# Patient Record
Sex: MALE | Race: WHITE | NOT HISPANIC OR LATINO | Employment: OTHER | URBAN - METROPOLITAN AREA
[De-identification: names, ages, dates, MRNs, and addresses within clinical notes are randomized per-mention and may not be internally consistent; named-entity substitution may affect disease eponyms.]

---

## 2017-01-13 ENCOUNTER — HOSPITAL ENCOUNTER (OUTPATIENT)
Dept: LAB | Facility: HOSPITAL | Age: 67
Discharge: HOME | End: 2017-01-13
Attending: FAMILY MEDICINE | Admitting: FAMILY MEDICINE

## 2017-01-13 LAB
ANION GAP SERPL CALC-SCNC: 12.9 MMOL/L (ref 10–20)
BUN SERPL-MCNC: 40 MG/DL (ref 8–20)
BUN/CREAT SERPL: 26.7 (ref 6.2–20.3)
CALCIUM SERPL-MCNC: 9.2 MG/DL (ref 8.9–10.3)
CHLORIDE SERPL-SCNC: 109 MMOL/L (ref 101–111)
CONV CO2: 19 MMOL/L (ref 22–32)
CREAT UR-MCNC: 1.5 MG/DL (ref 0.7–1.2)
GLUCOSE SERPL-MCNC: 214 MG/DL (ref 65–99)
POTASSIUM SERPL-SCNC: 5.9 MMOL/L (ref 3.6–5.1)
SODIUM SERPL-SCNC: 135 MMOL/L (ref 136–144)

## 2017-01-18 ENCOUNTER — HOSPITAL ENCOUNTER (OUTPATIENT)
Dept: LAB | Facility: HOSPITAL | Age: 67
Discharge: HOME | End: 2017-01-18
Attending: FAMILY MEDICINE | Admitting: FAMILY MEDICINE

## 2017-01-18 LAB
ANION GAP SERPL CALC-SCNC: 9.4 MMOL/L (ref 10–20)
BUN SERPL-MCNC: 32 MG/DL (ref 8–20)
BUN/CREAT SERPL: 21.3 (ref 6.2–20.3)
CALCIUM SERPL-MCNC: 9.5 MG/DL (ref 8.9–10.3)
CHLORIDE SERPL-SCNC: 111 MMOL/L (ref 101–111)
CONV CO2: 23 MMOL/L (ref 22–32)
CREAT UR-MCNC: 1.5 MG/DL (ref 0.7–1.2)
GLUCOSE SERPL-MCNC: 119 MG/DL (ref 65–99)
POTASSIUM SERPL-SCNC: 5.4 MMOL/L (ref 3.6–5.1)
SODIUM SERPL-SCNC: 138 MMOL/L (ref 136–144)

## 2017-11-30 ENCOUNTER — HOSPITAL ENCOUNTER (OUTPATIENT)
Dept: ULTRASOUND IMAGING | Facility: HOSPITAL | Age: 67
Discharge: HOME | End: 2017-11-30
Attending: INTERNAL MEDICINE | Admitting: INTERNAL MEDICINE

## 2017-11-30 LAB
ALBUMIN SERPL-MCNC: 4 G/DL (ref 3.5–4.8)
ALBUMIN/GLOB SERPL: 1.4 {RATIO} (ref 1–1.7)
ALP SERPL-CCNC: 49 IU/L (ref 32–91)
ALT SERPL-CCNC: 12 IU/L (ref 17–63)
ANION GAP SERPL CALC-SCNC: 8.3 MMOL/L (ref 10–20)
AST SERPL-CCNC: 15 IU/L (ref 15–41)
BACTERIA SPEC AEROBE CULT: NORMAL
BILIRUB SERPL-MCNC: 0.2 MG/DL (ref 0.3–1.2)
BILIRUB UR QL STRIP: NEGATIVE MG/DL
BUN SERPL-MCNC: 35 MG/DL (ref 8–20)
BUN/CREAT SERPL: 20.6 (ref 6.2–20.3)
CALCIUM SERPL-MCNC: 9.6 MG/DL (ref 8.9–10.3)
CASTS URNS QL MICRO: ABNORMAL /[LPF]
CHLORIDE SERPL-SCNC: 110 MMOL/L (ref 101–111)
CK SERPL-CCNC: 59 IU/L (ref 49–397)
COLOR UR: YELLOW
CONV BACTERIA IN URINE MICRO: ABNORMAL
CONV CLARITY OF URINE: ABNORMAL
CONV CO2: 23 MMOL/L (ref 22–32)
CONV HYALINE CASTS IN URINE MICRO: 4 /[LPF] (ref 0–5)
CONV PROTEIN IN URINE BY AUTOMATED TEST STRIP: NEGATIVE MG/DL
CONV SMALL ROUND CELLS: ABNORMAL /[HPF]
CONV TOTAL PROTEIN: 6.9 G/DL (ref 6.1–7.9)
CONV UROBILINOGEN IN URINE BY AUTOMATED TEST STRIP: 0.2 MG/DL
CREAT 24H UR-MCNC: 72.7 MG/DL
CREAT UR-MCNC: 1.7 MG/DL (ref 0.7–1.2)
CULTURE INDICATED?: ABNORMAL
ERYTHROCYTE [DISTWIDTH] IN BLOOD BY AUTOMATED COUNT: 12.5 % (ref 11.5–14.5)
GLOBULIN UR ELPH-MCNC: 2.9 G/DL (ref 2.5–3.8)
GLUCOSE SERPL-MCNC: 135 MG/DL (ref 65–99)
GLUCOSE UR QL: NEGATIVE MG/DL
HCT VFR BLD AUTO: 37.4 % (ref 40–54)
HGB BLD-MCNC: 12.5 G/DL (ref 14–18)
HGB UR QL STRIP: NEGATIVE
IRON SERPL-MCNC: 49 UG/DL (ref 45–182)
KETONES UR QL STRIP: NEGATIVE MG/DL
LEUKOCYTE ESTERASE UR QL STRIP: ABNORMAL
Lab: NORMAL
MCH RBC QN AUTO: 30.9 PG (ref 26–32)
MCHC RBC AUTO-ENTMCNC: 33.5 G/DL (ref 32–36)
MCV RBC AUTO: 92.3 FL (ref 80–94)
MICRO REPORT STATUS: NORMAL
NITRITE UR QL STRIP: POSITIVE
PH UR STRIP.AUTO: 8.5 [PH] (ref 4.5–8)
PLATELET # BLD AUTO: 202 10*3/UL (ref 150–450)
PMV BLD AUTO: 7.1 FL (ref 7.4–10.4)
POTASSIUM SERPL-SCNC: 5.3 MMOL/L (ref 3.6–5.1)
PROT UR-MCNC: 23 MG/DL
PROT/CREAT UR: 0.3 MG/MG (ref 0–22)
RBC # BLD AUTO: 4.06 10*6/UL (ref 4.6–6)
RBC #/AREA URNS HPF: 9 /[HPF] (ref 0–3)
SODIUM SERPL-SCNC: 136 MMOL/L (ref 136–144)
SP GR UR: 1.01 (ref 1–1.03)
SPECIMEN SOURCE: NORMAL
SPERM URNS QL MICRO: ABNORMAL /[HPF]
SQUAMOUS SPT QL MICRO: 5 /[HPF] (ref 0–5)
TSH SERPL-ACNC: 2.16 UIU/ML (ref 0.34–5.6)
UNIDENT CRYS URNS QL MICRO: ABNORMAL /[HPF]
URATE SERPL-MCNC: 5.9 MG/DL (ref 4.8–8.7)
WBC # BLD AUTO: 4.4 10*3/UL (ref 4.5–11.5)
WBC #/AREA URNS HPF: 8 /[HPF] (ref 0–5)
YEAST SPEC QL WET PREP: ABNORMAL /[HPF]

## 2017-12-01 LAB
ANA SER QL IA: ABNORMAL
INTERPRETATION UR IFE-IMP: NORMAL
PTH-INTACT SERPL-MCNC: 32 PG/ML (ref 11–72)

## 2017-12-04 LAB — PROT PATTERN SERPL IFE-IMP: NORMAL

## 2019-01-17 ENCOUNTER — HOSPITAL ENCOUNTER (OUTPATIENT)
Dept: LAB | Facility: HOSPITAL | Age: 69
Discharge: HOME | End: 2019-01-17
Attending: INTERNAL MEDICINE | Admitting: INTERNAL MEDICINE

## 2019-01-17 LAB
ANION GAP SERPL CALC-SCNC: 12.6 MMOL/L (ref 10–20)
AZTREONAM SUSC ISLT: ABNORMAL
BACTERIA ISLT: ABNORMAL
BACTERIA SPEC AEROBE CULT: ABNORMAL
BILIRUB UR QL STRIP: NEGATIVE MG/DL
BUN SERPL-MCNC: 42 MG/DL (ref 8–20)
BUN/CREAT SERPL: 24.7 (ref 6.2–20.3)
CALCIUM SERPL-MCNC: 9.2 MG/DL (ref 8.9–10.3)
CASTS URNS QL MICRO: ABNORMAL /[LPF]
CEFEPIME SUSC ISLT: ABNORMAL
CEFTRIAXONE SUSC ISLT: ABNORMAL
CHLORIDE SERPL-SCNC: 109 MMOL/L (ref 101–111)
CIPROFLOXACIN SUSC ISLT: ABNORMAL
COLONY COUNT: ABNORMAL
COLOR UR: YELLOW
CONV BACTERIA IN URINE MICRO: ABNORMAL
CONV CLARITY OF URINE: ABNORMAL
CONV CO2: 20 MMOL/L (ref 22–32)
CONV HYALINE CASTS IN URINE MICRO: 4 /[LPF] (ref 0–5)
CONV PROTEIN IN URINE BY AUTOMATED TEST STRIP: 100 MG/DL
CONV SMALL ROUND CELLS: ABNORMAL /[HPF]
CONV UROBILINOGEN IN URINE BY AUTOMATED TEST STRIP: 0.2 MG/DL
CREAT 24H UR-MCNC: 76.5 MG/DL
CREAT UR-MCNC: 1.7 MG/DL (ref 0.7–1.2)
CULTURE INDICATED?: ABNORMAL
ERYTHROCYTE [DISTWIDTH] IN BLOOD BY AUTOMATED COUNT: 12.9 % (ref 11.5–14.5)
GLUCOSE SERPL-MCNC: 85 MG/DL (ref 65–99)
GLUCOSE UR QL: NEGATIVE MG/DL
HCT VFR BLD AUTO: 38.2 % (ref 40–54)
HGB BLD-MCNC: 12.5 G/DL (ref 14–18)
HGB UR QL STRIP: ABNORMAL
IRON SERPL-MCNC: 37 UG/DL (ref 45–182)
KETONES UR QL STRIP: NEGATIVE MG/DL
LEUKOCYTE ESTERASE UR QL STRIP: ABNORMAL
LEVOFLOXACIN SUSC ISLT: ABNORMAL
Lab: ABNORMAL
MCH RBC QN AUTO: 31.5 PG (ref 26–32)
MCHC RBC AUTO-ENTMCNC: 32.9 G/DL (ref 32–36)
MCV RBC AUTO: 96 FL (ref 80–94)
MEROPENEM SUSC ISLT: ABNORMAL
MICRO REPORT STATUS: ABNORMAL
NITRITE UR QL STRIP: POSITIVE
PH UR STRIP.AUTO: 8.5 [PH] (ref 4.5–8)
PIP+TAZO SUSC ISLT: ABNORMAL
PLATELET # BLD AUTO: 203 10*3/UL (ref 150–450)
PMV BLD AUTO: 7.2 FL (ref 7.4–10.4)
POTASSIUM SERPL-SCNC: 5.6 MMOL/L (ref 3.6–5.1)
PROT UR-MCNC: 78 MG/DL
PROT/CREAT UR: 1 MG/MG (ref 0–22)
RBC # BLD AUTO: 3.98 10*6/UL (ref 4.6–6)
RBC #/AREA URNS HPF: NEGATIVE /[HPF] (ref 0–3)
SODIUM SERPL-SCNC: 136 MMOL/L (ref 136–144)
SP GR UR: 1.01 (ref 1–1.03)
SPECIMEN SOURCE: ABNORMAL
SPERM URNS QL MICRO: ABNORMAL /[HPF]
SQUAMOUS SPT QL MICRO: 7 /[HPF] (ref 0–5)
SUSC METH SPEC: ABNORMAL
TIGECYCLINE ISLT MIC: ABNORMAL
TOBRAMYCIN SUSC ISLT: ABNORMAL
TRIMETHOPRIM/SULFA: ABNORMAL
UNIDENT CRYS URNS QL MICRO: ABNORMAL /[HPF]
WBC # BLD AUTO: 5.9 10*3/UL (ref 4.5–11.5)
WBC #/AREA URNS HPF: 8 /[HPF] (ref 0–5)
YEAST SPEC QL WET PREP: ABNORMAL /[HPF]

## 2019-01-31 ENCOUNTER — HOSPITAL ENCOUNTER (OUTPATIENT)
Dept: LAB | Facility: HOSPITAL | Age: 69
Discharge: HOME | End: 2019-01-31
Attending: INTERNAL MEDICINE | Admitting: INTERNAL MEDICINE

## 2019-01-31 LAB
ANION GAP SERPL CALC-SCNC: 12.1 MMOL/L (ref 10–20)
AZTREONAM SUSC ISLT: NORMAL
BACTERIA ISLT: NORMAL
BACTERIA SPEC AEROBE CULT: NORMAL
BILIRUB UR QL STRIP: NEGATIVE MG/DL
BUN SERPL-MCNC: 26 MG/DL (ref 8–20)
BUN/CREAT SERPL: 16.3 (ref 6.2–20.3)
CALCIUM SERPL-MCNC: 8.8 MG/DL (ref 8.9–10.3)
CASTS URNS QL MICRO: ABNORMAL /[LPF]
CEFEPIME SUSC ISLT: NORMAL
CEFTRIAXONE SUSC ISLT: NORMAL
CHLORIDE SERPL-SCNC: 105 MMOL/L (ref 101–111)
CIPROFLOXACIN SUSC ISLT: NORMAL
COLONY COUNT: NORMAL
COLOR UR: YELLOW
CONV BACTERIA IN URINE MICRO: ABNORMAL
CONV CLARITY OF URINE: ABNORMAL
CONV CO2: 26 MMOL/L (ref 22–32)
CONV HYALINE CASTS IN URINE MICRO: ABNORMAL /[LPF] (ref 0–5)
CONV PROTEIN IN URINE BY AUTOMATED TEST STRIP: ABNORMAL MG/DL
CONV SMALL ROUND CELLS: ABNORMAL /[HPF]
CONV UROBILINOGEN IN URINE BY AUTOMATED TEST STRIP: 1 MG/DL
CREAT UR-MCNC: 1.6 MG/DL (ref 0.7–1.2)
CULTURE INDICATED?: ABNORMAL
GLUCOSE SERPL-MCNC: 94 MG/DL (ref 65–99)
GLUCOSE UR QL: NEGATIVE MG/DL
HGB UR QL STRIP: ABNORMAL
KETONES UR QL STRIP: NEGATIVE MG/DL
LEUKOCYTE ESTERASE UR QL STRIP: ABNORMAL
LEVOFLOXACIN SUSC ISLT: NORMAL
Lab: NORMAL
MEROPENEM SUSC ISLT: NORMAL
MICRO REPORT STATUS: NORMAL
NITRITE UR QL STRIP: POSITIVE
PH UR STRIP.AUTO: 8.5 [PH] (ref 4.5–8)
PIP+TAZO SUSC ISLT: NORMAL
POTASSIUM SERPL-SCNC: 5.1 MMOL/L (ref 3.6–5.1)
RBC #/AREA URNS HPF: 4 /[HPF] (ref 0–3)
SODIUM SERPL-SCNC: 138 MMOL/L (ref 136–144)
SP GR UR: 1.01 (ref 1–1.03)
SPECIMEN SOURCE: NORMAL
SPERM URNS QL MICRO: ABNORMAL /[HPF]
SQUAMOUS SPT QL MICRO: 20 /[HPF] (ref 0–5)
SUSC METH SPEC: NORMAL
TETRACYCLINE SUSC ISLT: NORMAL
TOBRAMYCIN SUSC ISLT: NORMAL
TRIMETHOPRIM/SULFA: NORMAL
UNIDENT CRYS URNS QL MICRO: ABNORMAL /[HPF]
WBC #/AREA URNS HPF: 8 /[HPF] (ref 0–5)
YEAST SPEC QL WET PREP: ABNORMAL /[HPF]

## 2019-05-16 ENCOUNTER — HOSPITAL ENCOUNTER (OUTPATIENT)
Dept: LAB | Facility: HOSPITAL | Age: 69
Discharge: HOME | End: 2019-05-16
Attending: INTERNAL MEDICINE | Admitting: INTERNAL MEDICINE

## 2019-05-16 LAB
25(OH)D3 SERPL-MCNC: 23 NG/ML (ref 30–100)
ANION GAP SERPL CALC-SCNC: 14.2 MMOL/L (ref 10–20)
BACTERIA SPEC AEROBE CULT: NORMAL
BASOPHILS # BLD AUTO: 0.1 10*3/UL (ref 0–0.2)
BASOPHILS NFR BLD AUTO: 1 % (ref 0–2)
BILIRUB UR QL STRIP: NEGATIVE MG/DL
BUN SERPL-MCNC: 43 MG/DL (ref 8–20)
BUN/CREAT SERPL: 23.9 (ref 6.2–20.3)
CALCIUM SERPL-MCNC: 9.4 MG/DL (ref 8.9–10.3)
CASTS URNS QL MICRO: ABNORMAL /[LPF]
CHLORIDE SERPL-SCNC: 104 MMOL/L (ref 101–111)
COLOR UR: YELLOW
CONV BACTERIA IN URINE MICRO: NEGATIVE
CONV CLARITY OF URINE: ABNORMAL
CONV CO2: 23 MMOL/L (ref 22–32)
CONV HYALINE CASTS IN URINE MICRO: ABNORMAL /[LPF] (ref 0–5)
CONV PROTEIN IN URINE BY AUTOMATED TEST STRIP: 30 MG/DL
CONV SMALL ROUND CELLS: ABNORMAL /[HPF]
CONV UROBILINOGEN IN URINE BY AUTOMATED TEST STRIP: 0.2 MG/DL
CREAT 24H UR-MCNC: 81.4 MG/DL
CREAT UR-MCNC: 1.8 MG/DL (ref 0.7–1.2)
CULTURE INDICATED?: ABNORMAL
CULTURE INDICATED?: ABNORMAL
DIFFERENTIAL METHOD BLD: (no result)
EOSINOPHIL # BLD AUTO: 0.1 10*3/UL (ref 0–0.3)
EOSINOPHIL # BLD AUTO: 2 % (ref 0–3)
ERYTHROCYTE [DISTWIDTH] IN BLOOD BY AUTOMATED COUNT: 12.4 % (ref 11.5–14.5)
GLUCOSE SERPL-MCNC: 95 MG/DL (ref 65–99)
GLUCOSE UR QL: NEGATIVE MG/DL
HBA1C MFR BLD: 5.7 % (ref 0–5.6)
HCT VFR BLD AUTO: 35.2 % (ref 40–54)
HGB BLD-MCNC: 11.6 G/DL (ref 14–18)
HGB UR QL STRIP: ABNORMAL
KETONES UR QL STRIP: NEGATIVE MG/DL
LEUKOCYTE ESTERASE UR QL STRIP: ABNORMAL
LYMPHOCYTES # BLD AUTO: 0.7 10*3/UL (ref 0.8–4.8)
LYMPHOCYTES NFR BLD AUTO: 16 % (ref 18–42)
Lab: NORMAL
MCH RBC QN AUTO: 30.6 PG (ref 26–32)
MCHC RBC AUTO-ENTMCNC: 33.1 G/DL (ref 32–36)
MCV RBC AUTO: 92.6 FL (ref 80–94)
MICRO REPORT STATUS: NORMAL
MONOCYTES # BLD AUTO: 0.5 10*3/UL (ref 0.1–1.3)
MONOCYTES NFR BLD AUTO: 11 % (ref 2–11)
NEUTROPHILS # BLD AUTO: 3.2 10*3/UL (ref 2.3–8.6)
NEUTROPHILS NFR BLD AUTO: 70 % (ref 50–75)
NITRITE UR QL STRIP: NEGATIVE
NRBC BLD AUTO-RTO: 0 /100{WBCS}
NRBC/RBC NFR BLD MANUAL: 0 10*3/UL
PH UR STRIP.AUTO: 7 [PH] (ref 4.5–8)
PHOSPHATE SERPL-MCNC: 3.4 MG/DL (ref 2.4–4.7)
PLATELET # BLD AUTO: 256 10*3/UL (ref 150–450)
PMV BLD AUTO: 6.9 FL (ref 7.4–10.4)
POTASSIUM SERPL-SCNC: 5.2 MMOL/L (ref 3.6–5.1)
PROT UR-MCNC: 40 MG/DL
PROT/CREAT UR: 0.5 MG/MG (ref 0–22)
RBC # BLD AUTO: 3.8 10*6/UL (ref 4.6–6)
RBC #/AREA URNS HPF: ABNORMAL /[HPF] (ref 0–3)
SODIUM SERPL-SCNC: 136 MMOL/L (ref 136–144)
SP GR UR: 1.01 (ref 1–1.03)
SPECIMEN SOURCE: NORMAL
SPERM URNS QL MICRO: ABNORMAL /[HPF]
SQUAMOUS SPT QL MICRO: 0 /[HPF] (ref 0–5)
UNIDENT CRYS URNS QL MICRO: ABNORMAL /[HPF]
WBC # BLD AUTO: 4.6 10*3/UL (ref 4.5–11.5)
WBC #/AREA URNS HPF: 47 /[HPF] (ref 0–5)
YEAST SPEC QL WET PREP: ABNORMAL /[HPF]

## 2019-05-17 LAB — PTH-INTACT SERPL-MCNC: 65 PG/ML (ref 11–72)

## 2019-10-17 ENCOUNTER — HOSPITAL ENCOUNTER (EMERGENCY)
Dept: CARDIOLOGY | Facility: HOSPITAL | Age: 69
Discharge: HOME | End: 2019-10-17

## 2019-10-17 ENCOUNTER — HOSPITAL ENCOUNTER (OUTPATIENT)
Facility: HOSPITAL | Age: 69
Setting detail: OBSERVATION
Discharge: HOME | End: 2019-10-18
Attending: FAMILY MEDICINE | Admitting: HOSPITALIST

## 2019-10-17 DIAGNOSIS — I82.412 DVT FEMORAL (DEEP VENOUS THROMBOSIS) WITH THROMBOPHLEBITIS, LEFT (HCC): Primary | ICD-10-CM

## 2019-10-17 LAB
ALBUMIN SERPL-MCNC: 4.5 G/DL (ref 3.5–5.2)
ALBUMIN/GLOB SERPL: 1.5 G/DL
ALP SERPL-CCNC: 71 U/L (ref 39–117)
ALT SERPL W P-5'-P-CCNC: 9 U/L (ref 1–41)
ANION GAP SERPL CALCULATED.3IONS-SCNC: 9 MMOL/L (ref 5–15)
AST SERPL-CCNC: 15 U/L (ref 1–40)
BASOPHILS # BLD AUTO: 0.1 10*3/MM3 (ref 0–0.2)
BASOPHILS NFR BLD AUTO: 1 % (ref 0–1.5)
BH CV LOW VAS LEFT COMMON FEMORAL SPONT: 1
BH CV LOW VAS LEFT DISTAL FEMORAL SPONT: 1
BH CV LOW VAS LEFT MID FEMORAL SPONT: 1
BH CV LOW VAS LEFT POPLITEAL SPONT: 1
BH CV LOW VAS LEFT PROXIMAL FEMORAL SPONT: 1
BH CV LOWER VASCULAR LEFT COMMON FEMORAL AUGMENT: NORMAL
BH CV LOWER VASCULAR LEFT COMMON FEMORAL COMPETENT: NORMAL
BH CV LOWER VASCULAR LEFT COMMON FEMORAL COMPRESS: NORMAL
BH CV LOWER VASCULAR LEFT COMMON FEMORAL PHASIC: NORMAL
BH CV LOWER VASCULAR LEFT COMMON FEMORAL SPONT: NORMAL
BH CV LOWER VASCULAR LEFT COMMON FEMORAL THROMBUS: NORMAL
BH CV LOWER VASCULAR LEFT DISTAL FEMORAL AUGMENT: NORMAL
BH CV LOWER VASCULAR LEFT DISTAL FEMORAL COMPETENT: NORMAL
BH CV LOWER VASCULAR LEFT DISTAL FEMORAL COMPRESS: NORMAL
BH CV LOWER VASCULAR LEFT DISTAL FEMORAL PHASIC: NORMAL
BH CV LOWER VASCULAR LEFT DISTAL FEMORAL SPONT: NORMAL
BH CV LOWER VASCULAR LEFT DISTAL FEMORAL THROMBUS: NORMAL
BH CV LOWER VASCULAR LEFT GASTRONEMIUS COMPRESS: NORMAL
BH CV LOWER VASCULAR LEFT GREATER SAPH AK COMPRESS: NORMAL
BH CV LOWER VASCULAR LEFT GREATER SAPH BK COMPRESS: NORMAL
BH CV LOWER VASCULAR LEFT LESSER SAPH COMPRESS: NORMAL
BH CV LOWER VASCULAR LEFT MID FEMORAL AUGMENT: NORMAL
BH CV LOWER VASCULAR LEFT MID FEMORAL COMPETENT: NORMAL
BH CV LOWER VASCULAR LEFT MID FEMORAL COMPRESS: NORMAL
BH CV LOWER VASCULAR LEFT MID FEMORAL PHASIC: NORMAL
BH CV LOWER VASCULAR LEFT MID FEMORAL SPONT: NORMAL
BH CV LOWER VASCULAR LEFT PERONEAL COMPRESS: NORMAL
BH CV LOWER VASCULAR LEFT POPLITEAL AUGMENT: NORMAL
BH CV LOWER VASCULAR LEFT POPLITEAL COMPETENT: NORMAL
BH CV LOWER VASCULAR LEFT POPLITEAL COMPRESS: NORMAL
BH CV LOWER VASCULAR LEFT POPLITEAL PHASIC: NORMAL
BH CV LOWER VASCULAR LEFT POPLITEAL SPONT: NORMAL
BH CV LOWER VASCULAR LEFT POPLITEAL THROMBUS: NORMAL
BH CV LOWER VASCULAR LEFT POSTERIOR TIBIAL COMPRESS: NORMAL
BH CV LOWER VASCULAR LEFT PROXIMAL FEMORAL AUGMENT: NORMAL
BH CV LOWER VASCULAR LEFT PROXIMAL FEMORAL COMPETENT: NORMAL
BH CV LOWER VASCULAR LEFT PROXIMAL FEMORAL COMPRESS: NORMAL
BH CV LOWER VASCULAR LEFT PROXIMAL FEMORAL PHASIC: NORMAL
BH CV LOWER VASCULAR LEFT PROXIMAL FEMORAL SPONT: NORMAL
BH CV LOWER VASCULAR LEFT PROXIMAL FEMORAL THROMBUS: NORMAL
BH CV LOWER VASCULAR LEFT SAPHENOFEMORAL JUNCTION AUGMENT: NORMAL
BH CV LOWER VASCULAR LEFT SAPHENOFEMORAL JUNCTION COMPETENT: NORMAL
BH CV LOWER VASCULAR LEFT SAPHENOFEMORAL JUNCTION COMPRESS: NORMAL
BH CV LOWER VASCULAR LEFT SAPHENOFEMORAL JUNCTION PHASIC: NORMAL
BH CV LOWER VASCULAR LEFT SAPHENOFEMORAL JUNCTION SPONT: NORMAL
BH CV LOWER VASCULAR RIGHT COMMON FEMORAL AUGMENT: NORMAL
BH CV LOWER VASCULAR RIGHT COMMON FEMORAL COMPETENT: NORMAL
BH CV LOWER VASCULAR RIGHT COMMON FEMORAL COMPRESS: NORMAL
BH CV LOWER VASCULAR RIGHT COMMON FEMORAL PHASIC: NORMAL
BH CV LOWER VASCULAR RIGHT COMMON FEMORAL SPONT: NORMAL
BILIRUB SERPL-MCNC: 0.3 MG/DL (ref 0.2–1.2)
BUN BLD-MCNC: 30 MG/DL (ref 8–23)
BUN/CREAT SERPL: 18.2 (ref 7–25)
CALCIUM SPEC-SCNC: 9.4 MG/DL (ref 8.6–10.5)
CHLORIDE SERPL-SCNC: 107 MMOL/L (ref 98–107)
CO2 SERPL-SCNC: 22 MMOL/L (ref 22–29)
CREAT BLD-MCNC: 1.65 MG/DL (ref 0.76–1.27)
DEPRECATED RDW RBC AUTO: 44.2 FL (ref 37–54)
EOSINOPHIL # BLD AUTO: 0.1 10*3/MM3 (ref 0–0.4)
EOSINOPHIL NFR BLD AUTO: 1.9 % (ref 0.3–6.2)
ERYTHROCYTE [DISTWIDTH] IN BLOOD BY AUTOMATED COUNT: 12.9 % (ref 12.3–15.4)
GFR SERPL CREATININE-BSD FRML MDRD: 42 ML/MIN/1.73
GLOBULIN UR ELPH-MCNC: 3 GM/DL
GLUCOSE BLD-MCNC: 95 MG/DL (ref 65–99)
GLUCOSE BLDC GLUCOMTR-MCNC: 196 MG/DL (ref 70–105)
HCT VFR BLD AUTO: 37.4 % (ref 37.5–51)
HGB BLD-MCNC: 13.2 G/DL (ref 13–17.7)
INR PPP: 1.03 (ref 0.9–1.1)
LYMPHOCYTES # BLD AUTO: 0.8 10*3/MM3 (ref 0.7–3.1)
LYMPHOCYTES NFR BLD AUTO: 13.1 % (ref 19.6–45.3)
MCH RBC QN AUTO: 32.1 PG (ref 26.6–33)
MCHC RBC AUTO-ENTMCNC: 35.2 G/DL (ref 31.5–35.7)
MCV RBC AUTO: 91.1 FL (ref 79–97)
MONOCYTES # BLD AUTO: 0.6 10*3/MM3 (ref 0.1–0.9)
MONOCYTES NFR BLD AUTO: 9.6 % (ref 5–12)
NEUTROPHILS # BLD AUTO: 4.4 10*3/MM3 (ref 1.7–7)
NEUTROPHILS NFR BLD AUTO: 74.4 % (ref 42.7–76)
NRBC BLD AUTO-RTO: 0.1 /100 WBC (ref 0–0.2)
PLATELET # BLD AUTO: 203 10*3/MM3 (ref 140–450)
PMV BLD AUTO: 7.7 FL (ref 6–12)
POTASSIUM BLD-SCNC: 5.4 MMOL/L (ref 3.5–5.2)
PROT SERPL-MCNC: 7.5 G/DL (ref 6–8.5)
PROTHROMBIN TIME: 10.7 SECONDS (ref 9.6–11.7)
RBC # BLD AUTO: 4.1 10*6/MM3 (ref 4.14–5.8)
SODIUM BLD-SCNC: 138 MMOL/L (ref 136–145)
WBC NRBC COR # BLD: 5.9 10*3/MM3 (ref 3.4–10.8)

## 2019-10-17 PROCEDURE — 85025 COMPLETE CBC W/AUTO DIFF WBC: CPT | Performed by: NURSE PRACTITIONER

## 2019-10-17 PROCEDURE — 99220 PR INITIAL OBSERVATION CARE/DAY 70 MINUTES: CPT | Performed by: INTERNAL MEDICINE

## 2019-10-17 PROCEDURE — G0378 HOSPITAL OBSERVATION PER HR: HCPCS

## 2019-10-17 PROCEDURE — 99283 EMERGENCY DEPT VISIT LOW MDM: CPT

## 2019-10-17 PROCEDURE — 93971 EXTREMITY STUDY: CPT

## 2019-10-17 PROCEDURE — 85610 PROTHROMBIN TIME: CPT | Performed by: NURSE PRACTITIONER

## 2019-10-17 PROCEDURE — 80053 COMPREHEN METABOLIC PANEL: CPT | Performed by: NURSE PRACTITIONER

## 2019-10-17 PROCEDURE — 96372 THER/PROPH/DIAG INJ SC/IM: CPT

## 2019-10-17 PROCEDURE — 82962 GLUCOSE BLOOD TEST: CPT

## 2019-10-17 PROCEDURE — 25010000002 ENOXAPARIN PER 10 MG: Performed by: NURSE PRACTITIONER

## 2019-10-17 RX ORDER — SODIUM BICARBONATE 650 MG/1
650 TABLET ORAL 2 TIMES DAILY
COMMUNITY

## 2019-10-17 RX ORDER — ONDANSETRON 2 MG/ML
4 INJECTION INTRAMUSCULAR; INTRAVENOUS EVERY 6 HOURS PRN
Status: DISCONTINUED | OUTPATIENT
Start: 2019-10-17 | End: 2019-10-18 | Stop reason: HOSPADM

## 2019-10-17 RX ORDER — ONDANSETRON 4 MG/1
4 TABLET, FILM COATED ORAL EVERY 6 HOURS PRN
Status: DISCONTINUED | OUTPATIENT
Start: 2019-10-17 | End: 2019-10-18 | Stop reason: HOSPADM

## 2019-10-17 RX ORDER — GABAPENTIN 300 MG/1
300 CAPSULE ORAL 3 TIMES DAILY
Status: ON HOLD | COMMUNITY
End: 2022-01-07

## 2019-10-17 RX ORDER — ACETAMINOPHEN 650 MG/1
650 SUPPOSITORY RECTAL EVERY 4 HOURS PRN
Status: DISCONTINUED | OUTPATIENT
Start: 2019-10-17 | End: 2019-10-18 | Stop reason: HOSPADM

## 2019-10-17 RX ORDER — ACETAMINOPHEN 160 MG/5ML
650 SOLUTION ORAL EVERY 4 HOURS PRN
Status: DISCONTINUED | OUTPATIENT
Start: 2019-10-17 | End: 2019-10-18 | Stop reason: HOSPADM

## 2019-10-17 RX ORDER — SODIUM CHLORIDE 0.9 % (FLUSH) 0.9 %
10 SYRINGE (ML) INJECTION EVERY 12 HOURS SCHEDULED
Status: DISCONTINUED | OUTPATIENT
Start: 2019-10-17 | End: 2019-10-18 | Stop reason: HOSPADM

## 2019-10-17 RX ORDER — SODIUM CHLORIDE 0.9 % (FLUSH) 0.9 %
10 SYRINGE (ML) INJECTION AS NEEDED
Status: DISCONTINUED | OUTPATIENT
Start: 2019-10-17 | End: 2019-10-18 | Stop reason: HOSPADM

## 2019-10-17 RX ORDER — AMLODIPINE BESYLATE 10 MG/1
10 TABLET ORAL NIGHTLY
COMMUNITY

## 2019-10-17 RX ORDER — ACETAMINOPHEN 325 MG/1
650 TABLET ORAL EVERY 4 HOURS PRN
Status: DISCONTINUED | OUTPATIENT
Start: 2019-10-17 | End: 2019-10-18 | Stop reason: HOSPADM

## 2019-10-17 RX ADMIN — ENOXAPARIN SODIUM 80 MG: 80 INJECTION SUBCUTANEOUS at 20:10

## 2019-10-17 RX ADMIN — Medication 10 ML: at 22:35

## 2019-10-17 NOTE — ED NOTES
Left leg red and swollen. Drove to [place] and back this past weekend.      Dacia Dixon, RN  10/17/19 1221

## 2019-10-17 NOTE — ED PROVIDER NOTES
Subjective   69-year-old  male with history of auditory disability, resents to the emergency room with complaints of left lower leg and knee pain for the past 2 days.  He states that he traveled over the weekend 4 hours to [place] and [facility] and did a lot of walking.  He states Tuesday his left lower leg started to swell and had very little pain he states yesterday that it hurt to walk and today it increased in pain and swelling.  Patient denies chest pain or shortness of breath.  Patient denies fever or cough.            Review of Systems   Constitutional: Negative for fever.   HENT: Negative.    Respiratory: Negative for cough, chest tightness, shortness of breath, wheezing and stridor.    Cardiovascular: Negative for chest pain.   Gastrointestinal: Negative.    Musculoskeletal: Positive for gait problem, joint swelling and myalgias.   Skin: Positive for color change and rash. Negative for pallor.   Hematological: Negative.        Past Medical History:   Diagnosis Date   • Deaf    • Diabetes mellitus (CMS/HCC)    • Renal disorder        No Known Allergies    Past Surgical History:   Procedure Laterality Date   • BLADDER SURGERY     • GASTRECTOMY         History reviewed. No pertinent family history.    Social History     Socioeconomic History   • Marital status:      Spouse name: Not on file   • Number of children: Not on file   • Years of education: Not on file   • Highest education level: Not on file   Tobacco Use   • Smoking status: Never Smoker   Substance and Sexual Activity   • Alcohol use: No     Frequency: Never           Objective   Physical Exam   Constitutional: He appears well-developed and well-nourished.   HENT:   Head: Normocephalic and atraumatic.   Eyes: Conjunctivae and EOM are normal. Pupils are equal, round, and reactive to light.   Cardiovascular: Normal rate and regular rhythm.   Pulmonary/Chest: Effort normal and breath sounds normal. No stridor. No respiratory distress. He  has no wheezes. He has no rales. He exhibits no tenderness.   Musculoskeletal: He exhibits edema.        Left lower leg: He exhibits tenderness, swelling and edema. He exhibits no bony tenderness and no laceration.        Legs:      Procedures           ED Course  ED Course as of Oct 17 1959   Thu Oct 17, 2019   1956 Patient was seen and evaluated.  He was seen with the assistance of .  He presents with swelling to left leg.  Started earlier this week.  He reports no chest pain or shortness of breath.  Feel examination he has significant edema of left leg.  He is neurovascular intact.  Ultrasound reveals extensive acute DVT.  [SP]      ED Course User Index  [SP] Juanito Reddy MD      Labs Reviewed   COMPREHENSIVE METABOLIC PANEL   CBC WITH AUTO DIFFERENTIAL   PROTIME-INR   CBC AND DIFFERENTIAL    Narrative:     The following orders were created for panel order CBC & Differential.  Procedure                               Abnormality         Status                     ---------                               -----------         ------                     CBC Auto Differential[[id number]]                                                         Please view results for these tests on the individual orders.     Medications   sodium chloride 0.9 % flush 10 mL (not administered)   enoxaparin (LOVENOX) syringe 80 mg (not administered)     No radiology results for the last day - Vascular verbal reports = + DVT common femoral and fem/popliteal vein.    Upon evaluation, US for LLE venous doppler ordered.  Results called and given verbally.  PIV established and labwork obtained.  Lovenox 1mg/kg ordered.  Order placed for admission to hospital for further evaluation and care.      Dr Reddy examined patient prior to admission.    MDM  Number of Diagnoses or Management Options  DVT femoral (deep venous thrombosis) with thrombophlebitis, left (CMS/[facility]):       Final diagnoses:   DVT femoral (deep venous thrombosis) with  thrombophlebitis, left (CMS/HCC)              Kassy Jacobs, APRN  10/17/19 2007

## 2019-10-18 VITALS
TEMPERATURE: 98.2 F | WEIGHT: 173.72 LBS | BODY MASS INDEX: 23.53 KG/M2 | DIASTOLIC BLOOD PRESSURE: 78 MMHG | OXYGEN SATURATION: 98 % | SYSTOLIC BLOOD PRESSURE: 152 MMHG | RESPIRATION RATE: 16 BRPM | HEIGHT: 72 IN | HEART RATE: 65 BPM

## 2019-10-18 LAB
ANION GAP SERPL CALCULATED.3IONS-SCNC: 10 MMOL/L (ref 5–15)
BASOPHILS # BLD AUTO: 0 10*3/MM3 (ref 0–0.2)
BASOPHILS NFR BLD AUTO: 1.1 % (ref 0–1.5)
BUN BLD-MCNC: 33 MG/DL (ref 8–23)
BUN/CREAT SERPL: 19.3 (ref 7–25)
CALCIUM SPEC-SCNC: 9.1 MG/DL (ref 8.6–10.5)
CHLORIDE SERPL-SCNC: 107 MMOL/L (ref 98–107)
CO2 SERPL-SCNC: 22 MMOL/L (ref 22–29)
CREAT BLD-MCNC: 1.71 MG/DL (ref 0.76–1.27)
DEPRECATED RDW RBC AUTO: 42.4 FL (ref 37–54)
EOSINOPHIL # BLD AUTO: 0.1 10*3/MM3 (ref 0–0.4)
EOSINOPHIL NFR BLD AUTO: 2.8 % (ref 0.3–6.2)
ERYTHROCYTE [DISTWIDTH] IN BLOOD BY AUTOMATED COUNT: 13 % (ref 12.3–15.4)
GFR SERPL CREATININE-BSD FRML MDRD: 40 ML/MIN/1.73
GLUCOSE BLD-MCNC: 97 MG/DL (ref 65–99)
GLUCOSE BLDC GLUCOMTR-MCNC: 128 MG/DL (ref 70–105)
GLUCOSE BLDC GLUCOMTR-MCNC: 136 MG/DL (ref 70–105)
GLUCOSE BLDC GLUCOMTR-MCNC: 83 MG/DL (ref 70–105)
HBA1C MFR BLD: 5.6 % (ref 3.5–5.6)
HCT VFR BLD AUTO: 35.8 % (ref 37.5–51)
HGB BLD-MCNC: 12.1 G/DL (ref 13–17.7)
LYMPHOCYTES # BLD AUTO: 1 10*3/MM3 (ref 0.7–3.1)
LYMPHOCYTES NFR BLD AUTO: 23.5 % (ref 19.6–45.3)
MCH RBC QN AUTO: 31.4 PG (ref 26.6–33)
MCHC RBC AUTO-ENTMCNC: 33.9 G/DL (ref 31.5–35.7)
MCV RBC AUTO: 92.8 FL (ref 79–97)
MONOCYTES # BLD AUTO: 0.5 10*3/MM3 (ref 0.1–0.9)
MONOCYTES NFR BLD AUTO: 12.8 % (ref 5–12)
NEUTROPHILS # BLD AUTO: 2.5 10*3/MM3 (ref 1.7–7)
NEUTROPHILS NFR BLD AUTO: 59.8 % (ref 42.7–76)
NRBC BLD AUTO-RTO: 0.1 /100 WBC (ref 0–0.2)
PLATELET # BLD AUTO: 197 10*3/MM3 (ref 140–450)
PMV BLD AUTO: 7.1 FL (ref 6–12)
POTASSIUM BLD-SCNC: 4.8 MMOL/L (ref 3.5–5.2)
RBC # BLD AUTO: 3.86 10*6/MM3 (ref 4.14–5.8)
SODIUM BLD-SCNC: 139 MMOL/L (ref 136–145)
WBC NRBC COR # BLD: 4.2 10*3/MM3 (ref 3.4–10.8)

## 2019-10-18 PROCEDURE — 99217 PR OBSERVATION CARE DISCHARGE MANAGEMENT: CPT | Performed by: HOSPITALIST

## 2019-10-18 PROCEDURE — 80048 BASIC METABOLIC PNL TOTAL CA: CPT | Performed by: NURSE PRACTITIONER

## 2019-10-18 PROCEDURE — 82962 GLUCOSE BLOOD TEST: CPT

## 2019-10-18 PROCEDURE — G0378 HOSPITAL OBSERVATION PER HR: HCPCS

## 2019-10-18 PROCEDURE — 83036 HEMOGLOBIN GLYCOSYLATED A1C: CPT | Performed by: NURSE PRACTITIONER

## 2019-10-18 PROCEDURE — 85025 COMPLETE CBC W/AUTO DIFF WBC: CPT | Performed by: NURSE PRACTITIONER

## 2019-10-18 PROCEDURE — 25010000002 ENOXAPARIN PER 10 MG: Performed by: NURSE PRACTITIONER

## 2019-10-18 PROCEDURE — 96372 THER/PROPH/DIAG INJ SC/IM: CPT

## 2019-10-18 RX ORDER — DEXTROSE MONOHYDRATE 25 G/50ML
25 INJECTION, SOLUTION INTRAVENOUS
Status: DISCONTINUED | OUTPATIENT
Start: 2019-10-18 | End: 2019-10-18 | Stop reason: HOSPADM

## 2019-10-18 RX ORDER — AMLODIPINE BESYLATE 5 MG/1
10 TABLET ORAL DAILY
Status: DISCONTINUED | OUTPATIENT
Start: 2019-10-18 | End: 2019-10-18 | Stop reason: HOSPADM

## 2019-10-18 RX ORDER — GABAPENTIN 300 MG/1
900 CAPSULE ORAL 2 TIMES DAILY
Status: DISCONTINUED | OUTPATIENT
Start: 2019-10-18 | End: 2019-10-18 | Stop reason: HOSPADM

## 2019-10-18 RX ORDER — NICOTINE POLACRILEX 4 MG
15 LOZENGE BUCCAL
Status: DISCONTINUED | OUTPATIENT
Start: 2019-10-18 | End: 2019-10-18 | Stop reason: HOSPADM

## 2019-10-18 RX ORDER — SODIUM BICARBONATE 650 MG/1
650 TABLET ORAL 3 TIMES DAILY
Status: DISCONTINUED | OUTPATIENT
Start: 2019-10-18 | End: 2019-10-18

## 2019-10-18 RX ORDER — SODIUM BICARBONATE 650 MG/1
650 TABLET ORAL 3 TIMES DAILY
Status: DISCONTINUED | OUTPATIENT
Start: 2019-10-18 | End: 2019-10-18 | Stop reason: HOSPADM

## 2019-10-18 RX ADMIN — ENOXAPARIN SODIUM 80 MG: 80 INJECTION SUBCUTANEOUS at 08:57

## 2019-10-18 RX ADMIN — GABAPENTIN 900 MG: 300 CAPSULE ORAL at 08:58

## 2019-10-18 RX ADMIN — SODIUM BICARBONATE 650 MG TABLET 650 MG: at 08:58

## 2019-10-18 RX ADMIN — SODIUM BICARBONATE 650 MG TABLET 650 MG: at 01:05

## 2019-10-18 RX ADMIN — GABAPENTIN 900 MG: 300 CAPSULE ORAL at 01:05

## 2019-10-18 RX ADMIN — Medication 10 ML: at 08:58

## 2019-10-18 RX ADMIN — AMLODIPINE BESYLATE 10 MG: 5 TABLET ORAL at 01:05

## 2019-10-18 NOTE — PROGRESS NOTES
Discharge Planning Assessment   Obi     Patient Name: Michael Dorantes  MRN: [id number]  Today's Date: 10/18/2019    Admit Date: 10/17/2019    Discharge Needs Assessment     Row Name 10/18/19 1247       Living Environment    Lives With  spouse    Current Living Arrangements  home/apartment/condo    Primary Care Provided by  self    Provides Primary Care For  no one    Family Caregiver if Needed  spouse    Quality of Family Relationships  helpful;involved;supportive    Able to Return to Prior Arrangements  yes       Resource/Environmental Concerns    Resource/Environmental Concerns  none    Transportation Concerns  car, none       Transition Planning    Patient/Family Anticipates Transition to  home with family    Patient/Family Anticipated Services at Transition  none    Transportation Anticipated  family or friend will provide;car, drives self       Discharge Needs Assessment    Readmission Within the Last 30 Days  no previous admission in last 30 days    Concerns to be Addressed  no discharge needs identified;denies needs/concerns at this time    Equipment Currently Used at Home  none    Anticipated Changes Related to Illness  none    Equipment Needed After Discharge  none        Discharge Plan     Row Name 10/18/19 1248       Plan    Plan  Anticipate routine home.     Patient/Family in Agreement with Plan  yes    Plan Comments  Met with patient and family member at bedside who report no anticipated needs at discharge. Spoke with Dr. Sherwood who reports patient will be a new start on Eliquis at discharge. Spoke with Felix, pharmacist at [facility], initial 7 days on Eliquis will be $79.25 then $111.44 for 30 day prescriptions, these costs are through patient's insurance. Cost of Eliquis with [facility] would be over $400/month. Cost of Xarelto would be essentially the same, no added savings. Delivered information to patient who is agreeable to $111, gave patient free 30 day card with instructions to bring with him to the  pharmacy. Discharge pending MD rounds.              Expected Discharge Date and Time     Expected Discharge Date Expected Discharge Time    Oct 18, 2019         Demographic Summary     Row Name 10/18/19 1246       General Information    Admission Type  observation    Arrived From  home    Required Notices Provided  Observation Status Notice    Referral Source  admission list    Reason for Consult  discharge planning    Preferred Language  other (see comments) ASL     Used During This Interaction  yes Family        Functional Status     Row Name 10/18/19 1247       Functional Status    Usual Activity Tolerance  good    Current Activity Tolerance  good       Functional Status, IADL    Medications  independent    Meal Preparation  independent    Housekeeping  independent    Laundry  independent    Shopping  independent       Mental Status    General Appearance WDL  WDL       Mental Status Summary    Recent Changes in Mental Status/Cognitive Functioning  no changes        Yaima King  [phone]

## 2019-10-18 NOTE — H&P
[facility] Medicine Services            Primary Care Provider:  Joey Islas MD    Patient Care Team:  Joey Islas MD as PCP - General (Family Medicine)    CHIEF COMPLAINT:     Chief Complaint   Patient presents with   • Leg Swelling       Pain and swelling to E    HISTORY OF PRESENT ILLNESS:    69-year-old male presents to the ER with a chief complaint of pain and swelling to his left lower extremity which is worsened over the last 2 days.  The patient had recent car ride to [place] and walked around a lot while he was down there with return on Tuesday.  The pain is exacerbated by walking and relieved with rest and elevation.  The patient denies history of DVT.  He denies recent subjective fever or chills.  He has had no chest pain or shortness of breath.    The patient is deaf with family  at bedside.          Past Medical History:   Diagnosis Date   • Deaf    • Diabetes mellitus (CMS/HCC)    • Renal disorder        Past Surgical History:   Procedure Laterality Date   • BLADDER SURGERY     • GASTRECTOMY         History reviewed. No pertinent family history.    Social History     Tobacco Use   • Smoking status: Never Smoker   Substance Use Topics   • Alcohol use: No     Frequency: Never   • Drug use: Not on file         (Not in a hospital admission)    Allergies:  Patient has no known allergies.      There is no immunization history on file for this patient.        REVIEW OF SYSTEMS:     Review of Systems   Constitution: Negative for chills and fever.   Cardiovascular: Negative for chest pain.   Respiratory: Negative for shortness of breath.    Skin:        Erythema to the left lower extremity   Musculoskeletal:        Left lower extremity pain and swelling   Gastrointestinal: Negative for abdominal pain.   All other systems reviewed and are negative.      Vital Signs  Temp:  [97.4 °F (36.3 °C)] 97.4 °F (36.3 °C)  Heart Rate:  [74] 74  Resp:  [16] 16  BP:  "(171)/(50) 171/84    Flowsheet Rows      First Filed Value   Admission Height  182.9 cm (72\") Documented at 10/17/2019 1721   Admission Weight  80.6 kg (177 lb 11.1 oz) Documented at 10/17/2019 1721           Physical Exam:    Physical Exam   Constitutional: He is oriented to person, place, and time. He appears well-developed and well-nourished.   HENT:   Head: Normocephalic and atraumatic.   Eyes: EOM are normal. Pupils are equal, round, and reactive to light. No scleral icterus.   Neck: Normal range of motion. Neck supple. No JVD present. No tracheal deviation present.   Cardiovascular: Normal rate, regular rhythm, normal heart sounds and intact distal pulses.   No murmur heard.  Pulmonary/Chest: Effort normal and breath sounds normal. No respiratory distress.   Abdominal: Soft. Bowel sounds are normal. He exhibits no distension.   Musculoskeletal: He exhibits edema and tenderness.   Neurological: He is alert and oriented to person, place, and time.   Skin: Skin is warm and dry. Capillary refill takes less than 2 seconds. There is erythema.   Psychiatric: He has a normal mood and affect. His behavior is normal. Judgment and thought content normal.   Vitals reviewed.      Emotional Behavior:    Cooperative    Debilities:  Deaf; family  at bedside  Age appropriate      Results Review:      I reviewed the patient's new clinical results.    Lab Results (most recent)     Procedure Component Value Units Date/Time    Comprehensive Metabolic Panel [[id number]]  (Abnormal) Collected:  10/17/19 2004    Specimen:  Blood Updated:  10/17/19 2028     Glucose 95 mg/dL      BUN 30 mg/dL      Creatinine 1.65 mg/dL      Sodium 138 mmol/L      Potassium 5.4 mmol/L      Chloride 107 mmol/L      CO2 22.0 mmol/L      Calcium 9.4 mg/dL      Total Protein 7.5 g/dL      Albumin 4.50 g/dL      ALT (SGPT) 9 U/L      AST (SGOT) 15 U/L      Alkaline Phosphatase 71 U/L      Total Bilirubin 0.3 mg/dL      eGFR Non  Amer 42 " mL/min/1.73      Globulin 3.0 gm/dL      A/G Ratio 1.5 g/dL      BUN/Creatinine Ratio 18.2     Anion Gap 9.0 mmol/L     Narrative:       GFR Normal >60  Chronic Kidney Disease <60  Kidney Failure <15    Protime-INR [[id number]]  (Normal) Collected:  10/17/19 2004    Specimen:  Blood Updated:  10/17/19 2022     Protime 10.7 Seconds      INR 1.03    CBC & Differential [[id number]] Collected:  10/17/19 2004    Specimen:  Blood Updated:  10/17/19 2014    Narrative:       The following orders were created for panel order CBC & Differential.  Procedure                               Abnormality         Status                     ---------                               -----------         ------                     CBC Auto Differential[[id number]]        Abnormal            Final result                 Please view results for these tests on the individual orders.    CBC Auto Differential [[id number]]  (Abnormal) Collected:  10/17/19 2004    Specimen:  Blood Updated:  10/17/19 2014     WBC 5.90 10*3/mm3      RBC 4.10 10*6/mm3      Hemoglobin 13.2 g/dL      Hematocrit 37.4 %      MCV 91.1 fL      MCH 32.1 pg      MCHC 35.2 g/dL      RDW 12.9 %      RDW-SD 44.2 fl      MPV 7.7 fL      Platelets 203 10*3/mm3      Neutrophil % 74.4 %      Lymphocyte % 13.1 %      Monocyte % 9.6 %      Eosinophil % 1.9 %      Basophil % 1.0 %      Neutrophils, Absolute 4.40 10*3/mm3      Lymphocytes, Absolute 0.80 10*3/mm3      Monocytes, Absolute 0.60 10*3/mm3      Eosinophils, Absolute 0.10 10*3/mm3      Basophils, Absolute 0.10 10*3/mm3      nRBC 0.1 /100 WBC           Imaging Results (most recent)     None        reviewed    Results for orders placed during the hospital encounter of 10/17/19   Duplex Venous Lower Extremity - Left    Narrative · Acute left lower extremity deep vein thrombosis noted in the common   femoral, proximal femoral, mid femoral, distal femoral and popliteal.               Duplex Venous Lower Extremity - Left  · Acute left  lower extremity deep vein thrombosis noted in the common   femoral, proximal femoral, mid femoral, distal femoral and popliteal.         Active Hospital Problems    Diagnosis  POA   • DVT femoral (deep venous thrombosis) with thrombophlebitis, left (CMS/HCC) [I82.412]  Yes      Resolved Hospital Problems   No resolved problems to display.         Assessment/Plan       DVT femoral (deep venous thrombosis) with thrombophlebitis, left (CMS/HCC)    DVT, left lower extremity, acute: Lovenox therapeutic dose every 12 hours with transition to oral anticoagulants    --Lovenox therapeutic dose x1 given in ER     --consider provoked with recent long car travel     Hyperkalemia, mild, potassium 5.4 with comparison 5.2 on 5/16/2019: Repeat BMP in a.m.    CKD with unknown staging, Creatinine 1.65 with comparison 1.8 on 5/16/2019: Repeat BMP    --Followed by Dr. Laughlin     Diabetes type 2, chronic, moderately controlled with diabetic neuropathy: Check hemoglobin A1c; hold metformin; add sliding scale; continue Neurontin    Hypertension, chronic, controlled: Continue amlodipine     DVT prophylaxis--patient receiving anticoagulation for acute DVT    Disposition    Acute DVT will likely be able to transition to oral anticoagulation within 23-hour observation timeframe.    I discussed the patients findings and my recommendations with patient and wife at bedside.     CATINA Patel  10/17/19  8:54 PM

## 2019-10-18 NOTE — PLAN OF CARE
Problem: Patient Care Overview  Goal: Plan of Care Review  Outcome: Ongoing (interventions implemented as appropriate)   10/18/19 0441   OTHER   Outcome Summary pt has had no complaints overnight, appears to have rested well. Pt should discharge back home soon once switched to oral anticoagulants. will continue to monitor pt.      Goal: Individualization and Mutuality  Outcome: Ongoing (interventions implemented as appropriate)    Goal: Discharge Needs Assessment  Outcome: Ongoing (interventions implemented as appropriate)    Goal: Interprofessional Rounds/Family Conf  Outcome: Ongoing (interventions implemented as appropriate)      Problem: VTE, DVT and PE (Adult)  Goal: Signs and Symptoms of Listed Potential Problems Will be Absent, Minimized or Managed (VTE, DVT and PE)  Outcome: Ongoing (interventions implemented as appropriate)   10/18/19 0441   Goal/Outcome Evaluation   Problems Assessed (VTE, DVT, PE) all   Problems Present (VTE, DVT, PE) deep vein thrombosis

## 2019-10-18 NOTE — DISCHARGE SUMMARY
Date of Admission: 10/17/2019    Date of Discharge:  10/18/2019    Length of stay:  LOS: 0 days     Presenting Problem/History of Present Illness  Active Hospital Problems    Diagnosis  POA   • **DVT femoral (deep venous thrombosis) with thrombophlebitis, left (CMS/[facility]) [I82.412]  Yes     Priority: High      Resolved Hospital Problems   No resolved problems to display.          Hospital Course    Chief Complaint   Patient presents with   • Leg Swelling         The patient is a 69-year-old deaf male with history of bladder cancer presented to the emergency room on 10/17/2019 with 2 days of left lower extremity edema. The patient had recent car ride to [place] and walked around a lot while he was down there with return on Tuesday.  Venous Doppler of lower extremity was significant for DVT.    The patient was placed on observation.  He will be discharged in the morning of 10/18/2019 with prescription for E;iquis.  The patient will follow up with hematology/oncology as an outpatient for further work-up.  Bladder cancer in the past was managed by his urologist.  Discussion was interpreted to sign language by his sister-in-law who was at the bedside.  The patient has expressed understanding of the plan of care and agrees.      Past Medical History:     Past Medical History:   Diagnosis Date   • Deaf    • Diabetes mellitus (CMS/[facility])    • Renal disorder        Past Surgical History:     Past Surgical History:   Procedure Laterality Date   • BLADDER SURGERY     • GASTRECTOMY         Social History:   Social History     Socioeconomic History   • Marital status:      Spouse name: Not on file   • Number of children: Not on file   • Years of education: Not on file   • Highest education level: Not on file   Tobacco Use   • Smoking status: Never Smoker   • Smokeless tobacco: Never Used   Substance and Sexual Activity   • Alcohol use: No     Frequency: Never   • Drug use: No   • Sexual activity: Defer       Procedures  Performed: None         Vital Signs  Temp:  [98.2 °F (36.8 °C)-98.6 °F (37 °C)] 98.2 °F (36.8 °C)  Heart Rate:  [65-74] 65  Resp:  [16-18] 16  BP: (127-162)/(68-80) 152/78    Physical Exam:  Physical Exam   Constitutional: He is oriented to person, place, and time. He appears well-developed and well-nourished.   HENT:   Head: Normocephalic. Microcephalic: deaf.   Eyes: Conjunctivae, EOM and lids are normal. Pupils are equal, round, and reactive to light.   Neck: Trachea normal and full passive range of motion without pain. No thyroid mass present.   Cardiovascular: Normal rate and normal pulses. A regularly irregular rhythm present.   Pulmonary/Chest: Effort normal and breath sounds normal.   Abdominal: Soft. Bowel sounds are normal.   Musculoskeletal:   Left lower extremity edema   Neurological: He is alert and oriented to person, place, and time.   Skin: Skin is warm.   Psychiatric: He has a normal mood and affect.   Patient has hearing deficit.       Discharge Diagnosis:     DVT femoral (deep venous thrombosis) with thrombophlebitis, left (CMS/HCC)      Present on Admission:  • DVT femoral (deep venous thrombosis) with thrombophlebitis, left (CMS/[facility])      Discharge Disposition  Home or Self Care       Discharge Medications      New Medications      Instructions Start Date   apixaban 5 MG tablet tablet  Commonly known as:  ELIQUIS   10 mg, Oral, Every 12 Hours Scheduled      apixaban 5 MG tablet tablet  Commonly known as:  ELIQUIS   5 mg, Oral, Every 12 Hours Scheduled         Continue These Medications      Instructions Start Date   amLODIPine 10 MG tablet  Commonly known as:  NORVASC   10 mg, Oral, Nightly      gabapentin 300 MG capsule  Commonly known as:  NEURONTIN   900 mg, Oral, 2 Times Daily      metFORMIN 1000 MG tablet  Commonly known as:  GLUCOPHAGE   1,000 mg, Oral, 2 Times Daily With Meals      sodium bicarbonate 650 MG tablet   650 mg, Oral, 3 Times Daily           Consults:   Consults     No orders  found for last 30 day(s).          Pertinent Test Results:     I reviewed the patient's new clinical results.    Results from last 7 days   Lab Units 10/18/19  0248 10/17/19  2004   WBC 10*3/mm3 4.20 5.90   HEMOGLOBIN g/dL 12.1* 13.2   HEMATOCRIT % 35.8* 37.4*   PLATELETS 10*3/mm3 197 203     Results from last 7 days   Lab Units 10/18/19  0248 10/17/19  2004   SODIUM mmol/L 139 138   POTASSIUM mmol/L 4.8 5.4*   CHLORIDE mmol/L 107 107   CO2 mmol/L 22.0 22.0   BUN mg/dL 33* 30*   CREATININE mg/dL 1.71* 1.65*   GLUCOSE mg/dL 97 95   CALCIUM mg/dL 9.1 9.4     Results from last 7 days   Lab Units 10/18/19  0248 10/17/19  2004   SODIUM mmol/L 139 138   POTASSIUM mmol/L 4.8 5.4*   CHLORIDE mmol/L 107 107   CO2 mmol/L 22.0 22.0   BUN mg/dL 33* 30*   CREATININE mg/dL 1.71* 1.65*   CALCIUM mg/dL 9.1 9.4   BILIRUBIN mg/dL  --  0.3   ALK PHOS U/L  --  71   ALT (SGPT) U/L  --  9   AST (SGOT) U/L  --  15   GLUCOSE mg/dL 97 95         Lab Results   Component Value Date    CALCIUM 9.1 10/18/2019    PHOS 3.4 05/16/2019     Hemoglobin A1C   Date Value Ref Range Status   10/18/2019 5.6 3.5 - 5.6 % Final             Microbiology Results (last 10 days)     ** No results found for the last 240 hours. **          ECG/EMG Results (most recent)     None          Results for orders placed during the hospital encounter of 10/17/19   Duplex Venous Lower Extremity - Left    Narrative · Acute left lower extremity deep vein thrombosis noted in the common   femoral, proximal femoral, mid femoral, distal femoral and popliteal.               Condition on Discharge:    Stable    Discharge Diet: Diabetic    Activity at Discharge: As tolerated    Follow-up Appointments  No future appointments.  Additional Instructions for the Follow-ups that You Need to Schedule     Discharge Follow-up with PCP   As directed       Currently Documented PCP:    Joey Islas MD    PCP Phone Number:    [phone]     Follow Up Details:  2 weeks         Discharge  Follow-up with Specified Provider: heme/onc; 1 Month   As directed      To:  heme/onc    Follow Up:  1 Month    Follow Up Details:  DVT         Additional information on Labs and Follow-ups:      [facility] appt in 1 month [phone]                    Test Results Pending at Discharge: None    Matthew Cantu DO  10/18/19  5:23 PM

## 2019-11-01 ENCOUNTER — TELEPHONE (OUTPATIENT)
Dept: ONCOLOGY | Facility: CLINIC | Age: 69
End: 2019-11-01

## 2019-11-01 NOTE — TELEPHONE ENCOUNTER
Left message asking patient to call my direct line to reschedule appointment as Dr Hoffman won't be in the office next Friday

## 2019-11-07 ENCOUNTER — LAB (OUTPATIENT)
Dept: LAB | Facility: HOSPITAL | Age: 69
End: 2019-11-07

## 2019-11-07 ENCOUNTER — TRANSCRIBE ORDERS (OUTPATIENT)
Dept: ADMINISTRATIVE | Facility: HOSPITAL | Age: 69
End: 2019-11-07

## 2019-11-07 DIAGNOSIS — Z79.01 LONG TERM (CURRENT) USE OF ANTICOAGULANTS: ICD-10-CM

## 2019-11-07 DIAGNOSIS — Z79.01 LONG TERM (CURRENT) USE OF ANTICOAGULANTS: Primary | ICD-10-CM

## 2019-11-07 LAB
INR PPP: 3.94 (ref 2–3)
PROTHROMBIN TIME: 37 SECONDS (ref 19.4–28.5)

## 2019-11-07 PROCEDURE — 85610 PROTHROMBIN TIME: CPT

## 2019-11-07 PROCEDURE — 36415 COLL VENOUS BLD VENIPUNCTURE: CPT

## 2019-11-08 ENCOUNTER — APPOINTMENT (OUTPATIENT)
Dept: LAB | Facility: HOSPITAL | Age: 69
End: 2019-11-08

## 2019-11-08 ENCOUNTER — APPOINTMENT (OUTPATIENT)
Dept: ONCOLOGY | Facility: CLINIC | Age: 69
End: 2019-11-08

## 2019-11-08 ENCOUNTER — TRANSCRIBE ORDERS (OUTPATIENT)
Dept: ADMINISTRATIVE | Facility: HOSPITAL | Age: 69
End: 2019-11-08

## 2019-11-08 ENCOUNTER — LAB (OUTPATIENT)
Dept: LAB | Facility: HOSPITAL | Age: 69
End: 2019-11-08

## 2019-11-08 DIAGNOSIS — N18.30 CHRONIC KIDNEY DISEASE, STAGE III (MODERATE) (HCC): ICD-10-CM

## 2019-11-08 DIAGNOSIS — E11.8 DIABETIC COMPLICATION (HCC): ICD-10-CM

## 2019-11-08 DIAGNOSIS — I10 ESSENTIAL HYPERTENSION: ICD-10-CM

## 2019-11-08 DIAGNOSIS — N18.30 CHRONIC KIDNEY DISEASE, STAGE III (MODERATE) (HCC): Primary | ICD-10-CM

## 2019-11-08 DIAGNOSIS — E55.9 VITAMIN D DEFICIENCY, UNSPECIFIED: ICD-10-CM

## 2019-11-08 DIAGNOSIS — R80.9 PROTEINURIA, UNSPECIFIED TYPE: ICD-10-CM

## 2019-11-08 LAB
25(OH)D3 SERPL-MCNC: 34.7 NG/ML (ref 30–100)
ANION GAP SERPL CALCULATED.3IONS-SCNC: 8.9 MMOL/L (ref 5–15)
BACTERIA UR QL AUTO: ABNORMAL /HPF
BASOPHILS # BLD AUTO: 0.1 10*3/MM3 (ref 0–0.2)
BASOPHILS NFR BLD AUTO: 1.3 % (ref 0–1.5)
BILIRUB UR QL STRIP: NEGATIVE
BUN BLD-MCNC: 29 MG/DL (ref 8–23)
BUN/CREAT SERPL: 18 (ref 7–25)
CALCIUM SPEC-SCNC: 9.8 MG/DL (ref 8.6–10.5)
CHLORIDE SERPL-SCNC: 102 MMOL/L (ref 98–107)
CLARITY UR: ABNORMAL
CO2 SERPL-SCNC: 30.1 MMOL/L (ref 22–29)
COLOR UR: YELLOW
CREAT BLD-MCNC: 1.61 MG/DL (ref 0.76–1.27)
CREAT UR-MCNC: 71.9 MG/DL
DEPRECATED RDW RBC AUTO: 41.1 FL (ref 37–54)
EOSINOPHIL # BLD AUTO: 0.1 10*3/MM3 (ref 0–0.4)
EOSINOPHIL NFR BLD AUTO: 2.7 % (ref 0.3–6.2)
ERYTHROCYTE [DISTWIDTH] IN BLOOD BY AUTOMATED COUNT: 12.7 % (ref 12.3–15.4)
GFR SERPL CREATININE-BSD FRML MDRD: 43 ML/MIN/1.73
GLUCOSE BLD-MCNC: 99 MG/DL (ref 65–99)
GLUCOSE UR STRIP-MCNC: NEGATIVE MG/DL
HBA1C MFR BLD: 5.5 % (ref 3.5–5.6)
HCT VFR BLD AUTO: 36.8 % (ref 37.5–51)
HGB BLD-MCNC: 12.7 G/DL (ref 13–17.7)
HGB UR QL STRIP.AUTO: ABNORMAL
HYALINE CASTS UR QL AUTO: ABNORMAL /LPF
KETONES UR QL STRIP: NEGATIVE
LEUKOCYTE ESTERASE UR QL STRIP.AUTO: ABNORMAL
LYMPHOCYTES # BLD AUTO: 0.9 10*3/MM3 (ref 0.7–3.1)
LYMPHOCYTES NFR BLD AUTO: 20.4 % (ref 19.6–45.3)
MCH RBC QN AUTO: 31.6 PG (ref 26.6–33)
MCHC RBC AUTO-ENTMCNC: 34.5 G/DL (ref 31.5–35.7)
MCV RBC AUTO: 91.8 FL (ref 79–97)
MONOCYTES # BLD AUTO: 0.4 10*3/MM3 (ref 0.1–0.9)
MONOCYTES NFR BLD AUTO: 9.8 % (ref 5–12)
NEUTROPHILS # BLD AUTO: 2.9 10*3/MM3 (ref 1.7–7)
NEUTROPHILS NFR BLD AUTO: 65.8 % (ref 42.7–76)
NITRITE UR QL STRIP: NEGATIVE
NRBC BLD AUTO-RTO: 0.1 /100 WBC (ref 0–0.2)
PH UR STRIP.AUTO: 6.5 [PH] (ref 5–8)
PHOSPHATE SERPL-MCNC: 3.3 MG/DL (ref 2.5–4.5)
PLATELET # BLD AUTO: 234 10*3/MM3 (ref 140–450)
PMV BLD AUTO: 6.7 FL (ref 6–12)
POTASSIUM BLD-SCNC: 5.1 MMOL/L (ref 3.5–5.2)
PROT UR QL STRIP: ABNORMAL
PROT UR-MCNC: 75 MG/DL
PTH-INTACT SERPL-MCNC: 65.3 PG/ML (ref 15–65)
RBC # BLD AUTO: 4.01 10*6/MM3 (ref 4.14–5.8)
RBC # UR: ABNORMAL /HPF
REF LAB TEST METHOD: ABNORMAL
SODIUM BLD-SCNC: 141 MMOL/L (ref 136–145)
SP GR UR STRIP: 1.01 (ref 1–1.03)
SQUAMOUS #/AREA URNS HPF: ABNORMAL /HPF
UROBILINOGEN UR QL STRIP: ABNORMAL
WBC NRBC COR # BLD: 4.5 10*3/MM3 (ref 3.4–10.8)
WBC UR QL AUTO: ABNORMAL /HPF

## 2019-11-08 PROCEDURE — 82570 ASSAY OF URINE CREATININE: CPT

## 2019-11-08 PROCEDURE — 80048 BASIC METABOLIC PNL TOTAL CA: CPT

## 2019-11-08 PROCEDURE — 81001 URINALYSIS AUTO W/SCOPE: CPT

## 2019-11-08 PROCEDURE — 85025 COMPLETE CBC W/AUTO DIFF WBC: CPT

## 2019-11-08 PROCEDURE — 82306 VITAMIN D 25 HYDROXY: CPT

## 2019-11-08 PROCEDURE — 83036 HEMOGLOBIN GLYCOSYLATED A1C: CPT

## 2019-11-08 PROCEDURE — 84100 ASSAY OF PHOSPHORUS: CPT

## 2019-11-08 PROCEDURE — 84156 ASSAY OF PROTEIN URINE: CPT

## 2019-11-08 PROCEDURE — 83970 ASSAY OF PARATHORMONE: CPT

## 2019-11-08 PROCEDURE — 87086 URINE CULTURE/COLONY COUNT: CPT

## 2019-11-09 LAB — BACTERIA SPEC AEROBE CULT: NORMAL

## 2019-11-19 NOTE — PROGRESS NOTES
Hematology/Oncology Outpatient Consultation    Patient name: Michael Dorantes  : 1950  MRN: [id number]  Primary Care Physician: Joey Islas MD  Referring Physician: Joey Islas MD  Reason For Consult:     Chief Complaint   Patient presents with   • Follow-up     DVT to left leg       History of Present Illness:  This is a 69-year-old male who developed swelling and pain involving the left lower extremity.  Within 24 hours of symptoms, patient was seen by his physician who ordered Doppler of the left lower extremity.  Patient had Doppler of the left lower extremity done on 10/17/2019 showed acute left lower extremity deep venous thrombosis noted in the common femoral, proximal femoral, mid femoral and distal femoral and popliteal veins.  Was then placed on anticoagulation therapy currently on warfarin 7.5 mg daily which she has been tolerating well.  The swelling has significantly improved.  Prior to the blood clots patient had gone on a  trip which was 3 hours long.  But denies any trauma or any surgical procedures.  He has a history of prostate cancer as well as bladder cancer and underwent radical cystectomy and currently has a urostomy tube.  Patient is seen by Dr. Mcdaniel and has had no evidence of the disease recurrent.  He is now being seen on a yearly basis.    Patient has no prior history of blood clots and no family history of blood clots.  Overall he feels well he remains active  He is up-to-date on his colonoscopy which he had approximately 2 years ago.      Past Medical History:   Diagnosis Date   • Bladder cancer (CMS/HCC)    • Deaf    • Diabetes mellitus (CMS/HCC)    • Prostate cancer (CMS/HCC)    • Renal disorder        Past Surgical History:   Procedure Laterality Date   • BLADDER SURGERY     • CYSTECTOMY     • GASTRECTOMY     • PROSTATECTOMY           Current Outpatient Medications:   •  amLODIPine (NORVASC) 10 MG tablet, Take 10 mg by mouth Every Night., Disp: , Rfl:   •   gabapentin (NEURONTIN) 300 MG capsule, Take 900 mg by mouth 2 (Two) Times a Day., Disp: , Rfl:   •  metFORMIN (GLUCOPHAGE) 1000 MG tablet, Take 1,000 mg by mouth 2 (Two) Times a Day With Meals., Disp: , Rfl:   •  Omega-3 Fatty Acids (FISH OIL) 1000 MG capsule capsule, Take  by mouth Daily., Disp: , Rfl:   •  sodium bicarbonate 650 MG tablet, Take 650 mg by mouth 3 (Three) Times a Day., Disp: , Rfl:   •  warfarin (COUMADIN) 7.5 MG tablet, Take 7.5 mg by mouth Daily., Disp: , Rfl:   •  apixaban (ELIQUIS) 5 MG tablet tablet, Take 1 tablet by mouth Every 12 (Twelve) Hours., Disp: 60 tablet, Rfl: 0    No Known Allergies      There is no immunization history on file for this patient.    Family History   Problem Relation Age of Onset   • Cancer Father        Cancer-related family history includes Cancer in his father.    Social History     Tobacco Use   • Smoking status: Never Smoker   • Smokeless tobacco: Never Used   Substance Use Topics   • Alcohol use: No     Frequency: Never   • Drug use: No     Subjective: Patient is here for routine follow-up.  He has a history of the venous thrombosis.  He denies any new leg swelling, pain or redness      ROS:    Review of Systems   Constitutional: Negative for chills and fever.   HENT: Negative for ear pain, mouth sores, nosebleeds and sore throat.    Eyes: Negative for photophobia and visual disturbance.   Respiratory: Negative for wheezing and stridor.    Cardiovascular: Negative for chest pain and palpitations.   Gastrointestinal: Negative for abdominal pain, diarrhea, nausea and vomiting.   Endocrine: Negative for cold intolerance and heat intolerance.   Genitourinary: Negative for dysuria and hematuria.   Musculoskeletal: Negative for joint swelling and neck stiffness.   Skin: Negative for color change and rash.   Neurological: Negative for seizures and syncope.   Hematological: Negative for adenopathy.        No obvious bleeding   Psychiatric/Behavioral: Negative for  "agitation, confusion and hallucinations.       Objective:    Vitals:    11/20/19 1022   BP: 160/79   Pulse: 62   Resp: 20   Temp: 98.1 °F (36.7 °C)   Weight: 80.3 kg (177 lb)   Height: 182.9 cm (72\")   PainSc: 0-No pain       ECOG  (1) Restricted in physically strenuous activity, ambulatory and able to do work of light nature    Physical Exam:    Physical Exam   Constitutional: He is oriented to person, place, and time. No distress.   HENT:   Head: Normocephalic and atraumatic.   Eyes: Conjunctivae and EOM are normal. Right eye exhibits no discharge. Left eye exhibits no discharge. No scleral icterus.   Neck: Normal range of motion. Neck supple. No thyromegaly present.   Cardiovascular: Normal rate, regular rhythm and normal heart sounds. Exam reveals no gallop and no friction rub.   Pulmonary/Chest: Effort normal. No stridor. No respiratory distress. He has no wheezes.   Abdominal: Soft. Bowel sounds are normal. He exhibits no mass. There is no tenderness. There is no rebound and no guarding.   Genitourinary:   Genitourinary Comments: Cystostomy bag   Musculoskeletal: Normal range of motion. He exhibits no tenderness.   Lymphadenopathy:     He has no cervical adenopathy.   Neurological: He is alert and oriented to person, place, and time. He exhibits normal muscle tone.   Skin: Skin is warm. No rash noted. He is not diaphoretic. No erythema.   Psychiatric: He has a normal mood and affect. His behavior is normal.   Nursing note and vitals reviewed.      RECENT LABS  WBC   Date Value Ref Range Status   11/08/2019 4.50 3.40 - 10.80 10*3/mm3 Final     RBC   Date Value Ref Range Status   11/08/2019 4.01 (L) 4.14 - 5.80 10*6/mm3 Final     Hemoglobin   Date Value Ref Range Status   11/08/2019 12.7 (L) 13.0 - 17.7 g/dL Final     Hematocrit   Date Value Ref Range Status   11/08/2019 36.8 (L) 37.5 - 51.0 % Final     MCV   Date Value Ref Range Status   11/08/2019 91.8 79.0 - 97.0 fL Final     MCH   Date Value Ref Range Status "   11/08/2019 31.6 26.6 - 33.0 pg Final     MCHC   Date Value Ref Range Status   11/08/2019 34.5 31.5 - 35.7 g/dL Final     RDW   Date Value Ref Range Status   11/08/2019 12.7 12.3 - 15.4 % Final     RDW-SD   Date Value Ref Range Status   11/08/2019 41.1 37.0 - 54.0 fl Final     MPV   Date Value Ref Range Status   11/08/2019 6.7 6.0 - 12.0 fL Final     Platelets   Date Value Ref Range Status   11/08/2019 234 140 - 450 10*3/mm3 Final     Neutrophil %   Date Value Ref Range Status   11/08/2019 65.8 42.7 - 76.0 % Final     Lymphocyte %   Date Value Ref Range Status   11/08/2019 20.4 19.6 - 45.3 % Final     Monocyte %   Date Value Ref Range Status   11/08/2019 9.8 5.0 - 12.0 % Final     Eosinophil %   Date Value Ref Range Status   11/08/2019 2.7 0.3 - 6.2 % Final     Basophil %   Date Value Ref Range Status   11/08/2019 1.3 0.0 - 1.5 % Final     Neutrophils, Absolute   Date Value Ref Range Status   11/08/2019 2.90 1.70 - 7.00 10*3/mm3 Final     Lymphocytes, Absolute   Date Value Ref Range Status   11/08/2019 0.90 0.70 - 3.10 10*3/mm3 Final     Monocytes, Absolute   Date Value Ref Range Status   11/08/2019 0.40 0.10 - 0.90 10*3/mm3 Final     Eosinophils, Absolute   Date Value Ref Range Status   11/08/2019 0.10 0.00 - 0.40 10*3/mm3 Final     Basophils, Absolute   Date Value Ref Range Status   11/08/2019 0.10 0.00 - 0.20 10*3/mm3 Final     nRBC   Date Value Ref Range Status   11/08/2019 0.1 0.0 - 0.2 /100 WBC Final       Lab Results   Component Value Date    GLUCOSE 99 11/08/2019    BUN 29 (H) 11/08/2019    CREATININE 1.61 (H) 11/08/2019    EGFRIFNONA 43 (L) 11/08/2019    BCR 18.0 11/08/2019    K 5.1 11/08/2019    CO2 30.1 (H) 11/08/2019    CALCIUM 9.8 11/08/2019    ALBUMIN 4.50 10/17/2019    LABIL2 1.4 11/30/2017    AST 15 10/17/2019    ALT 9 10/17/2019         Assessment/Plan     There are no diagnoses linked to this encounter.    1. DVT involving the left lower extremity really on warfarin therapy.  First  episode.  2. History of prostate cancer and bladder cancer status post surgery with proximal urostomy  3. Congenital deafness      Plans:    Complete thrombophilia work-up  I have requested for his records from Dr. Mcdaniel's office  Will continue warfarin.  His INR has been monitored by his primary care physician Dr. Islas  I will see him in 3 weeks  All his questions have been answered to the best of my abilities      I have reviewed labs results, imaging, vitals, and medications with the patient today.  Will follow up in 3 weeks  with me.    I counselled Michael of the risks of continuing to use tobacco and cessation.    During this visit, I spent 3-10 minutes counseling the patient regarding tobacco cessation.     Patient verbalized understanding and is in agreement of the above plan.

## 2019-11-20 ENCOUNTER — CONSULT (OUTPATIENT)
Dept: ONCOLOGY | Facility: CLINIC | Age: 69
End: 2019-11-20

## 2019-11-20 ENCOUNTER — RESULTS ENCOUNTER (OUTPATIENT)
Dept: ONCOLOGY | Facility: CLINIC | Age: 69
End: 2019-11-20

## 2019-11-20 ENCOUNTER — APPOINTMENT (OUTPATIENT)
Dept: LAB | Facility: HOSPITAL | Age: 69
End: 2019-11-20

## 2019-11-20 VITALS
BODY MASS INDEX: 23.98 KG/M2 | DIASTOLIC BLOOD PRESSURE: 79 MMHG | HEART RATE: 62 BPM | RESPIRATION RATE: 20 BRPM | TEMPERATURE: 98.1 F | HEIGHT: 72 IN | SYSTOLIC BLOOD PRESSURE: 160 MMHG | WEIGHT: 177 LBS

## 2019-11-20 DIAGNOSIS — D68.59 THROMBOPHILIA (HCC): ICD-10-CM

## 2019-11-20 DIAGNOSIS — D68.59 THROMBOPHILIA (HCC): Primary | ICD-10-CM

## 2019-11-20 DIAGNOSIS — I82.402 DEEP VEIN THROMBOSIS (DVT) OF LEFT LOWER EXTREMITY, UNSPECIFIED CHRONICITY, UNSPECIFIED VEIN (HCC): ICD-10-CM

## 2019-11-20 PROCEDURE — 99205 OFFICE O/P NEW HI 60 MIN: CPT | Performed by: INTERNAL MEDICINE

## 2019-11-20 RX ORDER — WARFARIN SODIUM 7.5 MG/1
2.5 TABLET ORAL
Status: ON HOLD | COMMUNITY
End: 2022-01-06

## 2019-11-20 RX ORDER — CHLORAL HYDRATE 500 MG
CAPSULE ORAL DAILY
Status: ON HOLD | COMMUNITY
End: 2022-01-07

## 2019-11-27 ENCOUNTER — TELEPHONE (OUTPATIENT)
Dept: ONCOLOGY | Facility: HOSPITAL | Age: 69
End: 2019-11-27

## 2019-11-27 NOTE — TELEPHONE ENCOUNTER
bio reference called in INR for this patient of 7.0 we do not monitor this patients INR. Dr. Islas does called and spoke with Sue at Dr. Perez office and advised them of the INR result

## 2019-12-19 NOTE — PROGRESS NOTES
Hematology/Oncology Outpatient Follow Up    PATIENT NAME:Michael Dorantes  :1950  MRN: [id number]  PRIMARY CARE PHYSICIAN: Joey Islas MD  REFERRING PHYSICIAN: Joey Islas MD    Chief Complaint   Patient presents with   • Follow-up     DVT involving the left lower extremity        HISTORY OF PRESENT ILLNESS:   This is a 69-year-old male who developed swelling and pain involving the left lower extremity.  Within 24 hours of symptoms, patient was seen by his physician who ordered Doppler of the left lower extremity.  Patient had Doppler of the left lower extremity done on 10/17/2019 showed acute left lower extremity deep venous thrombosis noted in the common femoral, proximal femoral, mid femoral and distal femoral and popliteal veins.  Was then placed on anticoagulation therapy currently on warfarin 7.5 mg daily which she has been tolerating well.  The swelling has significantly improved.  Prior to the blood clots patient had gone on a  trip which was 3 hours long.  But denies any trauma or any surgical procedures.  He has a history of prostate cancer as well as bladder cancer and underwent radical cystectomy and currently has a urostomy tube.  Patient is seen by Dr. Mcdaniel and has had no evidence of the disease recurrent.  He is now being seen on a yearly basis.     Patient has no prior history of blood clots and no family history of blood clots.  Overall he feels well he remains active  He is up-to-date on his colonoscopy which he had approximately 2 years ago.  · 2019 patient had thrombophilia work-up including anticardiolipin antibodies which were negative.  Beta-2 glycoprotein was normal factor V Leiden was not mutated prothrombin gene was not mutated.       Past Medical History:   Diagnosis Date   • Bladder cancer (CMS/HCC)    • Deaf    • Diabetes mellitus (CMS/HCC)    • Prostate cancer (CMS/HCC)    • Renal disorder        Past Surgical History:   Procedure Laterality Date   • BLADDER  SURGERY     • CYSTECTOMY     • GASTRECTOMY     • PROSTATECTOMY           Current Outpatient Medications:   •  warfarin (COUMADIN) 5 MG tablet, Take 2.5 mg by mouth Daily., Disp: , Rfl:   •  amLODIPine (NORVASC) 10 MG tablet, Take 10 mg by mouth Every Night., Disp: , Rfl:   •  gabapentin (NEURONTIN) 300 MG capsule, Take 900 mg by mouth 2 (Two) Times a Day., Disp: , Rfl:   •  metFORMIN (GLUCOPHAGE) 1000 MG tablet, Take 1,000 mg by mouth 2 (Two) Times a Day With Meals., Disp: , Rfl:   •  Omega-3 Fatty Acids (FISH OIL) 1000 MG capsule capsule, Take  by mouth Daily., Disp: , Rfl:   •  sodium bicarbonate 650 MG tablet, Take 650 mg by mouth 3 (Three) Times a Day., Disp: , Rfl:   •  warfarin (COUMADIN) 7.5 MG tablet, Take 2.5 mg by mouth Daily., Disp: , Rfl:     No Known Allergies    Family History   Problem Relation Age of Onset   • Cancer Father        Cancer-related family history includes Cancer in his father.    Social History     Tobacco Use   • Smoking status: Never Smoker   • Smokeless tobacco: Never Used   Substance Use Topics   • Alcohol use: No     Frequency: Never   • Drug use: No       I have reviewed the history of present illness, past medical history, family history, social history, lab results, all notes and other records since the patient was last seen on 11/20/2019.    SUBJECTIVE: he is here in follow up. Denies any problems today.  He also does not have any bleeding issues.  Patient is accompanied today by his family members for this appointment.            REVIEW OF SYSTEMS:  Review of Systems   Constitutional: Negative for chills and fever.   HENT: Negative for ear pain, mouth sores, nosebleeds and sore throat.    Eyes: Negative for photophobia and visual disturbance.   Respiratory: Negative for wheezing and stridor.    Cardiovascular: Negative for chest pain and palpitations.   Gastrointestinal: Negative for abdominal pain, diarrhea, nausea and vomiting.   Endocrine: Negative for cold intolerance and  "heat intolerance.   Genitourinary: Negative for dysuria and hematuria.   Musculoskeletal: Negative for joint swelling and neck stiffness.   Skin: Negative for color change and rash.   Neurological: Negative for seizures and syncope.   Hematological: Negative for adenopathy.        No obvious bleeding   Psychiatric/Behavioral: Negative for agitation, confusion and hallucinations.       OBJECTIVE:    Vitals:    12/20/19 1224   BP: 150/72   Pulse: 65   Resp: 18   Temp: 98.1 °F (36.7 °C)   Weight: 80.8 kg (178 lb 3.2 oz)   Height: 182.9 cm (72\")   PainSc:   3   PainLoc: Foot  Comment: has neurop in feet. slight pain/tingle.       ECOG  (1) Restricted in physically strenuous activity, ambulatory and able to do work of light nature    Physical Exam   Constitutional: He is oriented to person, place, and time. No distress.   HENT:   Head: Normocephalic and atraumatic.   Eyes: Conjunctivae and EOM are normal. Right eye exhibits no discharge. Left eye exhibits no discharge. No scleral icterus.   Neck: Normal range of motion. Neck supple. No thyromegaly present.   Cardiovascular: Normal rate, regular rhythm and normal heart sounds. Exam reveals no gallop and no friction rub.   Pulmonary/Chest: Effort normal. No stridor. No respiratory distress. He has no wheezes.   Abdominal: Soft. Bowel sounds are normal. He exhibits no mass. There is no tenderness. There is no rebound and no guarding.   Musculoskeletal: Normal range of motion. He exhibits no tenderness.   Lymphadenopathy:     He has no cervical adenopathy.   Neurological: He is alert and oriented to person, place, and time. He exhibits normal muscle tone.   Skin: Skin is warm. No rash noted. He is not diaphoretic. No erythema.   Psychiatric: He has a normal mood and affect. His behavior is normal.   Nursing note and vitals reviewed.      RECENT LABS    WBC   Date Value Ref Range Status   11/08/2019 4.50 3.40 - 10.80 10*3/mm3 Final     RBC   Date Value Ref Range Status "   11/08/2019 4.01 (L) 4.14 - 5.80 10*6/mm3 Final     Hemoglobin   Date Value Ref Range Status   11/08/2019 12.7 (L) 13.0 - 17.7 g/dL Final     Hematocrit   Date Value Ref Range Status   11/08/2019 36.8 (L) 37.5 - 51.0 % Final     MCV   Date Value Ref Range Status   11/08/2019 91.8 79.0 - 97.0 fL Final     MCH   Date Value Ref Range Status   11/08/2019 31.6 26.6 - 33.0 pg Final     MCHC   Date Value Ref Range Status   11/08/2019 34.5 31.5 - 35.7 g/dL Final     RDW   Date Value Ref Range Status   11/08/2019 12.7 12.3 - 15.4 % Final     RDW-SD   Date Value Ref Range Status   11/08/2019 41.1 37.0 - 54.0 fl Final     MPV   Date Value Ref Range Status   11/08/2019 6.7 6.0 - 12.0 fL Final     Platelets   Date Value Ref Range Status   11/08/2019 234 140 - 450 10*3/mm3 Final     Neutrophil %   Date Value Ref Range Status   11/08/2019 65.8 42.7 - 76.0 % Final     Lymphocyte %   Date Value Ref Range Status   11/08/2019 20.4 19.6 - 45.3 % Final     Monocyte %   Date Value Ref Range Status   11/08/2019 9.8 5.0 - 12.0 % Final     Eosinophil %   Date Value Ref Range Status   11/08/2019 2.7 0.3 - 6.2 % Final     Basophil %   Date Value Ref Range Status   11/08/2019 1.3 0.0 - 1.5 % Final     Neutrophils, Absolute   Date Value Ref Range Status   11/08/2019 2.90 1.70 - 7.00 10*3/mm3 Final     Lymphocytes, Absolute   Date Value Ref Range Status   11/08/2019 0.90 0.70 - 3.10 10*3/mm3 Final     Monocytes, Absolute   Date Value Ref Range Status   11/08/2019 0.40 0.10 - 0.90 10*3/mm3 Final     Eosinophils, Absolute   Date Value Ref Range Status   11/08/2019 0.10 0.00 - 0.40 10*3/mm3 Final     Basophils, Absolute   Date Value Ref Range Status   11/08/2019 0.10 0.00 - 0.20 10*3/mm3 Final     nRBC   Date Value Ref Range Status   11/08/2019 0.1 0.0 - 0.2 /100 WBC Final       Lab Results   Component Value Date    GLUCOSE 99 11/08/2019    BUN 29 (H) 11/08/2019    CREATININE 1.61 (H) 11/08/2019    EGFRIFNONA 43 (L) 11/08/2019    BCR 18.0  11/08/2019    K 5.1 11/08/2019    CO2 30.1 (H) 11/08/2019    CALCIUM 9.8 11/08/2019    ALBUMIN 4.50 10/17/2019    LABIL2 1.4 11/30/2017    AST 15 10/17/2019    ALT 9 10/17/2019         Assessment/Plan     DVT femoral (deep venous thrombosis) with thrombophlebitis, left (CMS/HCC)  - [facility] Requisition ([facility] Only)      ASSESSMENT:    1. DVT involving the left lower extremity. On warfarin therapy.  First episode.  2. History of prostate cancer and bladder cancer status post surgery with proximal urostomy  3. Congenital deafness    PLAN:     Complete thrombophilia work-up  I have requested for his records from Dr. Mcdaniel's office.  Reviewed Dr. Mcdaniel's notes patient is to follow-up with him December 2019.  Patient is encouraged to do so.  Will pleat his thrombophilia work-up by also checking antiphospholipid antibodies, lupus anticoagulant, Antithrombin III protein C, protein S activity levels.  Results will be called to patient.  We will continue INR monitoring through Dr. Islas's office  So long as he is doing well I will see him back in 6 months    All his questions have been answered to the best of my abilities              Patient verbalized understanding and is in agreement of the above plan.

## 2019-12-20 ENCOUNTER — TRANSCRIBE ORDERS (OUTPATIENT)
Dept: ADMINISTRATIVE | Facility: HOSPITAL | Age: 69
End: 2019-12-20

## 2019-12-20 ENCOUNTER — LAB (OUTPATIENT)
Dept: LAB | Facility: HOSPITAL | Age: 69
End: 2019-12-20

## 2019-12-20 ENCOUNTER — OFFICE VISIT (OUTPATIENT)
Dept: ONCOLOGY | Facility: CLINIC | Age: 69
End: 2019-12-20

## 2019-12-20 ENCOUNTER — RESULTS ENCOUNTER (OUTPATIENT)
Dept: ONCOLOGY | Facility: CLINIC | Age: 69
End: 2019-12-20

## 2019-12-20 ENCOUNTER — APPOINTMENT (OUTPATIENT)
Dept: LAB | Facility: HOSPITAL | Age: 69
End: 2019-12-20

## 2019-12-20 VITALS
TEMPERATURE: 98.1 F | RESPIRATION RATE: 18 BRPM | SYSTOLIC BLOOD PRESSURE: 150 MMHG | DIASTOLIC BLOOD PRESSURE: 72 MMHG | BODY MASS INDEX: 24.14 KG/M2 | HEART RATE: 65 BPM | HEIGHT: 72 IN | WEIGHT: 178.2 LBS

## 2019-12-20 DIAGNOSIS — Z85.46 HISTORY OF PROSTATE CANCER: ICD-10-CM

## 2019-12-20 DIAGNOSIS — Z79.01 LONG TERM (CURRENT) USE OF ANTICOAGULANTS: Primary | ICD-10-CM

## 2019-12-20 DIAGNOSIS — Z85.46 HISTORY OF PROSTATE CANCER: Primary | ICD-10-CM

## 2019-12-20 DIAGNOSIS — I82.412 DVT FEMORAL (DEEP VENOUS THROMBOSIS) WITH THROMBOPHLEBITIS, LEFT (HCC): Primary | ICD-10-CM

## 2019-12-20 DIAGNOSIS — I82.412 DVT FEMORAL (DEEP VENOUS THROMBOSIS) WITH THROMBOPHLEBITIS, LEFT (HCC): ICD-10-CM

## 2019-12-20 DIAGNOSIS — Z79.01 LONG TERM (CURRENT) USE OF ANTICOAGULANTS: ICD-10-CM

## 2019-12-20 LAB
INR PPP: 1.91 (ref 2–3)
PROTHROMBIN TIME: 18.4 SECONDS (ref 19.4–28.5)
PSA SERPL-MCNC: <0.014 NG/ML (ref 0–4)

## 2019-12-20 PROCEDURE — 36415 COLL VENOUS BLD VENIPUNCTURE: CPT

## 2019-12-20 PROCEDURE — 99214 OFFICE O/P EST MOD 30 MIN: CPT | Performed by: INTERNAL MEDICINE

## 2019-12-20 PROCEDURE — 84153 ASSAY OF PSA TOTAL: CPT

## 2019-12-20 PROCEDURE — G0103 PSA SCREENING: HCPCS

## 2019-12-20 PROCEDURE — 85610 PROTHROMBIN TIME: CPT

## 2019-12-20 PROCEDURE — G0463 HOSPITAL OUTPT CLINIC VISIT: HCPCS | Performed by: INTERNAL MEDICINE

## 2019-12-20 RX ORDER — WARFARIN SODIUM 5 MG/1
5 TABLET ORAL DAILY
COMMUNITY
Start: 2019-12-13

## 2020-02-21 ENCOUNTER — TRANSCRIBE ORDERS (OUTPATIENT)
Dept: ADMINISTRATIVE | Facility: HOSPITAL | Age: 70
End: 2020-02-21

## 2020-02-21 ENCOUNTER — LAB (OUTPATIENT)
Dept: LAB | Facility: HOSPITAL | Age: 70
End: 2020-02-21

## 2020-02-21 DIAGNOSIS — Z79.01 LONG TERM (CURRENT) USE OF ANTICOAGULANTS: ICD-10-CM

## 2020-02-21 DIAGNOSIS — Z79.01 LONG TERM (CURRENT) USE OF ANTICOAGULANTS: Primary | ICD-10-CM

## 2020-02-21 LAB
INR PPP: 1.87 (ref 2–3)
PROTHROMBIN TIME: 18.1 SECONDS (ref 19.4–28.5)

## 2020-02-21 PROCEDURE — 36415 COLL VENOUS BLD VENIPUNCTURE: CPT

## 2020-02-21 PROCEDURE — 85610 PROTHROMBIN TIME: CPT

## 2020-06-12 ENCOUNTER — TRANSCRIBE ORDERS (OUTPATIENT)
Dept: ADMINISTRATIVE | Facility: HOSPITAL | Age: 70
End: 2020-06-12

## 2020-06-12 ENCOUNTER — LAB (OUTPATIENT)
Dept: LAB | Facility: HOSPITAL | Age: 70
End: 2020-06-12

## 2020-06-12 DIAGNOSIS — I10 ESSENTIAL HYPERTENSION, MALIGNANT: ICD-10-CM

## 2020-06-12 DIAGNOSIS — R80.9 PROTEINURIA, UNSPECIFIED TYPE: ICD-10-CM

## 2020-06-12 DIAGNOSIS — E11.8 DIABETIC COMPLICATION (HCC): ICD-10-CM

## 2020-06-12 DIAGNOSIS — N18.30 CHRONIC KIDNEY DISEASE, STAGE III (MODERATE) (HCC): ICD-10-CM

## 2020-06-12 DIAGNOSIS — E55.9 AVITAMINOSIS D: ICD-10-CM

## 2020-06-12 DIAGNOSIS — D63.1 ANEMIA IN END-STAGE RENAL DISEASE (HCC): Primary | ICD-10-CM

## 2020-06-12 DIAGNOSIS — D63.1 ANEMIA IN END-STAGE RENAL DISEASE (HCC): ICD-10-CM

## 2020-06-12 DIAGNOSIS — N18.6 ANEMIA IN END-STAGE RENAL DISEASE (HCC): ICD-10-CM

## 2020-06-12 DIAGNOSIS — N18.6 ANEMIA IN END-STAGE RENAL DISEASE (HCC): Primary | ICD-10-CM

## 2020-06-12 LAB
25(OH)D3 SERPL-MCNC: 35.9 NG/ML (ref 30–100)
ANION GAP SERPL CALCULATED.3IONS-SCNC: 12.8 MMOL/L (ref 5–15)
BACTERIA UR QL AUTO: ABNORMAL /HPF
BASOPHILS # BLD AUTO: 0.04 10*3/MM3 (ref 0–0.2)
BASOPHILS NFR BLD AUTO: 0.8 % (ref 0–1.5)
BILIRUB UR QL STRIP: NEGATIVE
BUN BLD-MCNC: 42 MG/DL (ref 8–23)
BUN/CREAT SERPL: 20.4 (ref 7–25)
CALCIUM SPEC-SCNC: 9.8 MG/DL (ref 8.6–10.5)
CHLORIDE SERPL-SCNC: 104 MMOL/L (ref 98–107)
CLARITY UR: ABNORMAL
CO2 SERPL-SCNC: 23.2 MMOL/L (ref 22–29)
COLOR UR: YELLOW
CREAT BLD-MCNC: 2.06 MG/DL (ref 0.76–1.27)
CREAT UR-MCNC: 82.1 MG/DL
DEPRECATED RDW RBC AUTO: 42 FL (ref 37–54)
EOSINOPHIL # BLD AUTO: 0.13 10*3/MM3 (ref 0–0.4)
EOSINOPHIL NFR BLD AUTO: 2.7 % (ref 0.3–6.2)
ERYTHROCYTE [DISTWIDTH] IN BLOOD BY AUTOMATED COUNT: 12.6 % (ref 12.3–15.4)
GFR SERPL CREATININE-BSD FRML MDRD: 32 ML/MIN/1.73
GLUCOSE BLD-MCNC: 114 MG/DL (ref 65–99)
GLUCOSE UR STRIP-MCNC: NEGATIVE MG/DL
HBA1C MFR BLD: 6.3 % (ref 3.5–5.6)
HCT VFR BLD AUTO: 35.9 % (ref 37.5–51)
HGB BLD-MCNC: 12.1 G/DL (ref 13–17.7)
HGB UR QL STRIP.AUTO: ABNORMAL
HYALINE CASTS UR QL AUTO: ABNORMAL /LPF
IMM GRANULOCYTES # BLD AUTO: 0.01 10*3/MM3 (ref 0–0.05)
IMM GRANULOCYTES NFR BLD AUTO: 0.2 % (ref 0–0.5)
INR PPP: 3.03 (ref 2–3)
KETONES UR QL STRIP: NEGATIVE
LEUKOCYTE ESTERASE UR QL STRIP.AUTO: ABNORMAL
LYMPHOCYTES # BLD AUTO: 0.84 10*3/MM3 (ref 0.7–3.1)
LYMPHOCYTES NFR BLD AUTO: 17.7 % (ref 19.6–45.3)
MAGNESIUM SERPL-MCNC: 2.1 MG/DL (ref 1.6–2.4)
MCH RBC QN AUTO: 30.9 PG (ref 26.6–33)
MCHC RBC AUTO-ENTMCNC: 33.7 G/DL (ref 31.5–35.7)
MCV RBC AUTO: 91.6 FL (ref 79–97)
MONOCYTES # BLD AUTO: 0.51 10*3/MM3 (ref 0.1–0.9)
MONOCYTES NFR BLD AUTO: 10.8 % (ref 5–12)
NEUTROPHILS # BLD AUTO: 3.21 10*3/MM3 (ref 1.7–7)
NEUTROPHILS NFR BLD AUTO: 67.8 % (ref 42.7–76)
NITRITE UR QL STRIP: NEGATIVE
NRBC BLD AUTO-RTO: 0 /100 WBC (ref 0–0.2)
PH UR STRIP.AUTO: 7 [PH] (ref 5–8)
PLATELET # BLD AUTO: 227 10*3/MM3 (ref 140–450)
PMV BLD AUTO: 9 FL (ref 6–12)
POTASSIUM BLD-SCNC: 5.3 MMOL/L (ref 3.5–5.2)
PROT SERPL-MCNC: 7.3 G/DL (ref 6–8.5)
PROT UR QL STRIP: ABNORMAL
PROT UR-MCNC: 95 MG/DL
PROT/CREAT UR: 1157.1 MG/G CREA (ref 0–200)
PROTHROMBIN TIME: 28.6 SECONDS (ref 19.4–28.5)
PTH-INTACT SERPL-MCNC: 38.1 PG/ML (ref 15–65)
RBC # BLD AUTO: 3.92 10*6/MM3 (ref 4.14–5.8)
RBC # UR: ABNORMAL /HPF
REF LAB TEST METHOD: ABNORMAL
SODIUM BLD-SCNC: 140 MMOL/L (ref 136–145)
SP GR UR STRIP: 1.01 (ref 1–1.03)
SQUAMOUS #/AREA URNS HPF: ABNORMAL /HPF
URATE SERPL-MCNC: 5.9 MG/DL (ref 3.4–7)
UROBILINOGEN UR QL STRIP: ABNORMAL
WBC NRBC COR # BLD: 4.74 10*3/MM3 (ref 3.4–10.8)
WBC UR QL AUTO: ABNORMAL /HPF

## 2020-06-12 PROCEDURE — 85610 PROTHROMBIN TIME: CPT

## 2020-06-12 PROCEDURE — 83036 HEMOGLOBIN GLYCOSYLATED A1C: CPT

## 2020-06-12 PROCEDURE — 84550 ASSAY OF BLOOD/URIC ACID: CPT

## 2020-06-12 PROCEDURE — 82306 VITAMIN D 25 HYDROXY: CPT

## 2020-06-12 PROCEDURE — 36415 COLL VENOUS BLD VENIPUNCTURE: CPT

## 2020-06-12 PROCEDURE — 85025 COMPLETE CBC W/AUTO DIFF WBC: CPT

## 2020-06-12 PROCEDURE — 81001 URINALYSIS AUTO W/SCOPE: CPT

## 2020-06-12 PROCEDURE — 83735 ASSAY OF MAGNESIUM: CPT

## 2020-06-12 PROCEDURE — 83970 ASSAY OF PARATHORMONE: CPT

## 2020-06-12 PROCEDURE — 84155 ASSAY OF PROTEIN SERUM: CPT

## 2020-06-12 PROCEDURE — 80048 BASIC METABOLIC PNL TOTAL CA: CPT

## 2020-06-12 PROCEDURE — 82570 ASSAY OF URINE CREATININE: CPT

## 2020-06-12 PROCEDURE — 84156 ASSAY OF PROTEIN URINE: CPT

## 2020-10-29 ENCOUNTER — LAB (OUTPATIENT)
Dept: LAB | Facility: HOSPITAL | Age: 70
End: 2020-10-29

## 2020-10-29 ENCOUNTER — TRANSCRIBE ORDERS (OUTPATIENT)
Dept: ADMINISTRATIVE | Facility: HOSPITAL | Age: 70
End: 2020-10-29

## 2020-10-29 DIAGNOSIS — R80.9 PROTEINURIA, UNSPECIFIED TYPE: ICD-10-CM

## 2020-10-29 DIAGNOSIS — I13.10 MALIGNANT HYPERTENSIVE HEART AND CHRONIC KIDNEY DISEASE STAGE III (HCC): ICD-10-CM

## 2020-10-29 DIAGNOSIS — E11.8 DIABETIC COMPLICATION (HCC): ICD-10-CM

## 2020-10-29 DIAGNOSIS — E55.9 VITAMIN D DEFICIENCY, UNSPECIFIED: ICD-10-CM

## 2020-10-29 DIAGNOSIS — N17.9 ACUTE RENAL FAILURE, UNSPECIFIED ACUTE RENAL FAILURE TYPE (HCC): Primary | ICD-10-CM

## 2020-10-29 DIAGNOSIS — N17.9 ACUTE RENAL FAILURE, UNSPECIFIED ACUTE RENAL FAILURE TYPE (HCC): ICD-10-CM

## 2020-10-29 DIAGNOSIS — N18.30 MALIGNANT HYPERTENSIVE HEART AND CHRONIC KIDNEY DISEASE STAGE III (HCC): ICD-10-CM

## 2020-10-29 DIAGNOSIS — I10 ESSENTIAL HYPERTENSION: ICD-10-CM

## 2020-10-29 LAB
25(OH)D3 SERPL-MCNC: 24.1 NG/ML (ref 30–100)
ANION GAP SERPL CALCULATED.3IONS-SCNC: 10.7 MMOL/L (ref 5–15)
BACTERIA UR QL AUTO: ABNORMAL /HPF
BASOPHILS # BLD AUTO: 0.04 10*3/MM3 (ref 0–0.2)
BASOPHILS NFR BLD AUTO: 0.8 % (ref 0–1.5)
BILIRUB UR QL STRIP: NEGATIVE
BUN SERPL-MCNC: 30 MG/DL (ref 8–23)
BUN/CREAT SERPL: 16.7 (ref 7–25)
CALCIUM SPEC-SCNC: 9.7 MG/DL (ref 8.6–10.5)
CHLORIDE SERPL-SCNC: 104 MMOL/L (ref 98–107)
CLARITY UR: ABNORMAL
CO2 SERPL-SCNC: 27.3 MMOL/L (ref 22–29)
COLOR UR: ABNORMAL
CREAT SERPL-MCNC: 1.8 MG/DL (ref 0.76–1.27)
CREAT UR-MCNC: 75.2 MG/DL
DEPRECATED RDW RBC AUTO: 40.1 FL (ref 37–54)
EOSINOPHIL # BLD AUTO: 0.09 10*3/MM3 (ref 0–0.4)
EOSINOPHIL NFR BLD AUTO: 1.9 % (ref 0.3–6.2)
ERYTHROCYTE [DISTWIDTH] IN BLOOD BY AUTOMATED COUNT: 12.6 % (ref 12.3–15.4)
GFR SERPL CREATININE-BSD FRML MDRD: 37 ML/MIN/1.73
GLUCOSE SERPL-MCNC: 92 MG/DL (ref 65–99)
GLUCOSE UR STRIP-MCNC: NEGATIVE MG/DL
HBA1C MFR BLD: 5.8 % (ref 3.5–5.6)
HCT VFR BLD AUTO: 34.4 % (ref 37.5–51)
HGB BLD-MCNC: 11.5 G/DL (ref 13–17.7)
HGB UR QL STRIP.AUTO: ABNORMAL
HYALINE CASTS UR QL AUTO: ABNORMAL /LPF
IMM GRANULOCYTES # BLD AUTO: 0.01 10*3/MM3 (ref 0–0.05)
IMM GRANULOCYTES NFR BLD AUTO: 0.2 % (ref 0–0.5)
KETONES UR QL STRIP: NEGATIVE
LEUKOCYTE ESTERASE UR QL STRIP.AUTO: ABNORMAL
LYMPHOCYTES # BLD AUTO: 0.96 10*3/MM3 (ref 0.7–3.1)
LYMPHOCYTES NFR BLD AUTO: 20.1 % (ref 19.6–45.3)
MCH RBC QN AUTO: 29.3 PG (ref 26.6–33)
MCHC RBC AUTO-ENTMCNC: 33.4 G/DL (ref 31.5–35.7)
MCV RBC AUTO: 87.5 FL (ref 79–97)
MONOCYTES # BLD AUTO: 0.49 10*3/MM3 (ref 0.1–0.9)
MONOCYTES NFR BLD AUTO: 10.3 % (ref 5–12)
NEUTROPHILS NFR BLD AUTO: 3.18 10*3/MM3 (ref 1.7–7)
NEUTROPHILS NFR BLD AUTO: 66.7 % (ref 42.7–76)
NITRITE UR QL STRIP: NEGATIVE
NRBC BLD AUTO-RTO: 0 /100 WBC (ref 0–0.2)
PH UR STRIP.AUTO: 7.5 [PH] (ref 5–8)
PLATELET # BLD AUTO: 214 10*3/MM3 (ref 140–450)
PMV BLD AUTO: 9.1 FL (ref 6–12)
POTASSIUM SERPL-SCNC: 4.8 MMOL/L (ref 3.5–5.2)
PROT UR QL STRIP: ABNORMAL
PROT UR-MCNC: 171 MG/DL
PTH-INTACT SERPL-MCNC: 49.6 PG/ML (ref 15–65)
RBC # BLD AUTO: 3.93 10*6/MM3 (ref 4.14–5.8)
RBC # UR: ABNORMAL /HPF
REF LAB TEST METHOD: ABNORMAL
SODIUM SERPL-SCNC: 142 MMOL/L (ref 136–145)
SP GR UR STRIP: 1.01 (ref 1–1.03)
SQUAMOUS #/AREA URNS HPF: ABNORMAL /HPF
URATE SERPL-MCNC: 6.7 MG/DL (ref 3.4–7)
UROBILINOGEN UR QL STRIP: ABNORMAL
WBC # BLD AUTO: 4.77 10*3/MM3 (ref 3.4–10.8)
WBC UR QL AUTO: ABNORMAL /HPF

## 2020-10-29 PROCEDURE — 81001 URINALYSIS AUTO W/SCOPE: CPT

## 2020-10-29 PROCEDURE — 82306 VITAMIN D 25 HYDROXY: CPT

## 2020-10-29 PROCEDURE — 83970 ASSAY OF PARATHORMONE: CPT

## 2020-10-29 PROCEDURE — 82570 ASSAY OF URINE CREATININE: CPT

## 2020-10-29 PROCEDURE — 87086 URINE CULTURE/COLONY COUNT: CPT

## 2020-10-29 PROCEDURE — 80048 BASIC METABOLIC PNL TOTAL CA: CPT

## 2020-10-29 PROCEDURE — 83036 HEMOGLOBIN GLYCOSYLATED A1C: CPT

## 2020-10-29 PROCEDURE — 84156 ASSAY OF PROTEIN URINE: CPT

## 2020-10-29 PROCEDURE — 84550 ASSAY OF BLOOD/URIC ACID: CPT

## 2020-10-29 PROCEDURE — 36415 COLL VENOUS BLD VENIPUNCTURE: CPT

## 2020-10-29 PROCEDURE — 85025 COMPLETE CBC W/AUTO DIFF WBC: CPT

## 2020-10-30 LAB — BACTERIA SPEC AEROBE CULT: NORMAL

## 2021-06-10 ENCOUNTER — LAB (OUTPATIENT)
Dept: LAB | Facility: HOSPITAL | Age: 71
End: 2021-06-10

## 2021-06-10 ENCOUNTER — TRANSCRIBE ORDERS (OUTPATIENT)
Dept: ADMINISTRATIVE | Facility: HOSPITAL | Age: 71
End: 2021-06-10

## 2021-06-10 DIAGNOSIS — N18.30 MALIGNANT HYPERTENSIVE HEART AND CHRONIC KIDNEY DISEASE STAGE III (HCC): ICD-10-CM

## 2021-06-10 DIAGNOSIS — N18.30 MALIGNANT HYPERTENSIVE HEART AND CHRONIC KIDNEY DISEASE STAGE III (HCC): Primary | ICD-10-CM

## 2021-06-10 DIAGNOSIS — I13.10 MALIGNANT HYPERTENSIVE HEART AND CHRONIC KIDNEY DISEASE STAGE III (HCC): ICD-10-CM

## 2021-06-10 DIAGNOSIS — I13.10 MALIGNANT HYPERTENSIVE HEART AND CHRONIC KIDNEY DISEASE STAGE III (HCC): Primary | ICD-10-CM

## 2021-06-10 DIAGNOSIS — E55.9 VITAMIN D DEFICIENCY, UNSPECIFIED: ICD-10-CM

## 2021-06-10 LAB
25(OH)D3 SERPL-MCNC: 31.7 NG/ML
ALBUMIN SERPL-MCNC: 4.1 G/DL (ref 3.5–5.2)
ALBUMIN/GLOB SERPL: 1.6 G/DL
ALP SERPL-CCNC: 56 U/L (ref 39–117)
ALT SERPL W P-5'-P-CCNC: 12 U/L (ref 1–41)
ANION GAP SERPL CALCULATED.3IONS-SCNC: 6.4 MMOL/L (ref 5–15)
AST SERPL-CCNC: 15 U/L (ref 1–40)
BACTERIA UR QL AUTO: ABNORMAL /HPF
BASOPHILS # BLD AUTO: 0.06 10*3/MM3 (ref 0–0.2)
BASOPHILS NFR BLD AUTO: 1.5 % (ref 0–1.5)
BILIRUB SERPL-MCNC: 0.3 MG/DL (ref 0–1.2)
BILIRUB UR QL STRIP: NEGATIVE
BUN SERPL-MCNC: 33 MG/DL (ref 8–23)
BUN/CREAT SERPL: 18.1 (ref 7–25)
CALCIUM SPEC-SCNC: 8.6 MG/DL (ref 8.6–10.5)
CHLORIDE SERPL-SCNC: 108 MMOL/L (ref 98–107)
CK SERPL-CCNC: 94 U/L (ref 20–200)
CLARITY UR: CLEAR
CO2 SERPL-SCNC: 24.6 MMOL/L (ref 22–29)
COLOR UR: YELLOW
CREAT SERPL-MCNC: 1.82 MG/DL (ref 0.76–1.27)
CREAT UR-MCNC: 123.1 MG/DL
DEPRECATED RDW RBC AUTO: 41.1 FL (ref 37–54)
EOSINOPHIL # BLD AUTO: 0.13 10*3/MM3 (ref 0–0.4)
EOSINOPHIL NFR BLD AUTO: 3.2 % (ref 0.3–6.2)
ERYTHROCYTE [DISTWIDTH] IN BLOOD BY AUTOMATED COUNT: 12.3 % (ref 12.3–15.4)
GFR SERPL CREATININE-BSD FRML MDRD: 37 ML/MIN/1.73
GLOBULIN UR ELPH-MCNC: 2.5 GM/DL
GLUCOSE SERPL-MCNC: 110 MG/DL (ref 65–99)
GLUCOSE UR STRIP-MCNC: NEGATIVE MG/DL
HCT VFR BLD AUTO: 37.8 % (ref 37.5–51)
HGB BLD-MCNC: 12.4 G/DL (ref 13–17.7)
HGB UR QL STRIP.AUTO: ABNORMAL
HYALINE CASTS UR QL AUTO: ABNORMAL /LPF
IMM GRANULOCYTES # BLD AUTO: 0.01 10*3/MM3 (ref 0–0.05)
IMM GRANULOCYTES NFR BLD AUTO: 0.2 % (ref 0–0.5)
KETONES UR QL STRIP: NEGATIVE
LEUKOCYTE ESTERASE UR QL STRIP.AUTO: ABNORMAL
LYMPHOCYTES # BLD AUTO: 0.75 10*3/MM3 (ref 0.7–3.1)
LYMPHOCYTES NFR BLD AUTO: 18.4 % (ref 19.6–45.3)
MAGNESIUM SERPL-MCNC: 2 MG/DL (ref 1.6–2.4)
MCH RBC QN AUTO: 29.9 PG (ref 26.6–33)
MCHC RBC AUTO-ENTMCNC: 32.8 G/DL (ref 31.5–35.7)
MCV RBC AUTO: 91.1 FL (ref 79–97)
MONOCYTES # BLD AUTO: 0.49 10*3/MM3 (ref 0.1–0.9)
MONOCYTES NFR BLD AUTO: 12 % (ref 5–12)
NEUTROPHILS NFR BLD AUTO: 2.64 10*3/MM3 (ref 1.7–7)
NEUTROPHILS NFR BLD AUTO: 64.7 % (ref 42.7–76)
NITRITE UR QL STRIP: NEGATIVE
NRBC BLD AUTO-RTO: 0 /100 WBC (ref 0–0.2)
PH UR STRIP.AUTO: 7.5 [PH] (ref 5–8)
PLATELET # BLD AUTO: 208 10*3/MM3 (ref 140–450)
PMV BLD AUTO: 9.6 FL (ref 6–12)
POTASSIUM SERPL-SCNC: 5.1 MMOL/L (ref 3.5–5.2)
PROT SERPL-MCNC: 6.6 G/DL (ref 6–8.5)
PROT UR QL STRIP: ABNORMAL
PROT UR-MCNC: 382 MG/DL
PROT/CREAT UR: 3103.2 MG/G CREA (ref 0–200)
PTH-INTACT SERPL-MCNC: 142 PG/ML (ref 15–65)
RBC # BLD AUTO: 4.15 10*6/MM3 (ref 4.14–5.8)
RBC # UR: ABNORMAL /HPF
REF LAB TEST METHOD: ABNORMAL
SODIUM SERPL-SCNC: 139 MMOL/L (ref 136–145)
SP GR UR STRIP: 1.02 (ref 1–1.03)
SQUAMOUS #/AREA URNS HPF: ABNORMAL /HPF
TRI-PHOS CRY URNS QL MICRO: ABNORMAL /HPF
TSH SERPL DL<=0.05 MIU/L-ACNC: 3.42 UIU/ML (ref 0.27–4.2)
URATE SERPL-MCNC: 6.1 MG/DL (ref 3.4–7)
UROBILINOGEN UR QL STRIP: ABNORMAL
WBC # BLD AUTO: 4.08 10*3/MM3 (ref 3.4–10.8)
WBC UR QL AUTO: ABNORMAL /HPF

## 2021-06-10 PROCEDURE — 80053 COMPREHEN METABOLIC PANEL: CPT

## 2021-06-10 PROCEDURE — 84550 ASSAY OF BLOOD/URIC ACID: CPT

## 2021-06-10 PROCEDURE — 85025 COMPLETE CBC W/AUTO DIFF WBC: CPT

## 2021-06-10 PROCEDURE — 81001 URINALYSIS AUTO W/SCOPE: CPT

## 2021-06-10 PROCEDURE — 82306 VITAMIN D 25 HYDROXY: CPT

## 2021-06-10 PROCEDURE — 84156 ASSAY OF PROTEIN URINE: CPT

## 2021-06-10 PROCEDURE — 82570 ASSAY OF URINE CREATININE: CPT

## 2021-06-10 PROCEDURE — 84443 ASSAY THYROID STIM HORMONE: CPT

## 2021-06-10 PROCEDURE — 83970 ASSAY OF PARATHORMONE: CPT

## 2021-06-10 PROCEDURE — 82550 ASSAY OF CK (CPK): CPT

## 2021-06-10 PROCEDURE — 87086 URINE CULTURE/COLONY COUNT: CPT

## 2021-06-10 PROCEDURE — 36415 COLL VENOUS BLD VENIPUNCTURE: CPT

## 2021-06-10 PROCEDURE — 83735 ASSAY OF MAGNESIUM: CPT

## 2021-06-12 LAB — BACTERIA SPEC AEROBE CULT: NORMAL

## 2022-01-05 ENCOUNTER — HOSPITAL ENCOUNTER (INPATIENT)
Facility: HOSPITAL | Age: 72
LOS: 4 days | Discharge: HOME | End: 2022-01-11
Attending: INTERNAL MEDICINE | Admitting: INTERNAL MEDICINE

## 2022-01-05 ENCOUNTER — APPOINTMENT (OUTPATIENT)
Dept: GENERAL RADIOLOGY | Facility: HOSPITAL | Age: 72
End: 2022-01-05

## 2022-01-05 DIAGNOSIS — N17.9 ACUTE KIDNEY INJURY: ICD-10-CM

## 2022-01-05 DIAGNOSIS — R09.02 HYPOXIA: ICD-10-CM

## 2022-01-05 DIAGNOSIS — U07.1 COVID-19: Primary | ICD-10-CM

## 2022-01-05 LAB
ALBUMIN SERPL-MCNC: 3.3 G/DL (ref 3.5–5.2)
ALBUMIN/GLOB SERPL: 1.3 G/DL
ALP SERPL-CCNC: 45 U/L (ref 39–117)
ALT SERPL W P-5'-P-CCNC: 12 U/L (ref 1–41)
ANION GAP SERPL CALCULATED.3IONS-SCNC: 10 MMOL/L (ref 5–15)
AST SERPL-CCNC: 20 U/L (ref 1–40)
BASOPHILS # BLD AUTO: 0 10*3/MM3 (ref 0–0.2)
BASOPHILS NFR BLD AUTO: 0.8 % (ref 0–1.5)
BILIRUB SERPL-MCNC: 0.3 MG/DL (ref 0–1.2)
BUN SERPL-MCNC: 36 MG/DL (ref 8–23)
BUN/CREAT SERPL: 12.6 (ref 7–25)
CALCIUM SPEC-SCNC: 8 MG/DL (ref 8.6–10.5)
CHLORIDE SERPL-SCNC: 99 MMOL/L (ref 98–107)
CO2 SERPL-SCNC: 25 MMOL/L (ref 22–29)
CREAT SERPL-MCNC: 2.86 MG/DL (ref 0.76–1.27)
D DIMER PPP FEU-MCNC: 0.5 MG/L (FEU) (ref 0–0.59)
DEPRECATED RDW RBC AUTO: 40.3 FL (ref 37–54)
EOSINOPHIL # BLD AUTO: 0 10*3/MM3 (ref 0–0.4)
EOSINOPHIL NFR BLD AUTO: 0.1 % (ref 0.3–6.2)
ERYTHROCYTE [DISTWIDTH] IN BLOOD BY AUTOMATED COUNT: 13 % (ref 12.3–15.4)
GFR SERPL CREATININE-BSD FRML MDRD: 22 ML/MIN/1.73
GLOBULIN UR ELPH-MCNC: 2.6 GM/DL
GLUCOSE SERPL-MCNC: 97 MG/DL (ref 65–99)
HCT VFR BLD AUTO: 32.6 % (ref 37.5–51)
HGB BLD-MCNC: 11 G/DL (ref 13–17.7)
INR PPP: 1.22 (ref 2–3)
LYMPHOCYTES # BLD AUTO: 0.5 10*3/MM3 (ref 0.7–3.1)
LYMPHOCYTES NFR BLD AUTO: 8.9 % (ref 19.6–45.3)
MCH RBC QN AUTO: 30.3 PG (ref 26.6–33)
MCHC RBC AUTO-ENTMCNC: 33.8 G/DL (ref 31.5–35.7)
MCV RBC AUTO: 89.6 FL (ref 79–97)
MONOCYTES # BLD AUTO: 0.6 10*3/MM3 (ref 0.1–0.9)
MONOCYTES NFR BLD AUTO: 11.2 % (ref 5–12)
NEUTROPHILS NFR BLD AUTO: 4.3 10*3/MM3 (ref 1.7–7)
NEUTROPHILS NFR BLD AUTO: 79 % (ref 42.7–76)
NRBC BLD AUTO-RTO: 0 /100 WBC (ref 0–0.2)
NT-PROBNP SERPL-MCNC: 193.3 PG/ML (ref 0–900)
PLATELET # BLD AUTO: 158 10*3/MM3 (ref 140–450)
PMV BLD AUTO: 7.1 FL (ref 6–12)
POTASSIUM SERPL-SCNC: 4.4 MMOL/L (ref 3.5–5.2)
PROT SERPL-MCNC: 5.9 G/DL (ref 6–8.5)
PROTHROMBIN TIME: 13.3 SECONDS (ref 19.4–28.5)
RBC # BLD AUTO: 3.64 10*6/MM3 (ref 4.14–5.8)
SARS-COV-2 RNA PNL SPEC NAA+PROBE: DETECTED
SODIUM SERPL-SCNC: 134 MMOL/L (ref 136–145)
TROPONIN T SERPL-MCNC: <0.01 NG/ML (ref 0–0.03)
WBC NRBC COR # BLD: 5.5 10*3/MM3 (ref 3.4–10.8)

## 2022-01-05 PROCEDURE — 99284 EMERGENCY DEPT VISIT MOD MDM: CPT

## 2022-01-05 PROCEDURE — 93005 ELECTROCARDIOGRAM TRACING: CPT

## 2022-01-05 PROCEDURE — 80053 COMPREHEN METABOLIC PANEL: CPT | Performed by: NURSE PRACTITIONER

## 2022-01-05 PROCEDURE — 83880 ASSAY OF NATRIURETIC PEPTIDE: CPT | Performed by: NURSE PRACTITIONER

## 2022-01-05 PROCEDURE — 84484 ASSAY OF TROPONIN QUANT: CPT | Performed by: NURSE PRACTITIONER

## 2022-01-05 PROCEDURE — 85610 PROTHROMBIN TIME: CPT | Performed by: NURSE PRACTITIONER

## 2022-01-05 PROCEDURE — 71045 X-RAY EXAM CHEST 1 VIEW: CPT

## 2022-01-05 PROCEDURE — 85025 COMPLETE CBC W/AUTO DIFF WBC: CPT | Performed by: NURSE PRACTITIONER

## 2022-01-05 PROCEDURE — U0003 INFECTIOUS AGENT DETECTION BY NUCLEIC ACID (DNA OR RNA); SEVERE ACUTE RESPIRATORY SYNDROME CORONAVIRUS 2 (SARS-COV-2) (CORONAVIRUS DISEASE [COVID-19]), AMPLIFIED PROBE TECHNIQUE, MAKING USE OF HIGH THROUGHPUT TECHNOLOGIES AS DESCRIBED BY CMS-2020-01-R: HCPCS

## 2022-01-05 PROCEDURE — 93005 ELECTROCARDIOGRAM TRACING: CPT | Performed by: INTERNAL MEDICINE

## 2022-01-05 PROCEDURE — 85379 FIBRIN DEGRADATION QUANT: CPT | Performed by: NURSE PRACTITIONER

## 2022-01-05 RX ORDER — SODIUM CHLORIDE 0.9 % (FLUSH) 0.9 %
10 SYRINGE (ML) INJECTION AS NEEDED
Status: DISCONTINUED | OUTPATIENT
Start: 2022-01-05 | End: 2022-01-11 | Stop reason: HOSPADM

## 2022-01-06 PROBLEM — E11.22 TYPE 2 DIABETES MELLITUS WITH STAGE 3 CHRONIC KIDNEY DISEASE AND HYPERTENSION (HCC): Status: ACTIVE | Noted: 2022-01-06

## 2022-01-06 PROBLEM — U07.1 COVID-19: Status: ACTIVE | Noted: 2022-01-06

## 2022-01-06 PROBLEM — J12.82 PNEUMONIA DUE TO COVID-19 VIRUS: Status: ACTIVE | Noted: 2022-01-06

## 2022-01-06 PROBLEM — N18.30 TYPE 2 DIABETES MELLITUS WITH STAGE 3 CHRONIC KIDNEY DISEASE AND HYPERTENSION: Status: ACTIVE | Noted: 2022-01-06

## 2022-01-06 PROBLEM — N17.9 AKI (ACUTE KIDNEY INJURY): Status: ACTIVE | Noted: 2022-01-06

## 2022-01-06 PROBLEM — U07.1 PNEUMONIA DUE TO COVID-19 VIRUS: Status: ACTIVE | Noted: 2022-01-06

## 2022-01-06 PROBLEM — E11.40 DIABETES MELLITUS WITH NEUROPATHY: Status: ACTIVE | Noted: 2022-01-06

## 2022-01-06 PROBLEM — Z86.718 HISTORY OF DVT (DEEP VEIN THROMBOSIS): Status: ACTIVE | Noted: 2022-01-06

## 2022-01-06 PROBLEM — I12.9 TYPE 2 DIABETES MELLITUS WITH STAGE 3 CHRONIC KIDNEY DISEASE AND HYPERTENSION: Status: ACTIVE | Noted: 2022-01-06

## 2022-01-06 LAB
ALBUMIN SERPL-MCNC: 3.2 G/DL (ref 3.5–5.2)
ALBUMIN SERPL-MCNC: 3.2 G/DL (ref 3.5–5.2)
ALBUMIN/GLOB SERPL: 1.2 G/DL
ALP SERPL-CCNC: 46 U/L (ref 39–117)
ALP SERPL-CCNC: 47 U/L (ref 39–117)
ALT SERPL W P-5'-P-CCNC: 10 U/L (ref 1–41)
ALT SERPL W P-5'-P-CCNC: 10 U/L (ref 1–41)
ANION GAP SERPL CALCULATED.3IONS-SCNC: 10 MMOL/L (ref 5–15)
AST SERPL-CCNC: 15 U/L (ref 1–40)
AST SERPL-CCNC: 16 U/L (ref 1–40)
BASOPHILS # BLD AUTO: 0 10*3/MM3 (ref 0–0.2)
BASOPHILS NFR BLD AUTO: 0.3 % (ref 0–1.5)
BILIRUB CONJ SERPL-MCNC: <0.2 MG/DL (ref 0–0.3)
BILIRUB INDIRECT SERPL-MCNC: ABNORMAL MG/DL
BILIRUB SERPL-MCNC: 0.2 MG/DL (ref 0–1.2)
BILIRUB SERPL-MCNC: 0.3 MG/DL (ref 0–1.2)
BUN SERPL-MCNC: 35 MG/DL (ref 8–23)
BUN/CREAT SERPL: 15.8 (ref 7–25)
CALCIUM SPEC-SCNC: 8.2 MG/DL (ref 8.6–10.5)
CHLORIDE SERPL-SCNC: 101 MMOL/L (ref 98–107)
CO2 SERPL-SCNC: 25 MMOL/L (ref 22–29)
CREAT SERPL-MCNC: 1.98 MG/DL (ref 0.76–1.27)
CREAT SERPL-MCNC: 2.22 MG/DL (ref 0.76–1.27)
DEPRECATED RDW RBC AUTO: 41.1 FL (ref 37–54)
EOSINOPHIL # BLD AUTO: 0 10*3/MM3 (ref 0–0.4)
EOSINOPHIL NFR BLD AUTO: 0.2 % (ref 0.3–6.2)
ERYTHROCYTE [DISTWIDTH] IN BLOOD BY AUTOMATED COUNT: 13.1 % (ref 12.3–15.4)
FERRITIN SERPL-MCNC: 148.5 NG/ML (ref 30–400)
GFR SERPL CREATININE-BSD FRML MDRD: 29 ML/MIN/1.73
GFR SERPL CREATININE-BSD FRML MDRD: 33 ML/MIN/1.73
GLOBULIN UR ELPH-MCNC: 2.7 GM/DL
GLUCOSE BLDC GLUCOMTR-MCNC: 135 MG/DL (ref 70–105)
GLUCOSE BLDC GLUCOMTR-MCNC: 172 MG/DL (ref 70–105)
GLUCOSE BLDC GLUCOMTR-MCNC: 288 MG/DL (ref 70–105)
GLUCOSE SERPL-MCNC: 111 MG/DL (ref 65–99)
HBA1C MFR BLD: 6 % (ref 3.5–5.6)
HCT VFR BLD AUTO: 31.5 % (ref 37.5–51)
HGB BLD-MCNC: 10.8 G/DL (ref 13–17.7)
HOLD SPECIMEN: NORMAL
INR PPP: 1.23 (ref 2–3)
INR PPP: 1.24 (ref 2–3)
LYMPHOCYTES # BLD AUTO: 0.4 10*3/MM3 (ref 0.7–3.1)
LYMPHOCYTES NFR BLD AUTO: 7.4 % (ref 19.6–45.3)
MAGNESIUM SERPL-MCNC: 1.8 MG/DL (ref 1.6–2.4)
MCH RBC QN AUTO: 30.7 PG (ref 26.6–33)
MCHC RBC AUTO-ENTMCNC: 34.2 G/DL (ref 31.5–35.7)
MCV RBC AUTO: 89.8 FL (ref 79–97)
MONOCYTES # BLD AUTO: 0.5 10*3/MM3 (ref 0.1–0.9)
MONOCYTES NFR BLD AUTO: 9.3 % (ref 5–12)
NEUTROPHILS NFR BLD AUTO: 4.5 10*3/MM3 (ref 1.7–7)
NEUTROPHILS NFR BLD AUTO: 82.8 % (ref 42.7–76)
NRBC BLD AUTO-RTO: 0 /100 WBC (ref 0–0.2)
PHOSPHATE SERPL-MCNC: 2.6 MG/DL (ref 2.5–4.5)
PLATELET # BLD AUTO: 159 10*3/MM3 (ref 140–450)
PMV BLD AUTO: 6.9 FL (ref 6–12)
POTASSIUM SERPL-SCNC: 4.2 MMOL/L (ref 3.5–5.2)
PROT SERPL-MCNC: 5.7 G/DL (ref 6–8.5)
PROT SERPL-MCNC: 5.9 G/DL (ref 6–8.5)
PROTHROMBIN TIME: 13.4 SECONDS (ref 19.4–28.5)
PROTHROMBIN TIME: 13.5 SECONDS (ref 19.4–28.5)
RBC # BLD AUTO: 3.51 10*6/MM3 (ref 4.14–5.8)
SODIUM SERPL-SCNC: 136 MMOL/L (ref 136–145)
TSH SERPL DL<=0.05 MIU/L-ACNC: 2.95 UIU/ML (ref 0.27–4.2)
WBC NRBC COR # BLD: 5.4 10*3/MM3 (ref 3.4–10.8)
WHOLE BLOOD HOLD SPECIMEN: NORMAL
WHOLE BLOOD HOLD SPECIMEN: NORMAL

## 2022-01-06 PROCEDURE — 25010000002 ENOXAPARIN PER 10 MG: Performed by: INTERNAL MEDICINE

## 2022-01-06 PROCEDURE — 94640 AIRWAY INHALATION TREATMENT: CPT

## 2022-01-06 PROCEDURE — 94664 DEMO&/EVAL PT USE INHALER: CPT

## 2022-01-06 PROCEDURE — 84443 ASSAY THYROID STIM HORMONE: CPT | Performed by: NURSE PRACTITIONER

## 2022-01-06 PROCEDURE — 84100 ASSAY OF PHOSPHORUS: CPT | Performed by: NURSE PRACTITIONER

## 2022-01-06 PROCEDURE — 80053 COMPREHEN METABOLIC PANEL: CPT | Performed by: NURSE PRACTITIONER

## 2022-01-06 PROCEDURE — 82565 ASSAY OF CREATININE: CPT | Performed by: NURSE PRACTITIONER

## 2022-01-06 PROCEDURE — 94799 UNLISTED PULMONARY SVC/PX: CPT

## 2022-01-06 PROCEDURE — 63710000001 INSULIN LISPRO (HUMAN) PER 5 UNITS: Performed by: INTERNAL MEDICINE

## 2022-01-06 PROCEDURE — G0378 HOSPITAL OBSERVATION PER HR: HCPCS

## 2022-01-06 PROCEDURE — 82962 GLUCOSE BLOOD TEST: CPT

## 2022-01-06 PROCEDURE — XW033E5 INTRODUCTION OF REMDESIVIR ANTI-INFECTIVE INTO PERIPHERAL VEIN, PERCUTANEOUS APPROACH, NEW TECHNOLOGY GROUP 5: ICD-10-PCS | Performed by: INTERNAL MEDICINE

## 2022-01-06 PROCEDURE — 82248 BILIRUBIN DIRECT: CPT | Performed by: NURSE PRACTITIONER

## 2022-01-06 PROCEDURE — 85610 PROTHROMBIN TIME: CPT | Performed by: NURSE PRACTITIONER

## 2022-01-06 PROCEDURE — 83036 HEMOGLOBIN GLYCOSYLATED A1C: CPT | Performed by: INTERNAL MEDICINE

## 2022-01-06 PROCEDURE — 85025 COMPLETE CBC W/AUTO DIFF WBC: CPT | Performed by: NURSE PRACTITIONER

## 2022-01-06 PROCEDURE — 87040 BLOOD CULTURE FOR BACTERIA: CPT | Performed by: NURSE PRACTITIONER

## 2022-01-06 PROCEDURE — 82728 ASSAY OF FERRITIN: CPT | Performed by: NURSE PRACTITIONER

## 2022-01-06 PROCEDURE — 83735 ASSAY OF MAGNESIUM: CPT | Performed by: NURSE PRACTITIONER

## 2022-01-06 PROCEDURE — 36415 COLL VENOUS BLD VENIPUNCTURE: CPT | Performed by: NURSE PRACTITIONER

## 2022-01-06 RX ORDER — CHOLECALCIFEROL (VITAMIN D3) 125 MCG
5 CAPSULE ORAL NIGHTLY PRN
Status: DISCONTINUED | OUTPATIENT
Start: 2022-01-06 | End: 2022-01-11 | Stop reason: HOSPADM

## 2022-01-06 RX ORDER — SODIUM BICARBONATE 650 MG/1
650 TABLET ORAL 3 TIMES DAILY
Status: DISCONTINUED | OUTPATIENT
Start: 2022-01-06 | End: 2022-01-11 | Stop reason: HOSPADM

## 2022-01-06 RX ORDER — SODIUM CHLORIDE 0.9 % (FLUSH) 0.9 %
10 SYRINGE (ML) INJECTION AS NEEDED
Status: DISCONTINUED | OUTPATIENT
Start: 2022-01-06 | End: 2022-01-11 | Stop reason: HOSPADM

## 2022-01-06 RX ORDER — GABAPENTIN 300 MG/1
300 CAPSULE ORAL EVERY 8 HOURS SCHEDULED
Status: DISCONTINUED | OUTPATIENT
Start: 2022-01-06 | End: 2022-01-11 | Stop reason: HOSPADM

## 2022-01-06 RX ORDER — GLIMEPIRIDE 1 MG/1
1 TABLET ORAL
COMMUNITY

## 2022-01-06 RX ORDER — DEXTROSE MONOHYDRATE 25 G/50ML
25 INJECTION, SOLUTION INTRAVENOUS
Status: DISCONTINUED | OUTPATIENT
Start: 2022-01-06 | End: 2022-01-11 | Stop reason: HOSPADM

## 2022-01-06 RX ORDER — NICOTINE POLACRILEX 4 MG
15 LOZENGE BUCCAL
Status: DISCONTINUED | OUTPATIENT
Start: 2022-01-06 | End: 2022-01-11 | Stop reason: HOSPADM

## 2022-01-06 RX ORDER — INSULIN LISPRO 100 [IU]/ML
0-7 INJECTION, SOLUTION INTRAVENOUS; SUBCUTANEOUS AS NEEDED
Status: DISCONTINUED | OUTPATIENT
Start: 2022-01-06 | End: 2022-01-07

## 2022-01-06 RX ORDER — GABAPENTIN 300 MG/1
900 CAPSULE ORAL 2 TIMES DAILY
Status: DISCONTINUED | OUTPATIENT
Start: 2022-01-06 | End: 2022-01-06

## 2022-01-06 RX ORDER — ZINC SULFATE 50(220)MG
220 CAPSULE ORAL DAILY
Status: DISCONTINUED | OUTPATIENT
Start: 2022-01-06 | End: 2022-01-11 | Stop reason: HOSPADM

## 2022-01-06 RX ORDER — ACETAMINOPHEN 325 MG/1
650 TABLET ORAL EVERY 4 HOURS PRN
Status: DISCONTINUED | OUTPATIENT
Start: 2022-01-06 | End: 2022-01-11 | Stop reason: HOSPADM

## 2022-01-06 RX ORDER — GUAIFENESIN 600 MG/1
600 TABLET, EXTENDED RELEASE ORAL EVERY 12 HOURS SCHEDULED
Status: DISCONTINUED | OUTPATIENT
Start: 2022-01-06 | End: 2022-01-10

## 2022-01-06 RX ORDER — INSULIN LISPRO 100 [IU]/ML
0-7 INJECTION, SOLUTION INTRAVENOUS; SUBCUTANEOUS
Status: DISCONTINUED | OUTPATIENT
Start: 2022-01-06 | End: 2022-01-07

## 2022-01-06 RX ORDER — PANTOPRAZOLE SODIUM 40 MG/1
40 TABLET, DELAYED RELEASE ORAL
Status: DISCONTINUED | OUTPATIENT
Start: 2022-01-07 | End: 2022-01-11 | Stop reason: HOSPADM

## 2022-01-06 RX ORDER — ONDANSETRON 2 MG/ML
4 INJECTION INTRAMUSCULAR; INTRAVENOUS EVERY 6 HOURS PRN
Status: DISCONTINUED | OUTPATIENT
Start: 2022-01-06 | End: 2022-01-11 | Stop reason: HOSPADM

## 2022-01-06 RX ORDER — BUDESONIDE AND FORMOTEROL FUMARATE DIHYDRATE 160; 4.5 UG/1; UG/1
2 AEROSOL RESPIRATORY (INHALATION)
Status: DISCONTINUED | OUTPATIENT
Start: 2022-01-06 | End: 2022-01-11 | Stop reason: HOSPADM

## 2022-01-06 RX ORDER — ALBUTEROL SULFATE 90 UG/1
2 AEROSOL, METERED RESPIRATORY (INHALATION)
Status: DISCONTINUED | OUTPATIENT
Start: 2022-01-06 | End: 2022-01-10

## 2022-01-06 RX ORDER — ONDANSETRON 4 MG/1
4 TABLET, FILM COATED ORAL EVERY 6 HOURS PRN
Status: DISCONTINUED | OUTPATIENT
Start: 2022-01-06 | End: 2022-01-11 | Stop reason: HOSPADM

## 2022-01-06 RX ORDER — WARFARIN SODIUM 2.5 MG/1
5 TABLET ORAL
Status: DISCONTINUED | OUTPATIENT
Start: 2022-01-06 | End: 2022-01-11

## 2022-01-06 RX ORDER — DEXAMETHASONE 6 MG/1
6 TABLET ORAL
Status: DISCONTINUED | OUTPATIENT
Start: 2022-01-06 | End: 2022-01-08

## 2022-01-06 RX ORDER — SODIUM CHLORIDE 0.9 % (FLUSH) 0.9 %
10 SYRINGE (ML) INJECTION EVERY 12 HOURS SCHEDULED
Status: DISCONTINUED | OUTPATIENT
Start: 2022-01-06 | End: 2022-01-11 | Stop reason: HOSPADM

## 2022-01-06 RX ORDER — OLANZAPINE 10 MG/2ML
1 INJECTION, POWDER, LYOPHILIZED, FOR SOLUTION INTRAMUSCULAR AS NEEDED
Status: DISCONTINUED | OUTPATIENT
Start: 2022-01-06 | End: 2022-01-11 | Stop reason: HOSPADM

## 2022-01-06 RX ORDER — HYDRALAZINE HYDROCHLORIDE 50 MG/1
75 TABLET, FILM COATED ORAL 3 TIMES DAILY
COMMUNITY

## 2022-01-06 RX ORDER — SODIUM CHLORIDE 9 MG/ML
75 INJECTION, SOLUTION INTRAVENOUS CONTINUOUS
Status: DISCONTINUED | OUTPATIENT
Start: 2022-01-06 | End: 2022-01-07

## 2022-01-06 RX ADMIN — ALBUTEROL SULFATE 2 PUFF: 108 INHALANT RESPIRATORY (INHALATION) at 19:25

## 2022-01-06 RX ADMIN — GUAIFENESIN 600 MG: 600 TABLET, EXTENDED RELEASE ORAL at 09:58

## 2022-01-06 RX ADMIN — SODIUM BICARBONATE 650 MG TABLET 650 MG: at 21:36

## 2022-01-06 RX ADMIN — BUDESONIDE AND FORMOTEROL FUMARATE DIHYDRATE 2 PUFF: 160; 4.5 AEROSOL RESPIRATORY (INHALATION) at 19:27

## 2022-01-06 RX ADMIN — BUDESONIDE AND FORMOTEROL FUMARATE DIHYDRATE 2 PUFF: 160; 4.5 AEROSOL RESPIRATORY (INHALATION) at 07:54

## 2022-01-06 RX ADMIN — GABAPENTIN 300 MG: 300 CAPSULE ORAL at 13:48

## 2022-01-06 RX ADMIN — SODIUM BICARBONATE 650 MG TABLET 650 MG: at 16:07

## 2022-01-06 RX ADMIN — ENOXAPARIN SODIUM 30 MG: 30 INJECTION SUBCUTANEOUS at 10:01

## 2022-01-06 RX ADMIN — DEXAMETHASONE 6 MG: 6 TABLET ORAL at 09:58

## 2022-01-06 RX ADMIN — WARFARIN 5 MG: 2.5 TABLET ORAL at 17:44

## 2022-01-06 RX ADMIN — SODIUM CHLORIDE, PRESERVATIVE FREE 10 ML: 5 INJECTION INTRAVENOUS at 12:13

## 2022-01-06 RX ADMIN — REMDESIVIR 200 MG: 100 INJECTION, POWDER, LYOPHILIZED, FOR SOLUTION INTRAVENOUS at 13:48

## 2022-01-06 RX ADMIN — ALBUTEROL SULFATE 2 PUFF: 108 INHALANT RESPIRATORY (INHALATION) at 10:40

## 2022-01-06 RX ADMIN — GABAPENTIN 300 MG: 300 CAPSULE ORAL at 21:36

## 2022-01-06 RX ADMIN — SODIUM CHLORIDE, PRESERVATIVE FREE 10 ML: 5 INJECTION INTRAVENOUS at 21:36

## 2022-01-06 RX ADMIN — GUAIFENESIN 600 MG: 600 TABLET, EXTENDED RELEASE ORAL at 21:36

## 2022-01-06 RX ADMIN — ALBUTEROL SULFATE 2 PUFF: 108 INHALANT RESPIRATORY (INHALATION) at 14:55

## 2022-01-06 RX ADMIN — SODIUM CHLORIDE 500 ML: 9 INJECTION, SOLUTION INTRAVENOUS at 00:29

## 2022-01-06 RX ADMIN — SODIUM CHLORIDE 75 ML/HR: 9 INJECTION, SOLUTION INTRAVENOUS at 10:01

## 2022-01-06 RX ADMIN — ZINC SULFATE 220 MG (50 MG) CAPSULE 220 MG: CAPSULE at 09:58

## 2022-01-06 RX ADMIN — INSULIN LISPRO 2 UNITS: 100 INJECTION, SOLUTION INTRAVENOUS; SUBCUTANEOUS at 17:44

## 2022-01-06 NOTE — PROGRESS NOTES
"Pharmacy dosing service  Anticoagulant  Warfarin     Subjective:    Michael Dorantes is a 71 y.o.male being continued on warfarin for  Hx of DVT .    INR Goal: 2 - 3  Home medication?: Yes, warfarin 5 mg PO every day at 1800  Bridge Therapy Present?:  Yes,  Enoxaparin 30 mg SQ Q24H, dose adjusted for LEO per Dr. Rogers  Interacting Medications Evaluation (New/Present/Discontinued): Dexamethasone (may increase INR)  Additional Contributing Factors:       Assessment/Plan:    Pt has been holding warfarin for the past several days (last dose 12/30) due to multiple teeth extractions 1/3. Will resume today at previous home dose of 5mg daily. Lovenox for VTE ppx to be discontinued once INR >2.     Continue to monitor and adjust based on INR.         Date 1/6           INR 1.23           Dose 5mg               Objective:  [Ht: 182.9 cm (72\"); Wt: 90 kg (198 lb 6.6 oz); BMI: Body mass index is 26.91 kg/m².]    Lab Results   Component Value Date    ALBUMIN 3.20 (L) 01/06/2022     Lab Results   Component Value Date    INR 1.23 (L) 01/06/2022    INR 1.22 (L) 01/05/2022    INR 3.03 (H) 06/12/2020    PROTIME 13.4 (L) 01/06/2022    PROTIME 13.3 (L) 01/05/2022    PROTIME 28.6 (H) 06/12/2020     Lab Results   Component Value Date    HGB 10.8 (L) 01/06/2022    HGB 11.0 (L) 01/05/2022    HGB 12.4 (L) 06/10/2021     Lab Results   Component Value Date    HCT 31.5 (L) 01/06/2022    HCT 32.6 (L) 01/05/2022    HCT 37.8 06/10/2021       Ailyn Bradley, PharmD  01/06/22 12:10 EST     "

## 2022-01-06 NOTE — ED PROVIDER NOTES
Subjective    Chief Complaint   Patient presents with   • Fever     Pts family reports fever as high as 104 last night.  pt has all of his teeth extracted on Monday.  Pt family reports cough, fever and lethargy since last night.      Joey Islas MD  No LMP for male patient.  No Known Allergies  History Provided By: Patient    Patient is a 71-year-old male, presents emergency department with wife and sister-in-law the bedside.  Patient is deaf, as well as his wife.  His sister-in-law uses a something which interpreted per the patient.  Patient agrees with this.  Patient presents with fever, low O2 saturations, cough, generally feeling unwell since last night.  Patient recently had all of his teeth extracted, they were concerned he had infection, he was placed on antibiotic per his PCP, but he continues to feel poorly.  He has not any speech disturbances.  No visual disturbances.  No episodes of syncope.  No unilateral weakness.  No nausea vomiting diarrhea.  Denies any chest pain.          Review of Systems   Constitutional: Positive for chills, fatigue and fever. Negative for diaphoresis.   Eyes: Negative for photophobia and visual disturbance.   Respiratory: Positive for cough. Negative for shortness of breath.    Cardiovascular: Negative for chest pain.   Gastrointestinal: Negative for abdominal pain, diarrhea, nausea and vomiting.   Musculoskeletal: Negative for back pain and neck pain.   Skin: Negative for color change and rash.   Neurological: Positive for weakness. Negative for dizziness, syncope and light-headedness.   Psychiatric/Behavioral: Negative for confusion.       Past Medical History:   Diagnosis Date   • Bladder cancer (CMS/HCC)    • Deaf    • Diabetes mellitus (CMS/HCC)    • Prostate cancer (CMS/HCC)    • Renal disorder        No Known Allergies    Past Surgical History:   Procedure Laterality Date   • BLADDER SURGERY     • CYSTECTOMY     • GASTRECTOMY     • PROSTATECTOMY         Family  History   Problem Relation Age of Onset   • Cancer Father        Social History     Socioeconomic History   • Marital status:    Tobacco Use   • Smoking status: Never Smoker   • Smokeless tobacco: Never Used   Substance and Sexual Activity   • Alcohol use: No   • Drug use: No   • Sexual activity: Defer           Objective   Physical Exam  Vitals and nursing note reviewed.   Constitutional:       General: He is not in acute distress.     Appearance: Normal appearance. He is ill-appearing. He is not toxic-appearing or diaphoretic.   HENT:      Head: Normocephalic and atraumatic.      Nose: Nose normal.      Mouth/Throat:      Lips: Pink.      Mouth: Mucous membranes are moist.      Pharynx: Oropharynx is clear. Uvula midline.      Comments: Patient has had all of his teeth extracted.  He has sutures along the gumline.  No obvious abscess, drainage or bleeding is appreciated.  4 of the mouth is normal.  Eyes:      Extraocular Movements: Extraocular movements intact.      Conjunctiva/sclera: Conjunctivae normal.      Pupils: Pupils are equal, round, and reactive to light.   Cardiovascular:      Rate and Rhythm: Normal rate and regular rhythm.      Heart sounds: Normal heart sounds. No murmur heard.  No friction rub. No gallop.    Pulmonary:      Effort: Pulmonary effort is normal.      Breath sounds: Normal breath sounds.   Abdominal:      General: Bowel sounds are normal.      Palpations: Abdomen is soft.   Musculoskeletal:         General: Normal range of motion.      Cervical back: Normal range of motion and neck supple.   Skin:     General: Skin is warm and dry.      Capillary Refill: Capillary refill takes less than 2 seconds.   Neurological:      General: No focal deficit present.      Mental Status: He is alert and oriented to person, place, and time.   Psychiatric:         Mood and Affect: Mood normal.         Behavior: Behavior normal.         Procedures           ED Course  ED Course as of 01/06/22 9607  "  Thu Jan 06, 2022   0035 Spoke with Anitha Stuart [LB]   0052 EKG sinus rhythm rate of 61.  Right bundle branch block.  Interpreted per ED physician.  Independently by me.  Compared to previous 4/29/2016. [LB]      ED Course User Index  [LB] Kira Oneill M, APRN                /56   Pulse 61   Temp 98.6 °F (37 °C) (Oral)   Resp 14   Ht 182.9 cm (72\")   Wt 90 kg (198 lb 6.6 oz)   SpO2 96%   BMI 26.91 kg/m²   Labs Reviewed   COVID-19,CEPHEID/KALEN,COR/TIERA/PAD/JOYCE IN-HOUSE,NP SWAB IN TRANSPORT MEDIA 3-4 HR TAT, RT-PCR - Abnormal; Notable for the following components:       Result Value    COVID19 Detected (*)     All other components within normal limits    Narrative:     Fact sheet for providers: https://www.fda.gov/media/535538/download     Fact sheet for patients: https://www.fda.gov/media/625121/download  Fact sheet for providers: https://www.fda.gov/media/591362/download     Fact sheet for patients: https://www.fda.gov/media/118795/download   COMPREHENSIVE METABOLIC PANEL - Abnormal; Notable for the following components:    BUN 36 (*)     Creatinine 2.86 (*)     Sodium 134 (*)     Calcium 8.0 (*)     Total Protein 5.9 (*)     Albumin 3.30 (*)     eGFR Non  Amer 22 (*)     All other components within normal limits    Narrative:     GFR Normal >60  Chronic Kidney Disease <60  Kidney Failure <15     PROTIME-INR - Abnormal; Notable for the following components:    Protime 13.3 (*)     INR 1.22 (*)     All other components within normal limits   CBC WITH AUTO DIFFERENTIAL - Abnormal; Notable for the following components:    RBC 3.64 (*)     Hemoglobin 11.0 (*)     Hematocrit 32.6 (*)     Neutrophil % 79.0 (*)     Lymphocyte % 8.9 (*)     Eosinophil % 0.1 (*)     Lymphocytes, Absolute 0.50 (*)     All other components within normal limits   BNP (IN-HOUSE) - Normal    Narrative:     Among patients with dyspnea, NT-proBNP is highly sensitive for the detection of acute congestive heart failure. In " addition NT-proBNP of <300 pg/ml effectively rules out acute congestive heart failure with 99% negative predictive value.    Results may be falsely decreased if patient taking Biotin.     TROPONIN (IN-HOUSE) - Normal    Narrative:     Troponin T Reference Range:  <= 0.03 ng/mL-   Negative for AMI  >0.03 ng/mL-     Abnormal for myocardial necrosis.  Clinicians would have to utilize clinical acumen, EKG, Troponin and serial changes to determine if it is an Acute Myocardial Infarction or myocardial injury due to an underlying chronic condition.       Results may be falsely decreased if patient taking Biotin.     D-DIMER, QUANTITATIVE - Normal    Narrative:     Reference Range  --------------------------------------------------------------------     < 0.50   Negative Predictive Value  0.50-0.59   Indeterminate    >= 0.60   Probable VTE             A very low percentage of patients with DVT may yield D-Dimer results   below the cut-off of 0.50 mg/L FEU.  This is known to be more   prevalent in patients with distal DVT.             Results of this test should always be interpreted in conjunction with   the patient's medical history, clinical presentation and other   findings.  Clinical diagnosis should not be based on the result of   [place] D-Dimer alone.   RAINBOW DRAW    Narrative:     The following orders were created for panel order [place] Draw.  Procedure                               Abnormality         Status                     ---------                               -----------         ------                     Green Top (Gel)[[id number]]                                  Final result               Lavender Top[[id number]]                                     Final result               Gold Top - SST[[id number]]                                   Final result               Light Blue Top[[id number]]                                   Final result                 Please view results for these tests on the individual  orders.   GREEN TOP   LAVENDER TOP   GOLD TOP - SST   LIGHT BLUE TOP   CBC AND DIFFERENTIAL    Narrative:     The following orders were created for panel order CBC & Differential.  Procedure                               Abnormality         Status                     ---------                               -----------         ------                     CBC Auto Differential[[id number]]        Abnormal            Final result                 Please view results for these tests on the individual orders.     Medications   sodium chloride 0.9 % flush 10 mL (has no administration in time range)   sodium chloride 0.9 % flush 10 mL (has no administration in time range)   sodium chloride 0.9 % bolus 500 mL (500 mL Intravenous New Bag 1/6/22 0029)     XR Chest 1 View    Result Date: 1/6/2022  Ill-defined infiltrates are noted bilaterally with mild interstitial changes. The findings suggest atypical/viral infection or multifocal pneumonia and suggest changes of Covid 19 infection. Recommend follow-up to ensure improvement/resolution. Electronically Signed: Bran Colin MD 1/6/2022 0:03 EST                                     MDM  Number of Diagnoses or Management Options  Acute kidney injury (HCC)  COVID-19  Hypoxia  Diagnosis management comments: Appropriate PPE was worn during the duration of the care for this patient while in the emergency department per [facility] Policy    ----Differentials> COVID-19, [place] abnormality, arrhythmia, dental infection  This list is not all inclusive and does not constitute the entireity of considered causes.     ED  Course-, Labs, Imaging (reviewed by me, interpreted per ED physician and/or Radiologist---Patient was brought back to the emergency department room for evaluation and placed on appropriate monitoring.    Patient had IV established and blood work obtained.  Radiology and imaging orders as above.    Patient noted to be hypoxic at triage, O2 sat 87% on room air.  He was  placed on 3 L nasal cannula tolerating well.  He does have COVID-19.  He has increased renal function and from baseline.  He will be given IV fluids.  I feel symptoms are likely related to COVID-19.  He does have pneumonia which appears to be viral given his COVID-19 diagnosis.  He will be admitted to family medicine for further evaluation and management.    ----Consults> family medicine    ----Disposition> I discussed with the patient their test results, work-up here in the emergency department, and need for admission and further evaluation. Patient is agreeable to the plan of care. At time of disposition patients VS are non concerning, and without acute distress.  Opportunity was provided for questions at the bedside, all questions and concerns were addressed.    Vital signs have  been reviewed. Patient is afebrile. Non toxic in appearance. Alert and oriented to person, place, time and situation.                               Amount and/or Complexity of Data Reviewed  Clinical lab tests: reviewed  Tests in the radiology section of CPT®: reviewed  Tests in the medicine section of CPT®: reviewed  Decide to obtain previous medical records or to obtain history from someone other than the patient: yes  Discuss the patient with other providers: yes (Family medicine)    Patient Progress  Patient progress: stable      Final diagnoses:   COVID-19   Hypoxia   Acute kidney injury (HCC)       ED Disposition  ED Disposition     ED Disposition Condition Comment    Decision to Admit  Level of Care: Telemetry [5]   Admitting Physician: ANTONIO MAYEN [[id number]]   Attending Physician: ANTONIO MAYEN [[id number]]            No follow-up provider specified.       Medication List      No changes were made to your prescriptions during this visit.          Kira Oneill, APRN  01/06/22 0441

## 2022-01-06 NOTE — PLAN OF CARE
Problem: Adult Inpatient Plan of Care  Goal: Plan of Care Review  Outcome: Ongoing, Progressing  Flowsheets (Taken 1/6/2022 1257)  Progress: improving  Plan of Care Reviewed With: patient  Outcome Summary: New admit from the ED. Pt. is covid + and is currently on 2L of oxygen. No complaints voiced at this time. Will continue to monitor.  Goal: Patient-Specific Goal (Individualized)  Outcome: Ongoing, Progressing  Goal: Absence of Hospital-Acquired Illness or Injury  Outcome: Ongoing, Progressing  Intervention: Identify and Manage Fall Risk  Recent Flowsheet Documentation  Taken 1/6/2022 1200 by Deidre Roy RN  Safety Promotion/Fall Prevention:   assistive device/personal items within reach   clutter free environment maintained   safety round/check completed   nonskid shoes/slippers when out of bed  Intervention: Prevent Infection  Recent Flowsheet Documentation  Taken 1/6/2022 1200 by Deidre Roy RN  Infection Prevention:   cohorting utilized   visitors restricted/screened   single patient room provided  Goal: Optimal Comfort and Wellbeing  Outcome: Ongoing, Progressing  Intervention: Provide Person-Centered Care  Recent Flowsheet Documentation  Taken 1/6/2022 1000 by Deidre Roy RN  Trust Relationship/Rapport: care explained  Goal: Readiness for Transition of Care  Outcome: Ongoing, Progressing  Intervention: Mutually Develop Transition Plan  Recent Flowsheet Documentation  Taken 1/6/2022 0949 by Deidre Roy RN  Transportation Anticipated: family or friend will provide  Patient/Family Anticipated Services at Transition: none  Patient/Family Anticipates Transition to: home with family  Taken 1/6/2022 0947 by Deidre Roy RN  Equipment Currently Used at Home: (urostomy) other (see comments)     Problem: Breathing Pattern Ineffective  Goal: Effective Breathing Pattern  Outcome: Ongoing, Progressing   Goal Outcome Evaluation:  Plan of Care Reviewed With: patient        Progress:  improving  Outcome Summary: New admit from the ED. Pt. is covid + and is currently on 2L of oxygen. No complaints voiced at this time. Will continue to monitor.

## 2022-01-06 NOTE — CONSULTS
NEPHROLOGY CONSULTATION-----[facility]    [facility]  [phone]  Stephanie Laughlin MD    Patient Care Team:  Joey Islas MD as PCP - General (Family Medicine)  Truman, MD Neville as Consulting Physician (Nephrology)    CC/REASON FOR CONSULTATION: RENAL FAILURE/ELEVATED SERUM CREATININE    PROVIDER REQUESTING CONSULTATION: PATRICIA HENRY/    History of Present Illness     HPI    Patient is a 71 y.o. WM, very well known to me as I actively follow him as an outpatient,  whom I was asked to see in consultation for evaluation and management of renal failure/elevated serum creatinine. Patient was admitted with COVID 19 + PNA.   Patient with known CRF/CKD STG 3. No NSAIDs or recent IV dye exposure. No known h/o hepatitis, TB, rheumatic fever, jaundice, SLE. Does bleed/bruise easily as he is chronically anticoagulated on Coumadin. +Urostomy. No edema or fluid retention.  +Compliance with home meds. Was not on diuretics prior to admission. Was not on ACE-I/ARB prior to admission. No herbal med use.      Review of Systems   Constitutional: Positive for activity change, appetite change and fatigue. Negative for chills, diaphoresis, fever and unexpected weight change.   HENT: Positive for congestion. Negative for dental problem, drooling, ear discharge, ear pain, facial swelling, hearing loss, mouth sores, nosebleeds, postnasal drip, rhinorrhea, sinus pressure, sinus pain, sneezing, sore throat, tinnitus, trouble swallowing and voice change.    Eyes: Negative for photophobia, pain, discharge, redness, itching and visual disturbance.   Respiratory: Positive for cough and shortness of breath. Negative for apnea, choking, chest tightness, wheezing and stridor.    Cardiovascular: Negative for chest pain, palpitations and leg swelling.   Gastrointestinal: Negative for abdominal distention, abdominal pain, anal bleeding, blood in  stool, constipation, diarrhea, nausea, rectal pain and vomiting.   Endocrine: Negative for cold intolerance, heat intolerance, polydipsia, polyphagia and polyuria.   Genitourinary: Negative for decreased urine volume, difficulty urinating, dysuria, enuresis, flank pain, frequency, genital sores, hematuria and urgency.   Musculoskeletal: Positive for back pain and myalgias. Negative for arthralgias, gait problem, joint swelling, neck pain and neck stiffness.   Skin: Negative for color change, pallor, rash and wound.   Allergic/Immunologic: Negative for environmental allergies, food allergies and immunocompromised state.   Neurological: Positive for weakness. Negative for dizziness, tremors, seizures, syncope, facial asymmetry, speech difficulty, light-headedness, numbness and headaches.   Hematological: Negative for adenopathy. Does not bruise/bleed easily.   Psychiatric/Behavioral: Negative for agitation, behavioral problems, confusion, decreased concentration, dysphoric mood, hallucinations, self-injury, sleep disturbance and suicidal ideas. The patient is not nervous/anxious and is not hyperactive.           Past Medical History:   Diagnosis Date   • Bladder cancer (CMS/[facility])    • Deaf    • Diabetes mellitus (CMS/HCC)    • Prostate cancer (CMS/HCC)    • Renal disorder        Past Surgical History:   Procedure Laterality Date   • BLADDER SURGERY     • CYSTECTOMY     • GASTRECTOMY     • PROSTATECTOMY         Family History   Problem Relation Age of Onset   • Cancer Father        Social History     Tobacco Use   • Smoking status: Never Smoker   • Smokeless tobacco: Never Used   Substance Use Topics   • Alcohol use: No   • Drug use: No       Home Meds:   Medications Prior to Admission   Medication Sig Dispense Refill Last Dose   • amLODIPine (NORVASC) 10 MG tablet Take 10 mg by mouth Every Night.   Past Week at Unknown time   • gabapentin (NEURONTIN) 300 MG capsule Take 300 mg by mouth 3 (Three) Times a Day.   1/5/2022  at Unknown time   • glimepiride (AMARYL) 1 MG tablet Take 1 mg by mouth Every Morning Before Breakfast.   1/5/2022 at Unknown time   • hydrALAZINE (APRESOLINE) 50 MG tablet Take 50 mg by mouth 2 (Two) Times a Day.      • Omega-3 Fatty Acids (FISH OIL) 1000 MG capsule capsule Take  by mouth Daily.   1/5/2022 at Unknown time   • sodium bicarbonate 650 MG tablet Take 650 mg by mouth Daily.   1/5/2022 at Unknown timehydral   • warfarin (COUMADIN) 5 MG tablet Take 5 mg by mouth Daily.   1/5/2022 at Unknown time       Scheduled Meds:  albuterol sulfate HFA, 2 puff, Inhalation, 4x Daily - RT  budesonide-formoterol, 2 puff, Inhalation, BID - RT  dexamethasone, 6 mg, Oral, Daily With Breakfast  enoxaparin, 30 mg, Subcutaneous, Q24H  gabapentin, 300 mg, Oral, Q8H  guaiFENesin, 600 mg, Oral, Q12H  insulin lispro, 0-7 Units, Subcutaneous, TID AC  [START ON 1/7/2022] pantoprazole, 40 mg, Oral, Q AM  remdesivir, 200 mg, Intravenous, Q24H   Followed by  [START ON 1/7/2022] remdesivir, 100 mg, Intravenous, Q24H  sodium bicarbonate, 650 mg, Oral, TID  sodium chloride, 10 mL, Intravenous, Q12H  warfarin, 5 mg, Oral, Daily  zinc sulfate, 220 mg, Oral, Daily        Continuous Infusions:  Pharmacy Consult - Remdesivir,   Pharmacy to dose warfarin,   sodium chloride, 75 mL/hr, Last Rate: 75 mL/hr (01/06/22 1213)        PRN Meds:  •  acetaminophen  •  dextrose  •  dextrose  •  glucagon (human recombinant)  •  insulin lispro **AND** insulin lispro  •  melatonin  •  ondansetron **OR** ondansetron  •  Pharmacy Consult - Remdesivir  •  Pharmacy to dose warfarin  •  sodium chloride  •  [COMPLETED] Insert peripheral IV **AND** sodium chloride  •  sodium chloride    Allergies:  Patient has no known allergies.    OBJECTIVE    Vital Signs  Temp:  [98.5 °F (36.9 °C)-98.6 °F (37 °C)] 98.5 °F (36.9 °C)  Heart Rate:  [61-81] 81  Resp:  [14-18] 18  BP: (114-143)/(56-74) 143/74    No intake/output data recorded.  No intake/output data  recorded.    Physical Exam:  General Appearance: alert, appears stated age and cooperative  Head: normocephalic, without obvious abnormality and atraumatic  Eyes: conjunctivae and sclerae normal and no icterus  Neck: supple and no JVD  Lungs: +BIBASILAR RALES  Heart: regular rhythm & normal rate and normal S1, S2 +CHAYO  Chest Wall: no abnormalities observed  Abdomen: normal bowel sounds and soft non-tender +UROSTOMY  Extremities: moves extremities well, no edema, no cyanosis  Skin: no bleeding, bruising or rash  Neurologic: Alert, and oriented. No focal deficits    Results Review:    I reviewed the patient's new clinical results.    WBC WBC   Date Value Ref Range Status   01/06/2022 5.40 3.40 - 10.80 10*3/mm3 Final   01/05/2022 5.50 3.40 - 10.80 10*3/mm3 Final      HGB Hemoglobin   Date Value Ref Range Status   01/06/2022 10.8 (L) 13.0 - 17.7 g/dL Final   01/05/2022 11.0 (L) 13.0 - 17.7 g/dL Final      HCT Hematocrit   Date Value Ref Range Status   01/06/2022 31.5 (L) 37.5 - 51.0 % Final   01/05/2022 32.6 (L) 37.5 - 51.0 % Final      Platlets No results found for: LABPLAT   MCV MCV   Date Value Ref Range Status   01/06/2022 89.8 79.0 - 97.0 fL Final   01/05/2022 89.6 79.0 - 97.0 fL Final          Sodium Sodium   Date Value Ref Range Status   01/06/2022 136 136 - 145 mmol/L Final   01/05/2022 134 (L) 136 - 145 mmol/L Final      Potassium Potassium   Date Value Ref Range Status   01/06/2022 4.2 3.5 - 5.2 mmol/L Final   01/05/2022 4.4 3.5 - 5.2 mmol/L Final      Chloride Chloride   Date Value Ref Range Status   01/06/2022 101 98 - 107 mmol/L Final   01/05/2022 99 98 - 107 mmol/L Final      CO2 CO2   Date Value Ref Range Status   01/06/2022 25.0 22.0 - 29.0 mmol/L Final   01/05/2022 25.0 22.0 - 29.0 mmol/L Final      BUN BUN   Date Value Ref Range Status   01/06/2022 35 (H) 8 - 23 mg/dL Final   01/05/2022 36 (H) 8 - 23 mg/dL Final      Creatinine Creatinine   Date Value Ref Range Status   01/06/2022 2.22 (H) 0.76 - 1.27  mg/dL Final   01/05/2022 2.86 (H) 0.76 - 1.27 mg/dL Final      Calcium Calcium   Date Value Ref Range Status   01/06/2022 8.2 (L) 8.6 - 10.5 mg/dL Final   01/05/2022 8.0 (L) 8.6 - 10.5 mg/dL Final      PO4 No results found for: CAPO4   Albumin Albumin   Date Value Ref Range Status   01/06/2022 3.20 (L) 3.50 - 5.20 g/dL Final   01/05/2022 3.30 (L) 3.50 - 5.20 g/dL Final      Magnesium Magnesium   Date Value Ref Range Status   01/06/2022 1.8 1.6 - 2.4 mg/dL Final      Uric Acid No results found for: URICACID       Imaging Results (Last 72 Hours)     Procedure Component Value Units Date/Time    XR Chest 1 View [[id number]] Collected: 01/06/22 0002     Updated: 01/06/22 0006    Narrative:      XR CHEST 1 VW    Date of Exam: 1/5/2022 23:38 EST    Indication: dyspnea.  Covid positive.     Comparison Exams: None available.    Technique: Portable AP chest    FINDINGS:  Ill-defined infiltrates are seen throughout the central to peripheral aspect of the lungs bilaterally with mild interstitial changes. No pleural effusions are observed. The cardiac silhouette is within normal limits for size for the portable study. The   mediastinum is unremarkable. No acute osseous abnormalities are identified.      Impression:      Ill-defined infiltrates are noted bilaterally with mild interstitial changes. The findings suggest atypical/viral infection or multifocal pneumonia and suggest changes of Covid 19 infection. Recommend follow-up to ensure improvement/resolution.    Electronically Signed: Bran Colin MD 1/6/2022 0:03 EST            Results for orders placed during the hospital encounter of 01/05/22    XR Chest 1 View    Narrative  XR CHEST 1 VW    Date of Exam: 1/5/2022 23:38 EST    Indication: dyspnea.  Covid positive.    Comparison Exams: None available.    Technique: Portable AP chest    FINDINGS:  Ill-defined infiltrates are seen throughout the central to peripheral aspect of the lungs bilaterally with mild interstitial  changes. No pleural effusions are observed. The cardiac silhouette is within normal limits for size for the portable study. The  mediastinum is unremarkable. No acute osseous abnormalities are identified.    Impression  Ill-defined infiltrates are noted bilaterally with mild interstitial changes. The findings suggest atypical/viral infection or multifocal pneumonia and suggest changes of Covid 19 infection. Recommend follow-up to ensure improvement/resolution.    Electronically Signed: Bran Colin MD 1/6/2022 0:03 EST      Results for orders placed in visit on 12/13/18    SCANNED - IMAGING      SCANNED - IMAGING        Results for orders placed during the hospital encounter of 10/17/19    Duplex Venous Lower Extremity - Left    Interpretation Summary  · Acute left lower extremity deep vein thrombosis noted in the common femoral, proximal femoral, mid femoral, distal femoral and popliteal.      ASSESSMENT / PLAN      Pneumonia due to COVID-19 virus    Diabetes mellitus with neuropathy (HCC)    History of DVT (deep vein thrombosis)    LEO (acute kidney injury) (HCC)    COVID-19    1. RENAL FAILURE-----Nonoliguric. +ARF/LEO on top of known CRF/CKD STG 3B with a baseline serum creatinine of about 1.8. CRF/CKD STG 3B is secondary to DGS/HTN NS. +ARF/LEO appears to be secondary to prerenal state/intravascular volume depletion. Hydrate. Check urine and serum studies and renal US. No NSAIDs or IV dye. Lytes, acid-base okay. No uremia    2. DMII WITH RENAL MANIFESTATIONS-----BS okay. Glucometers, SSI. Follow with steroid exposure    3. HTN WITH CKD-----BP okay. Avoid hypotension. No ACE-I/ARB/DRI/diuretic for now    4. MILD HYPOALBUMINEMIA-----Encouraged increased po protein intake    5. H/O BLADDER/PROSTATE CA WITH UROSTOMY    6. ANEMIA OF CKD--------H/H in safe range. Follow for EPO need. Check Fe    7. DM PERIPHERAL NEUROPATHY------On Neurontin    8. GERD/PUD PROPHYLAXIS------On PPI. Benefits outweigh risks, despite  renal dysfunction, given steroid exposure and h/o use and tolerating PPI    9. TYPE 4 RTA------Stable bicarbonate levels on current po NaHCO3 dose    10. DVT-----Anticoagulated    11. COVID 19 + PNA-------Okay for Remdesivir despite renal dysfunction. On Steroids and pulmonary toilet also per PMD    12. HEARING IMPAIRED    I discussed the patients findings and my recommendations with patient, nursing staff and primary care team    Will follow along closely. Thank you for allowing us to see this patient in renal consultation.    [facility]  [phone]  MD Stephanie Gant MD  01/06/22  14:47 EST

## 2022-01-06 NOTE — ED NOTES
Placed pt on 3L O2 nasal canula.  Pt saturation increased to 94-96%.     Deepali James RN  01/05/22 [id number]

## 2022-01-06 NOTE — H&P
Patient Care Team:  Joey Islas MD as PCP - General (Family Medicine)  Neville Laughlin MD as Consulting Physician (Nephrology)    Chief complaint  Fever- cough     Subjective     Patient is a 71 y.o. male presents with fever and cough  Onset of symptoms was 1/4/21.  Mr. 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 He has a hx of bladder and prostate cancer and has a urostomy.  His last dose of coumadin was 12/30 as he had all his teeth extracted on Monday 1/3. He was put on keflex on Monday after procedure. On Tuesday he began to run a fever and yesterday he started with a cough . He had a home oximetry which recorded a sat on RA of 82 and he presented to the ER. He was hypoxic with sat of 87 on RA at triage and was placed on o2 at 3L. He tested positive for COVID 19 and has a cxr C/W changes of COVID 19 pneumonia. D dimer, proBnp and troponin are negative.   CBC with hgb 11 WBC 5.5. CMP with Cr 2.86. In June 2021 Cr was 1.82. IN 2020 it was 2.06. He does see Dr Laughlin for CKD stage 3. He is admitted for further evaluation and management.     Review of Systems   Constitutional: Positive for activity change, appetite change (poor intake since Monday with teeth extraction), chills, fatigue and fever.   HENT: Positive for hearing loss (pt is deaf- sister in law is at bedside to sign ). Negative for trouble swallowing.    Eyes: Negative for visual disturbance.   Respiratory: Positive for cough. Negative for shortness of breath.    Cardiovascular: Positive for leg swelling (chronic ankle edema). Negative for chest pain and palpitations.   Gastrointestinal: Negative for abdominal pain, constipation, diarrhea, nausea and vomiting.   Genitourinary: Positive for difficulty urinating (pt is with urostopmy s/p bladder and prostate cancer.).   Musculoskeletal: Negative for gait problem.   Skin: Negative for rash and wound.   Neurological:  Positive for weakness. Negative for headaches.   Psychiatric/Behavioral: Negative for confusion.          History  Past Medical History:   Diagnosis Date   • Bladder cancer (CMS/HCC)    • Deaf    • Diabetes mellitus (CMS/HCC)    • Prostate cancer (CMS/HCC)    • Renal disorder      Past Surgical History:   Procedure Laterality Date   • BLADDER SURGERY     • CYSTECTOMY     • GASTRECTOMY     • PROSTATECTOMY       Family History   Problem Relation Age of Onset   • Cancer Father      Social History     Tobacco Use   • Smoking status: Never Smoker   • Smokeless tobacco: Never Used   Substance Use Topics   • Alcohol use: No   • Drug use: No     (Not in a hospital admission)    Allergies:  Patient has no known allergies.    Objective     Vital Signs  Temp:  [98.6 °F (37 °C)] 98.6 °F (37 °C)  Heart Rate:  [61-67] 61  Resp:  [14-16] 14  BP: (114-120)/(56-60) 114/56     Physical Exam:      General Appearance:    Alert, cooperative, in no acute distress- deaf- signs    Head:    Normocephalic, without obvious abnormality, atraumatic   Eyes:            Lids and lashes normal, conjunctivae and sclerae normal, no   icterus, no pallor, corneas clear, PERRLA   Ears:    Ears appear intact with no abnormalities noted   Throat:   No oral lesions, no thrush, oral mucosa is dry - no active bleeding at the gums    Neck:   No adenopathy, supple, trachea midline, no thyromegaly, no   carotid bruit, no JVD   Lungs:     BS are diminished Bilateral ,respirations regular, even and                  unlabored    Heart:    Regular rhythm and normal rate, normal S1 and S2, no            murmur, no gallop, no rub, no click   Chest Wall:    No abnormalities observed   Abdomen:     Normal bowel sounds, no masses, no organomegaly, soft        non-tender, non-distended, no guarding, no rebound                tenderness   Extremities:   Moves all extremities well, trace ankle edema esteban L > R ( chronic per pt) no cyanosis, no             redness   Pulses:    Pulses palpable and equal bilaterally   Skin:   No bleeding, bruising or rash   Lymph nodes:   No palpable adenopathy   Neurologic:   Cranial nerves 2 - 12 grossly intact, sensation intact, DTR       present and equal bilaterally       Results Review:     Imaging Results (Last 24 Hours)     Procedure Component Value Units Date/Time    XR Chest 1 View [[id number]] Collected: 01/06/22 0002     Updated: 01/06/22 0006    Narrative:      XR CHEST 1 VW    Date of Exam: 1/5/2022 23:38 EST    Indication: dyspnea.  Covid positive.     Comparison Exams: None available.    Technique: Portable AP chest    FINDINGS:  Ill-defined infiltrates are seen throughout the central to peripheral aspect of the lungs bilaterally with mild interstitial changes. No pleural effusions are observed. The cardiac silhouette is within normal limits for size for the portable study. The   mediastinum is unremarkable. No acute osseous abnormalities are identified.      Impression:      Ill-defined infiltrates are noted bilaterally with mild interstitial changes. The findings suggest atypical/viral infection or multifocal pneumonia and suggest changes of Covid 19 infection. Recommend follow-up to ensure improvement/resolution.    Electronically Signed: Bran Colin MD 1/6/2022 0:03 EST           Lab Results (last 24 hours)     Procedure Component Value Units Date/Time    [place] Draw [[id number]] Collected: 01/05/22 2319    Specimen: Blood from Arm, Left Updated: 01/06/22 0030    Narrative:      The following orders were created for panel order [place] Draw.  Procedure                               Abnormality         Status                     ---------                               -----------         ------                     Green Top (Gel)[[id number]]                                  Final result               Lavender Top[[id number]]                                     Final result               Gold Top - SST[[id number]]                                    Final result               Light Blue Top[[id number]]                                   Final result                 Please view results for these tests on the individual orders.    Light Blue Top [[id number]] Collected: 01/05/22 2319    Specimen: Blood from Arm, Left Updated: 01/06/22 0030     Extra Tube hold for add-on     Comment: Auto resulted       Lavender Top [[id number]] Collected: 01/05/22 2319    Specimen: Blood from Arm, Left Updated: 01/06/22 0030     Extra Tube hold for add-on     Comment: Auto resulted       Gold Top - SST [[id number]] Collected: 01/05/22 2319    Specimen: Blood from Arm, Left Updated: 01/06/22 0030     Extra Tube Hold for add-ons.     Comment: Auto resulted.       Green Top (Gel) [[id number]] Collected: 01/05/22 2319    Specimen: Blood from Arm, Left Updated: 01/06/22 0009     Extra Tube --    Protime-INR [[id number]]  (Abnormal) Collected: 01/05/22 2319    Specimen: Blood from Arm, Left Updated: 01/05/22 2359     Protime 13.3 Seconds      INR 1.22    D-dimer, Quantitative [[id number]]  (Normal) Collected: 01/05/22 2319    Specimen: Blood from Arm, Left Updated: 01/05/22 2359     D-Dimer, Quantitative 0.50 mg/L (FEU)     Narrative:      Reference Range  --------------------------------------------------------------------     < 0.50   Negative Predictive Value  0.50-0.59   Indeterminate    >= 0.60   Probable VTE             A very low percentage of patients with DVT may yield D-Dimer results   below the cut-off of 0.50 mg/L FEU.  This is known to be more   prevalent in patients with distal DVT.             Results of this test should always be interpreted in conjunction with   the patient's medical history, clinical presentation and other   findings.  Clinical diagnosis should not be based on the result of   [place] D-Dimer alone.    Comprehensive Metabolic Panel [[id number]]  (Abnormal) Collected: 01/05/22 2319    Specimen: Blood from Arm, Left Updated: 01/05/22 5813     Glucose 97  mg/dL      BUN 36 mg/dL      Creatinine 2.86 mg/dL      Sodium 134 mmol/L      Potassium 4.4 mmol/L      Chloride 99 mmol/L      CO2 25.0 mmol/L      Calcium 8.0 mg/dL      Total Protein 5.9 g/dL      Albumin 3.30 g/dL      ALT (SGPT) 12 U/L      AST (SGOT) 20 U/L      Alkaline Phosphatase 45 U/L      Total Bilirubin 0.3 mg/dL      eGFR Non African Amer 22 mL/min/1.73      Globulin 2.6 gm/dL      A/G Ratio 1.3 g/dL      BUN/Creatinine Ratio 12.6     Anion Gap 10.0 mmol/L     Narrative:      GFR Normal >60  Chronic Kidney Disease <60  Kidney Failure <15      Troponin [[id number]]  (Normal) Collected: 01/05/22 2319    Specimen: Blood from Arm, Left Updated: 01/05/22 2352     Troponin T <0.010 ng/mL     Narrative:      Troponin T Reference Range:  <= 0.03 ng/mL-   Negative for AMI  >0.03 ng/mL-     Abnormal for myocardial necrosis.  Clinicians would have to utilize clinical acumen, EKG, Troponin and serial changes to determine if it is an Acute Myocardial Infarction or myocardial injury due to an underlying chronic condition.       Results may be falsely decreased if patient taking Biotin.      BNP [[id number]]  (Normal) Collected: 01/05/22 2319    Specimen: Blood from Arm, Left Updated: 01/05/22 2350     proBNP 193.3 pg/mL     Narrative:      Among patients with dyspnea, NT-proBNP is highly sensitive for the detection of acute congestive heart failure. In addition NT-proBNP of <300 pg/ml effectively rules out acute congestive heart failure with 99% negative predictive value.    Results may be falsely decreased if patient taking Biotin.      CBC & Differential [[id number]]  (Abnormal) Collected: 01/05/22 2319    Specimen: Blood from Arm, Left Updated: 01/05/22 9758    Narrative:      The following orders were created for panel order CBC & Differential.  Procedure                               Abnormality         Status                     ---------                               -----------         ------                      CBC Auto Differential[[id number]]        Abnormal            Final result                 Please view results for these tests on the individual orders.    CBC Auto Differential [[id number]]  (Abnormal) Collected: 01/05/22 2319    Specimen: Blood from Arm, Left Updated: 01/05/22 2338     WBC 5.50 10*3/mm3      RBC 3.64 10*6/mm3      Hemoglobin 11.0 g/dL      Hematocrit 32.6 %      MCV 89.6 fL      MCH 30.3 pg      MCHC 33.8 g/dL      RDW 13.0 %      RDW-SD 40.3 fl      MPV 7.1 fL      Platelets 158 10*3/mm3      Neutrophil % 79.0 %      Lymphocyte % 8.9 %      Monocyte % 11.2 %      Eosinophil % 0.1 %      Basophil % 0.8 %      Neutrophils, Absolute 4.30 10*3/mm3      Lymphocytes, Absolute 0.50 10*3/mm3      Monocytes, Absolute 0.60 10*3/mm3      Eosinophils, Absolute 0.00 10*3/mm3      Basophils, Absolute 0.00 10*3/mm3      nRBC 0.0 /100 WBC     COVID-19,CEPHEID/KALEN,COR/TIERA/PAD/JOYCE IN-HOUSE(OR EMERGENT/ADD-ON),NP SWAB IN TRANSPORT MEDIA 3-4 HR TAT, RT-PCR - Swab, Nasopharynx [[id number]]  (Abnormal) Collected: 01/05/22 2240    Specimen: Swab from Nasopharynx Updated: 01/05/22 2331     COVID19 Detected    Narrative:      Fact sheet for providers: https://www.fda.gov/media/968797/download     Fact sheet for patients: https://www.fda.gov/media/253319/download  Fact sheet for providers: https://www.fda.gov/media/723310/download     Fact sheet for patients: https://www.fda.gov/media/978954/download           I reviewed the patient's new clinical results.    Assessment/Plan       * No active hospital problems. *       COVID pneumonia- Consulting pulmonology and nephrology - pt with CKD and GFR 22. Symbicort, O2, IS. To note pt did have 2 COVID vaccines and had not yet had his booster.     LEO on CKD- IVF consulting nephrology     T2DM with neuropathy - Hold metformin SSI for coverage. Continue home neurontin    HX DVT - restart coumadin . Pharmacy to dose. Last coumadin dose was 12/30. Bridge with lovenox    T2Dm with HTN  and CKD stage 3- Consulting Dr Laughlin . Holding Norvasc with current  bp 114/56 - SSI     Addendum: 1245pm  I spoke with Dr. Laughlin who gave the ok for remdesivir. Will hold pulmonology consult for now.       I discussed the patients findings and my recommendations with patient.     Anitha Stuart, APRN  01/06/22  07:37 EST

## 2022-01-06 NOTE — CASE MANAGEMENT/SOCIAL WORK
Discharge Planning Assessment   [facility]     Patient Name: Michael Dorantes  MRN: [id number]  Today's Date: 1/6/2022    Admit Date: 1/5/2022          Discharge Plan     Row Name 01/06/22 1607       Plan    Plan  left VM for sister Cathy Hilligas, no answer in room. Will follow up 1/6.                Chart review only.           Elijah Balderas RN

## 2022-01-07 LAB
ALBUMIN SERPL-MCNC: 2.9 G/DL (ref 3.5–5.2)
ALBUMIN/GLOB SERPL: 1.2 G/DL
ALP SERPL-CCNC: 42 U/L (ref 39–117)
ALT SERPL W P-5'-P-CCNC: 9 U/L (ref 1–41)
ANION GAP SERPL CALCULATED.3IONS-SCNC: 11 MMOL/L (ref 5–15)
AST SERPL-CCNC: 14 U/L (ref 1–40)
BASOPHILS # BLD AUTO: 0 10*3/MM3 (ref 0–0.2)
BASOPHILS NFR BLD AUTO: 0.1 % (ref 0–1.5)
BILIRUB CONJ SERPL-MCNC: <0.2 MG/DL (ref 0–0.3)
BILIRUB SERPL-MCNC: 0.2 MG/DL (ref 0–1.2)
BUN SERPL-MCNC: 35 MG/DL (ref 8–23)
BUN/CREAT SERPL: 17.2 (ref 7–25)
CALCIUM SPEC-SCNC: 7.9 MG/DL (ref 8.6–10.5)
CHLORIDE SERPL-SCNC: 101 MMOL/L (ref 98–107)
CO2 SERPL-SCNC: 22 MMOL/L (ref 22–29)
CREAT SERPL-MCNC: 2.03 MG/DL (ref 0.76–1.27)
D DIMER PPP FEU-MCNC: 0.62 MG/L (FEU) (ref 0–0.59)
DEPRECATED RDW RBC AUTO: 39.8 FL (ref 37–54)
EOSINOPHIL # BLD AUTO: 0 10*3/MM3 (ref 0–0.4)
EOSINOPHIL NFR BLD AUTO: 0.1 % (ref 0.3–6.2)
ERYTHROCYTE [DISTWIDTH] IN BLOOD BY AUTOMATED COUNT: 12.8 % (ref 12.3–15.4)
GFR SERPL CREATININE-BSD FRML MDRD: 33 ML/MIN/1.73
GLOBULIN UR ELPH-MCNC: 2.5 GM/DL
GLUCOSE BLDC GLUCOMTR-MCNC: 160 MG/DL (ref 70–105)
GLUCOSE BLDC GLUCOMTR-MCNC: 181 MG/DL (ref 70–105)
GLUCOSE BLDC GLUCOMTR-MCNC: 251 MG/DL (ref 70–105)
GLUCOSE BLDC GLUCOMTR-MCNC: 261 MG/DL (ref 70–105)
GLUCOSE SERPL-MCNC: 298 MG/DL (ref 65–99)
HCT VFR BLD AUTO: 26.9 % (ref 37.5–51)
HGB BLD-MCNC: 9.4 G/DL (ref 13–17.7)
INR PPP: 1.23 (ref 2–3)
LYMPHOCYTES # BLD AUTO: 0.2 10*3/MM3 (ref 0.7–3.1)
LYMPHOCYTES NFR BLD AUTO: 7.1 % (ref 19.6–45.3)
MCH RBC QN AUTO: 30.8 PG (ref 26.6–33)
MCHC RBC AUTO-ENTMCNC: 34.8 G/DL (ref 31.5–35.7)
MCV RBC AUTO: 88.4 FL (ref 79–97)
MONOCYTES # BLD AUTO: 0.2 10*3/MM3 (ref 0.1–0.9)
MONOCYTES NFR BLD AUTO: 6.9 % (ref 5–12)
NEUTROPHILS NFR BLD AUTO: 2.6 10*3/MM3 (ref 1.7–7)
NEUTROPHILS NFR BLD AUTO: 85.8 % (ref 42.7–76)
NRBC BLD AUTO-RTO: 0 /100 WBC (ref 0–0.2)
PLATELET # BLD AUTO: 166 10*3/MM3 (ref 140–450)
PMV BLD AUTO: 7.2 FL (ref 6–12)
POTASSIUM SERPL-SCNC: 4.6 MMOL/L (ref 3.5–5.2)
PROT SERPL-MCNC: 5.4 G/DL (ref 6–8.5)
PROTHROMBIN TIME: 13.4 SECONDS (ref 19.4–28.5)
RBC # BLD AUTO: 3.05 10*6/MM3 (ref 4.14–5.8)
SODIUM SERPL-SCNC: 134 MMOL/L (ref 136–145)
WBC NRBC COR # BLD: 3 10*3/MM3 (ref 3.4–10.8)

## 2022-01-07 PROCEDURE — 85379 FIBRIN DEGRADATION QUANT: CPT | Performed by: NURSE PRACTITIONER

## 2022-01-07 PROCEDURE — 63710000001 INSULIN LISPRO (HUMAN) PER 5 UNITS: Performed by: INTERNAL MEDICINE

## 2022-01-07 PROCEDURE — 80053 COMPREHEN METABOLIC PANEL: CPT | Performed by: NURSE PRACTITIONER

## 2022-01-07 PROCEDURE — 25010000002 ENOXAPARIN PER 10 MG: Performed by: INTERNAL MEDICINE

## 2022-01-07 PROCEDURE — 82962 GLUCOSE BLOOD TEST: CPT

## 2022-01-07 PROCEDURE — 85610 PROTHROMBIN TIME: CPT | Performed by: NURSE PRACTITIONER

## 2022-01-07 PROCEDURE — 85025 COMPLETE CBC W/AUTO DIFF WBC: CPT | Performed by: NURSE PRACTITIONER

## 2022-01-07 PROCEDURE — 94799 UNLISTED PULMONARY SVC/PX: CPT

## 2022-01-07 PROCEDURE — 82248 BILIRUBIN DIRECT: CPT | Performed by: NURSE PRACTITIONER

## 2022-01-07 RX ORDER — HYDROCODONE BITARTRATE AND ACETAMINOPHEN 5; 325 MG/1; MG/1
1 TABLET ORAL EVERY 6 HOURS PRN
COMMUNITY
End: 2022-01-20 | Stop reason: HOSPADM

## 2022-01-07 RX ORDER — ONDANSETRON 4 MG/1
4 TABLET, FILM COATED ORAL EVERY 6 HOURS PRN
COMMUNITY
End: 2022-01-20 | Stop reason: HOSPADM

## 2022-01-07 RX ORDER — WARFARIN SODIUM 1 MG/1
2 TABLET ORAL
Status: COMPLETED | OUTPATIENT
Start: 2022-01-07 | End: 2022-01-07

## 2022-01-07 RX ORDER — CEPHALEXIN 500 MG/1
500 CAPSULE ORAL 4 TIMES DAILY
Status: ON HOLD | COMMUNITY
Start: 2022-01-03 | End: 2022-01-08

## 2022-01-07 RX ORDER — BENZONATATE 100 MG/1
200 CAPSULE ORAL 3 TIMES DAILY PRN
Status: DISCONTINUED | OUTPATIENT
Start: 2022-01-07 | End: 2022-01-11 | Stop reason: HOSPADM

## 2022-01-07 RX ORDER — INSULIN LISPRO 100 [IU]/ML
0-9 INJECTION, SOLUTION INTRAVENOUS; SUBCUTANEOUS
Status: DISCONTINUED | OUTPATIENT
Start: 2022-01-07 | End: 2022-01-11 | Stop reason: HOSPADM

## 2022-01-07 RX ORDER — INSULIN LISPRO 100 [IU]/ML
0-9 INJECTION, SOLUTION INTRAVENOUS; SUBCUTANEOUS AS NEEDED
Status: DISCONTINUED | OUTPATIENT
Start: 2022-01-07 | End: 2022-01-11 | Stop reason: HOSPADM

## 2022-01-07 RX ADMIN — ALBUTEROL SULFATE 2 PUFF: 108 INHALANT RESPIRATORY (INHALATION) at 10:39

## 2022-01-07 RX ADMIN — INSULIN LISPRO 6 UNITS: 100 INJECTION, SOLUTION INTRAVENOUS; SUBCUTANEOUS at 17:35

## 2022-01-07 RX ADMIN — SODIUM CHLORIDE, PRESERVATIVE FREE 10 ML: 5 INJECTION INTRAVENOUS at 08:32

## 2022-01-07 RX ADMIN — GUAIFENESIN 600 MG: 600 TABLET, EXTENDED RELEASE ORAL at 08:31

## 2022-01-07 RX ADMIN — WARFARIN 2 MG: 1 TABLET ORAL at 17:35

## 2022-01-07 RX ADMIN — SODIUM BICARBONATE 650 MG TABLET 650 MG: at 20:55

## 2022-01-07 RX ADMIN — REMDESIVIR 100 MG: 100 INJECTION, POWDER, LYOPHILIZED, FOR SOLUTION INTRAVENOUS at 12:31

## 2022-01-07 RX ADMIN — BUDESONIDE AND FORMOTEROL FUMARATE DIHYDRATE 2 PUFF: 160; 4.5 AEROSOL RESPIRATORY (INHALATION) at 20:07

## 2022-01-07 RX ADMIN — ENOXAPARIN SODIUM 30 MG: 30 INJECTION SUBCUTANEOUS at 16:23

## 2022-01-07 RX ADMIN — SODIUM CHLORIDE, PRESERVATIVE FREE 10 ML: 5 INJECTION INTRAVENOUS at 20:56

## 2022-01-07 RX ADMIN — BUDESONIDE AND FORMOTEROL FUMARATE DIHYDRATE 2 PUFF: 160; 4.5 AEROSOL RESPIRATORY (INHALATION) at 06:44

## 2022-01-07 RX ADMIN — GABAPENTIN 300 MG: 300 CAPSULE ORAL at 16:23

## 2022-01-07 RX ADMIN — DEXAMETHASONE 6 MG: 6 TABLET ORAL at 08:31

## 2022-01-07 RX ADMIN — GUAIFENESIN 600 MG: 600 TABLET, EXTENDED RELEASE ORAL at 20:55

## 2022-01-07 RX ADMIN — INSULIN LISPRO 2 UNITS: 100 INJECTION, SOLUTION INTRAVENOUS; SUBCUTANEOUS at 12:12

## 2022-01-07 RX ADMIN — ALBUTEROL SULFATE 2 PUFF: 108 INHALANT RESPIRATORY (INHALATION) at 06:44

## 2022-01-07 RX ADMIN — WARFARIN 5 MG: 2.5 TABLET ORAL at 17:34

## 2022-01-07 RX ADMIN — ZINC SULFATE 220 MG (50 MG) CAPSULE 220 MG: CAPSULE at 08:31

## 2022-01-07 RX ADMIN — GABAPENTIN 300 MG: 300 CAPSULE ORAL at 05:05

## 2022-01-07 RX ADMIN — ALBUTEROL SULFATE 2 PUFF: 108 INHALANT RESPIRATORY (INHALATION) at 20:07

## 2022-01-07 RX ADMIN — SODIUM BICARBONATE 650 MG TABLET 650 MG: at 16:23

## 2022-01-07 RX ADMIN — PANTOPRAZOLE SODIUM 40 MG: 40 TABLET, DELAYED RELEASE ORAL at 05:05

## 2022-01-07 RX ADMIN — SODIUM BICARBONATE 650 MG TABLET 650 MG: at 08:31

## 2022-01-07 RX ADMIN — GABAPENTIN 300 MG: 300 CAPSULE ORAL at 22:20

## 2022-01-07 RX ADMIN — INSULIN LISPRO 2 UNITS: 100 INJECTION, SOLUTION INTRAVENOUS; SUBCUTANEOUS at 08:32

## 2022-01-07 NOTE — PROGRESS NOTES
"Pharmacy dosing service  Anticoagulant  Warfarin     Subjective:    Michael Dorantes is a 71 y.o.male being continued on warfarin for  Hx of DVT .    INR Goal: 2 - 3  Home medication?: Yes, warfarin 5 mg PO every day at 1800  Bridge Therapy Present?:  Yes,  Enoxaparin 30 mg SQ Q24H, dose adjusted for LEO per Dr. Rogers  Interacting Medications Evaluation (New/Present/Discontinued): Dexamethasone (may increase INR)  Additional Contributing Factors:       Assessment/Plan:    Pt has been holding warfarin for the past several days due to multiple teeth extractions 1/3. Warfarin resumed 1/6. Will give extra 2 mg warfarin for a total of warfarin 7 mg tonight. Lovenox for VTE ppx to be discontinued once INR >2.     Continue to monitor and adjust based on INR.         Date 1/6 1/7          INR 1.23 1.23          Dose 5mg 7 mg              Objective:  [Ht: 182.9 cm (72\"); Wt: 90 kg (198 lb 6.6 oz); BMI: Body mass index is 26.91 kg/m².]    Lab Results   Component Value Date    ALBUMIN 2.90 (L) 01/07/2022     Lab Results   Component Value Date    INR 1.23 (L) 01/07/2022    INR 1.24 (L) 01/06/2022    INR 1.23 (L) 01/06/2022    PROTIME 13.4 (L) 01/07/2022    PROTIME 13.5 (L) 01/06/2022    PROTIME 13.4 (L) 01/06/2022     Lab Results   Component Value Date    HGB 9.4 (L) 01/07/2022    HGB 10.8 (L) 01/06/2022    HGB 11.0 (L) 01/05/2022     Lab Results   Component Value Date    HCT 26.9 (L) 01/07/2022    HCT 31.5 (L) 01/06/2022    HCT 32.6 (L) 01/05/2022       Mariana Lopez, PharmD  01/07/22 14:57 EST       "

## 2022-01-07 NOTE — PLAN OF CARE
Problem: Adult Inpatient Plan of Care  Goal: Plan of Care Review  Outcome: Ongoing, Progressing  Goal: Patient-Specific Goal (Individualized)  Outcome: Ongoing, Progressing  Goal: Absence of Hospital-Acquired Illness or Injury  Outcome: Ongoing, Progressing  Intervention: Identify and Manage Fall Risk  Recent Flowsheet Documentation  Taken 1/7/2022 0546 by Shanon Shanks RN  Safety Promotion/Fall Prevention:   safety round/check completed   room organization consistent   clutter free environment maintained   lighting adjusted  Taken 1/7/2022 0300 by Shanon Shanks RN  Safety Promotion/Fall Prevention:   safety round/check completed   room organization consistent   activity supervised   lighting adjusted  Taken 1/7/2022 0100 by Shanon Shanks RN  Safety Promotion/Fall Prevention:   safety round/check completed   room organization consistent   activity supervised   lighting adjusted  Taken 1/6/2022 2306 by Shanon Shanks RN  Safety Promotion/Fall Prevention:   safety round/check completed   room organization consistent   activity supervised   lighting adjusted  Taken 1/6/2022 2132 by Shaonn Shanks RN  Safety Promotion/Fall Prevention:   safety round/check completed   room organization consistent   activity supervised   lighting adjusted  Taken 1/6/2022 1952 by Shanon Shanks RN  Safety Promotion/Fall Prevention:   safety round/check completed   room organization consistent   activity supervised   lighting adjusted  Intervention: Prevent Infection  Recent Flowsheet Documentation  Taken 1/7/2022 0546 by Shanon Shanks RN  Infection Prevention:   visitors restricted/screened   single patient room provided   rest/sleep promoted  Taken 1/7/2022 0300 by Shanon Shanks RN  Infection Prevention:   visitors restricted/screened   single patient room provided   rest/sleep promoted  Taken 1/7/2022 0100 by Shanon Shanks RN  Infection Prevention:   visitors restricted/screened   single patient room provided   rest/sleep promoted  Taken 1/6/2022 2306 by  Shanon Shanks, RN  Infection Prevention:   rest/sleep promoted   single patient room provided   visitors restricted/screened  Taken 1/6/2022 2132 by Shnaon Shanks RN  Infection Prevention:   rest/sleep promoted   single patient room provided  Taken 1/6/2022 1952 by Shanon Shansk RN  Infection Prevention:   single patient room provided   visitors restricted/screened   rest/sleep promoted  Goal: Optimal Comfort and Wellbeing  Outcome: Ongoing, Progressing  Goal: Readiness for Transition of Care  Outcome: Ongoing, Progressing     Problem: Breathing Pattern Ineffective  Goal: Effective Breathing Pattern  Outcome: Ongoing, Progressing   Goal Outcome Evaluation:

## 2022-01-07 NOTE — PLAN OF CARE
Problem: Adult Inpatient Plan of Care  Goal: Plan of Care Review  Outcome: Ongoing, Progressing  Goal: Patient-Specific Goal (Individualized)  Outcome: Ongoing, Progressing  Goal: Absence of Hospital-Acquired Illness or Injury  Outcome: Ongoing, Progressing  Intervention: Identify and Manage Fall Risk  Recent Flowsheet Documentation  Taken 1/7/2022 0840 by Deidre Roy RN  Safety Promotion/Fall Prevention:   assistive device/personal items within reach   clutter free environment maintained   safety round/check completed   nonskid shoes/slippers when out of bed  Intervention: Prevent Skin Injury  Recent Flowsheet Documentation  Taken 1/7/2022 0840 by Deidre Roy RN  Body Position:   position maintained   position changed independently  Goal: Optimal Comfort and Wellbeing  Outcome: Ongoing, Progressing  Intervention: Provide Person-Centered Care  Recent Flowsheet Documentation  Taken 1/7/2022 0840 by Deidre Roy RN  Trust Relationship/Rapport: care explained  Goal: Readiness for Transition of Care  Outcome: Ongoing, Progressing     Problem: Breathing Pattern Ineffective  Goal: Effective Breathing Pattern  Outcome: Ongoing, Progressing  Intervention: Promote Improved Breathing Pattern  Recent Flowsheet Documentation  Taken 1/7/2022 0840 by Deidre Roy RN  Head of Bed (HOB): HOB elevated   Goal Outcome Evaluation:  Plan of Care Reviewed With: patient        Progress: improving  Outcome Summary: New admit from the ED. Pt. is covid + and is currently on 2L of oxygen. No complaints voiced at this time. Will continue to monitor.

## 2022-01-07 NOTE — PROGRESS NOTES
"NEPHROLOGY PROGRESS NOTE------[facility]    [facility]  [phone]  Stephanie Laughlin MD      Patient Care Team:  Joey Islas MD as PCP - General (Family Medicine)  Neville Laughlin MD as Consulting Physician (Nephrology)      Provider:  Stephanie Laughlin MD  Patient Name: Michael Dorantes  :  1950    SUBJECTIVE:    F/U ARF/LEO/CRF/CKD    Feeling/breathing okay. No real SOB. No CP    Medication:  albuterol sulfate HFA, 2 puff, Inhalation, 4x Daily - RT  budesonide-formoterol, 2 puff, Inhalation, BID - RT  dexamethasone, 6 mg, Oral, Daily With Breakfast  enoxaparin, 30 mg, Subcutaneous, Q24H  gabapentin, 300 mg, Oral, Q8H  guaiFENesin, 600 mg, Oral, Q12H  insulin lispro, 0-7 Units, Subcutaneous, TID AC  pantoprazole, 40 mg, Oral, Q AM  remdesivir, 100 mg, Intravenous, Q24H  sodium bicarbonate, 650 mg, Oral, TID  sodium chloride, 10 mL, Intravenous, Q12H  warfarin, 5 mg, Oral, Daily  zinc sulfate, 220 mg, Oral, Daily      Pharmacy Consult - Remdesivir,   Pharmacy to dose warfarin,   sodium chloride, 75 mL/hr, Last Rate: 75 mL/hr (22 0647)        OBJECTIVE    Vital Sign Min/Max for last 24 hours  Temp  Min: 97.7 °F (36.5 °C)  Max: 98.9 °F (37.2 °C)   BP  Min: 139/65  Max: 163/78   Pulse  Min: 70  Max: 87   Resp  Min: 15  Max: 18   SpO2  Min: 92 %  Max: 97 %   No data recorded   No data recorded     Flowsheet Rows      First Filed Value   Admission Height 182.9 cm (72\") Documented at 2022   Admission Weight 90 kg (198 lb 6.6 oz) Documented at 2022          No intake/output data recorded.  I/O last 3 completed shifts:  In: 720 [P.O.:720]  Out: 1300 [Urine:1300]    Physical Exam:  General Appearance: alert, appears stated age and cooperative  Head: normocephalic, without obvious abnormality and atraumatic  Eyes: conjunctivae and sclerae normal and no icterus  Neck: supple and no JVD  Lungs: +BIBASILAR " RALES  Heart: regular rhythm & normal rate and normal S1, S2 +CHAYO  Chest Wall: no abnormalities observed  Abdomen: normal bowel sounds and soft non-tender +UROSTOMY  Extremities: moves extremities well, no edema, no cyanosis  Skin: no bleeding, bruising or rash  Neurologic: Alert, and oriented. No focal deficits    Labs:    WBC WBC   Date Value Ref Range Status   01/07/2022 3.00 (L) 3.40 - 10.80 10*3/mm3 Final   01/06/2022 5.40 3.40 - 10.80 10*3/mm3 Final   01/05/2022 5.50 3.40 - 10.80 10*3/mm3 Final      HGB Hemoglobin   Date Value Ref Range Status   01/07/2022 9.4 (L) 13.0 - 17.7 g/dL Final   01/06/2022 10.8 (L) 13.0 - 17.7 g/dL Final   01/05/2022 11.0 (L) 13.0 - 17.7 g/dL Final      HCT Hematocrit   Date Value Ref Range Status   01/07/2022 26.9 (L) 37.5 - 51.0 % Final   01/06/2022 31.5 (L) 37.5 - 51.0 % Final   01/05/2022 32.6 (L) 37.5 - 51.0 % Final      Platlets No results found for: LABPLAT   MCV MCV   Date Value Ref Range Status   01/07/2022 88.4 79.0 - 97.0 fL Final   01/06/2022 89.8 79.0 - 97.0 fL Final   01/05/2022 89.6 79.0 - 97.0 fL Final          Sodium Sodium   Date Value Ref Range Status   01/07/2022 134 (L) 136 - 145 mmol/L Final   01/06/2022 136 136 - 145 mmol/L Final   01/05/2022 134 (L) 136 - 145 mmol/L Final      Potassium Potassium   Date Value Ref Range Status   01/07/2022 4.6 3.5 - 5.2 mmol/L Final   01/06/2022 4.2 3.5 - 5.2 mmol/L Final   01/05/2022 4.4 3.5 - 5.2 mmol/L Final      Chloride Chloride   Date Value Ref Range Status   01/07/2022 101 98 - 107 mmol/L Final   01/06/2022 101 98 - 107 mmol/L Final   01/05/2022 99 98 - 107 mmol/L Final      CO2 CO2   Date Value Ref Range Status   01/07/2022 22.0 22.0 - 29.0 mmol/L Final   01/06/2022 25.0 22.0 - 29.0 mmol/L Final   01/05/2022 25.0 22.0 - 29.0 mmol/L Final      BUN BUN   Date Value Ref Range Status   01/07/2022 35 (H) 8 - 23 mg/dL Final   01/06/2022 35 (H) 8 - 23 mg/dL Final   01/05/2022 36 (H) 8 - 23 mg/dL Final      Creatinine  Creatinine   Date Value Ref Range Status   01/07/2022 2.03 (H) 0.76 - 1.27 mg/dL Final   01/06/2022 1.98 (H) 0.76 - 1.27 mg/dL Final   01/06/2022 2.22 (H) 0.76 - 1.27 mg/dL Final   01/05/2022 2.86 (H) 0.76 - 1.27 mg/dL Final      Calcium Calcium   Date Value Ref Range Status   01/07/2022 7.9 (L) 8.6 - 10.5 mg/dL Final   01/06/2022 8.2 (L) 8.6 - 10.5 mg/dL Final   01/05/2022 8.0 (L) 8.6 - 10.5 mg/dL Final      PO4 No components found for: PO4   Albumin Albumin   Date Value Ref Range Status   01/07/2022 2.90 (L) 3.50 - 5.20 g/dL Final   01/06/2022 3.20 (L) 3.50 - 5.20 g/dL Final   01/06/2022 3.20 (L) 3.50 - 5.20 g/dL Final   01/05/2022 3.30 (L) 3.50 - 5.20 g/dL Final      Magnesium Magnesium   Date Value Ref Range Status   01/06/2022 1.8 1.6 - 2.4 mg/dL Final      Uric Acid No components found for: URIC ACID     Imaging Results (Last 72 Hours)     Procedure Component Value Units Date/Time    XR Chest 1 View [[id number]] Collected: 01/06/22 0002     Updated: 01/06/22 0006    Narrative:      XR CHEST 1 VW    Date of Exam: 1/5/2022 23:38 EST    Indication: dyspnea.  Covid positive.     Comparison Exams: None available.    Technique: Portable AP chest    FINDINGS:  Ill-defined infiltrates are seen throughout the central to peripheral aspect of the lungs bilaterally with mild interstitial changes. No pleural effusions are observed. The cardiac silhouette is within normal limits for size for the portable study. The   mediastinum is unremarkable. No acute osseous abnormalities are identified.      Impression:      Ill-defined infiltrates are noted bilaterally with mild interstitial changes. The findings suggest atypical/viral infection or multifocal pneumonia and suggest changes of Covid 19 infection. Recommend follow-up to ensure improvement/resolution.    Electronically Signed: Bran Colin MD 1/6/2022 0:03 EST          Results for orders placed during the hospital encounter of 01/05/22    XR Chest 1  View    Narrative  XR CHEST 1 VW    Date of Exam: 1/5/2022 23:38 EST    Indication: dyspnea.  Covid positive.    Comparison Exams: None available.    Technique: Portable AP chest    FINDINGS:  Ill-defined infiltrates are seen throughout the central to peripheral aspect of the lungs bilaterally with mild interstitial changes. No pleural effusions are observed. The cardiac silhouette is within normal limits for size for the portable study. The  mediastinum is unremarkable. No acute osseous abnormalities are identified.    Impression  Ill-defined infiltrates are noted bilaterally with mild interstitial changes. The findings suggest atypical/viral infection or multifocal pneumonia and suggest changes of Covid 19 infection. Recommend follow-up to ensure improvement/resolution.    Electronically Signed: Bran Colin MD 1/6/2022 0:03 EST      Results for orders placed in visit on 12/13/18    SCANNED - IMAGING      SCANNED - IMAGING      Results for orders placed during the hospital encounter of 10/17/19    Duplex Venous Lower Extremity - Left    Interpretation Summary  · Acute left lower extremity deep vein thrombosis noted in the common femoral, proximal femoral, mid femoral, distal femoral and popliteal.        ASSESSMENT / PLAN      Pneumonia due to COVID-19 virus    Diabetes mellitus with neuropathy (HCC)    History of DVT (deep vein thrombosis)    LEO (acute kidney injury) (HCC)    COVID-19    1. ARF/LEO/CRF/CKD STG 3B----Nonoliguric. BUN/Cr down. +ARF/LEO on top of known CRF/CKD STG 3B with a baseline serum creatinine of about 1.8. CRF/CKD STG 3B is secondary to DGS/HTN NS. +ARF/LEO appears to be secondary to prerenal state/intravascular volume depletion. D/C IVFs today and follow.  No NSAIDs or IV dye. Lytes, acid-base okay. No uremia     2. DMII WITH RENAL MANIFESTATIONS-----BS okay. Glucometers, SSI. Follow with steroid exposure     3. HTN WITH CKD-----BP okay. Avoid hypotension. No ACE-I/ARB/DRI/diuretic for  now     4. MILD HYPOALBUMINEMIA-----Encouraged increased po protein intake     5. H/O BLADDER/PROSTATE CA WITH UROSTOMY     6. ANEMIA OF CKD--------H/H in safe range. Follow for EPO need.      7. DM PERIPHERAL NEUROPATHY------On Neurontin     8. GERD/PUD PROPHYLAXIS------On PPI. Benefits outweigh risks, despite renal dysfunction, given steroid exposure and h/o use and tolerating PPI     9. TYPE 4 RTA------Stable bicarbonate levels on current po NaHCO3 dose     10. DVT-----Anticoagulated     11. COVID 19 + PNA-------Okay for Remdesivir despite renal dysfunction. On Steroids and pulmonary toilet also per PMD     12. HEARING IMPAIRED        Stephanie Laughlin MD  [facility]  [phone]  01/07/22  08:44 EST

## 2022-01-07 NOTE — CASE MANAGEMENT/SOCIAL WORK
Discharge Planning Assessment  [facility]     Patient Name: Michael Dorantes  MRN: [id number]  Today's Date: 1/7/2022    Admit Date: 1/5/2022     Discharge Needs Assessment     Row Name 01/07/22 1006       Living Environment    Lives With spouse    Name(s) of Who Lives With Patient Spouse who is also COVID +.    Current Living Arrangements home/apartment/condo    Primary Care Provided by self    Provides Primary Care For no one    Family Caregiver if Needed spouse    Able to Return to Prior Arrangements yes    Living Arrangement Comments Pt has walker at home but rarely uses it.       Resource/Environmental Concerns    Resource/Environmental Concerns none       Transition Planning    Patient/Family Anticipates Transition to home with family    Transportation Anticipated family or friend will provide       Discharge Needs Assessment    Readmission Within the Last 30 Days no previous admission in last 30 days    Equipment Currently Used at Home walker, rolling    Concerns to be Addressed discharge planning    Discharge Coordination/Progress DC Plan: Return home with spouse, no home O2. Sister can transport home.               Discharge Plan     Row Name 01/07/22 1009       Plan    Plan DC Plan: Return home with spouse, no home O2. Sister can transport home.    Plan Comments IADLs, Spouse also COVID +. Pt now on room air, Remdesivir 2/5. Pharmacy [facility] - Admitted Since 1/5/2022    Coordination has not been started for this encounter.          Demographic Summary     Row Name 01/07/22 1004       General Information    Admission Type observation    Arrived From emergency department    Referral Source admission list    Reason for Consult discharge planning    Preferred Language English     Used During This Interaction no    General Information Comments Spoke to Marilyn Monaco per phone. 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               Functional Status     Row Name 01/07/22 1008        Functional Status    Usual Activity Tolerance moderate    Current Activity Tolerance moderate       Functional Status, IADL    Medications independent    Meal Preparation independent    Housekeeping independent    Laundry independent    Shopping independent       Mental Status    General Appearance WDL --  COVID 19 isolation              Phone communication or documentation only - no physical contact with patient or family.        Patient Forms    No documentation.                   Elijah Balderas RN

## 2022-01-07 NOTE — PROGRESS NOTES
LOS: 0 days   Patient Care Team:  Joey Islas MD as PCP - General (Family Medicine)  Neville Laughlin MD as Consulting Physician (Nephrology)    Subjective     Interval History: Interview conducted with the help of a secure video .    Patient Complaints: SOA improving.  Cough is harsh, non-productive.    History taken from: patient    Review of Systems   Constitutional: Positive for activity change and fatigue. Negative for appetite change, diaphoresis and fever.   HENT: Negative for facial swelling.    Eyes: Negative for visual disturbance.   Respiratory: Positive for cough and shortness of breath. Negative for wheezing and stridor.    Cardiovascular: Negative for chest pain, palpitations and leg swelling.   Gastrointestinal: Negative for abdominal pain, constipation, diarrhea, nausea and vomiting.   Genitourinary: Negative for hematuria.   Musculoskeletal: Negative for arthralgias and back pain.   Skin: Negative for rash and wound.   Neurological: Negative for weakness.   Psychiatric/Behavioral: Negative for confusion.           Objective     Vital Signs  Temp:  [97.7 °F (36.5 °C)-98.9 °F (37.2 °C)] 98.1 °F (36.7 °C)  Heart Rate:  [64-87] 69  Resp:  [15-18] 15  BP: (139-163)/(64-78) 142/65    Physical Exam:     General Appearance:    Alert, cooperative, in no acute distress,   Head:    Normocephalic, without obvious abnormality, atraumatic   Eyes:            Lids and lashes normal, conjunctivae and sclerae normal, no   icterus, no pallor, corneas clear, PERRLA   Ears:    Ears appear intact with no abnormalities noted   Throat:   No oral lesions, no thrush, oral mucosa moist   Neck:   No adenopathy, supple, trachea midline, no thyromegaly, no   carotid bruit, no JVD   Lungs:     Crackles throughout, no wheezing    Heart:    Regular rhythm and normal rate, normal S1 and S2, no            murmur, no gallop, no rub, no click   Chest Wall:    No abnormalities observed   Abdomen:      Normal bowel sounds, no masses, no organomegaly, soft        Non-tender non-distended, no guarding,   Extremities:   Moves all extremities well, no edema, no cyanosis, no             Redness   Pulses:   Pulses palpable and equal bilaterally   Skin:   No bleeding, bruising or rash   Lymph nodes:   No palpable adenopathy   Neurologic:   Cranial nerves 2 - 12 grossly intact, sensation intact, DTR       present and equal bilaterally        Results Review:    Lab Results (last 24 hours)     Procedure Component Value Units Date/Time    POC Glucose Once [[id number]]  (Abnormal) Collected: 01/07/22 1138    Specimen: Blood Updated: 01/07/22 1139     Glucose 181 mg/dL      Comment: Serial Number: [id number]:  [id number]       POC Glucose Once [[id number]]  (Abnormal) Collected: 01/07/22 0751    Specimen: Blood Updated: 01/07/22 0752     Glucose 160 mg/dL      Comment: Serial Number: [id number]:  [id number]       Blood Culture - Blood, Arm, Right [[id number]]  (Normal) Collected: 01/06/22 0729    Specimen: Blood from Arm, Right Updated: 01/07/22 0745     Blood Culture No growth at 24 hours    Blood Culture - Blood, Arm, Left [[id number]]  (Normal) Collected: 01/06/22 0735    Specimen: Blood from Arm, Left Updated: 01/07/22 0745     Blood Culture No growth at 24 hours    Comprehensive Metabolic Panel [[id number]]  (Abnormal) Collected: 01/07/22 0132    Specimen: Blood Updated: 01/07/22 0237     Glucose 298 mg/dL      BUN 35 mg/dL      Creatinine 2.03 mg/dL      Sodium 134 mmol/L      Potassium 4.6 mmol/L      Chloride 101 mmol/L      CO2 22.0 mmol/L      Calcium 7.9 mg/dL      Total Protein 5.4 g/dL      Albumin 2.90 g/dL      ALT (SGPT) 9 U/L      AST (SGOT) 14 U/L      Alkaline Phosphatase 42 U/L      Total Bilirubin 0.2 mg/dL      eGFR Non African Amer 33 mL/min/1.73      Globulin 2.5 gm/dL      A/G Ratio 1.2 g/dL      BUN/Creatinine Ratio 17.2     Anion Gap 11.0 mmol/L     Narrative:      GFR Normal >60  Chronic  Kidney Disease <60  Kidney Failure <15      Bilirubin, Direct [[id number]]  (Normal) Collected: 01/07/22 0132    Specimen: Blood Updated: 01/07/22 0237     Bilirubin, Direct <0.2 mg/dL     Protime-INR [[id number]]  (Abnormal) Collected: 01/07/22 0132    Specimen: Blood Updated: 01/07/22 0231     Protime 13.4 Seconds      INR 1.23    D-dimer, Quantitative [[id number]]  (Abnormal) Collected: 01/07/22 0132    Specimen: Blood Updated: 01/07/22 0231     D-Dimer, Quantitative 0.62 mg/L (FEU)     Narrative:      Reference Range  --------------------------------------------------------------------     < 0.50   Negative Predictive Value  0.50-0.59   Indeterminate    >= 0.60   Probable VTE             A very low percentage of patients with DVT may yield D-Dimer results   below the cut-off of 0.50 mg/L FEU.  This is known to be more   prevalent in patients with distal DVT.             Results of this test should always be interpreted in conjunction with   the patient's medical history, clinical presentation and other   findings.  Clinical diagnosis should not be based on the result of   INNOVANCE D-Dimer alone.    CBC & Differential [[id number]]  (Abnormal) Collected: 01/07/22 0132    Specimen: Blood Updated: 01/07/22 0214    Narrative:      The following orders were created for panel order CBC & Differential.  Procedure                               Abnormality         Status                     ---------                               -----------         ------                     CBC Auto Differential[[id number]]        Abnormal            Final result                 Please view results for these tests on the individual orders.    CBC Auto Differential [[id number]]  (Abnormal) Collected: 01/07/22 0132    Specimen: Blood Updated: 01/07/22 0214     WBC 3.00 10*3/mm3      RBC 3.05 10*6/mm3      Hemoglobin 9.4 g/dL      Hematocrit 26.9 %      MCV 88.4 fL      MCH 30.8 pg      MCHC 34.8 g/dL      RDW 12.8 %      RDW-SD 39.8 fl       MPV 7.2 fL      Platelets 166 10*3/mm3      Neutrophil % 85.8 %      Lymphocyte % 7.1 %      Monocyte % 6.9 %      Eosinophil % 0.1 %      Basophil % 0.1 %      Neutrophils, Absolute 2.60 10*3/mm3      Lymphocytes, Absolute 0.20 10*3/mm3      Monocytes, Absolute 0.20 10*3/mm3      Eosinophils, Absolute 0.00 10*3/mm3      Basophils, Absolute 0.00 10*3/mm3      nRBC 0.0 /100 WBC     POC Glucose Once [[id number]]  (Abnormal) Collected: 01/06/22 1939    Specimen: Blood Updated: 01/06/22 1940     Glucose 288 mg/dL      Comment: Serial Number: [id number]:  [id number]       POC Glucose Once [[id number]]  (Abnormal) Collected: 01/06/22 1633    Specimen: Blood Updated: 01/06/22 1634     Glucose 172 mg/dL      Comment: Serial Number: [id number]:  [id number]              Imaging Results (Last 24 Hours)     ** No results found for the last 24 hours. **               I reviewed the patient's new clinical results.    Medication Review:   Scheduled Meds:albuterol sulfate HFA, 2 puff, Inhalation, 4x Daily - RT  budesonide-formoterol, 2 puff, Inhalation, BID - RT  dexamethasone, 6 mg, Oral, Daily With Breakfast  enoxaparin, 30 mg, Subcutaneous, Q24H  gabapentin, 300 mg, Oral, Q8H  guaiFENesin, 600 mg, Oral, Q12H  insulin lispro, 0-9 Units, Subcutaneous, TID AC  pantoprazole, 40 mg, Oral, Q AM  remdesivir, 100 mg, Intravenous, Q24H  sodium bicarbonate, 650 mg, Oral, TID  sodium chloride, 10 mL, Intravenous, Q12H  warfarin, 2 mg, Oral, Daily  warfarin, 5 mg, Oral, Daily  zinc sulfate, 220 mg, Oral, Daily      Continuous Infusions:Pharmacy Consult - [facility],   Pharmacy to dose warfarin,       PRN Meds:.•  acetaminophen  •  benzonatate  •  dextrose  •  dextrose  •  glucagon (human recombinant)  •  HYDROcodone-homatropine  •  insulin lispro **AND** insulin lispro  •  melatonin  •  ondansetron **OR** ondansetron  •  Pharmacy Consult - Remdesivir  •  Pharmacy to dose warfarin  •  sodium chloride  •  [COMPLETED] Insert  peripheral IV **AND** sodium chloride  •  sodium chloride     Assessment/Plan       Pneumonia due to COVID-19 virus    Diabetes mellitus with neuropathy (HCC)    History of DVT (deep vein thrombosis)    LEO (acute kidney injury) (HCC)    COVID-19  Acute hypoxemia  Steroid-induce hyperglycemia    Improving - today is day 2/5 of remdesivir.  Continue steroids, bronchodilators, Mucinex.  Continue SSI for elevated blood sugars.      Pantoprazole for stress ulcer prophy  Anticoagulated with warfarin for h/o DVT        Plan for disposition:Home at discharge  Samanta Rogers MD  01/07/22  15:48 EST

## 2022-01-08 LAB
ALBUMIN SERPL-MCNC: 3.1 G/DL (ref 3.5–5.2)
ALP SERPL-CCNC: 42 U/L (ref 39–117)
ALT SERPL W P-5'-P-CCNC: 10 U/L (ref 1–41)
ANION GAP SERPL CALCULATED.3IONS-SCNC: 11 MMOL/L (ref 5–15)
AST SERPL-CCNC: 15 U/L (ref 1–40)
BACTERIA UR QL AUTO: ABNORMAL /HPF
BASOPHILS # BLD AUTO: 0 10*3/MM3 (ref 0–0.2)
BASOPHILS NFR BLD AUTO: 0.4 % (ref 0–1.5)
BILIRUB CONJ SERPL-MCNC: <0.2 MG/DL (ref 0–0.3)
BILIRUB INDIRECT SERPL-MCNC: ABNORMAL MG/DL
BILIRUB SERPL-MCNC: 0.2 MG/DL (ref 0–1.2)
BILIRUB UR QL STRIP: NEGATIVE
BUN SERPL-MCNC: 36 MG/DL (ref 8–23)
BUN/CREAT SERPL: 19.5 (ref 7–25)
CALCIUM SPEC-SCNC: 8.1 MG/DL (ref 8.6–10.5)
CHLORIDE SERPL-SCNC: 103 MMOL/L (ref 98–107)
CLARITY UR: ABNORMAL
CO2 SERPL-SCNC: 23 MMOL/L (ref 22–29)
COLOR UR: YELLOW
CREAT SERPL-MCNC: 1.85 MG/DL (ref 0.76–1.27)
DEPRECATED RDW RBC AUTO: 41.1 FL (ref 37–54)
EOSINOPHIL # BLD AUTO: 0 10*3/MM3 (ref 0–0.4)
EOSINOPHIL NFR BLD AUTO: 0 % (ref 0.3–6.2)
ERYTHROCYTE [DISTWIDTH] IN BLOOD BY AUTOMATED COUNT: 13.1 % (ref 12.3–15.4)
GFR SERPL CREATININE-BSD FRML MDRD: 36 ML/MIN/1.73
GLUCOSE BLDC GLUCOMTR-MCNC: 148 MG/DL (ref 70–105)
GLUCOSE BLDC GLUCOMTR-MCNC: 164 MG/DL (ref 70–105)
GLUCOSE BLDC GLUCOMTR-MCNC: 224 MG/DL (ref 70–105)
GLUCOSE BLDC GLUCOMTR-MCNC: 273 MG/DL (ref 70–105)
GLUCOSE SERPL-MCNC: 151 MG/DL (ref 65–99)
GLUCOSE UR STRIP-MCNC: ABNORMAL MG/DL
HCT VFR BLD AUTO: 28.1 % (ref 37.5–51)
HGB BLD-MCNC: 9.8 G/DL (ref 13–17.7)
HGB UR QL STRIP.AUTO: ABNORMAL
HYALINE CASTS UR QL AUTO: ABNORMAL /LPF
INR PPP: 1.43 (ref 2–3)
KETONES UR QL STRIP: NEGATIVE
LEUKOCYTE ESTERASE UR QL STRIP.AUTO: ABNORMAL
LYMPHOCYTES # BLD AUTO: 0.4 10*3/MM3 (ref 0.7–3.1)
LYMPHOCYTES NFR BLD AUTO: 9.3 % (ref 19.6–45.3)
MAGNESIUM SERPL-MCNC: 1.9 MG/DL (ref 1.6–2.4)
MCH RBC QN AUTO: 31.1 PG (ref 26.6–33)
MCHC RBC AUTO-ENTMCNC: 34.9 G/DL (ref 31.5–35.7)
MCV RBC AUTO: 89.2 FL (ref 79–97)
MONOCYTES # BLD AUTO: 0.3 10*3/MM3 (ref 0.1–0.9)
MONOCYTES NFR BLD AUTO: 7.1 % (ref 5–12)
NEUTROPHILS NFR BLD AUTO: 4 10*3/MM3 (ref 1.7–7)
NEUTROPHILS NFR BLD AUTO: 83.2 % (ref 42.7–76)
NITRITE UR QL STRIP: NEGATIVE
NRBC BLD AUTO-RTO: 0 /100 WBC (ref 0–0.2)
PH UR STRIP.AUTO: 7.5 [PH] (ref 5–8)
PHOSPHATE SERPL-MCNC: 2.2 MG/DL (ref 2.5–4.5)
PLATELET # BLD AUTO: 192 10*3/MM3 (ref 140–450)
PMV BLD AUTO: 7.1 FL (ref 6–12)
POTASSIUM SERPL-SCNC: 4.8 MMOL/L (ref 3.5–5.2)
PROT SERPL-MCNC: 5.7 G/DL (ref 6–8.5)
PROT UR QL STRIP: ABNORMAL
PROTHROMBIN TIME: 15.5 SECONDS (ref 19.4–28.5)
RBC # BLD AUTO: 3.15 10*6/MM3 (ref 4.14–5.8)
RBC # UR STRIP: ABNORMAL /HPF
REF LAB TEST METHOD: ABNORMAL
SODIUM SERPL-SCNC: 137 MMOL/L (ref 136–145)
SP GR UR STRIP: 1.02 (ref 1–1.03)
SQUAMOUS #/AREA URNS HPF: ABNORMAL /HPF
TRI-PHOS CRY URNS QL MICRO: ABNORMAL /HPF
UROBILINOGEN UR QL STRIP: ABNORMAL
WBC # UR STRIP: ABNORMAL /HPF
WBC NRBC COR # BLD: 4.8 10*3/MM3 (ref 3.4–10.8)

## 2022-01-08 PROCEDURE — 25010000002 ENOXAPARIN PER 10 MG: Performed by: NURSE PRACTITIONER

## 2022-01-08 PROCEDURE — 80076 HEPATIC FUNCTION PANEL: CPT | Performed by: NURSE PRACTITIONER

## 2022-01-08 PROCEDURE — 85025 COMPLETE CBC W/AUTO DIFF WBC: CPT | Performed by: NURSE PRACTITIONER

## 2022-01-08 PROCEDURE — 94799 UNLISTED PULMONARY SVC/PX: CPT

## 2022-01-08 PROCEDURE — 87086 URINE CULTURE/COLONY COUNT: CPT | Performed by: NURSE PRACTITIONER

## 2022-01-08 PROCEDURE — 87077 CULTURE AEROBIC IDENTIFY: CPT | Performed by: NURSE PRACTITIONER

## 2022-01-08 PROCEDURE — 87186 SC STD MICRODIL/AGAR DIL: CPT | Performed by: NURSE PRACTITIONER

## 2022-01-08 PROCEDURE — 80048 BASIC METABOLIC PNL TOTAL CA: CPT | Performed by: INTERNAL MEDICINE

## 2022-01-08 PROCEDURE — 36415 COLL VENOUS BLD VENIPUNCTURE: CPT | Performed by: NURSE PRACTITIONER

## 2022-01-08 PROCEDURE — 85610 PROTHROMBIN TIME: CPT | Performed by: NURSE PRACTITIONER

## 2022-01-08 PROCEDURE — 82962 GLUCOSE BLOOD TEST: CPT

## 2022-01-08 PROCEDURE — 63710000001 INSULIN LISPRO (HUMAN) PER 5 UNITS: Performed by: INTERNAL MEDICINE

## 2022-01-08 PROCEDURE — 81001 URINALYSIS AUTO W/SCOPE: CPT | Performed by: NURSE PRACTITIONER

## 2022-01-08 PROCEDURE — 83735 ASSAY OF MAGNESIUM: CPT | Performed by: INTERNAL MEDICINE

## 2022-01-08 PROCEDURE — 84100 ASSAY OF PHOSPHORUS: CPT | Performed by: INTERNAL MEDICINE

## 2022-01-08 RX ORDER — WARFARIN SODIUM 1 MG/1
1 TABLET ORAL
Status: COMPLETED | OUTPATIENT
Start: 2022-01-08 | End: 2022-01-08

## 2022-01-08 RX ORDER — DEXAMETHASONE 4 MG/1
4 TABLET ORAL
Status: DISCONTINUED | OUTPATIENT
Start: 2022-01-09 | End: 2022-01-10

## 2022-01-08 RX ORDER — HYDRALAZINE HYDROCHLORIDE 25 MG/1
50 TABLET, FILM COATED ORAL 2 TIMES DAILY
Status: DISCONTINUED | OUTPATIENT
Start: 2022-01-08 | End: 2022-01-11 | Stop reason: HOSPADM

## 2022-01-08 RX ADMIN — BUDESONIDE AND FORMOTEROL FUMARATE DIHYDRATE 2 PUFF: 160; 4.5 AEROSOL RESPIRATORY (INHALATION) at 07:58

## 2022-01-08 RX ADMIN — SODIUM CHLORIDE, PRESERVATIVE FREE 10 ML: 5 INJECTION INTRAVENOUS at 21:57

## 2022-01-08 RX ADMIN — SODIUM BICARBONATE 650 MG TABLET 650 MG: at 21:56

## 2022-01-08 RX ADMIN — SODIUM BICARBONATE 650 MG TABLET 650 MG: at 13:58

## 2022-01-08 RX ADMIN — GABAPENTIN 300 MG: 300 CAPSULE ORAL at 05:32

## 2022-01-08 RX ADMIN — GABAPENTIN 300 MG: 300 CAPSULE ORAL at 13:56

## 2022-01-08 RX ADMIN — ENOXAPARIN SODIUM 40 MG: 40 INJECTION SUBCUTANEOUS at 17:26

## 2022-01-08 RX ADMIN — REMDESIVIR 100 MG: 100 INJECTION, POWDER, LYOPHILIZED, FOR SOLUTION INTRAVENOUS at 09:40

## 2022-01-08 RX ADMIN — PANTOPRAZOLE SODIUM 40 MG: 40 TABLET, DELAYED RELEASE ORAL at 05:32

## 2022-01-08 RX ADMIN — ALBUTEROL SULFATE 2 PUFF: 108 INHALANT RESPIRATORY (INHALATION) at 19:00

## 2022-01-08 RX ADMIN — SODIUM BICARBONATE 650 MG TABLET 650 MG: at 09:39

## 2022-01-08 RX ADMIN — HYDRALAZINE HYDROCHLORIDE 50 MG: 25 TABLET, FILM COATED ORAL at 21:56

## 2022-01-08 RX ADMIN — GABAPENTIN 300 MG: 300 CAPSULE ORAL at 21:56

## 2022-01-08 RX ADMIN — POTASSIUM PHOSPHATE, MONOBASIC 1000 MG: 500 TABLET, SOLUBLE ORAL at 13:56

## 2022-01-08 RX ADMIN — ZINC SULFATE 220 MG (50 MG) CAPSULE 220 MG: CAPSULE at 09:39

## 2022-01-08 RX ADMIN — DEXAMETHASONE 6 MG: 6 TABLET ORAL at 09:39

## 2022-01-08 RX ADMIN — GUAIFENESIN 600 MG: 600 TABLET, EXTENDED RELEASE ORAL at 21:57

## 2022-01-08 RX ADMIN — SODIUM BICARBONATE 650 MG TABLET 650 MG: at 17:29

## 2022-01-08 RX ADMIN — WARFARIN 5 MG: 2.5 TABLET ORAL at 17:27

## 2022-01-08 RX ADMIN — INSULIN LISPRO 6 UNITS: 100 INJECTION, SOLUTION INTRAVENOUS; SUBCUTANEOUS at 17:29

## 2022-01-08 RX ADMIN — ALBUTEROL SULFATE 2 PUFF: 108 INHALANT RESPIRATORY (INHALATION) at 11:36

## 2022-01-08 RX ADMIN — WARFARIN 1 MG: 1 TABLET ORAL at 17:28

## 2022-01-08 RX ADMIN — ALBUTEROL SULFATE 2 PUFF: 108 INHALANT RESPIRATORY (INHALATION) at 14:30

## 2022-01-08 RX ADMIN — SODIUM CHLORIDE, PRESERVATIVE FREE 10 ML: 5 INJECTION INTRAVENOUS at 09:40

## 2022-01-08 RX ADMIN — INSULIN LISPRO 2 UNITS: 100 INJECTION, SOLUTION INTRAVENOUS; SUBCUTANEOUS at 13:48

## 2022-01-08 RX ADMIN — BUDESONIDE AND FORMOTEROL FUMARATE DIHYDRATE 2 PUFF: 160; 4.5 AEROSOL RESPIRATORY (INHALATION) at 19:07

## 2022-01-08 RX ADMIN — GUAIFENESIN 600 MG: 600 TABLET, EXTENDED RELEASE ORAL at 09:40

## 2022-01-08 RX ADMIN — ALBUTEROL SULFATE 2 PUFF: 108 INHALANT RESPIRATORY (INHALATION) at 07:55

## 2022-01-08 NOTE — PROGRESS NOTES
"Pharmacy dosing service  Anticoagulant  Warfarin     Subjective:    Michael Dorantes is a 71 y.o.male being continued on warfarin for  Hx of DVT .    INR Goal: 2 - 3  Home medication?: Yes, warfarin 5 mg PO every day at 1800  Bridge Therapy Present?:  Yes,  Enoxaparin 40 mg SQ Q24H. LEO improving and Anusha Monaeaver NP okay with increasing to lovenox 40 mg daily for crcl >30 ml/min.   Interacting Medications Evaluation (New/Present/Discontinued): Dexamethasone (may increase INR)  Additional Contributing Factors:       Assessment/Plan:    Pt has been holding warfarin for the past several days due to multiple teeth extractions 1/3. Warfarin resumed 1/6. Will give extra 1 mg warfarin for a total of warfarin 6 mg tonight. Lovenox for VTE ppx to be discontinued once INR >2.     Continue to monitor and adjust based on INR.         Date 1/6 1/7 1/8         INR 1.23 1.23 1.43         Dose 5mg 7 mg 6 mg              Objective:  [Ht: 182.9 cm (72\"); Wt: 90 kg (198 lb 6.6 oz); BMI: Body mass index is 26.91 kg/m².]    Lab Results   Component Value Date    ALBUMIN 3.10 (L) 01/08/2022     Lab Results   Component Value Date    INR 1.43 (L) 01/08/2022    INR 1.23 (L) 01/07/2022    INR 1.24 (L) 01/06/2022    PROTIME 15.5 (L) 01/08/2022    PROTIME 13.4 (L) 01/07/2022    PROTIME 13.5 (L) 01/06/2022     Lab Results   Component Value Date    HGB 9.8 (L) 01/08/2022    HGB 9.4 (L) 01/07/2022    HGB 10.8 (L) 01/06/2022     Lab Results   Component Value Date    HCT 28.1 (L) 01/08/2022    HCT 26.9 (L) 01/07/2022    HCT 31.5 (L) 01/06/2022       Mariana Lopez, PharmD  01/08/22 12:09 EST         "

## 2022-01-08 NOTE — PLAN OF CARE
Goal Outcome Evaluation:     Patient is non verbal and AOX4, witting is used for communication. Vitals re WNL for patient. Patient is on room air. No skin issues. Urostomy is assessed, skin is red. Bag is occluded, patient states he changes bag himself.

## 2022-01-08 NOTE — PLAN OF CARE
Goal Outcome Evaluation:  Plan of Care Reviewed With: patient      Progress: no change     Alert and oriented x 4. Able to make needs and wants known. Patient is deaf, translation device used to facilitate communication. No s/s of hyper/hypoglycemia noted. No tracheal deviation/neck vein distention noted. No clubbing/cyanosis noted. No decrease in appetite noted. Does not require O2 therapy at this time. Independent for ambulation and transfers. Continent of bowel and bladder. Currently in bed, awake. Call bell in reach

## 2022-01-08 NOTE — PROGRESS NOTES
"NEPHROLOGY PROGRESS NOTE------[facility]    [facility]  [phone]  Stephanie Laughlin MD      Patient Care Team:  Joey Islas MD as PCP - General (Family Medicine)  Neville Laughlin MD as Consulting Physician (Nephrology)      Provider:  Stephanie Laughlin MD  Patient Name: Michael Dorantes  :  1950    SUBJECTIVE:    F/U ARF/LEO/CRF/CKD    Comfortable. No major new events. No dysuria. No palpitations.     Medication:  albuterol sulfate HFA, 2 puff, Inhalation, 4x Daily - RT  budesonide-formoterol, 2 puff, Inhalation, BID - RT  dexamethasone, 6 mg, Oral, Daily With Breakfast  enoxaparin, 30 mg, Subcutaneous, Q24H  gabapentin, 300 mg, Oral, Q8H  guaiFENesin, 600 mg, Oral, Q12H  insulin lispro, 0-9 Units, Subcutaneous, TID AC  pantoprazole, 40 mg, Oral, Q AM  potassium phosphate (monobasic), 1,000 mg, Oral, Once  remdesivir, 100 mg, Intravenous, Q24H  sodium bicarbonate, 650 mg, Oral, TID  sodium chloride, 10 mL, Intravenous, Q12H  warfarin, 5 mg, Oral, Daily  zinc sulfate, 220 mg, Oral, Daily      Pharmacy Consult - Remdesivir,   Pharmacy to dose warfarin,         OBJECTIVE    Vital Sign Min/Max for last 24 hours  Temp  Min: 96.4 °F (35.8 °C)  Max: 98.3 °F (36.8 °C)   BP  Min: 135/69  Max: 152/76   Pulse  Min: 62  Max: 75   Resp  Min: 14  Max: 21   SpO2  Min: 93 %  Max: 98 %   No data recorded   No data recorded     Flowsheet Rows      First Filed Value   Admission Height 182.9 cm (72\") Documented at 2022   Admission Weight 90 kg (198 lb 6.6 oz) Documented at 2022          No intake/output data recorded.  I/O last 3 completed shifts:  In: 1560 [P.O.:1560]  Out: 2800 [Urine:2800]    Physical Exam:  General Appearance: alert, appears stated age and cooperative  Head: normocephalic, without obvious abnormality and atraumatic  Eyes: conjunctivae and sclerae normal and no icterus  Neck: supple and no JVD  Lungs: " +BIBASILAR RALES (BETTER)  Heart: regular rhythm & normal rate and normal S1, S2 +CHAYO  Chest Wall: no abnormalities observed  Abdomen: normal bowel sounds and soft non-tender +UROSTOMY  Extremities: moves extremities well, no edema, no cyanosis  Skin: no bleeding, bruising or rash  Neurologic: Alert, and oriented. No focal deficits    Labs:    WBC WBC   Date Value Ref Range Status   01/08/2022 4.80 3.40 - 10.80 10*3/mm3 Final   01/07/2022 3.00 (L) 3.40 - 10.80 10*3/mm3 Final   01/06/2022 5.40 3.40 - 10.80 10*3/mm3 Final   01/05/2022 5.50 3.40 - 10.80 10*3/mm3 Final      HGB Hemoglobin   Date Value Ref Range Status   01/08/2022 9.8 (L) 13.0 - 17.7 g/dL Final   01/07/2022 9.4 (L) 13.0 - 17.7 g/dL Final   01/06/2022 10.8 (L) 13.0 - 17.7 g/dL Final   01/05/2022 11.0 (L) 13.0 - 17.7 g/dL Final      HCT Hematocrit   Date Value Ref Range Status   01/08/2022 28.1 (L) 37.5 - 51.0 % Final   01/07/2022 26.9 (L) 37.5 - 51.0 % Final   01/06/2022 31.5 (L) 37.5 - 51.0 % Final   01/05/2022 32.6 (L) 37.5 - 51.0 % Final      Platlets No results found for: LABPLAT   MCV MCV   Date Value Ref Range Status   01/08/2022 89.2 79.0 - 97.0 fL Final   01/07/2022 88.4 79.0 - 97.0 fL Final   01/06/2022 89.8 79.0 - 97.0 fL Final   01/05/2022 89.6 79.0 - 97.0 fL Final          Sodium Sodium   Date Value Ref Range Status   01/08/2022 137 136 - 145 mmol/L Final   01/07/2022 134 (L) 136 - 145 mmol/L Final   01/06/2022 136 136 - 145 mmol/L Final   01/05/2022 134 (L) 136 - 145 mmol/L Final      Potassium Potassium   Date Value Ref Range Status   01/08/2022 4.8 3.5 - 5.2 mmol/L Final   01/07/2022 4.6 3.5 - 5.2 mmol/L Final   01/06/2022 4.2 3.5 - 5.2 mmol/L Final   01/05/2022 4.4 3.5 - 5.2 mmol/L Final      Chloride Chloride   Date Value Ref Range Status   01/08/2022 103 98 - 107 mmol/L Final   01/07/2022 101 98 - 107 mmol/L Final   01/06/2022 101 98 - 107 mmol/L Final   01/05/2022 99 98 - 107 mmol/L Final      CO2 CO2   Date Value Ref Range Status    01/08/2022 23.0 22.0 - 29.0 mmol/L Final   01/07/2022 22.0 22.0 - 29.0 mmol/L Final   01/06/2022 25.0 22.0 - 29.0 mmol/L Final   01/05/2022 25.0 22.0 - 29.0 mmol/L Final      BUN BUN   Date Value Ref Range Status   01/08/2022 36 (H) 8 - 23 mg/dL Final   01/07/2022 35 (H) 8 - 23 mg/dL Final   01/06/2022 35 (H) 8 - 23 mg/dL Final   01/05/2022 36 (H) 8 - 23 mg/dL Final      Creatinine Creatinine   Date Value Ref Range Status   01/08/2022 1.85 (H) 0.76 - 1.27 mg/dL Final   01/07/2022 2.03 (H) 0.76 - 1.27 mg/dL Final   01/06/2022 1.98 (H) 0.76 - 1.27 mg/dL Final   01/06/2022 2.22 (H) 0.76 - 1.27 mg/dL Final   01/05/2022 2.86 (H) 0.76 - 1.27 mg/dL Final      Calcium Calcium   Date Value Ref Range Status   01/08/2022 8.1 (L) 8.6 - 10.5 mg/dL Final   01/07/2022 7.9 (L) 8.6 - 10.5 mg/dL Final   01/06/2022 8.2 (L) 8.6 - 10.5 mg/dL Final   01/05/2022 8.0 (L) 8.6 - 10.5 mg/dL Final      PO4 No components found for: PO4   Albumin Albumin   Date Value Ref Range Status   01/08/2022 3.10 (L) 3.50 - 5.20 g/dL Final   01/07/2022 2.90 (L) 3.50 - 5.20 g/dL Final   01/06/2022 3.20 (L) 3.50 - 5.20 g/dL Final   01/06/2022 3.20 (L) 3.50 - 5.20 g/dL Final   01/05/2022 3.30 (L) 3.50 - 5.20 g/dL Final      Magnesium Magnesium   Date Value Ref Range Status   01/08/2022 1.9 1.6 - 2.4 mg/dL Final   01/06/2022 1.8 1.6 - 2.4 mg/dL Final      Uric Acid No components found for: URIC ACID     Imaging Results (Last 72 Hours)     Procedure Component Value Units Date/Time    XR Chest 1 View [[id number]] Collected: 01/06/22 0002     Updated: 01/06/22 0006    Narrative:      XR CHEST 1 VW    Date of Exam: 1/5/2022 23:38 EST    Indication: dyspnea.  Covid positive.     Comparison Exams: None available.    Technique: Portable AP chest    FINDINGS:  Ill-defined infiltrates are seen throughout the central to peripheral aspect of the lungs bilaterally with mild interstitial changes. No pleural effusions are observed. The cardiac silhouette is within normal  limits for size for the portable study. The   mediastinum is unremarkable. No acute osseous abnormalities are identified.      Impression:      Ill-defined infiltrates are noted bilaterally with mild interstitial changes. The findings suggest atypical/viral infection or multifocal pneumonia and suggest changes of Covid 19 infection. Recommend follow-up to ensure improvement/resolution.    Electronically Signed: Bran Colin MD 1/6/2022 0:03 EST          Results for orders placed during the hospital encounter of 01/05/22    XR Chest 1 View    Narrative  XR CHEST 1 VW    Date of Exam: 1/5/2022 23:38 EST    Indication: dyspnea.  Covid positive.    Comparison Exams: None available.    Technique: Portable AP chest    FINDINGS:  Ill-defined infiltrates are seen throughout the central to peripheral aspect of the lungs bilaterally with mild interstitial changes. No pleural effusions are observed. The cardiac silhouette is within normal limits for size for the portable study. The  mediastinum is unremarkable. No acute osseous abnormalities are identified.    Impression  Ill-defined infiltrates are noted bilaterally with mild interstitial changes. The findings suggest atypical/viral infection or multifocal pneumonia and suggest changes of Covid 19 infection. Recommend follow-up to ensure improvement/resolution.    Electronically Signed: Bran Colin MD 1/6/2022 0:03 EST      Results for orders placed in visit on 12/13/18    SCANNED - IMAGING      SCANNED - IMAGING      Results for orders placed during the hospital encounter of 10/17/19    Duplex Venous Lower Extremity - Left    Interpretation Summary  · Acute left lower extremity deep vein thrombosis noted in the common femoral, proximal femoral, mid femoral, distal femoral and popliteal.        ASSESSMENT / PLAN      Pneumonia due to COVID-19 virus    Diabetes mellitus with neuropathy (HCC)    History of DVT (deep vein thrombosis)    LEO (acute kidney injury) ([facility])     COVID-19    1. ARF/LEO/CRF/CKD STG 3B----Nonoliguric. BUN/Cr down to baseline. ARF/LEO resolved. +ARF/LEO on top of known CRF/CKD STG 3B with a baseline serum creatinine of about 1.8. CRF/CKD STG 3B is secondary to DGS/HTN NS. +ARF/LEO was secondary to prerenal state/intravascular volume depletion. Follow off of IVFs.   No NSAIDs or IV dye. Lytes, acid-base okay. No uremia     2. DMII WITH RENAL MANIFESTATIONS-----BS okay. Glucometers, SSI. Follow with steroid exposure     3. HTN WITH CKD-----BP okay. Avoid hypotension. No ACE-I/ARB/DRI/diuretic for now     4. MILD HYPOALBUMINEMIA-----Encouraged increased po protein intake     5. H/O BLADDER/PROSTATE CA WITH UROSTOMY----Change Urostomy bag today     6. ANEMIA OF CKD--------H/H in safe range. Follow for EPO need.      7. DM PERIPHERAL NEUROPATHY------On Neurontin     8. GERD/PUD PROPHYLAXIS------On PPI. Benefits outweigh risks, despite renal dysfunction, given steroid exposure and h/o use and tolerating PPI     9. TYPE 4 RTA------Stable bicarbonate levels on current po NaHCO3 dose     10. DVT-----Anticoagulated     11. COVID 19 + PNA-------Okay for Remdesivir despite renal dysfunction. On Steroids and pulmonary toilet also per PMD     12. HEARING IMPAIRED    13. HYPOPHOSPHATEMIA------Replace po        Stephanie Laughlin MD  [facility]  [phone]  01/08/22  09:04 EST

## 2022-01-08 NOTE — PROGRESS NOTES
LOS: 1 day   Patient Care Team:  Joey Islas MD as PCP - General (Family Medicine)  Neville Laughlin MD as Consulting Physician (Nephrology)    Subjective     Denies any complaints    Review of Systems   Constitutional: Positive for activity change and fatigue.   HENT: Positive for congestion.    Respiratory: Positive for cough and shortness of breath.    Gastrointestinal: Negative for abdominal distention, abdominal pain, diarrhea, rectal pain and vomiting.   Genitourinary: Negative.    Musculoskeletal: Negative.    Skin: Negative.    Neurological: Positive for weakness. Negative for dizziness.   Psychiatric/Behavioral: Negative.            Objective     Vital Signs  Temp:  [96.4 °F (35.8 °C)-98.3 °F (36.8 °C)] 97.5 °F (36.4 °C)  Heart Rate:  [59-74] 59  Resp:  [12-21] 12  BP: (135-160)/(61-82) 147/82      Physical Exam  Vitals reviewed.   Constitutional:       Appearance: Normal appearance.   HENT:      Head: Normocephalic and atraumatic.      Right Ear: External ear normal.      Left Ear: External ear normal.      Nose: Nose normal.      Mouth/Throat:      Mouth: Mucous membranes are moist.      Pharynx: Oropharynx is clear.   Eyes:      Conjunctiva/sclera: Conjunctivae normal.   Cardiovascular:      Rate and Rhythm: Normal rate and regular rhythm.      Pulses: Normal pulses.      Heart sounds: Normal heart sounds.   Pulmonary:      Effort: Pulmonary effort is normal. No respiratory distress.      Breath sounds: Normal breath sounds. No wheezing or rhonchi.   Abdominal:      General: Bowel sounds are normal. There is no distension.      Palpations: Abdomen is soft.      Tenderness: There is no abdominal tenderness. There is no guarding.   Musculoskeletal:         General: Normal range of motion.      Cervical back: Normal range of motion.   Skin:     General: Skin is warm and dry.   Neurological:      Mental Status: He is alert and oriented to person, place, and time.   Psychiatric:          Behavior: Behavior normal.              Results Review:    Lab Results (last 24 hours)     Procedure Component Value Units Date/Time    POC Glucose Once [[id number]]  (Abnormal) Collected: 01/08/22 1127    Specimen: Blood Updated: 01/08/22 1134     Glucose 164 mg/dL      Comment: Serial Number: [id number]:  [id number]       POC Glucose Once [[id number]]  (Abnormal) Collected: 01/08/22 0807    Specimen: Blood Updated: 01/08/22 0818     Glucose 148 mg/dL      Comment: Serial Number: [id number]:  [id number]       Blood Culture - Blood, Arm, Right [[id number]]  (Normal) Collected: 01/06/22 0729    Specimen: Blood from Arm, Right Updated: 01/08/22 0745     Blood Culture No growth at 2 days    Blood Culture - Blood, Arm, Left [[id number]]  (Normal) Collected: 01/06/22 0735    Specimen: Blood from Arm, Left Updated: 01/08/22 0745     Blood Culture No growth at 2 days    Basic Metabolic Panel [[id number]]  (Abnormal) Collected: 01/08/22 0537    Specimen: Blood Updated: 01/08/22 0638     Glucose 151 mg/dL      BUN 36 mg/dL      Creatinine 1.85 mg/dL      Sodium 137 mmol/L      Potassium 4.8 mmol/L      Chloride 103 mmol/L      CO2 23.0 mmol/L      Calcium 8.1 mg/dL      eGFR Non African Amer 36 mL/min/1.73      BUN/Creatinine Ratio 19.5     Anion Gap 11.0 mmol/L     Narrative:      GFR Normal >60  Chronic Kidney Disease <60  Kidney Failure <15      Phosphorus [[id number]]  (Abnormal) Collected: 01/08/22 0537    Specimen: Blood Updated: 01/08/22 0638     Phosphorus 2.2 mg/dL     Magnesium [[id number]]  (Normal) Collected: 01/08/22 0537    Specimen: Blood Updated: 01/08/22 0638     Magnesium 1.9 mg/dL     Hepatic Function Panel [[id number]]  (Abnormal) Collected: 01/08/22 0537    Specimen: Blood Updated: 01/08/22 0638     Total Protein 5.7 g/dL      Albumin 3.10 g/dL      ALT (SGPT) 10 U/L      AST (SGOT) 15 U/L      Alkaline Phosphatase 42 U/L      Total Bilirubin 0.2 mg/dL      Bilirubin, Direct <0.2 mg/dL       Bilirubin, Indirect --     Comment: Unable to calculate       Protime-INR [[id number]]  (Abnormal) Collected: 01/08/22 0537    Specimen: Blood Updated: 01/08/22 0619     Protime 15.5 Seconds      INR 1.43    CBC & Differential [[id number]]  (Abnormal) Collected: 01/08/22 0537    Specimen: Blood Updated: 01/08/22 0619    Narrative:      The following orders were created for panel order CBC & Differential.  Procedure                               Abnormality         Status                     ---------                               -----------         ------                     CBC Auto Differential[[id number]]        Abnormal            Final result                 Please view results for these tests on the individual orders.    CBC Auto Differential [[id number]]  (Abnormal) Collected: 01/08/22 0537    Specimen: Blood Updated: 01/08/22 0619     WBC 4.80 10*3/mm3      RBC 3.15 10*6/mm3      Hemoglobin 9.8 g/dL      Hematocrit 28.1 %      MCV 89.2 fL      MCH 31.1 pg      MCHC 34.9 g/dL      RDW 13.1 %      RDW-SD 41.1 fl      MPV 7.1 fL      Platelets 192 10*3/mm3      Neutrophil % 83.2 %      Lymphocyte % 9.3 %      Monocyte % 7.1 %      Eosinophil % 0.0 %      Basophil % 0.4 %      Neutrophils, Absolute 4.00 10*3/mm3      Lymphocytes, Absolute 0.40 10*3/mm3      Monocytes, Absolute 0.30 10*3/mm3      Eosinophils, Absolute 0.00 10*3/mm3      Basophils, Absolute 0.00 10*3/mm3      nRBC 0.0 /100 WBC     POC Glucose Once [[id number]]  (Abnormal) Collected: 01/07/22 1927    Specimen: Blood Updated: 01/07/22 1928     Glucose 251 mg/dL      Comment: Serial Number: [id number]:  [id number]       POC Glucose Once [[id number]]  (Abnormal) Collected: 01/07/22 1624    Specimen: Blood Updated: 01/07/22 1624     Glucose 261 mg/dL      Comment: Serial Number: [id number]:  [id number]       POC Glucose Once [[id number]]  (Abnormal) Collected: 01/07/22 1138    Specimen: Blood Updated: 01/07/22 1139     Glucose 181 mg/dL       Comment: Serial Number: [id number]:  [id number]              Imaging Results (Last 24 Hours)     ** No results found for the last 24 hours. **               I reviewed the patient's new clinical results.    Medication Review:   Scheduled Meds:albuterol sulfate HFA, 2 puff, Inhalation, 4x Daily - RT  budesonide-formoterol, 2 puff, Inhalation, BID - RT  dexamethasone, 6 mg, Oral, Daily With Breakfast  enoxaparin, 30 mg, Subcutaneous, Q24H  gabapentin, 300 mg, Oral, Q8H  guaiFENesin, 600 mg, Oral, Q12H  insulin lispro, 0-9 Units, Subcutaneous, TID AC  pantoprazole, 40 mg, Oral, Q AM  potassium phosphate (monobasic), 1,000 mg, Oral, Once  remdesivir, 100 mg, Intravenous, Q24H  sodium bicarbonate, 650 mg, Oral, TID  sodium chloride, 10 mL, Intravenous, Q12H  warfarin, 5 mg, Oral, Daily  zinc sulfate, 220 mg, Oral, Daily      Continuous Infusions:Pharmacy Consult - Remdesivir,   Pharmacy to dose warfarin,       PRN Meds:.•  acetaminophen  •  benzonatate  •  dextrose  •  dextrose  •  glucagon (human recombinant)  •  HYDROcodone-homatropine  •  insulin lispro **AND** insulin lispro  •  melatonin  •  ondansetron **OR** ondansetron  •  Pharmacy Consult - Remdesivir  •  Pharmacy to dose warfarin  •  sodium chloride  •  [COMPLETED] Insert peripheral IV **AND** sodium chloride  •  sodium chloride     Interval History:    Assessment/Plan       Pneumonia due to COVID-19 virus    Diabetes mellitus with neuropathy (HCC)    History of DVT (deep vein thrombosis)    LEO (acute kidney injury) (HCC)    COVID-19    Remdesivir, currently 2 liters, steroids, bronchodilators, Mucinex    SSI for elevated blood sugars.       Pantoprazole for stress ulcer prophy  Anticoagulated with warfarin for h/o DVT     Plan for disposition: home    JOANNE Taylor  01/08/22  11:38 EST

## 2022-01-09 LAB
ALBUMIN SERPL-MCNC: 2.7 G/DL (ref 3.5–5.2)
ALP SERPL-CCNC: 39 U/L (ref 39–117)
ALT SERPL W P-5'-P-CCNC: 39 U/L (ref 1–41)
ANION GAP SERPL CALCULATED.3IONS-SCNC: 9 MMOL/L (ref 5–15)
AST SERPL-CCNC: 52 U/L (ref 1–40)
BASOPHILS # BLD AUTO: 0 10*3/MM3 (ref 0–0.2)
BASOPHILS NFR BLD AUTO: 0.3 % (ref 0–1.5)
BILIRUB CONJ SERPL-MCNC: <0.2 MG/DL (ref 0–0.3)
BILIRUB INDIRECT SERPL-MCNC: ABNORMAL MG/DL
BILIRUB SERPL-MCNC: 0.2 MG/DL (ref 0–1.2)
BUN SERPL-MCNC: 33 MG/DL (ref 8–23)
BUN/CREAT SERPL: 19.1 (ref 7–25)
CALCIUM SPEC-SCNC: 7.4 MG/DL (ref 8.6–10.5)
CHLORIDE SERPL-SCNC: 104 MMOL/L (ref 98–107)
CO2 SERPL-SCNC: 24 MMOL/L (ref 22–29)
CREAT SERPL-MCNC: 1.73 MG/DL (ref 0.76–1.27)
DEPRECATED RDW RBC AUTO: 41.1 FL (ref 37–54)
EOSINOPHIL # BLD AUTO: 0 10*3/MM3 (ref 0–0.4)
EOSINOPHIL NFR BLD AUTO: 0.3 % (ref 0.3–6.2)
ERYTHROCYTE [DISTWIDTH] IN BLOOD BY AUTOMATED COUNT: 13.1 % (ref 12.3–15.4)
GFR SERPL CREATININE-BSD FRML MDRD: 39 ML/MIN/1.73
GLUCOSE BLDC GLUCOMTR-MCNC: 143 MG/DL (ref 70–105)
GLUCOSE BLDC GLUCOMTR-MCNC: 298 MG/DL (ref 70–105)
GLUCOSE BLDC GLUCOMTR-MCNC: 94 MG/DL (ref 70–105)
GLUCOSE SERPL-MCNC: 96 MG/DL (ref 65–99)
HCT VFR BLD AUTO: 26.4 % (ref 37.5–51)
HGB BLD-MCNC: 9.1 G/DL (ref 13–17.7)
INR PPP: 1.81 (ref 2–3)
LYMPHOCYTES # BLD AUTO: 0.8 10*3/MM3 (ref 0.7–3.1)
LYMPHOCYTES NFR BLD AUTO: 13.7 % (ref 19.6–45.3)
MAGNESIUM SERPL-MCNC: 1.8 MG/DL (ref 1.6–2.4)
MCH RBC QN AUTO: 29.8 PG (ref 26.6–33)
MCHC RBC AUTO-ENTMCNC: 34.4 G/DL (ref 31.5–35.7)
MCV RBC AUTO: 86.5 FL (ref 79–97)
MONOCYTES # BLD AUTO: 0.7 10*3/MM3 (ref 0.1–0.9)
MONOCYTES NFR BLD AUTO: 11.8 % (ref 5–12)
NEUTROPHILS NFR BLD AUTO: 4.3 10*3/MM3 (ref 1.7–7)
NEUTROPHILS NFR BLD AUTO: 73.9 % (ref 42.7–76)
NRBC BLD AUTO-RTO: 0.1 /100 WBC (ref 0–0.2)
PHOSPHATE SERPL-MCNC: 2.4 MG/DL (ref 2.5–4.5)
PLATELET # BLD AUTO: 174 10*3/MM3 (ref 140–450)
PMV BLD AUTO: 6.9 FL (ref 6–12)
POTASSIUM SERPL-SCNC: 4.3 MMOL/L (ref 3.5–5.2)
PROT SERPL-MCNC: 5.2 G/DL (ref 6–8.5)
PROTHROMBIN TIME: 19.2 SECONDS (ref 19.4–28.5)
QT INTERVAL: 430 MS
RBC # BLD AUTO: 3.05 10*6/MM3 (ref 4.14–5.8)
SODIUM SERPL-SCNC: 137 MMOL/L (ref 136–145)
WBC NRBC COR # BLD: 5.8 10*3/MM3 (ref 3.4–10.8)

## 2022-01-09 PROCEDURE — 80076 HEPATIC FUNCTION PANEL: CPT | Performed by: NURSE PRACTITIONER

## 2022-01-09 PROCEDURE — 85610 PROTHROMBIN TIME: CPT | Performed by: NURSE PRACTITIONER

## 2022-01-09 PROCEDURE — 25010000002 ENOXAPARIN PER 10 MG: Performed by: NURSE PRACTITIONER

## 2022-01-09 PROCEDURE — 63710000001 DEXAMETHASONE PER 0.25 MG: Performed by: NURSE PRACTITIONER

## 2022-01-09 PROCEDURE — 94799 UNLISTED PULMONARY SVC/PX: CPT

## 2022-01-09 PROCEDURE — 80048 BASIC METABOLIC PNL TOTAL CA: CPT | Performed by: INTERNAL MEDICINE

## 2022-01-09 PROCEDURE — 85025 COMPLETE CBC W/AUTO DIFF WBC: CPT | Performed by: NURSE PRACTITIONER

## 2022-01-09 PROCEDURE — 83735 ASSAY OF MAGNESIUM: CPT | Performed by: INTERNAL MEDICINE

## 2022-01-09 PROCEDURE — 82962 GLUCOSE BLOOD TEST: CPT

## 2022-01-09 PROCEDURE — 84100 ASSAY OF PHOSPHORUS: CPT | Performed by: INTERNAL MEDICINE

## 2022-01-09 RX ADMIN — SODIUM CHLORIDE, PRESERVATIVE FREE 10 ML: 5 INJECTION INTRAVENOUS at 09:36

## 2022-01-09 RX ADMIN — POTASSIUM PHOSPHATE, MONOBASIC 1000 MG: 500 TABLET, SOLUBLE ORAL at 16:36

## 2022-01-09 RX ADMIN — ALBUTEROL SULFATE 2 PUFF: 108 INHALANT RESPIRATORY (INHALATION) at 15:30

## 2022-01-09 RX ADMIN — DEXAMETHASONE 4 MG: 4 TABLET ORAL at 09:35

## 2022-01-09 RX ADMIN — ZINC SULFATE 220 MG (50 MG) CAPSULE 220 MG: CAPSULE at 09:35

## 2022-01-09 RX ADMIN — BUDESONIDE AND FORMOTEROL FUMARATE DIHYDRATE 2 PUFF: 160; 4.5 AEROSOL RESPIRATORY (INHALATION) at 07:00

## 2022-01-09 RX ADMIN — ALBUTEROL SULFATE 2 PUFF: 108 INHALANT RESPIRATORY (INHALATION) at 07:00

## 2022-01-09 RX ADMIN — WARFARIN 5 MG: 2.5 TABLET ORAL at 16:36

## 2022-01-09 RX ADMIN — ENOXAPARIN SODIUM 40 MG: 40 INJECTION SUBCUTANEOUS at 16:35

## 2022-01-09 RX ADMIN — GUAIFENESIN 600 MG: 600 TABLET, EXTENDED RELEASE ORAL at 09:35

## 2022-01-09 RX ADMIN — GABAPENTIN 300 MG: 300 CAPSULE ORAL at 21:46

## 2022-01-09 RX ADMIN — GUAIFENESIN 600 MG: 600 TABLET, EXTENDED RELEASE ORAL at 21:46

## 2022-01-09 RX ADMIN — SODIUM BICARBONATE 650 MG TABLET 650 MG: at 16:36

## 2022-01-09 RX ADMIN — SODIUM BICARBONATE 650 MG TABLET 650 MG: at 09:35

## 2022-01-09 RX ADMIN — SODIUM CHLORIDE, PRESERVATIVE FREE 10 ML: 5 INJECTION INTRAVENOUS at 21:46

## 2022-01-09 RX ADMIN — REMDESIVIR 100 MG: 100 INJECTION, POWDER, LYOPHILIZED, FOR SOLUTION INTRAVENOUS at 09:37

## 2022-01-09 RX ADMIN — GABAPENTIN 300 MG: 300 CAPSULE ORAL at 06:05

## 2022-01-09 RX ADMIN — HYDRALAZINE HYDROCHLORIDE 50 MG: 25 TABLET, FILM COATED ORAL at 09:35

## 2022-01-09 RX ADMIN — GABAPENTIN 300 MG: 300 CAPSULE ORAL at 16:36

## 2022-01-09 RX ADMIN — PANTOPRAZOLE SODIUM 40 MG: 40 TABLET, DELAYED RELEASE ORAL at 06:16

## 2022-01-09 RX ADMIN — SODIUM BICARBONATE 650 MG TABLET 650 MG: at 21:46

## 2022-01-09 RX ADMIN — HYDRALAZINE HYDROCHLORIDE 50 MG: 25 TABLET, FILM COATED ORAL at 21:46

## 2022-01-09 RX ADMIN — MAGNESIUM GLUCONATE 500 MG ORAL TABLET 400 MG: 500 TABLET ORAL at 09:35

## 2022-01-09 RX ADMIN — ALBUTEROL SULFATE 2 PUFF: 108 INHALANT RESPIRATORY (INHALATION) at 11:05

## 2022-01-09 NOTE — PROGRESS NOTES
LOS: 2 days   Patient Care Team:  Joey Islas MD as PCP - General (Family Medicine)  Neville Laughlin MD as Consulting Physician (Nephrology)    Subjective     Denies any complaints    Review of Systems   Constitutional: Positive for activity change and fatigue.   HENT: Negative for congestion.    Respiratory: Positive for shortness of breath. Negative for cough.    Gastrointestinal: Negative for abdominal distention, abdominal pain, diarrhea, rectal pain and vomiting.   Genitourinary: Negative.    Musculoskeletal: Negative.    Skin: Negative.    Neurological: Positive for weakness. Negative for dizziness.   Psychiatric/Behavioral: Negative.            Objective     Vital Signs  Temp:  [97.4 °F (36.3 °C)-98.8 °F (37.1 °C)] 98.8 °F (37.1 °C)  Heart Rate:  [59-75] 70  Resp:  [12-20] 16  BP: (130-154)/(60-82) 140/71      Physical Exam  Vitals reviewed.   Constitutional:       Appearance: Normal appearance.   HENT:      Head: Normocephalic and atraumatic.      Right Ear: External ear normal.      Left Ear: External ear normal.      Nose: Nose normal.      Mouth/Throat:      Mouth: Mucous membranes are moist.      Pharynx: Oropharynx is clear.   Eyes:      Conjunctiva/sclera: Conjunctivae normal.   Cardiovascular:      Rate and Rhythm: Normal rate and regular rhythm.      Pulses: Normal pulses.      Heart sounds: Normal heart sounds.   Pulmonary:      Effort: Pulmonary effort is normal. No respiratory distress.      Breath sounds: Normal breath sounds. No wheezing or rhonchi.   Abdominal:      General: Bowel sounds are normal. There is no distension.      Palpations: Abdomen is soft.      Tenderness: There is no abdominal tenderness. There is no guarding.   Musculoskeletal:         General: Normal range of motion.      Cervical back: Normal range of motion.   Skin:     General: Skin is warm and dry.   Neurological:      Mental Status: He is alert and oriented to person, place, and time.    Psychiatric:         Behavior: Behavior normal.              Results Review:    Lab Results (last 24 hours)     Procedure Component Value Units Date/Time    Blood Culture - Blood, Arm, Right [[id number]]  (Normal) Collected: 01/06/22 0729    Specimen: Blood from Arm, Right Updated: 01/09/22 0745     Blood Culture No growth at 3 days    Blood Culture - Blood, Arm, Left [[id number]]  (Normal) Collected: 01/06/22 0735    Specimen: Blood from Arm, Left Updated: 01/09/22 0745     Blood Culture No growth at 3 days    POC Glucose Once [[id number]]  (Normal) Collected: 01/09/22 0737    Specimen: Blood Updated: 01/09/22 0742     Glucose 94 mg/dL      Comment: Serial Number: [id number]:  [id number]       Basic Metabolic Panel [[id number]]  (Abnormal) Collected: 01/09/22 0408    Specimen: Blood Updated: 01/09/22 0500     Glucose 96 mg/dL      BUN 33 mg/dL      Creatinine 1.73 mg/dL      Sodium 137 mmol/L      Potassium 4.3 mmol/L      Chloride 104 mmol/L      CO2 24.0 mmol/L      Calcium 7.4 mg/dL      eGFR Non African Amer 39 mL/min/1.73      BUN/Creatinine Ratio 19.1     Anion Gap 9.0 mmol/L     Narrative:      GFR Normal >60  Chronic Kidney Disease <60  Kidney Failure <15      Hepatic Function Panel [[id number]]  (Abnormal) Collected: 01/09/22 0408    Specimen: Blood Updated: 01/09/22 0500     Total Protein 5.2 g/dL      Albumin 2.70 g/dL      ALT (SGPT) 39 U/L      AST (SGOT) 52 U/L      Alkaline Phosphatase 39 U/L      Total Bilirubin 0.2 mg/dL      Bilirubin, Direct <0.2 mg/dL      Bilirubin, Indirect --     Comment: Unable to calculate       Phosphorus [[id number]]  (Abnormal) Collected: 01/09/22 0408    Specimen: Blood Updated: 01/09/22 0500     Phosphorus 2.4 mg/dL     Magnesium [[id number]]  (Normal) Collected: 01/09/22 0408    Specimen: Blood Updated: 01/09/22 0500     Magnesium 1.8 mg/dL     Protime-INR [[id number]]  (Abnormal) Collected: 01/09/22 0408    Specimen: Blood Updated: 01/09/22 0455     Protime 19.2  Seconds      INR 1.81    CBC & Differential [[id number]]  (Abnormal) Collected: 01/09/22 0408    Specimen: Blood Updated: 01/09/22 0444    Narrative:      The following orders were created for panel order CBC & Differential.  Procedure                               Abnormality         Status                     ---------                               -----------         ------                     CBC Auto Differential[[id number]]        Abnormal            Final result                 Please view results for these tests on the individual orders.    CBC Auto Differential [[id number]]  (Abnormal) Collected: 01/09/22 0408    Specimen: Blood Updated: 01/09/22 0444     WBC 5.80 10*3/mm3      RBC 3.05 10*6/mm3      Hemoglobin 9.1 g/dL      Hematocrit 26.4 %      MCV 86.5 fL      MCH 29.8 pg      MCHC 34.4 g/dL      RDW 13.1 %      RDW-SD 41.1 fl      MPV 6.9 fL      Platelets 174 10*3/mm3      Neutrophil % 73.9 %      Lymphocyte % 13.7 %      Monocyte % 11.8 %      Eosinophil % 0.3 %      Basophil % 0.3 %      Neutrophils, Absolute 4.30 10*3/mm3      Lymphocytes, Absolute 0.80 10*3/mm3      Monocytes, Absolute 0.70 10*3/mm3      Eosinophils, Absolute 0.00 10*3/mm3      Basophils, Absolute 0.00 10*3/mm3      nRBC 0.1 /100 WBC     POC Glucose Once [[id number]]  (Abnormal) Collected: 01/08/22 2011    Specimen: Blood Updated: 01/08/22 2016     Glucose 224 mg/dL      Comment: Serial Number: [id number]:  [id number]       Urinalysis, Microscopic Only - Urine, Random Void [[id number]]  (Abnormal) Collected: 01/08/22 1733    Specimen: Urine, Random Void Updated: 01/08/22 1824     RBC, UA 31-50 /HPF      WBC, UA 13-20 /HPF      Bacteria, UA 4+ /HPF      Squamous Epithelial Cells, UA 3-6 /HPF      Hyaline Casts, UA None Seen /LPF      Triple Phosphate Crystals, UA Small/1+ /HPF      Methodology Manual Light Microscopy    Urine Culture - Urine, Urine, Random Void [[id number]] Collected: 01/08/22 1733    Specimen: Urine, Random Void  Updated: 01/08/22 1824    Urinalysis With Culture If Indicated - Urine, Random Void [[id number]]  (Abnormal) Collected: 01/08/22 1733    Specimen: Urine, Random Void Updated: 01/08/22 1812     Color, UA Yellow     Appearance, UA Cloudy     Comment: Result checked         pH, UA 7.5     Specific Gravity, UA 1.020     Glucose,  mg/dL (2+)     Ketones, UA Negative     Bilirubin, UA Negative     Blood, UA Large (3+)     Protein,  mg/dL (2+)     Leuk Esterase, UA Moderate (2+)     Nitrite, UA Negative     Urobilinogen, UA 1.0 E.U./dL    POC Glucose Once [[id number]]  (Abnormal) Collected: 01/08/22 1653    Specimen: Blood Updated: 01/08/22 1655     Glucose 273 mg/dL      Comment: Serial Number: [id number]:  [id number]       POC Glucose Once [[id number]]  (Abnormal) Collected: 01/08/22 1127    Specimen: Blood Updated: 01/08/22 1134     Glucose 164 mg/dL      Comment: Serial Number: [id number]:  [id number]              Imaging Results (Last 24 Hours)     ** No results found for the last 24 hours. **               I reviewed the patient's new clinical results.    Medication Review:   Scheduled Meds:albuterol sulfate HFA, 2 puff, Inhalation, 4x Daily - RT  budesonide-formoterol, 2 puff, Inhalation, BID - RT  dexamethasone, 4 mg, Oral, Daily With Breakfast  enoxaparin, 40 mg, Subcutaneous, Q24H  gabapentin, 300 mg, Oral, Q8H  guaiFENesin, 600 mg, Oral, Q12H  hydrALAZINE, 50 mg, Oral, BID  insulin lispro, 0-9 Units, Subcutaneous, TID AC  magnesium oxide, 400 mg, Oral, Daily  pantoprazole, 40 mg, Oral, Q AM  potassium phosphate (monobasic), 1,000 mg, Oral, Once  remdesivir, 100 mg, Intravenous, Q24H  sodium bicarbonate, 650 mg, Oral, TID  sodium chloride, 10 mL, Intravenous, Q12H  warfarin, 5 mg, Oral, Daily  zinc sulfate, 220 mg, Oral, Daily      Continuous Infusions:Pharmacy Consult - Remdesivir,   Pharmacy to dose warfarin,       PRN Meds:.•  acetaminophen  •  benzonatate  •  dextrose  •  dextrose  •   glucagon (human recombinant)  •  HYDROcodone-homatropine  •  insulin lispro **AND** insulin lispro  •  melatonin  •  ondansetron **OR** ondansetron  •  Pharmacy Consult - Remdesivir  •  Pharmacy to dose warfarin  •  sodium chloride  •  [COMPLETED] Insert peripheral IV **AND** sodium chloride  •  sodium chloride     Interval History:    Assessment/Plan       Pneumonia due to COVID-19 virus    Diabetes mellitus with neuropathy (HCC)    History of DVT (deep vein thrombosis)    LEO (acute kidney injury) (HCC)    COVID-19    Remdesivir, currently 2 liters, steroids, bronchodilators, Mucinex    SSI for elevated blood sugars.       Pantoprazole for stress ulcer prophy  Anticoagulated with warfarin for h/o DVT     Plan for disposition: home    JOANNE Taylor  01/09/22  10:31 EST

## 2022-01-09 NOTE — NURSING NOTE
Patient Received only one dose of Remdesivir yesterday, on the MAR it looks like 2 doses were given, but they were not. Only one dose was given.

## 2022-01-09 NOTE — PLAN OF CARE
Goal Outcome Evaluation:  Plan of Care Reviewed With: patient        Progress: improving  Outcome Summary: Pt slept well overnight, RA, no c/o pain or discomfort, urostomy bag changed 1/8/22, discharged possible today.

## 2022-01-09 NOTE — PROGRESS NOTES
"Pharmacy dosing service  Anticoagulant  Warfarin     Subjective:    Michael Dorantes is a 71 y.o.male being continued on warfarin for  Hx of DVT .    INR Goal: 2 - 3  Home medication?: Yes, warfarin 5 mg PO every day at 1800  Bridge Therapy Present?:  Yes,  Enoxaparin 40 mg SQ Q24H.   Interacting Medications Evaluation (New/Present/Discontinued): Dexamethasone (may increase INR)  Additional Contributing Factors:       Assessment/Plan:    Pt has been holding warfarin for the past several days due to multiple teeth extractions 1/3. Warfarin resumed 1/6. Will give home dose of warfarin 5 mg tonight. Lovenox for VTE ppx to be discontinued once INR >2.     Continue to monitor and adjust based on INR.         Date 1/6 1/7 1/8 1/9        INR 1.23 1.23 1.43 1.81        Dose 5mg 7 mg 6 mg  5 mg             Objective:  [Ht: 182.9 cm (72\"); Wt: 90 kg (198 lb 6.6 oz); BMI: Body mass index is 26.91 kg/m².]    Lab Results   Component Value Date    ALBUMIN 2.70 (L) 01/09/2022     Lab Results   Component Value Date    INR 1.81 (L) 01/09/2022    INR 1.43 (L) 01/08/2022    INR 1.23 (L) 01/07/2022    PROTIME 19.2 (L) 01/09/2022    PROTIME 15.5 (L) 01/08/2022    PROTIME 13.4 (L) 01/07/2022     Lab Results   Component Value Date    HGB 9.1 (L) 01/09/2022    HGB 9.8 (L) 01/08/2022    HGB 9.4 (L) 01/07/2022     Lab Results   Component Value Date    HCT 26.4 (L) 01/09/2022    HCT 28.1 (L) 01/08/2022    HCT 26.9 (L) 01/07/2022       Mariana Lopez, PharmD  01/09/22 14:05 EST           "

## 2022-01-09 NOTE — PROGRESS NOTES
"NEPHROLOGY PROGRESS NOTE------[facility]    [facility]  [phone]  Stephanie Laughlin MD      Patient Care Team:  Joey Islas MD as PCP - General (Family Medicine)  Neville Laughlin MD as Consulting Physician (Nephrology)      Provider:  Stephanie Laughlin MD  Patient Name: Michael Dorantes  :  1950    SUBJECTIVE:    F/U ARF/LEO/CRF/CKD    No complaints. No SOB, CP, palpitations, cramping. No dysuria.    Medication:  albuterol sulfate HFA, 2 puff, Inhalation, 4x Daily - RT  budesonide-formoterol, 2 puff, Inhalation, BID - RT  dexamethasone, 4 mg, Oral, Daily With Breakfast  enoxaparin, 40 mg, Subcutaneous, Q24H  gabapentin, 300 mg, Oral, Q8H  guaiFENesin, 600 mg, Oral, Q12H  hydrALAZINE, 50 mg, Oral, BID  insulin lispro, 0-9 Units, Subcutaneous, TID AC  pantoprazole, 40 mg, Oral, Q AM  remdesivir, 100 mg, Intravenous, Q24H  sodium bicarbonate, 650 mg, Oral, TID  sodium chloride, 10 mL, Intravenous, Q12H  warfarin, 5 mg, Oral, Daily  zinc sulfate, 220 mg, Oral, Daily      Pharmacy Consult - Remdesivir,   Pharmacy to dose warfarin,         OBJECTIVE    Vital Sign Min/Max for last 24 hours  Temp  Min: 97.4 °F (36.3 °C)  Max: 98.8 °F (37.1 °C)   BP  Min: 130/60  Max: 154/71   Pulse  Min: 59  Max: 75   Resp  Min: 12  Max: 20   SpO2  Min: 94 %  Max: 99 %   No data recorded   No data recorded     Flowsheet Rows      First Filed Value   Admission Height 182.9 cm (72\") Documented at 2022   Admission Weight 90 kg (198 lb 6.6 oz) Documented at 2022          No intake/output data recorded.  I/O last 3 completed shifts:  In: 720 [P.O.:720]  Out: 800 [Urine:800]    Physical Exam:  General Appearance: alert, appears stated age and cooperative  Head: normocephalic, without obvious abnormality and atraumatic  Eyes: conjunctivae and sclerae normal and no icterus  Neck: supple and no JVD  Lungs: +BIBASILAR RALES " (BETTER)  Heart: regular rhythm & normal rate and normal S1, S2 +CHAYO  Chest Wall: no abnormalities observed  Abdomen: normal bowel sounds and soft non-tender +UROSTOMY  Extremities: moves extremities well, no edema, no cyanosis  Skin: no bleeding, bruising or rash  Neurologic: Alert, and oriented. No focal deficits    Labs:    WBC WBC   Date Value Ref Range Status   01/09/2022 5.80 3.40 - 10.80 10*3/mm3 Final   01/08/2022 4.80 3.40 - 10.80 10*3/mm3 Final   01/07/2022 3.00 (L) 3.40 - 10.80 10*3/mm3 Final      HGB Hemoglobin   Date Value Ref Range Status   01/09/2022 9.1 (L) 13.0 - 17.7 g/dL Final   01/08/2022 9.8 (L) 13.0 - 17.7 g/dL Final   01/07/2022 9.4 (L) 13.0 - 17.7 g/dL Final      HCT Hematocrit   Date Value Ref Range Status   01/09/2022 26.4 (L) 37.5 - 51.0 % Final   01/08/2022 28.1 (L) 37.5 - 51.0 % Final   01/07/2022 26.9 (L) 37.5 - 51.0 % Final      Platlets No results found for: LABPLAT   MCV MCV   Date Value Ref Range Status   01/09/2022 86.5 79.0 - 97.0 fL Final   01/08/2022 89.2 79.0 - 97.0 fL Final   01/07/2022 88.4 79.0 - 97.0 fL Final          Sodium Sodium   Date Value Ref Range Status   01/09/2022 137 136 - 145 mmol/L Final   01/08/2022 137 136 - 145 mmol/L Final   01/07/2022 134 (L) 136 - 145 mmol/L Final      Potassium Potassium   Date Value Ref Range Status   01/09/2022 4.3 3.5 - 5.2 mmol/L Final   01/08/2022 4.8 3.5 - 5.2 mmol/L Final   01/07/2022 4.6 3.5 - 5.2 mmol/L Final      Chloride Chloride   Date Value Ref Range Status   01/09/2022 104 98 - 107 mmol/L Final   01/08/2022 103 98 - 107 mmol/L Final   01/07/2022 101 98 - 107 mmol/L Final      CO2 CO2   Date Value Ref Range Status   01/09/2022 24.0 22.0 - 29.0 mmol/L Final   01/08/2022 23.0 22.0 - 29.0 mmol/L Final   01/07/2022 22.0 22.0 - 29.0 mmol/L Final      BUN BUN   Date Value Ref Range Status   01/09/2022 33 (H) 8 - 23 mg/dL Final   01/08/2022 36 (H) 8 - 23 mg/dL Final   01/07/2022 35 (H) 8 - 23 mg/dL Final      Creatinine Creatinine    Date Value Ref Range Status   01/09/2022 1.73 (H) 0.76 - 1.27 mg/dL Final   01/08/2022 1.85 (H) 0.76 - 1.27 mg/dL Final   01/07/2022 2.03 (H) 0.76 - 1.27 mg/dL Final   01/06/2022 1.98 (H) 0.76 - 1.27 mg/dL Final      Calcium Calcium   Date Value Ref Range Status   01/09/2022 7.4 (L) 8.6 - 10.5 mg/dL Final   01/08/2022 8.1 (L) 8.6 - 10.5 mg/dL Final   01/07/2022 7.9 (L) 8.6 - 10.5 mg/dL Final      PO4 No components found for: PO4   Albumin Albumin   Date Value Ref Range Status   01/09/2022 2.70 (L) 3.50 - 5.20 g/dL Final   01/08/2022 3.10 (L) 3.50 - 5.20 g/dL Final   01/07/2022 2.90 (L) 3.50 - 5.20 g/dL Final   01/06/2022 3.20 (L) 3.50 - 5.20 g/dL Final      Magnesium Magnesium   Date Value Ref Range Status   01/09/2022 1.8 1.6 - 2.4 mg/dL Final   01/08/2022 1.9 1.6 - 2.4 mg/dL Final      Uric Acid No components found for: URIC ACID     Imaging Results (Last 72 Hours)     ** No results found for the last 72 hours. **          Results for orders placed during the hospital encounter of 01/05/22    XR Chest 1 View    Narrative  XR CHEST 1 VW    Date of Exam: 1/5/2022 23:38 EST    Indication: dyspnea.  Covid positive.    Comparison Exams: None available.    Technique: Portable AP chest    FINDINGS:  Ill-defined infiltrates are seen throughout the central to peripheral aspect of the lungs bilaterally with mild interstitial changes. No pleural effusions are observed. The cardiac silhouette is within normal limits for size for the portable study. The  mediastinum is unremarkable. No acute osseous abnormalities are identified.    Impression  Ill-defined infiltrates are noted bilaterally with mild interstitial changes. The findings suggest atypical/viral infection or multifocal pneumonia and suggest changes of Covid 19 infection. Recommend follow-up to ensure improvement/resolution.    Electronically Signed: Bran Colin MD 1/6/2022 0:03 EST      Results for orders placed in visit on 12/13/18    SCANNED -  IMAGING      SCANNED - IMAGING      Results for orders placed during the hospital encounter of 10/17/19    Duplex Venous Lower Extremity - Left    Interpretation Summary  · Acute left lower extremity deep vein thrombosis noted in the common femoral, proximal femoral, mid femoral, distal femoral and popliteal.        ASSESSMENT / PLAN      Pneumonia due to COVID-19 virus    Diabetes mellitus with neuropathy (HCC)    History of DVT (deep vein thrombosis)    LEO (acute kidney injury) (HCC)    COVID-19    1. ARF/LEO/CRF/CKD STG 3B----Nonoliguric. BUN/Cr down to baseline. ARF/LEO resolved. +ARF/LEO on top of known CRF/CKD STG 3B with a baseline serum creatinine of about 1.8. CRF/CKD STG 3B is secondary to DGS/HTN NS. +ARF/LEO was secondary to prerenal state/intravascular volume depletion. Follow off of IVFs.   No NSAIDs or IV dye. Lytes, acid-base okay. No uremia     2. DMII WITH RENAL MANIFESTATIONS-----BS okay. Glucometers, SSI. Follow with steroid exposure     3. HTN WITH CKD-----BP okay. Avoid hypotension. No ACE-I/ARB/DRI/diuretic for now     4. MILD HYPOALBUMINEMIA-----Encouraged increased po protein intake     5. H/O BLADDER/PROSTATE CA WITH UROSTOMY----Change Urostomy bag today     6. ANEMIA OF CKD--------H/H in safe range. Follow for EPO need.      7. DM PERIPHERAL NEUROPATHY------On Neurontin     8. GERD/PUD PROPHYLAXIS------On PPI. Benefits outweigh risks, despite renal dysfunction, given steroid exposure and h/o use and tolerating PPI     9. TYPE 4 RTA------Stable bicarbonate levels on current po NaHCO3 dose     10. DVT-----Anticoagulated     11. COVID 19 + PNA-------Okay for Remdesivir despite renal dysfunction. On Steroids and pulmonary toilet also per PMD     12. HEARING IMPAIRED    13. HYPOPHOSPHATEMIA------Replace po    14. HYPOMAGNESEMIA------Replace po        Stephanie Laughlin MD  [facility]  [phone]  01/09/22  09:00 EST

## 2022-01-10 LAB
ALBUMIN SERPL-MCNC: 2.9 G/DL (ref 3.5–5.2)
ALP SERPL-CCNC: 47 U/L (ref 39–117)
ALT SERPL W P-5'-P-CCNC: 59 U/L (ref 1–41)
ANION GAP SERPL CALCULATED.3IONS-SCNC: 10 MMOL/L (ref 5–15)
AST SERPL-CCNC: 44 U/L (ref 1–40)
BACTERIA SPEC AEROBE CULT: ABNORMAL
BASOPHILS # BLD AUTO: 0 10*3/MM3 (ref 0–0.2)
BASOPHILS NFR BLD AUTO: 0 % (ref 0–1.5)
BILIRUB CONJ SERPL-MCNC: <0.2 MG/DL (ref 0–0.3)
BILIRUB INDIRECT SERPL-MCNC: ABNORMAL MG/DL
BILIRUB SERPL-MCNC: 0.2 MG/DL (ref 0–1.2)
BUN SERPL-MCNC: 41 MG/DL (ref 8–23)
BUN/CREAT SERPL: 22.2 (ref 7–25)
CALCIUM SPEC-SCNC: 7.5 MG/DL (ref 8.6–10.5)
CHLORIDE SERPL-SCNC: 101 MMOL/L (ref 98–107)
CO2 SERPL-SCNC: 23 MMOL/L (ref 22–29)
CREAT SERPL-MCNC: 1.85 MG/DL (ref 0.76–1.27)
DEPRECATED RDW RBC AUTO: 41.1 FL (ref 37–54)
EOSINOPHIL # BLD AUTO: 0 10*3/MM3 (ref 0–0.4)
EOSINOPHIL NFR BLD AUTO: 0.1 % (ref 0.3–6.2)
ERYTHROCYTE [DISTWIDTH] IN BLOOD BY AUTOMATED COUNT: 13.1 % (ref 12.3–15.4)
GFR SERPL CREATININE-BSD FRML MDRD: 36 ML/MIN/1.73
GLUCOSE BLDC GLUCOMTR-MCNC: 142 MG/DL (ref 70–105)
GLUCOSE BLDC GLUCOMTR-MCNC: 207 MG/DL (ref 70–105)
GLUCOSE BLDC GLUCOMTR-MCNC: 250 MG/DL (ref 70–105)
GLUCOSE BLDC GLUCOMTR-MCNC: 276 MG/DL (ref 70–105)
GLUCOSE SERPL-MCNC: 240 MG/DL (ref 65–99)
HCT VFR BLD AUTO: 27.3 % (ref 37.5–51)
HGB BLD-MCNC: 9.4 G/DL (ref 13–17.7)
INR PPP: 1.86 (ref 2–3)
LYMPHOCYTES # BLD AUTO: 0.3 10*3/MM3 (ref 0.7–3.1)
LYMPHOCYTES NFR BLD AUTO: 6.5 % (ref 19.6–45.3)
MAGNESIUM SERPL-MCNC: 2 MG/DL (ref 1.6–2.4)
MCH RBC QN AUTO: 29.8 PG (ref 26.6–33)
MCHC RBC AUTO-ENTMCNC: 34.6 G/DL (ref 31.5–35.7)
MCV RBC AUTO: 86.2 FL (ref 79–97)
MONOCYTES # BLD AUTO: 0.4 10*3/MM3 (ref 0.1–0.9)
MONOCYTES NFR BLD AUTO: 7.5 % (ref 5–12)
NEUTROPHILS NFR BLD AUTO: 4.1 10*3/MM3 (ref 1.7–7)
NEUTROPHILS NFR BLD AUTO: 85.9 % (ref 42.7–76)
NRBC BLD AUTO-RTO: 0.1 /100 WBC (ref 0–0.2)
PHOSPHATE SERPL-MCNC: 3 MG/DL (ref 2.5–4.5)
PLATELET # BLD AUTO: 186 10*3/MM3 (ref 140–450)
PMV BLD AUTO: 7.2 FL (ref 6–12)
POTASSIUM SERPL-SCNC: 4.7 MMOL/L (ref 3.5–5.2)
PROT SERPL-MCNC: 5.4 G/DL (ref 6–8.5)
PROTHROMBIN TIME: 19.7 SECONDS (ref 19.4–28.5)
RBC # BLD AUTO: 3.16 10*6/MM3 (ref 4.14–5.8)
SODIUM SERPL-SCNC: 134 MMOL/L (ref 136–145)
WBC NRBC COR # BLD: 4.8 10*3/MM3 (ref 3.4–10.8)

## 2022-01-10 PROCEDURE — 80048 BASIC METABOLIC PNL TOTAL CA: CPT | Performed by: INTERNAL MEDICINE

## 2022-01-10 PROCEDURE — 83735 ASSAY OF MAGNESIUM: CPT | Performed by: INTERNAL MEDICINE

## 2022-01-10 PROCEDURE — 94799 UNLISTED PULMONARY SVC/PX: CPT

## 2022-01-10 PROCEDURE — 63710000001 DEXAMETHASONE PER 0.25 MG: Performed by: NURSE PRACTITIONER

## 2022-01-10 PROCEDURE — 85025 COMPLETE CBC W/AUTO DIFF WBC: CPT | Performed by: NURSE PRACTITIONER

## 2022-01-10 PROCEDURE — 82962 GLUCOSE BLOOD TEST: CPT

## 2022-01-10 PROCEDURE — 36415 COLL VENOUS BLD VENIPUNCTURE: CPT | Performed by: NURSE PRACTITIONER

## 2022-01-10 PROCEDURE — 80076 HEPATIC FUNCTION PANEL: CPT | Performed by: NURSE PRACTITIONER

## 2022-01-10 PROCEDURE — 85610 PROTHROMBIN TIME: CPT | Performed by: NURSE PRACTITIONER

## 2022-01-10 PROCEDURE — 25010000002 ENOXAPARIN PER 10 MG: Performed by: NURSE PRACTITIONER

## 2022-01-10 PROCEDURE — 63710000001 INSULIN LISPRO (HUMAN) PER 5 UNITS: Performed by: INTERNAL MEDICINE

## 2022-01-10 PROCEDURE — 84100 ASSAY OF PHOSPHORUS: CPT | Performed by: INTERNAL MEDICINE

## 2022-01-10 RX ORDER — SODIUM CHLORIDE 9 MG/ML
75 INJECTION, SOLUTION INTRAVENOUS CONTINUOUS
Status: DISCONTINUED | OUTPATIENT
Start: 2022-01-10 | End: 2022-01-10

## 2022-01-10 RX ORDER — DEXAMETHASONE 4 MG/1
2 TABLET ORAL
Status: DISCONTINUED | OUTPATIENT
Start: 2022-01-11 | End: 2022-01-11 | Stop reason: HOSPADM

## 2022-01-10 RX ADMIN — SODIUM BICARBONATE 650 MG TABLET 650 MG: at 09:33

## 2022-01-10 RX ADMIN — GABAPENTIN 300 MG: 300 CAPSULE ORAL at 15:02

## 2022-01-10 RX ADMIN — INSULIN LISPRO 4 UNITS: 100 INJECTION, SOLUTION INTRAVENOUS; SUBCUTANEOUS at 12:25

## 2022-01-10 RX ADMIN — WARFARIN 5 MG: 2.5 TABLET ORAL at 17:31

## 2022-01-10 RX ADMIN — SODIUM CHLORIDE, PRESERVATIVE FREE 10 ML: 5 INJECTION INTRAVENOUS at 20:19

## 2022-01-10 RX ADMIN — GABAPENTIN 300 MG: 300 CAPSULE ORAL at 05:47

## 2022-01-10 RX ADMIN — REMDESIVIR 100 MG: 100 INJECTION, POWDER, LYOPHILIZED, FOR SOLUTION INTRAVENOUS at 12:25

## 2022-01-10 RX ADMIN — HYDRALAZINE HYDROCHLORIDE 50 MG: 25 TABLET, FILM COATED ORAL at 09:33

## 2022-01-10 RX ADMIN — ENOXAPARIN SODIUM 40 MG: 40 INJECTION SUBCUTANEOUS at 17:31

## 2022-01-10 RX ADMIN — MAGNESIUM GLUCONATE 500 MG ORAL TABLET 400 MG: 500 TABLET ORAL at 09:33

## 2022-01-10 RX ADMIN — SODIUM CHLORIDE 75 ML/HR: 9 INJECTION, SOLUTION INTRAVENOUS at 09:33

## 2022-01-10 RX ADMIN — GABAPENTIN 300 MG: 300 CAPSULE ORAL at 20:18

## 2022-01-10 RX ADMIN — DEXAMETHASONE 4 MG: 4 TABLET ORAL at 09:33

## 2022-01-10 RX ADMIN — SODIUM CHLORIDE, PRESERVATIVE FREE 10 ML: 5 INJECTION INTRAVENOUS at 09:33

## 2022-01-10 RX ADMIN — SODIUM BICARBONATE 650 MG TABLET 650 MG: at 17:31

## 2022-01-10 RX ADMIN — BUDESONIDE AND FORMOTEROL FUMARATE DIHYDRATE 2 PUFF: 160; 4.5 AEROSOL RESPIRATORY (INHALATION) at 08:04

## 2022-01-10 RX ADMIN — ZINC SULFATE 220 MG (50 MG) CAPSULE 220 MG: CAPSULE at 09:33

## 2022-01-10 RX ADMIN — ALBUTEROL SULFATE 2 PUFF: 108 INHALANT RESPIRATORY (INHALATION) at 12:15

## 2022-01-10 RX ADMIN — ALBUTEROL SULFATE 2 PUFF: 108 INHALANT RESPIRATORY (INHALATION) at 08:01

## 2022-01-10 RX ADMIN — INSULIN LISPRO 6 UNITS: 100 INJECTION, SOLUTION INTRAVENOUS; SUBCUTANEOUS at 17:31

## 2022-01-10 RX ADMIN — HYDRALAZINE HYDROCHLORIDE 50 MG: 25 TABLET, FILM COATED ORAL at 20:18

## 2022-01-10 RX ADMIN — GUAIFENESIN 600 MG: 600 TABLET, EXTENDED RELEASE ORAL at 09:33

## 2022-01-10 RX ADMIN — PANTOPRAZOLE SODIUM 40 MG: 40 TABLET, DELAYED RELEASE ORAL at 05:47

## 2022-01-10 RX ADMIN — BUDESONIDE AND FORMOTEROL FUMARATE DIHYDRATE 2 PUFF: 160; 4.5 AEROSOL RESPIRATORY (INHALATION) at 18:30

## 2022-01-10 RX ADMIN — SODIUM BICARBONATE 650 MG TABLET 650 MG: at 20:18

## 2022-01-10 NOTE — PROGRESS NOTES
"NEPHROLOGY PROGRESS NOTE------[facility]    [facility]  [phone]  Stephanie Laughlin MD      Patient Care Team:  Joey Islas MD as PCP - General (Family Medicine)  Neville Laughlin MD as Consulting Physician (Nephrology)      Provider:  Stephanie Laughlin MD  Patient Name: Michael Dorantes  :  1950    SUBJECTIVE:    F/U ARF/LEO/CRF/CKD    Feeling/breathing fine. No angina, dysuria, palpitations. Appetite okay.     Medication:  albuterol sulfate HFA, 2 puff, Inhalation, 4x Daily - RT  budesonide-formoterol, 2 puff, Inhalation, BID - RT  dexamethasone, 4 mg, Oral, Daily With Breakfast  enoxaparin, 40 mg, Subcutaneous, Q24H  gabapentin, 300 mg, Oral, Q8H  guaiFENesin, 600 mg, Oral, Q12H  hydrALAZINE, 50 mg, Oral, BID  insulin lispro, 0-9 Units, Subcutaneous, TID AC  magnesium oxide, 400 mg, Oral, Daily  pantoprazole, 40 mg, Oral, Q AM  remdesivir, 100 mg, Intravenous, Q24H  sodium bicarbonate, 650 mg, Oral, TID  sodium chloride, 10 mL, Intravenous, Q12H  warfarin, 5 mg, Oral, Daily  zinc sulfate, 220 mg, Oral, Daily      Pharmacy Consult - Remdesivir,   Pharmacy to dose warfarin,         OBJECTIVE    Vital Sign Min/Max for last 24 hours  Temp  Min: 96.9 °F (36.1 °C)  Max: 99.8 °F (37.7 °C)   BP  Min: 131/67  Max: 170/78   Pulse  Min: 68  Max: 78   Resp  Min: 15  Max: 20   SpO2  Min: 95 %  Max: 97 %   No data recorded   No data recorded     Flowsheet Rows      First Filed Value   Admission Height 182.9 cm (72\") Documented at 2022   Admission Weight 90 kg (198 lb 6.6 oz) Documented at 2022          I/O this shift:  In: 480 [P.O.:480]  Out: 850 [Urine:850]  I/O last 3 completed shifts:  In: 1320 [P.O.:1320]  Out: 1800 [Urine:1800]    Physical Exam:  General Appearance: alert, appears stated age and cooperative  Head: normocephalic, without obvious abnormality and atraumatic  Eyes: conjunctivae and sclerae " normal and no icterus  Neck: supple and no JVD  Lungs: +BIBASILAR RALES (BETTER)  Heart: regular rhythm & normal rate and normal S1, S2 +CHAYO  Chest Wall: no abnormalities observed  Abdomen: normal bowel sounds and soft non-tender +UROSTOMY  Extremities: moves extremities well, no edema, no cyanosis  Skin: no bleeding, bruising or rash  Neurologic: Alert, and oriented. No focal deficits +[place]    Labs:    WBC WBC   Date Value Ref Range Status   01/10/2022 4.80 3.40 - 10.80 10*3/mm3 Final   01/09/2022 5.80 3.40 - 10.80 10*3/mm3 Final   01/08/2022 4.80 3.40 - 10.80 10*3/mm3 Final      HGB Hemoglobin   Date Value Ref Range Status   01/10/2022 9.4 (L) 13.0 - 17.7 g/dL Final   01/09/2022 9.1 (L) 13.0 - 17.7 g/dL Final   01/08/2022 9.8 (L) 13.0 - 17.7 g/dL Final      HCT Hematocrit   Date Value Ref Range Status   01/10/2022 27.3 (L) 37.5 - 51.0 % Final   01/09/2022 26.4 (L) 37.5 - 51.0 % Final   01/08/2022 28.1 (L) 37.5 - 51.0 % Final      Platlets No results found for: LABPLAT   MCV MCV   Date Value Ref Range Status   01/10/2022 86.2 79.0 - 97.0 fL Final   01/09/2022 86.5 79.0 - 97.0 fL Final   01/08/2022 89.2 79.0 - 97.0 fL Final          Sodium Sodium   Date Value Ref Range Status   01/10/2022 134 (L) 136 - 145 mmol/L Final   01/09/2022 137 136 - 145 mmol/L Final   01/08/2022 137 136 - 145 mmol/L Final      Potassium Potassium   Date Value Ref Range Status   01/10/2022 4.7 3.5 - 5.2 mmol/L Final   01/09/2022 4.3 3.5 - 5.2 mmol/L Final   01/08/2022 4.8 3.5 - 5.2 mmol/L Final      Chloride Chloride   Date Value Ref Range Status   01/10/2022 101 98 - 107 mmol/L Final   01/09/2022 104 98 - 107 mmol/L Final   01/08/2022 103 98 - 107 mmol/L Final      CO2 CO2   Date Value Ref Range Status   01/10/2022 23.0 22.0 - 29.0 mmol/L Final   01/09/2022 24.0 22.0 - 29.0 mmol/L Final   01/08/2022 23.0 22.0 - 29.0 mmol/L Final      BUN BUN   Date Value Ref Range Status   01/10/2022 41 (H) 8 - 23 mg/dL Final   01/09/2022 33 (H) 8 - 23 mg/dL  Final   01/08/2022 36 (H) 8 - 23 mg/dL Final      Creatinine Creatinine   Date Value Ref Range Status   01/10/2022 1.85 (H) 0.76 - 1.27 mg/dL Final   01/09/2022 1.73 (H) 0.76 - 1.27 mg/dL Final   01/08/2022 1.85 (H) 0.76 - 1.27 mg/dL Final      Calcium Calcium   Date Value Ref Range Status   01/10/2022 7.5 (L) 8.6 - 10.5 mg/dL Final   01/09/2022 7.4 (L) 8.6 - 10.5 mg/dL Final   01/08/2022 8.1 (L) 8.6 - 10.5 mg/dL Final      PO4 No components found for: PO4   Albumin Albumin   Date Value Ref Range Status   01/10/2022 2.90 (L) 3.50 - 5.20 g/dL Final   01/09/2022 2.70 (L) 3.50 - 5.20 g/dL Final   01/08/2022 3.10 (L) 3.50 - 5.20 g/dL Final      Magnesium Magnesium   Date Value Ref Range Status   01/10/2022 2.0 1.6 - 2.4 mg/dL Final   01/09/2022 1.8 1.6 - 2.4 mg/dL Final   01/08/2022 1.9 1.6 - 2.4 mg/dL Final      Uric Acid No components found for: URIC ACID     Imaging Results (Last 72 Hours)     ** No results found for the last 72 hours. **          Results for orders placed during the hospital encounter of 01/05/22    XR Chest 1 View    Narrative  XR CHEST 1 VW    Date of Exam: 1/5/2022 23:38 EST    Indication: dyspnea.  Covid positive.    Comparison Exams: None available.    Technique: Portable AP chest    FINDINGS:  Ill-defined infiltrates are seen throughout the central to peripheral aspect of the lungs bilaterally with mild interstitial changes. No pleural effusions are observed. The cardiac silhouette is within normal limits for size for the portable study. The  mediastinum is unremarkable. No acute osseous abnormalities are identified.    Impression  Ill-defined infiltrates are noted bilaterally with mild interstitial changes. The findings suggest atypical/viral infection or multifocal pneumonia and suggest changes of Covid 19 infection. Recommend follow-up to ensure improvement/resolution.    Electronically Signed: Bran Colin MD 1/6/2022 0:03 EST      Results for orders placed in visit on  12/13/18    SCANNED - IMAGING      SCANNED - IMAGING      Results for orders placed during the hospital encounter of 10/17/19    Duplex Venous Lower Extremity - Left    Interpretation Summary  · Acute left lower extremity deep vein thrombosis noted in the common femoral, proximal femoral, mid femoral, distal femoral and popliteal.        ASSESSMENT / PLAN      Pneumonia due to COVID-19 virus    Diabetes mellitus with neuropathy (HCC)    History of DVT (deep vein thrombosis)    LEO (acute kidney injury) (HCC)    COVID-19    1. ARF/LEO/CRF/CKD STG 3B----Nonoliguric. BUN/Cr up a little. Restart little maintenance IVFs and encouraged patient to stay well hydrated. ARF/LEO resolved. +ARF/LEO on top of known CRF/CKD STG 3B with a baseline serum creatinine of about 1.8. CRF/CKD STG 3B is secondary to DGS/HTN NS. +ARF/LEO was secondary to prerenal state/intravascular volume depletion. Follow off of IVFs.   No NSAIDs or IV dye. Lytes, acid-base okay. No uremia     2. DMII WITH RENAL MANIFESTATIONS-----BS okay. Glucometers, SSI. Follow with steroid exposure     3. HTN WITH CKD-----BP okay. Avoid hypotension. No ACE-I/ARB/DRI/diuretic for now     4. MILD HYPOALBUMINEMIA-----Encouraged increased po protein intake     5. H/O BLADDER/PROSTATE CA WITH UROSTOMY----Change Urostomy bag today     6. ANEMIA OF CKD--------H/H in safe range. Follow for EPO need.      7. DM PERIPHERAL NEUROPATHY------On Neurontin     8. GERD/PUD PROPHYLAXIS------On PPI. Benefits outweigh risks, despite renal dysfunction, given steroid exposure and h/o use and tolerating PPI     9. TYPE 4 RTA------Stable bicarbonate levels on current po NaHCO3 dose     10. DVT-----Anticoagulated     11. COVID 19 + PNA-------Okay for Remdesivir despite renal dysfunction. On Steroids and pulmonary toilet also per PMD     12. HEARING IMPAIRED    13. HYPOPHOSPHATEMIA------Replaced    14. HYPOMAGNESEMIA------Replaced        Stephanie Laughlin MD  [facility]  [phone]  01/10/22  07:00 EST

## 2022-01-10 NOTE — PLAN OF CARE
Problem: Adult Inpatient Plan of Care  Goal: Plan of Care Review  Outcome: Ongoing, Progressing  Flowsheets (Taken 1/10/2022 1852)  Progress: improving  Plan of Care Reviewed With: patient  Outcome Summary: Pt rested well, Deniesany complaints, vitals have been stable. possible discharge tomorrow  Goal: Patient-Specific Goal (Individualized)  Outcome: Ongoing, Progressing  Goal: Absence of Hospital-Acquired Illness or Injury  Outcome: Ongoing, Progressing  Intervention: Identify and Manage Fall Risk  Recent Flowsheet Documentation  Taken 1/10/2022 1145 by Marlon Spencer RN  Safety Promotion/Fall Prevention:   clutter free environment maintained   safety round/check completed   room organization consistent  Taken 1/10/2022 0745 by Marlon Spencer RN  Safety Promotion/Fall Prevention:   safety round/check completed   nonskid shoes/slippers when out of bed   clutter free environment maintained  Intervention: Prevent Skin Injury  Recent Flowsheet Documentation  Taken 1/10/2022 1600 by Marlon Spencer RN  Body Position:   supine   position changed independently  Taken 1/10/2022 0745 by Marlon Spencer RN  Body Position:   position changed independently   supine  Intervention: Prevent Infection  Recent Flowsheet Documentation  Taken 1/10/2022 0745 by Marlon Spencer RN  Infection Prevention:   hand hygiene promoted   single patient room provided   rest/sleep promoted   environmental surveillance performed   personal protective equipment utilized  Goal: Optimal Comfort and Wellbeing  Outcome: Ongoing, Progressing  Goal: Readiness for Transition of Care  Outcome: Ongoing, Progressing     Problem: Breathing Pattern Ineffective  Goal: Effective Breathing Pattern  Outcome: Ongoing, Progressing  Intervention: Promote Improved Breathing Pattern  Recent Flowsheet Documentation  Taken 1/10/2022 1600 by Marlon Spencer RN  Head of Bed (HOB): HOB at 30 degrees  Taken 1/10/2022 0745 by Marlon Spencer RN  Head  of Bed (HOB): HOB at 30 degrees   Goal Outcome Evaluation:  Plan of Care Reviewed With: patient        Progress: improving  Outcome Summary: Pt rested well, Deniesany complaints, vitals have been stable. possible discharge tomorrow

## 2022-01-10 NOTE — PROGRESS NOTES
LOS: 3 days   Patient Care Team:  Joey Islas MD as PCP - General (Family Medicine)  Neville Laughlin MD as Consulting Physician (Nephrology)    Subjective     Denies any complaints    Review of Systems   Constitutional: Positive for activity change. Negative for fatigue.   HENT: Negative for congestion.    Respiratory: Negative for cough and shortness of breath.    Gastrointestinal: Negative for abdominal distention, abdominal pain, diarrhea, rectal pain and vomiting.   Genitourinary: Negative.    Musculoskeletal: Negative.    Skin: Negative.    Neurological: Positive for weakness. Negative for dizziness.   Psychiatric/Behavioral: Negative.            Objective     Vital Signs  Temp:  [96.9 °F (36.1 °C)-98.5 °F (36.9 °C)] 98.5 °F (36.9 °C)  Heart Rate:  [63-78] 68  Resp:  [15-19] 18  BP: (141-170)/(70-78) 155/72      Physical Exam  Vitals reviewed.   Constitutional:       Appearance: Normal appearance.   HENT:      Head: Normocephalic and atraumatic.      Right Ear: External ear normal.      Left Ear: External ear normal.      Nose: Nose normal.      Mouth/Throat:      Mouth: Mucous membranes are moist.      Pharynx: Oropharynx is clear.   Eyes:      Conjunctiva/sclera: Conjunctivae normal.   Cardiovascular:      Rate and Rhythm: Normal rate and regular rhythm.      Pulses: Normal pulses.      Heart sounds: Normal heart sounds.   Pulmonary:      Effort: Pulmonary effort is normal. No respiratory distress.      Breath sounds: Normal breath sounds. No wheezing or rhonchi.   Abdominal:      General: Bowel sounds are normal. There is no distension.      Palpations: Abdomen is soft.      Tenderness: There is no abdominal tenderness. There is no guarding.   Musculoskeletal:         General: Normal range of motion.      Cervical back: Normal range of motion.   Skin:     General: Skin is warm and dry.   Neurological:      Mental Status: He is alert and oriented to person, place, and time.    Psychiatric:         Behavior: Behavior normal.              Results Review:    Lab Results (last 24 hours)     Procedure Component Value Units Date/Time    POC Glucose Once [[id number]]  (Abnormal) Collected: 01/10/22 1115    Specimen: Blood Updated: 01/10/22 1116     Glucose 207 mg/dL      Comment: Serial Number: [id number]:  [id number]       Urine Culture - Urine, Urine, Random Void [[id number]]  (Abnormal)  (Susceptibility) Collected: 01/08/22 1733    Specimen: Urine, Random Void Updated: 01/10/22 0955     Urine Culture >100,000 CFU/mL Proteus hauseri    Susceptibility      Proteus hauseri     JUNE     Ampicillin Resistant     Ampicillin + Sulbactam Susceptible     Cefazolin Resistant     Cefepime Susceptible     Ceftazidime Susceptible     Ceftriaxone Susceptible     Gentamicin Susceptible     Levofloxacin Susceptible     Nitrofurantoin Resistant     Piperacillin + Tazobactam Susceptible     Trimethoprim + Sulfamethoxazole Susceptible                  Linear View                   Blood Culture - Blood, Arm, Right [[id number]]  (Normal) Collected: 01/06/22 0729    Specimen: Blood from Arm, Right Updated: 01/10/22 0745     Blood Culture No growth at 4 days    Blood Culture - Blood, Arm, Left [[id number]]  (Normal) Collected: 01/06/22 0735    Specimen: Blood from Arm, Left Updated: 01/10/22 0745     Blood Culture No growth at 4 days    POC Glucose Once [[id number]]  (Abnormal) Collected: 01/10/22 0720    Specimen: Blood Updated: 01/10/22 0721     Glucose 142 mg/dL      Comment: Serial Number: [id number]:  [id number]       Phosphorus [[id number]]  (Normal) Collected: 01/10/22 0039    Specimen: Blood Updated: 01/10/22 0150     Phosphorus 3.0 mg/dL     Basic Metabolic Panel [[id number]]  (Abnormal) Collected: 01/10/22 0039    Specimen: Blood Updated: 01/10/22 0149     Glucose 240 mg/dL      BUN 41 mg/dL      Creatinine 1.85 mg/dL      Sodium 134 mmol/L      Potassium 4.7 mmol/L      Chloride 101 mmol/L       CO2 23.0 mmol/L      Calcium 7.5 mg/dL      eGFR Non African Amer 36 mL/min/1.73      BUN/Creatinine Ratio 22.2     Anion Gap 10.0 mmol/L     Narrative:      GFR Normal >60  Chronic Kidney Disease <60  Kidney Failure <15      Hepatic Function Panel [[id number]]  (Abnormal) Collected: 01/10/22 0039    Specimen: Blood Updated: 01/10/22 0149     Total Protein 5.4 g/dL      Albumin 2.90 g/dL      ALT (SGPT) 59 U/L      AST (SGOT) 44 U/L      Alkaline Phosphatase 47 U/L      Total Bilirubin 0.2 mg/dL      Bilirubin, Direct <0.2 mg/dL      Bilirubin, Indirect --     Comment: Unable to calculate       Magnesium [[id number]]  (Normal) Collected: 01/10/22 0039    Specimen: Blood Updated: 01/10/22 0149     Magnesium 2.0 mg/dL     Protime-INR [[id number]]  (Abnormal) Collected: 01/10/22 0039    Specimen: Blood Updated: 01/10/22 0132     Protime 19.7 Seconds      INR 1.86    CBC & Differential [[id number]]  (Abnormal) Collected: 01/10/22 0039    Specimen: Blood Updated: 01/10/22 0119    Narrative:      The following orders were created for panel order CBC & Differential.  Procedure                               Abnormality         Status                     ---------                               -----------         ------                     CBC Auto Differential[[id number]]        Abnormal            Final result                 Please view results for these tests on the individual orders.    CBC Auto Differential [[id number]]  (Abnormal) Collected: 01/10/22 0039    Specimen: Blood Updated: 01/10/22 0119     WBC 4.80 10*3/mm3      RBC 3.16 10*6/mm3      Hemoglobin 9.4 g/dL      Hematocrit 27.3 %      MCV 86.2 fL      MCH 29.8 pg      MCHC 34.6 g/dL      RDW 13.1 %      RDW-SD 41.1 fl      MPV 7.2 fL      Platelets 186 10*3/mm3      Neutrophil % 85.9 %      Lymphocyte % 6.5 %      Monocyte % 7.5 %      Eosinophil % 0.1 %      Basophil % 0.0 %      Neutrophils, Absolute 4.10 10*3/mm3      Lymphocytes, Absolute 0.30 10*3/mm3       Monocytes, Absolute 0.40 10*3/mm3      Eosinophils, Absolute 0.00 10*3/mm3      Basophils, Absolute 0.00 10*3/mm3      nRBC 0.1 /100 WBC     POC Glucose Once [[id number]]  (Abnormal) Collected: 01/09/22 2105    Specimen: Blood Updated: 01/09/22 2107     Glucose 298 mg/dL      Comment: Serial Number: [id number]:  [id number]              Imaging Results (Last 24 Hours)     ** No results found for the last 24 hours. **               I reviewed the patient's new clinical results.    Medication Review:   Scheduled Meds:albuterol sulfate HFA, 2 puff, Inhalation, 4x Daily - RT  budesonide-formoterol, 2 puff, Inhalation, BID - RT  dexamethasone, 4 mg, Oral, Daily With Breakfast  enoxaparin, 40 mg, Subcutaneous, Q24H  gabapentin, 300 mg, Oral, Q8H  guaiFENesin, 600 mg, Oral, Q12H  hydrALAZINE, 50 mg, Oral, BID  insulin lispro, 0-9 Units, Subcutaneous, TID AC  magnesium oxide, 400 mg, Oral, Daily  pantoprazole, 40 mg, Oral, Q AM  remdesivir, 100 mg, Intravenous, Q24H  sodium bicarbonate, 650 mg, Oral, TID  sodium chloride, 10 mL, Intravenous, Q12H  warfarin, 5 mg, Oral, Daily  zinc sulfate, 220 mg, Oral, Daily      Continuous Infusions:Pharmacy Consult - [facility],   Pharmacy to dose warfarin,   sodium chloride, 75 mL/hr, Last Rate: 75 mL/hr (01/10/22 0933)      PRN Meds:.•  acetaminophen  •  benzonatate  •  dextrose  •  dextrose  •  glucagon (human recombinant)  •  HYDROcodone-homatropine  •  insulin lispro **AND** insulin lispro  •  melatonin  •  ondansetron **OR** ondansetron  •  Pharmacy Consult - Remdesivir  •  Pharmacy to dose warfarin  •  sodium chloride  •  [COMPLETED] Insert peripheral IV **AND** sodium chloride  •  sodium chloride     Interval History:    Assessment/Plan     One more day of remdesivir; will place on room air and monitor; coumadin restarted on 1/6 working on therapeutic INR; decreased steroids to taper and improve BS  Hopefully home tomorrow after last dose tomorrow afternoon. May need walking  oximetry      Pneumonia due to COVID-19 virus    Diabetes mellitus with neuropathy (HCC)    History of DVT (deep vein thrombosis)    LEO (acute kidney injury) (HCC)    COVID-19    Remdesivir, currently 2 liters, steroids, bronchodilators, Mucinex    SSI for elevated blood sugars.       Pantoprazole for stress ulcer prophy  Anticoagulated with warfarin for h/o DVT     Plan for disposition: home    JOANNE Taylor  01/10/22  13:21 EST

## 2022-01-10 NOTE — PROGRESS NOTES
"Pharmacy dosing service  Anticoagulant  Warfarin     Subjective:    Michael Dorantes is a 71 y.o.male being continued on warfarin for  Hx of DVT .    INR Goal: 2 - 3  Home medication?: Yes, warfarin 5 mg PO every day at 1800  Bridge Therapy Present?:  Yes,  Enoxaparin 40 mg SQ Q24H.   Interacting Medications Evaluation (New/Present/Discontinued): Dexamethasone (may increase INR)  Additional Contributing Factors:       Assessment/Plan:    Pt has been holding warfarin for the past several days PTA due to multiple teeth extractions 1/3. Warfarin resumed 1/6. INR increasing appropriately therefore will continue with home dose tonight. Lovenox for VTE ppx to be discontinued once INR >2.     Continue to monitor and adjust based on INR.         Date 1/6 1/7 1/8 1/9 1/10       INR 1.23 1.23 1.43 1.81 1.86       Dose 5mg 7 mg 6 mg  5 mg  5 mg           Objective:  [Ht: 182.9 cm (72\"); Wt: 90 kg (198 lb 6.6 oz); BMI: Body mass index is 26.91 kg/m².]    Lab Results   Component Value Date    ALBUMIN 2.90 (L) 01/10/2022     Lab Results   Component Value Date    INR 1.86 (L) 01/10/2022    INR 1.81 (L) 01/09/2022    INR 1.43 (L) 01/08/2022    PROTIME 19.7 01/10/2022    PROTIME 19.2 (L) 01/09/2022    PROTIME 15.5 (L) 01/08/2022     Lab Results   Component Value Date    HGB 9.4 (L) 01/10/2022    HGB 9.1 (L) 01/09/2022    HGB 9.8 (L) 01/08/2022     Lab Results   Component Value Date    HCT 27.3 (L) 01/10/2022    HCT 26.4 (L) 01/09/2022    HCT 28.1 (L) 01/08/2022       Fabi Carey, Boni  01/10/22 09:12 EST             "

## 2022-01-10 NOTE — CASE MANAGEMENT/SOCIAL WORK
Continued Stay Note  BOZENA Crocker     Patient Name: Michael Dorantes  MRN: [id number]  Today's Date: 1/10/2022    Admit Date: 1/5/2022     Discharge Plan     Row Name 01/10/22 1301       Plan    Plan Comments DC Barriers: IV fluids, nephrology following, COVID positive, currently on 2L O2 with no home O2. CM will continue to follow to watch for any home O2 needs at dc once medically cleared. May require walking oximetry to be performe 24-48 hrs prior to dc.              Phone communication or documentation only - no physical contact with patient or family.    Ayanna Pompa RN     Office Phone: [phone]  Office Cell: [phone]

## 2022-01-10 NOTE — PLAN OF CARE
Problem: Adult Inpatient Plan of Care  Goal: Plan of Care Review  Outcome: Ongoing, Progressing  Goal: Patient-Specific Goal (Individualized)  Outcome: Ongoing, Progressing  Goal: Absence of Hospital-Acquired Illness or Injury  Outcome: Ongoing, Progressing  Intervention: Identify and Manage Fall Risk  Recent Flowsheet Documentation  Taken 1/10/2022 0300 by Shanon Shanks RN  Safety Promotion/Fall Prevention:   room organization consistent   safety round/check completed   activity supervised   clutter free environment maintained   fall prevention program maintained   lighting adjusted  Taken 1/10/2022 0100 by Shanon Shanks RN  Safety Promotion/Fall Prevention:   safety round/check completed   room organization consistent   activity supervised   clutter free environment maintained   fall prevention program maintained   lighting adjusted  Taken 1/9/2022 2300 by Shanon Shanks RN  Safety Promotion/Fall Prevention:   safety round/check completed   room organization consistent   activity supervised   fall prevention program maintained   lighting adjusted  Taken 1/9/2022 2120 by Shanon Shanks RN  Safety Promotion/Fall Prevention:   safety round/check completed   room organization consistent   activity supervised   clutter free environment maintained   fall prevention program maintained   lighting adjusted  Intervention: Prevent Infection  Recent Flowsheet Documentation  Taken 1/10/2022 0300 by Shanon Shanks RN  Infection Prevention:   visitors restricted/screened   single patient room provided   rest/sleep promoted  Taken 1/10/2022 0100 by Shanon Shanks RN  Infection Prevention:   visitors restricted/screened   single patient room provided   rest/sleep promoted  Taken 1/9/2022 2300 by Shanon Shanks RN  Infection Prevention:   visitors restricted/screened   single patient room provided   rest/sleep promoted  Taken 1/9/2022 2120 by Shanon Shanks RN  Infection Prevention:   visitors restricted/screened   single patient room provided    rest/sleep promoted  Goal: Optimal Comfort and Wellbeing  Outcome: Ongoing, Progressing  Intervention: Provide Person-Centered Care  Recent Flowsheet Documentation  Taken 1/9/2022 2120 by Shanon Shanks RN  Trust Relationship/Rapport: care explained  Goal: Readiness for Transition of Care  Outcome: Ongoing, Progressing     Problem: Breathing Pattern Ineffective  Goal: Effective Breathing Pattern  Outcome: Ongoing, Progressing   Goal Outcome Evaluation:         Patient is agreeable to plan. Patient is progressing towards outcome. Expresses desire to get good sleep tonight.

## 2022-01-11 ENCOUNTER — READMISSION MANAGEMENT (OUTPATIENT)
Dept: CALL CENTER | Facility: HOSPITAL | Age: 72
End: 2022-01-11

## 2022-01-11 VITALS
DIASTOLIC BLOOD PRESSURE: 76 MMHG | SYSTOLIC BLOOD PRESSURE: 144 MMHG | HEART RATE: 63 BPM | BODY MASS INDEX: 26.87 KG/M2 | RESPIRATION RATE: 18 BRPM | WEIGHT: 198.41 LBS | TEMPERATURE: 97.5 F | OXYGEN SATURATION: 95 % | HEIGHT: 72 IN

## 2022-01-11 PROBLEM — R09.02 HYPOXIA: Status: ACTIVE | Noted: 2022-01-11

## 2022-01-11 PROBLEM — D89.832 CYTOKINE RELEASE SYNDROME, GRADE 2: Status: ACTIVE | Noted: 2022-01-11

## 2022-01-11 LAB
ALBUMIN SERPL-MCNC: 2.8 G/DL (ref 3.5–5.2)
ALP SERPL-CCNC: 45 U/L (ref 39–117)
ALT SERPL W P-5'-P-CCNC: 53 U/L (ref 1–41)
ANION GAP SERPL CALCULATED.3IONS-SCNC: 6 MMOL/L (ref 5–15)
AST SERPL-CCNC: 30 U/L (ref 1–40)
BACTERIA SPEC AEROBE CULT: NORMAL
BACTERIA SPEC AEROBE CULT: NORMAL
BASOPHILS # BLD AUTO: 0 10*3/MM3 (ref 0–0.2)
BASOPHILS NFR BLD AUTO: 0.1 % (ref 0–1.5)
BILIRUB CONJ SERPL-MCNC: <0.2 MG/DL (ref 0–0.3)
BILIRUB INDIRECT SERPL-MCNC: ABNORMAL MG/DL
BILIRUB SERPL-MCNC: 0.2 MG/DL (ref 0–1.2)
BUN SERPL-MCNC: 39 MG/DL (ref 8–23)
BUN/CREAT SERPL: 22 (ref 7–25)
CALCIUM SPEC-SCNC: 7.6 MG/DL (ref 8.6–10.5)
CHLORIDE SERPL-SCNC: 99 MMOL/L (ref 98–107)
CO2 SERPL-SCNC: 24 MMOL/L (ref 22–29)
CREAT SERPL-MCNC: 1.77 MG/DL (ref 0.76–1.27)
DEPRECATED RDW RBC AUTO: 39.4 FL (ref 37–54)
EOSINOPHIL # BLD AUTO: 0 10*3/MM3 (ref 0–0.4)
EOSINOPHIL NFR BLD AUTO: 0 % (ref 0.3–6.2)
ERYTHROCYTE [DISTWIDTH] IN BLOOD BY AUTOMATED COUNT: 12.8 % (ref 12.3–15.4)
GFR SERPL CREATININE-BSD FRML MDRD: 38 ML/MIN/1.73
GLUCOSE BLDC GLUCOMTR-MCNC: 159 MG/DL (ref 70–105)
GLUCOSE BLDC GLUCOMTR-MCNC: 184 MG/DL (ref 70–105)
GLUCOSE SERPL-MCNC: 186 MG/DL (ref 65–99)
HCT VFR BLD AUTO: 28.7 % (ref 37.5–51)
HGB BLD-MCNC: 9.8 G/DL (ref 13–17.7)
INR PPP: 2.32 (ref 2–3)
LYMPHOCYTES # BLD AUTO: 0.5 10*3/MM3 (ref 0.7–3.1)
LYMPHOCYTES NFR BLD AUTO: 8.2 % (ref 19.6–45.3)
MCH RBC QN AUTO: 29.1 PG (ref 26.6–33)
MCHC RBC AUTO-ENTMCNC: 34.1 G/DL (ref 31.5–35.7)
MCV RBC AUTO: 85.4 FL (ref 79–97)
MONOCYTES # BLD AUTO: 0.6 10*3/MM3 (ref 0.1–0.9)
MONOCYTES NFR BLD AUTO: 9.2 % (ref 5–12)
NEUTROPHILS NFR BLD AUTO: 5 10*3/MM3 (ref 1.7–7)
NEUTROPHILS NFR BLD AUTO: 82.5 % (ref 42.7–76)
NRBC BLD AUTO-RTO: 0.1 /100 WBC (ref 0–0.2)
PLATELET # BLD AUTO: 246 10*3/MM3 (ref 140–450)
PMV BLD AUTO: 6.9 FL (ref 6–12)
POTASSIUM SERPL-SCNC: 4.4 MMOL/L (ref 3.5–5.2)
PROT SERPL-MCNC: 5.3 G/DL (ref 6–8.5)
PROTHROMBIN TIME: 24.2 SECONDS (ref 19.4–28.5)
RBC # BLD AUTO: 3.36 10*6/MM3 (ref 4.14–5.8)
SODIUM SERPL-SCNC: 129 MMOL/L (ref 136–145)
WBC NRBC COR # BLD: 6.1 10*3/MM3 (ref 3.4–10.8)

## 2022-01-11 PROCEDURE — 94799 UNLISTED PULMONARY SVC/PX: CPT

## 2022-01-11 PROCEDURE — 80076 HEPATIC FUNCTION PANEL: CPT | Performed by: NURSE PRACTITIONER

## 2022-01-11 PROCEDURE — 80048 BASIC METABOLIC PNL TOTAL CA: CPT | Performed by: INTERNAL MEDICINE

## 2022-01-11 PROCEDURE — 36415 COLL VENOUS BLD VENIPUNCTURE: CPT | Performed by: NURSE PRACTITIONER

## 2022-01-11 PROCEDURE — 82962 GLUCOSE BLOOD TEST: CPT

## 2022-01-11 PROCEDURE — 94618 PULMONARY STRESS TESTING: CPT

## 2022-01-11 PROCEDURE — 85610 PROTHROMBIN TIME: CPT | Performed by: NURSE PRACTITIONER

## 2022-01-11 PROCEDURE — 63710000001 INSULIN LISPRO (HUMAN) PER 5 UNITS: Performed by: INTERNAL MEDICINE

## 2022-01-11 PROCEDURE — 85025 COMPLETE CBC W/AUTO DIFF WBC: CPT | Performed by: NURSE PRACTITIONER

## 2022-01-11 PROCEDURE — 63710000001 DEXAMETHASONE PER 0.25 MG: Performed by: NURSE PRACTITIONER

## 2022-01-11 RX ORDER — PANTOPRAZOLE SODIUM 40 MG/1
40 TABLET, DELAYED RELEASE ORAL
Qty: 30 TABLET | Refills: 0 | Status: SHIPPED | OUTPATIENT
Start: 2022-01-12 | End: 2023-01-18

## 2022-01-11 RX ORDER — CEFDINIR 300 MG/1
300 CAPSULE ORAL EVERY 12 HOURS SCHEDULED
Qty: 14 CAPSULE | Refills: 0 | Status: SHIPPED | OUTPATIENT
Start: 2022-01-11 | End: 2022-01-20 | Stop reason: HOSPADM

## 2022-01-11 RX ORDER — WARFARIN SODIUM 1 MG/1
3 TABLET ORAL
Status: DISCONTINUED | OUTPATIENT
Start: 2022-01-11 | End: 2022-01-11 | Stop reason: HOSPADM

## 2022-01-11 RX ORDER — ACETAMINOPHEN 325 MG/1
650 TABLET ORAL EVERY 4 HOURS PRN
Start: 2022-01-11

## 2022-01-11 RX ORDER — GABAPENTIN 300 MG/1
300 CAPSULE ORAL 3 TIMES DAILY
Start: 2022-01-11

## 2022-01-11 RX ORDER — DEXAMETHASONE 2 MG/1
2 TABLET ORAL
Qty: 4 TABLET | Refills: 0 | Status: SHIPPED | OUTPATIENT
Start: 2022-01-12 | End: 2022-01-16

## 2022-01-11 RX ORDER — CEFDINIR 300 MG/1
300 CAPSULE ORAL EVERY 12 HOURS SCHEDULED
Status: DISCONTINUED | OUTPATIENT
Start: 2022-01-11 | End: 2022-01-11 | Stop reason: HOSPADM

## 2022-01-11 RX ORDER — BUDESONIDE AND FORMOTEROL FUMARATE DIHYDRATE 160; 4.5 UG/1; UG/1
2 AEROSOL RESPIRATORY (INHALATION)
Qty: 10.2 G | Refills: 0 | Status: SHIPPED | OUTPATIENT
Start: 2022-01-11 | End: 2022-01-20 | Stop reason: HOSPADM

## 2022-01-11 RX ORDER — WARFARIN SODIUM 2.5 MG/1
5 TABLET ORAL
Status: DISCONTINUED | OUTPATIENT
Start: 2022-01-12 | End: 2022-01-11 | Stop reason: HOSPADM

## 2022-01-11 RX ADMIN — GABAPENTIN 300 MG: 300 CAPSULE ORAL at 15:13

## 2022-01-11 RX ADMIN — INSULIN LISPRO 2 UNITS: 100 INJECTION, SOLUTION INTRAVENOUS; SUBCUTANEOUS at 08:30

## 2022-01-11 RX ADMIN — BUDESONIDE AND FORMOTEROL FUMARATE DIHYDRATE 2 PUFF: 160; 4.5 AEROSOL RESPIRATORY (INHALATION) at 07:06

## 2022-01-11 RX ADMIN — SODIUM CHLORIDE, PRESERVATIVE FREE 10 ML: 5 INJECTION INTRAVENOUS at 08:29

## 2022-01-11 RX ADMIN — DEXAMETHASONE 2 MG: 4 TABLET ORAL at 08:28

## 2022-01-11 RX ADMIN — SODIUM BICARBONATE 650 MG TABLET 650 MG: at 15:13

## 2022-01-11 RX ADMIN — ZINC SULFATE 220 MG (50 MG) CAPSULE 220 MG: CAPSULE at 08:28

## 2022-01-11 RX ADMIN — PANTOPRAZOLE SODIUM 40 MG: 40 TABLET, DELAYED RELEASE ORAL at 06:47

## 2022-01-11 RX ADMIN — HYDRALAZINE HYDROCHLORIDE 50 MG: 25 TABLET, FILM COATED ORAL at 08:28

## 2022-01-11 RX ADMIN — CEFDINIR 300 MG: 300 CAPSULE ORAL at 10:15

## 2022-01-11 RX ADMIN — GABAPENTIN 300 MG: 300 CAPSULE ORAL at 06:47

## 2022-01-11 RX ADMIN — INSULIN LISPRO 2 UNITS: 100 INJECTION, SOLUTION INTRAVENOUS; SUBCUTANEOUS at 12:44

## 2022-01-11 RX ADMIN — SODIUM BICARBONATE 650 MG TABLET 650 MG: at 08:30

## 2022-01-11 RX ADMIN — MAGNESIUM GLUCONATE 500 MG ORAL TABLET 400 MG: 500 TABLET ORAL at 08:30

## 2022-01-11 NOTE — PROGRESS NOTES
"Pharmacy dosing service  Anticoagulant  Warfarin     Subjective:    Michael Dorantes is a 71 y.o.male being continued on warfarin for  Hx of DVT .    INR Goal: 2 - 3  Home medication?: Yes, warfarin 5 mg PO every day at 1800  Bridge Therapy Present?:  No - Lovenox bridge d/c'd 1/11.   Interacting Medications Evaluation (New/Present/Discontinued): Dexamethasone (may increase INR)  Additional Contributing Factors:       Assessment/Plan:    Pt has been holding warfarin for the past several days PTA due to multiple teeth extractions 1/3. Warfarin resumed 1/6. INR increased slightly more than anticipated therefore will give slightly lower dose of 3mg tonight. INR now >2 therefore will discontinue Lovenox.     Continue to monitor and adjust based on INR.         Date 1/6 1/7 1/8 1/9 1/10 1/11      INR 1.23 1.23 1.43 1.81 1.86 2.32      Dose 5mg 7 mg 6 mg  5 mg  5 mg 3 mg          Objective:  [Ht: 182.9 cm (72\"); Wt: 90 kg (198 lb 6.6 oz); BMI: Body mass index is 26.91 kg/m².]    Lab Results   Component Value Date    ALBUMIN 2.80 (L) 01/11/2022     Lab Results   Component Value Date    INR 2.32 01/11/2022    INR 1.86 (L) 01/10/2022    INR 1.81 (L) 01/09/2022    PROTIME 24.2 01/11/2022    PROTIME 19.7 01/10/2022    PROTIME 19.2 (L) 01/09/2022     Lab Results   Component Value Date    HGB 9.8 (L) 01/11/2022    HGB 9.4 (L) 01/10/2022    HGB 9.1 (L) 01/09/2022     Lab Results   Component Value Date    HCT 28.7 (L) 01/11/2022    HCT 27.3 (L) 01/10/2022    HCT 26.4 (L) 01/09/2022       Fabi Carey PharmD  01/11/22 08:32 EST               "

## 2022-01-11 NOTE — PLAN OF CARE
Goal Outcome Evaluation:  Patient is alert and oriented, able to make needs known to staff through writing. Patient has no complaints of pain noted at this time. Will continue to monitor.

## 2022-01-11 NOTE — PROGRESS NOTES
Exercise Oximetry    Patient Name:Michael Dorantes   MRN: [id number]   Date: 01/11/22             ROOM AIR BASELINE   SpO2%                      95   Heart Rate                 88    Blood Pressure      EXERCISE ON ROOM AIR SpO2% EXERCISE ON O2 @  LPM SpO2%   1 MINUTE 95 1 MINUTE    2 MINUTES 94 2 MINUTES    3 MINUTES 93 3 MINUTES    4 MINUTES 94 4 MINUTES    5 MINUTES 94 5 MINUTES    6 MINUTES 93 6 MINUTES               Distance Walked                       40 ft Distance Walked   Dyspnea (Nolan Scale)   Dyspnea (Nolan Scale)   Fatigue (Nolan Scale)   Fatigue ([place]aln Scale)   SpO2% Post Exercise               96 % SpO2% Post Exercise   HR Post Exercise                       87  HR Post Exercise   Time to Recovery                        0  Time to Recovery     Comments:The Pt does NOT require O2 at this time at rest or with exertion.

## 2022-01-11 NOTE — PROGRESS NOTES
"NEPHROLOGY PROGRESS NOTE------[facility]    [facility]  [phone]  Saeed Briscoe MD      Patient Care Team:  Joey Islas MD as PCP - General (Family Medicine)  Neville Laughlin MD as Consulting Physician (Nephrology)      Provider:  Saeed Briscoe MD  Patient Name: Michael Dorantes  :  1950    SUBJECTIVE:    Follow-up for LEO/CKD    Feeling/breathing fine. No angina, dysuria, palpitations. Appetite okay.     Medication:  budesonide-formoterol, 2 puff, Inhalation, BID - RT  cefdinir, 300 mg, Oral, Q12H  dexamethasone, 2 mg, Oral, Daily With Breakfast  gabapentin, 300 mg, Oral, Q8H  hydrALAZINE, 50 mg, Oral, BID  insulin lispro, 0-9 Units, Subcutaneous, TID AC  magnesium oxide, 400 mg, Oral, Daily  pantoprazole, 40 mg, Oral, Q AM  sodium bicarbonate, 650 mg, Oral, TID  sodium chloride, 10 mL, Intravenous, Q12H  warfarin, 3 mg, Oral, Once  [START ON 2022] warfarin, 5 mg, Oral, Daily  zinc sulfate, 220 mg, Oral, Daily      Pharmacy Consult - Remdesivir,   Pharmacy to dose warfarin,         OBJECTIVE    Vital Sign Min/Max for last 24 hours  Temp  Min: 97.3 °F (36.3 °C)  Max: 98.5 °F (36.9 °C)   BP  Min: 126/74  Max: 175/80   Pulse  Min: 62  Max: 82   Resp  Min: 13  Max: 18   SpO2  Min: 92 %  Max: 99 %   No data recorded   No data recorded     Flowsheet Rows      First Filed Value   Admission Height 182.9 cm (72\") Documented at 2022   Admission Weight 90 kg (198 lb 6.6 oz) Documented at 2022          No intake/output data recorded.  I/O last 3 completed shifts:  In: 1180 [P.O.:1180]  Out: 1650 [Urine:1650]    Physical Exam:  General Appearance: alert, appears stated age and cooperative  Head: normocephalic, without obvious abnormality and atraumatic  Eyes: conjunctivae and sclerae normal and no icterus  Neck: supple and no JVD  Lungs:  Clear to auscultation bilaterally  Heart: regular rhythm & normal rate and " normal S1, S2 +CHAYO  Chest Wall: no abnormalities observed  Abdomen: normal bowel sounds and soft non-tender +UROSTOMY  Extremities: moves extremities well, no edema, no cyanosis  Skin: no bleeding, bruising or rash  Neurologic: Alert, and oriented. No focal deficits +[place]    Labs:    WBC WBC   Date Value Ref Range Status   01/11/2022 6.10 3.40 - 10.80 10*3/mm3 Final   01/10/2022 4.80 3.40 - 10.80 10*3/mm3 Final   01/09/2022 5.80 3.40 - 10.80 10*3/mm3 Final      HGB Hemoglobin   Date Value Ref Range Status   01/11/2022 9.8 (L) 13.0 - 17.7 g/dL Final   01/10/2022 9.4 (L) 13.0 - 17.7 g/dL Final   01/09/2022 9.1 (L) 13.0 - 17.7 g/dL Final      HCT Hematocrit   Date Value Ref Range Status   01/11/2022 28.7 (L) 37.5 - 51.0 % Final   01/10/2022 27.3 (L) 37.5 - 51.0 % Final   01/09/2022 26.4 (L) 37.5 - 51.0 % Final      Platlets No results found for: LABPLAT   MCV MCV   Date Value Ref Range Status   01/11/2022 85.4 79.0 - 97.0 fL Final   01/10/2022 86.2 79.0 - 97.0 fL Final   01/09/2022 86.5 79.0 - 97.0 fL Final          Sodium Sodium   Date Value Ref Range Status   01/11/2022 129 (L) 136 - 145 mmol/L Final   01/10/2022 134 (L) 136 - 145 mmol/L Final   01/09/2022 137 136 - 145 mmol/L Final      Potassium Potassium   Date Value Ref Range Status   01/11/2022 4.4 3.5 - 5.2 mmol/L Final   01/10/2022 4.7 3.5 - 5.2 mmol/L Final   01/09/2022 4.3 3.5 - 5.2 mmol/L Final      Chloride Chloride   Date Value Ref Range Status   01/11/2022 99 98 - 107 mmol/L Final   01/10/2022 101 98 - 107 mmol/L Final   01/09/2022 104 98 - 107 mmol/L Final      CO2 CO2   Date Value Ref Range Status   01/11/2022 24.0 22.0 - 29.0 mmol/L Final   01/10/2022 23.0 22.0 - 29.0 mmol/L Final   01/09/2022 24.0 22.0 - 29.0 mmol/L Final      BUN BUN   Date Value Ref Range Status   01/11/2022 39 (H) 8 - 23 mg/dL Final   01/10/2022 41 (H) 8 - 23 mg/dL Final   01/09/2022 33 (H) 8 - 23 mg/dL Final      Creatinine Creatinine   Date Value Ref Range Status   01/11/2022  1.77 (H) 0.76 - 1.27 mg/dL Final   01/10/2022 1.85 (H) 0.76 - 1.27 mg/dL Final   01/09/2022 1.73 (H) 0.76 - 1.27 mg/dL Final      Calcium Calcium   Date Value Ref Range Status   01/11/2022 7.6 (L) 8.6 - 10.5 mg/dL Final   01/10/2022 7.5 (L) 8.6 - 10.5 mg/dL Final   01/09/2022 7.4 (L) 8.6 - 10.5 mg/dL Final      PO4 No components found for: PO4   Albumin Albumin   Date Value Ref Range Status   01/11/2022 2.80 (L) 3.50 - 5.20 g/dL Final   01/10/2022 2.90 (L) 3.50 - 5.20 g/dL Final   01/09/2022 2.70 (L) 3.50 - 5.20 g/dL Final      Magnesium Magnesium   Date Value Ref Range Status   01/10/2022 2.0 1.6 - 2.4 mg/dL Final   01/09/2022 1.8 1.6 - 2.4 mg/dL Final      Uric Acid No components found for: URIC ACID     Imaging Results (Last 72 Hours)     ** No results found for the last 72 hours. **          Results for orders placed during the hospital encounter of 01/05/22    XR Chest 1 View    Narrative  XR CHEST 1 VW    Date of Exam: 1/5/2022 23:38 EST    Indication: dyspnea.  Covid positive.    Comparison Exams: None available.    Technique: Portable AP chest    FINDINGS:  Ill-defined infiltrates are seen throughout the central to peripheral aspect of the lungs bilaterally with mild interstitial changes. No pleural effusions are observed. The cardiac silhouette is within normal limits for size for the portable study. The  mediastinum is unremarkable. No acute osseous abnormalities are identified.    Impression  Ill-defined infiltrates are noted bilaterally with mild interstitial changes. The findings suggest atypical/viral infection or multifocal pneumonia and suggest changes of Covid 19 infection. Recommend follow-up to ensure improvement/resolution.    Electronically Signed: Bran Colin MD 1/6/2022 0:03 EST      Results for orders placed in visit on 12/13/18    SCANNED - IMAGING      SCANNED - IMAGING      Results for orders placed during the hospital encounter of 10/17/19    Duplex Venous Lower Extremity -  Left    Interpretation Summary  · Acute left lower extremity deep vein thrombosis noted in the common femoral, proximal femoral, mid femoral, distal femoral and popliteal.        ASSESSMENT / PLAN      Pneumonia due to COVID-19 virus    Diabetes mellitus with neuropathy (HCC)    History of DVT (deep vein thrombosis)    LEO (acute kidney injury) (HCC)    COVID-19    Hypoxia    Cytokine release syndrome, grade 2    1. ARF/LEO/CRF/CKD STG 3B----. ARF/LEO resolved. +ARF/LEO on top of known CRF/CKD STG 3B with a baseline serum creatinine of about 1.8. CRF/CKD STG 3B is secondary to DGS/HTN NS. +ARF/LEO was secondary to prerenal state/intravascular volume depletion. Follow off of IVFs.   No NSAIDs or IV dye. Lytes, acid-base okay. No uremia     2. DMII WITH RENAL MANIFESTATIONS-----BS okay. Glucometers, SSI. Follow with steroid exposure     3. HTN WITH CKD-----BP okay. Avoid hypotension. No ACE-I/ARB/DRI/diuretic for now     4. MILD HYPOALBUMINEMIA-----Encouraged increased po protein intake     5. H/O BLADDER/PROSTATE CA WITH UROSTOMY     6. ANEMIA OF CKD--------H/H in safe range. Follow for EPO need.      7. DM PERIPHERAL NEUROPATHY------On Neurontin     8. GERD/PUD PROPHYLAXIS------On PPI. Benefits outweigh risks, despite renal dysfunction, given steroid exposure and h/o use and tolerating PPI     9. TYPE 4 RTA------Stable bicarbonate levels on current po NaHCO3 dose     10. DVT-----Anticoagulated     11. COVID 19 + PNA-------Okay for Remdesivir despite renal dysfunction. On Steroids and pulmonary toilet also per PMD     12. HEARING IMPAIRED    13. HYPOPHOSPHATEMIA------Replaced    14. HYPOMAGNESEMIA------Replaced    Creatinine stable, stop IV fluids  Continue supportive care  No objection to discharge with outpatient follow-up      Saeed Briscoe MD  [facility] [place]  [phone]  01/11/22  11:15 EST

## 2022-01-11 NOTE — DISCHARGE SUMMARY
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]   History of DVT (deep vein thrombosis) [Z86.718]   LEO (acute kidney injury) (HCC) [N17.9]   COVID-19 [U07.1]   Urinary tract infection    Presenting Problem/History of Present Illness  Active Hospital Problems    Diagnosis  POA   • **Pneumonia due to COVID-19 virus [U07.1, J12.82]  Yes   • Hypoxia [R09.02]  Yes   • Cytokine release syndrome, grade 2 [D89.832]  No   • Diabetes mellitus with 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          Hospital Course  Patient is a 71 y.o. male with history of bladder/prostate cancer (urostomy), chronic kidney disease, diabetes and previous DVT who presented with cough and shortness of breath.  He noted home oxygen saturations of 82%.  He had COVID pneumonia.  He was treated with remdesivir, steroids, anticoagulation, bronchodilators, Mucinex, zinc.  He did require supplemental oxygen at 2 L/min.  By the time he completed his remdesivir course he was able to be weaned to room air.  A 6-minute walk to determine if he needs home oxygen with exertion is pending at the time of this dictation.  He is going home to complete a short course of cefdinir for a Proteus urinary tract infection.  He will complete a short course of oral dexamethasone.  He will continue on Symbicort and albuterol.  He will resume his home medications for hypertension and history of DVT.  He will have an INR checked next week.  He will schedule follow-up appointment within a week with Dr. Islas.  All of this was explained to patient in writing and sister in law via video chat.    Procedures  Performed         Consults:   Consults     Date and Time Order Name Status Description    1/6/2022  7:36 AM Inpatient Nephrology Consult Completed     1/6/2022 12:28 AM Family Medicine Consult            Pertinent Test Results:    Lab Results (most recent)     Procedure Component Value Units Date/Time    Blood Culture - Blood, Arm, Right [[id number]]  (Normal) Collected: 01/06/22 0729    Specimen: Blood from Arm, Right Updated: 01/11/22 0745     Blood Culture No growth at 5 days    Blood Culture - Blood, Arm, Left [[id number]]  (Normal) Collected: 01/06/22 0735    Specimen: Blood from Arm, Left Updated: 01/11/22 0745     Blood Culture No growth at 5 days    POC Glucose Once [[id number]]  (Abnormal) Collected: 01/11/22 0737    Specimen: Blood Updated: 01/11/22 0737     Glucose 159 mg/dL      Comment: Serial Number: [id number]:  [id number]       Basic Metabolic Panel [[id number]]  (Abnormal) Collected: 01/11/22 0531    Specimen: Blood Updated: 01/11/22 0617     Glucose 186 mg/dL      BUN 39 mg/dL      Creatinine 1.77 mg/dL      Sodium 129 mmol/L      Potassium 4.4 mmol/L      Chloride 99 mmol/L      CO2 24.0 mmol/L      Calcium 7.6 mg/dL      eGFR Non African Amer 38 mL/min/1.73      BUN/Creatinine Ratio 22.0     Anion Gap 6.0 mmol/L     Narrative:      GFR Normal >60  Chronic Kidney Disease <60  Kidney Failure <15      Hepatic Function Panel [[id number]]  (Abnormal) Collected: 01/11/22 0531    Specimen: Blood Updated: 01/11/22 0617     Total Protein 5.3 g/dL      Albumin 2.80 g/dL      ALT (SGPT) 53 U/L      AST (SGOT) 30 U/L      Alkaline Phosphatase 45 U/L      Total Bilirubin 0.2 mg/dL      Bilirubin, Direct <0.2 mg/dL      Bilirubin, Indirect --     Comment: Unable to calculate       Protime-INR [[id number]]  (Normal) Collected: 01/11/22 0531    Specimen: Blood Updated: 01/11/22 0616     Protime 24.2 Seconds      INR 2.32    CBC & Differential [[id number]]  (Abnormal) Collected: 01/11/22 0531    Specimen: Blood  Updated: 01/11/22 0553    Narrative:      The following orders were created for panel order CBC & Differential.  Procedure                               Abnormality         Status                     ---------                               -----------         ------                     CBC Auto Differential[[id number]]        Abnormal            Final result                 Please view results for these tests on the individual orders.    CBC Auto Differential [[id number]]  (Abnormal) Collected: 01/11/22 0531    Specimen: Blood Updated: 01/11/22 0553     WBC 6.10 10*3/mm3      RBC 3.36 10*6/mm3      Hemoglobin 9.8 g/dL      Hematocrit 28.7 %      MCV 85.4 fL      MCH 29.1 pg      MCHC 34.1 g/dL      RDW 12.8 %      RDW-SD 39.4 fl      MPV 6.9 fL      Platelets 246 10*3/mm3      Neutrophil % 82.5 %      Lymphocyte % 8.2 %      Monocyte % 9.2 %      Eosinophil % 0.0 %      Basophil % 0.1 %      Neutrophils, Absolute 5.00 10*3/mm3      Lymphocytes, Absolute 0.50 10*3/mm3      Monocytes, Absolute 0.60 10*3/mm3      Eosinophils, Absolute 0.00 10*3/mm3      Basophils, Absolute 0.00 10*3/mm3      nRBC 0.1 /100 WBC     POC Glucose Once [[id number]]  (Abnormal) Collected: 01/10/22 2005    Specimen: Blood Updated: 01/10/22 2006     Glucose 250 mg/dL      Comment: Serial Number: [id number]:  [id number]       Urine Culture - Urine, Urine, Random Void [[id number]]  (Abnormal)  (Susceptibility) Collected: 01/08/22 1733    Specimen: Urine, Random Void Updated: 01/10/22 0955     Urine Culture >100,000 CFU/mL Proteus hauseri    Susceptibility      Proteus hauseri     JUNE     Ampicillin Resistant     Ampicillin + Sulbactam Susceptible     Cefazolin Resistant     Cefepime Susceptible     Ceftazidime Susceptible     Ceftriaxone Susceptible     Gentamicin Susceptible     Levofloxacin Susceptible     Nitrofurantoin Resistant     Piperacillin + Tazobactam Susceptible     Trimethoprim + Sulfamethoxazole Susceptible                  Linear  View                   Phosphorus [[id number]]  (Normal) Collected: 01/10/22 0039    Specimen: Blood Updated: 01/10/22 0150     Phosphorus 3.0 mg/dL     Basic Metabolic Panel [[id number]]  (Abnormal) Collected: 01/10/22 0039    Specimen: Blood Updated: 01/10/22 0149     Glucose 240 mg/dL      BUN 41 mg/dL      Creatinine 1.85 mg/dL      Sodium 134 mmol/L      Potassium 4.7 mmol/L      Chloride 101 mmol/L      CO2 23.0 mmol/L      Calcium 7.5 mg/dL      eGFR Non African Amer 36 mL/min/1.73      BUN/Creatinine Ratio 22.2     Anion Gap 10.0 mmol/L     Narrative:      GFR Normal >60  Chronic Kidney Disease <60  Kidney Failure <15      Hepatic Function Panel [[id number]]  (Abnormal) Collected: 01/10/22 0039    Specimen: Blood Updated: 01/10/22 0149     Total Protein 5.4 g/dL      Albumin 2.90 g/dL      ALT (SGPT) 59 U/L      AST (SGOT) 44 U/L      Alkaline Phosphatase 47 U/L      Total Bilirubin 0.2 mg/dL      Bilirubin, Direct <0.2 mg/dL      Bilirubin, Indirect --     Comment: Unable to calculate       Magnesium [[id number]]  (Normal) Collected: 01/10/22 0039    Specimen: Blood Updated: 01/10/22 0149     Magnesium 2.0 mg/dL     Protime-INR [[id number]]  (Abnormal) Collected: 01/10/22 0039    Specimen: Blood Updated: 01/10/22 0132     Protime 19.7 Seconds      INR 1.86    CBC & Differential [[id number]]  (Abnormal) Collected: 01/10/22 0039    Specimen: Blood Updated: 01/10/22 0119    Narrative:      The following orders were created for panel order CBC & Differential.  Procedure                               Abnormality         Status                     ---------                               -----------         ------                     CBC Auto Differential[[id number]]        Abnormal            Final result                 Please view results for these tests on the individual orders.    CBC Auto Differential [[id number]]  (Abnormal) Collected: 01/10/22 0039    Specimen: Blood Updated: 01/10/22 0119     WBC 4.80 10*3/mm3       RBC 3.16 10*6/mm3      Hemoglobin 9.4 g/dL      Hematocrit 27.3 %      MCV 86.2 fL      MCH 29.8 pg      MCHC 34.6 g/dL      RDW 13.1 %      RDW-SD 41.1 fl      MPV 7.2 fL      Platelets 186 10*3/mm3      Neutrophil % 85.9 %      Lymphocyte % 6.5 %      Monocyte % 7.5 %      Eosinophil % 0.1 %      Basophil % 0.0 %      Neutrophils, Absolute 4.10 10*3/mm3      Lymphocytes, Absolute 0.30 10*3/mm3      Monocytes, Absolute 0.40 10*3/mm3      Eosinophils, Absolute 0.00 10*3/mm3      Basophils, Absolute 0.00 10*3/mm3      nRBC 0.1 /100 WBC     Phosphorus [[id number]]  (Abnormal) Collected: 01/09/22 0408    Specimen: Blood Updated: 01/09/22 0500     Phosphorus 2.4 mg/dL     Magnesium [[id number]]  (Normal) Collected: 01/09/22 0408    Specimen: Blood Updated: 01/09/22 0500     Magnesium 1.8 mg/dL     Urinalysis, Microscopic Only - Urine, Random Void [[id number]]  (Abnormal) Collected: 01/08/22 1733    Specimen: Urine, Random Void Updated: 01/08/22 1824     RBC, UA 31-50 /HPF      WBC, UA 13-20 /HPF      Bacteria, UA 4+ /HPF      Squamous Epithelial Cells, UA 3-6 /HPF      Hyaline Casts, UA None Seen /LPF      Triple Phosphate Crystals, UA Small/1+ /HPF      Methodology Manual Light Microscopy    Urinalysis With Culture If Indicated - Urine, Random Void [[id number]]  (Abnormal) Collected: 01/08/22 1733    Specimen: Urine, Random Void Updated: 01/08/22 1812     Color, UA Yellow     Appearance, UA Cloudy     Comment: Result checked         pH, UA 7.5     Specific Gravity, UA 1.020     Glucose,  mg/dL (2+)     Ketones, UA Negative     Bilirubin, UA Negative     Blood, UA Large (3+)     Protein,  mg/dL (2+)     Leuk Esterase, UA Moderate (2+)     Nitrite, UA Negative     Urobilinogen, UA 1.0 E.U./dL    Comprehensive Metabolic Panel [[id number]]  (Abnormal) Collected: 01/07/22 0132    Specimen: Blood Updated: 01/07/22 0237     Glucose 298 mg/dL      BUN 35 mg/dL      Creatinine 2.03 mg/dL      Sodium 134 mmol/L       Potassium 4.6 mmol/L      Chloride 101 mmol/L      CO2 22.0 mmol/L      Calcium 7.9 mg/dL      Total Protein 5.4 g/dL      Albumin 2.90 g/dL      ALT (SGPT) 9 U/L      AST (SGOT) 14 U/L      Alkaline Phosphatase 42 U/L      Total Bilirubin 0.2 mg/dL      eGFR Non African Amer 33 mL/min/1.73      Globulin 2.5 gm/dL      A/G Ratio 1.2 g/dL      BUN/Creatinine Ratio 17.2     Anion Gap 11.0 mmol/L     Narrative:      GFR Normal >60  Chronic Kidney Disease <60  Kidney Failure <15      Bilirubin, Direct [[id number]]  (Normal) Collected: 01/07/22 0132    Specimen: Blood Updated: 01/07/22 0237     Bilirubin, Direct <0.2 mg/dL     D-dimer, Quantitative [[id number]]  (Abnormal) Collected: 01/07/22 0132    Specimen: Blood Updated: 01/07/22 0231     D-Dimer, Quantitative 0.62 mg/L (FEU)     Narrative:      Reference Range  --------------------------------------------------------------------     < 0.50   Negative Predictive Value  0.50-0.59   Indeterminate    >= 0.60   Probable VTE             A very low percentage of patients with DVT may yield D-Dimer results   below the cut-off of 0.50 mg/L FEU.  This is known to be more   prevalent in patients with distal DVT.             Results of this test should always be interpreted in conjunction with   the patient's medical history, clinical presentation and other   findings.  Clinical diagnosis should not be based on the result of   INNOVANCE D-Dimer alone.    Creatinine, Serum [[id number]]  (Abnormal) Collected: 01/06/22 1400    Specimen: Blood Updated: 01/06/22 1517     Creatinine 1.98 mg/dL      eGFR Non African Amer 33 mL/min/1.73     Narrative:      GFR Normal >60  Chronic Kidney Disease <60  Kidney Failure <15      TSH [[id number]]  (Normal) Collected: 01/06/22 0729    Specimen: Blood Updated: 01/06/22 1231     TSH 2.950 uIU/mL     Hemoglobin A1c [[id number]]  (Abnormal) Collected: 01/06/22 0729    Specimen: Blood Updated: 01/06/22 1040     Hemoglobin A1C 6.0 %     Narrative:       Hemoglobin A1C Reference Range:    <5.7 %        Normal  5.7-6.4 %     Increased risk for diabetes  > 6.4 %        Diabetes       These guidelines have been recommended by the American Diabetic Association for Hgb A1c.      The following 2010 guidelines have been recommended by the American Diabetes Association for Hemoglobin A1c.    HBA1c 5.7-6.4% Increased risk for future diabetes (pre-diabetes)  HBA1c     >6.4% Diabetes      Extra Tubes [[id number]] Collected: 01/06/22 0739    Specimen: Blood, Venous Line Updated: 01/06/22 0846    Narrative:      The following orders were created for panel order Extra Tubes.  Procedure                               Abnormality         Status                     ---------                               -----------         ------                     Gold Top - SST[[id number]]                                   Final result                 Please view results for these tests on the individual orders.    Gold Top - SST [[id number]] Collected: 01/06/22 0739    Specimen: Blood Updated: 01/06/22 0846     Extra Tube Hold for add-ons.     Comment: Auto resulted.       Ferritin [[id number]]  (Normal) Collected: 01/06/22 0729    Specimen: Blood Updated: 01/06/22 0827     Ferritin 148.50 ng/mL     Narrative:      Results may be falsely decreased if patient taking Biotin.      Comprehensive Metabolic Panel [[id number]]  (Abnormal) Collected: 01/06/22 0729    Specimen: Blood Updated: 01/06/22 0824     Glucose 111 mg/dL      BUN 35 mg/dL      Creatinine 2.22 mg/dL      Sodium 136 mmol/L      Potassium 4.2 mmol/L      Chloride 101 mmol/L      CO2 25.0 mmol/L      Calcium 8.2 mg/dL      Total Protein 5.9 g/dL      Albumin 3.20 g/dL      ALT (SGPT) 10 U/L      AST (SGOT) 16 U/L      Alkaline Phosphatase 46 U/L      Total Bilirubin 0.2 mg/dL      eGFR Non African Amer 29 mL/min/1.73      Globulin 2.7 gm/dL      A/G Ratio 1.2 g/dL      BUN/Creatinine Ratio 15.8     Anion Gap 10.0 mmol/L     Narrative:       GFR Normal >60  Chronic Kidney Disease <60  Kidney Failure <15      [place] Draw [[id number]] Collected: 01/05/22 2319    Specimen: Blood from Arm, Left Updated: 01/06/22 0030    Narrative:      The following orders were created for panel order [place] Draw.  Procedure                               Abnormality         Status                     ---------                               -----------         ------                     Green Top (Gel)[[id number]]                                  Final result               Lavender Top[[id number]]                                     Final result               Gold Top - SST[[id number]]                                   Final result               Light Blue Top[[id number]]                                   Final result                 Please view results for these tests on the individual orders.    Light Blue Top [[id number]] Collected: 01/05/22 2319    Specimen: Blood from Arm, Left Updated: 01/06/22 0030     Extra Tube hold for add-on     Comment: Auto resulted       Lavender Top [[id number]] Collected: 01/05/22 2319    Specimen: Blood from Arm, Left Updated: 01/06/22 0030     Extra Tube hold for add-on     Comment: Auto resulted       Gold Top - SST [[id number]] Collected: 01/05/22 2319    Specimen: Blood from Arm, Left Updated: 01/06/22 0030     Extra Tube Hold for add-ons.     Comment: Auto resulted.       Green Top (Gel) [[id number]] Collected: 01/05/22 2319    Specimen: Blood from Arm, Left Updated: 01/06/22 0009     Extra Tube --    D-dimer, Quantitative [[id number]]  (Normal) Collected: 01/05/22 2319    Specimen: Blood from Arm, Left Updated: 01/05/22 2359     D-Dimer, Quantitative 0.50 mg/L (FEU)     Narrative:      Reference Range  --------------------------------------------------------------------     < 0.50   Negative Predictive Value  0.50-0.59   Indeterminate    >= 0.60   Probable VTE             A very low percentage of patients with DVT may yield D-Dimer results    below the cut-off of 0.50 mg/L FEU.  This is known to be more   prevalent in patients with distal DVT.             Results of this test should always be interpreted in conjunction with   the patient's medical history, clinical presentation and other   findings.  Clinical diagnosis should not be based on the result of   INNOVANCE D-Dimer alone.    Troponin [[id number]]  (Normal) Collected: 01/05/22 2319    Specimen: Blood from Arm, Left Updated: 01/05/22 2352     Troponin T <0.010 ng/mL     Narrative:      Troponin T Reference Range:  <= 0.03 ng/mL-   Negative for AMI  >0.03 ng/mL-     Abnormal for myocardial necrosis.  Clinicians would have to utilize clinical acumen, EKG, Troponin and serial changes to determine if it is an Acute Myocardial Infarction or myocardial injury due to an underlying chronic condition.       Results may be falsely decreased if patient taking Biotin.      BNP [[id number]]  (Normal) Collected: 01/05/22 2319    Specimen: Blood from Arm, Left Updated: 01/05/22 2350     proBNP 193.3 pg/mL     Narrative:      Among patients with dyspnea, NT-proBNP is highly sensitive for the detection of acute congestive heart failure. In addition NT-proBNP of <300 pg/ml effectively rules out acute congestive heart failure with 99% negative predictive value.    Results may be falsely decreased if patient taking Biotin.      COVID-19,CEPHEID/KALEN,COR/TIERA/PAD/JOYCE IN-HOUSE(OR EMERGENT/ADD-ON),NP SWAB IN TRANSPORT MEDIA 3-4 HR TAT, RT-PCR - Swab, Nasopharynx [[id number]]  (Abnormal) Collected: 01/05/22 2240    Specimen: Swab from Nasopharynx Updated: 01/05/22 2331     COVID19 Detected    Narrative:      Fact sheet for providers: https://www.fda.gov/media/233356/download     Fact sheet for patients: https://www.fda.gov/media/020394/download  Fact sheet for providers: https://www.fda.gov/media/615697/download     Fact sheet for patients: https://www.fda.gov/media/162228/download                       Condition on  Discharge:  Stable    Vital Signs  Temp:  [97.3 °F (36.3 °C)-98.5 °F (36.9 °C)] 97.3 °F (36.3 °C)  Heart Rate:  [62-82] 64  Resp:  [13-18] 18  BP: (126-175)/(71-84) 175/80    Physical Exam:     General Appearance:    Alert, cooperative, in no acute distress   Head:    Normocephalic, without obvious abnormality, atraumatic   Eyes:            Lids and lashes normal, conjunctivae and sclerae normal, no   icterus, no pallor, corneas clear, PERRLA   Ears:    Ears appear intact with no abnormalities noted   Throat:   No oral lesions, no thrush, oral mucosa moist   Neck:   No adenopathy, supple, trachea midline, no thyromegaly, no   carotid bruit, no JVD   Lungs:    Few scattered crackles, improved over admission    Heart:    Regular rhythm and normal rate, normal S1 and S2, no            murmur, no gallop, no rub, no click   Chest Wall:    No abnormalities observed   Abdomen:     Normal bowel sounds, no masses, no organomegaly, soft        non-tender, non-distended, no guarding, no rebound                tenderness   Extremities:   Moves all extremities well, no edema, no cyanosis, no             redness   Pulses:   Pulses palpable and equal bilaterally   Skin:   No bleeding, bruising or rash   Lymph nodes:   No palpable adenopathy   Neurologic:   Cranial nerves 2 - 12 grossly intact, sensation intact, DTR       present and equal bilaterally       Discharge Disposition  Home or Self Care    Discharge Medications     Discharge Medications      New Medications      Instructions Start Date   acetaminophen 325 MG tablet  Commonly known as: TYLENOL   650 mg, Oral, Every 4 Hours PRN      budesonide-formoterol 160-4.5 MCG/ACT inhaler  Commonly known as: SYMBICORT   2 puffs, Inhalation, 2 Times Daily - RT      cefdinir 300 MG capsule  Commonly known as: OMNICEF   300 mg, Oral, Every 12 Hours Scheduled      dexamethasone 2 MG tablet  Commonly known as: DECADRON   2 mg, Oral, Daily With Breakfast   Start Date: January 12, 2022      magnesium oxide 400 tablet tablet  Commonly known as: MAG-OX   400 mg, Oral, Daily   Start Date: January 12, 2022     pantoprazole 40 MG EC tablet  Commonly known as: PROTONIX   40 mg, Oral, Every Early Morning   Start Date: January 12, 2022        Continue These Medications      Instructions Start Date   amLODIPine 10 MG tablet  Commonly known as: NORVASC   10 mg, Oral, Nightly      gabapentin 300 MG capsule  Commonly known as: NEURONTIN   300 mg, Oral, 3 Times Daily      glimepiride 1 MG tablet  Commonly known as: AMARYL   1 mg, Oral, Every Morning Before Breakfast      hydrALAZINE 50 MG tablet  Commonly known as: APRESOLINE   50 mg, Oral, 2 Times Daily      HYDROcodone-acetaminophen 5-325 MG per tablet  Commonly known as: NORCO   1 tablet, Oral, Every 6 Hours PRN      ondansetron 4 MG tablet  Commonly known as: ZOFRAN   4 mg, Oral, Every 6 Hours PRN      sodium bicarbonate 650 MG tablet   650 mg, Oral, 2 Times Daily      warfarin 5 MG tablet  Commonly known as: COUMADIN   5 mg, Oral, Daily             Discharge Diet:     Activity at Discharge:     Follow-up Appointments  No future appointments.  Additional Instructions for the Follow-ups that You Need to Schedule     Discharge Follow-up with PCP   As directed       Currently Documented PCP:    Joey Islas MD    PCP Phone Number:    [phone]     Follow Up Details: one week               Test Results Pending at Discharge       Samanta Rogers MD  01/11/22  10:09 EST    Time: Discharge 35 min spent coordinating discharge with patient, family members, nurse, case management.

## 2022-01-12 ENCOUNTER — READMISSION MANAGEMENT (OUTPATIENT)
Dept: CALL CENTER | Facility: HOSPITAL | Age: 72
End: 2022-01-12

## 2022-01-12 NOTE — OUTREACH NOTE
Prep Survey      Responses   [facility] facility patient discharged from? [facility]   Is LACE score < 7 ? No   Emergency Room discharge w/ pulse ox? No   Eligibility Readm Mgmt   Discharge diagnosis Pneumonia due to COVID-19 virus   Does the patient have one of the following disease processes/diagnoses(primary or secondary)? COVID-19   Does the patient have Home health ordered? No   Is there a DME ordered? No   Prep survey completed? Yes          Anitha Vizcaino RN

## 2022-01-12 NOTE — CASE MANAGEMENT/SOCIAL WORK
Case Management Discharge Note      Final Note: No home O2 needs per 6 min walk.                                        Final Discharge Disposition Code: 01 - home or self-care

## 2022-01-12 NOTE — OUTREACH NOTE
COVID-19 Week 1 Survey      Responses   [facility], [place] [facility] by [facility] patient discharged from? [facility]   Does the patient have one of the following disease processes/diagnoses(primary or secondary)? COVID-19   COVID-19 underlying condition? None   Call Number Call 1   Week 1 Call successful? Yes   Call start time 1215   Call end time 1218   Discharge diagnosis Pneumonia due to COVID-19 virus   Person spoke with today (if not patient) and relationship Marilyn-sister in law   Does the patient have a primary care provider?  Yes   Does the patient have an appointment with their PCP or specialist within 7 days of discharge? No   What is preventing the patient from scheduling follow up appointments within 7 days of discharge? Haven't had time   Nursing Interventions Educated patient on importance of making appointment,  Advised patient to make appointment   Has the patient kept scheduled appointments due by today? N/A   Has home health visited the patient within 72 hours of discharge? N/A   Psychosocial issues? No   Did the patient receive a copy of their discharge instructions? Yes   Did the patient receive a copy of COVID-19 specific instructions? Yes   Nursing interventions Reviewed instructions with patient   What is the patient's perception of their health status since discharge? Same   Does the patient have any of the following symptoms? None   Nursing Interventions Nurse provided patient education   Pulse Ox monitoring Intermittent   Pulse Ox device source Patient   O2 Sat comments sister states spouse is checking sats frequently, yesterday 95-96% on RA   O2 Sat: education provided Sat levels,  Monitoring frequency   Is the patient/caregiver able to teach back steps to recovery at home? Set small, achievable goals for return to baseline health,  Rest and rebuild strength, gradually increase activity,  Eat a well-balance diet   If the patient is a current smoker, are they able to teach back resources for cessation? Not a smoker   Is  the patient/caregiver able to teach back the hierarchy of who to call/visit for symptoms/problems? PCP, Specialist, Home health nurse, Urgent Care, ED, 911 Yes   COVID-19 call completed? Yes          EPI KHANNA RN

## 2022-01-13 ENCOUNTER — READMISSION MANAGEMENT (OUTPATIENT)
Dept: CALL CENTER | Facility: HOSPITAL | Age: 72
End: 2022-01-13

## 2022-01-13 NOTE — OUTREACH NOTE
COVID-19 Week 1 Survey      Responses   [facility] patient discharged from? [facility]   Does the patient have one of the following disease processes/diagnoses(primary or secondary)? COVID-19   COVID-19 underlying condition? None   Call Number Call 2   Week 1 Call successful? Yes   Call start time 1345   Call end time 1401   Discharge diagnosis Pneumonia due to COVID-19 virus   Person spoke with today (if not patient) and relationship Marilyn-sister in law   Medication alerts for this patient Warfarin   Meds reviewed with patient/caregiver? Yes   Is the patient having any side effects they believe may be caused by any medication additions or changes? No   Does the patient have all medications ordered at discharge? Yes   Is the patient taking all medications as directed (includes completed medication regime)? Yes   Medication comments Family questioning adding Imodium--advised to call PCP    Does the patient have a primary care provider?  Yes   Comments regarding PCP 1/27/22 w/ [facility]   Does the patient have an appointment with their PCP or specialist within 7 days of discharge? Greater than 7 days   Nursing Interventions Verified appointment date/time/provider   Has the patient kept scheduled appointments due by today? N/A   Has home health visited the patient within 72 hours of discharge? N/A   Psychosocial issues? No   Comments Encouraged use of walker as pt experienced a fall in bathroom last night, slid down---family reports he did not receive any injuries.  Reviewed falls precautions as noted on Warfarin   Did the patient receive a copy of their discharge instructions? Yes   Did the patient receive a copy of COVID-19 specific instructions? Yes   Nursing interventions Reviewed instructions with patient   What is the patient's perception of their health status since discharge? Same  [Pt w/continued diarrhea unknown if r/t antibiotics are the fact that he had teeth pulled just prior to dx and his diet has been mostly  clears to full liquids--reintroducing solids and pushing fluids to prevent dehydration.  Pt is weak and fell last night]   Does the patient have any of the following symptoms? Fever/chills  [fever 101 during the night--afebrile today]   Nursing Interventions Nurse provided patient education   Pulse Ox monitoring Intermittent   Pulse Ox device source Patient   O2 Sat comments 91-95% on room air   O2 Sat: education provided Sat levels,  Monitoring frequency,  When to seek care   Is the patient/caregiver able to teach back steps to recovery at home? Set small, achievable goals for return to baseline health,  Rest and rebuild strength, gradually increase activity,  Eat a well-balance diet   If the patient is a current smoker, are they able to teach back resources for cessation? Not a smoker   Is the patient/caregiver able to teach back the hierarchy of who to call/visit for symptoms/problems? PCP, Specialist, Home health nurse, Urgent Care, ED, 911 Yes  [Provided number to 24/7 nurse call center line]   COVID-19 call completed? Yes   Wrap up additional comments Concerns as sister in  reports pt is very weak, had a fall last night, temperature to 101 and continues to have diarrhea.  Advised her to call PCP to provide an update as she also had questions regarding using Imodium.            Yamila Kirkpatrick RN

## 2022-01-14 ENCOUNTER — READMISSION MANAGEMENT (OUTPATIENT)
Dept: CALL CENTER | Facility: HOSPITAL | Age: 72
End: 2022-01-14

## 2022-01-14 NOTE — OUTREACH NOTE
COVID-19 Week 1 Survey      Responses   [facility] patient discharged from? [facility]   Does the patient have one of the following disease processes/diagnoses(primary or secondary)? COVID-19   COVID-19 underlying condition? None   Call Number Call 3   Week 1 Call successful? Yes   Call start time 1543   Call end time 1550   General alerts for this patient PATIENT IS DEAF   Discharge diagnosis Pneumonia due to COVID-19 virus   Is patient permission given to speak with other caregiver? Yes   Person spoke with today (if not patient) and relationship Marilyn-sister in law   Meds reviewed with patient/caregiver? Yes   Is the patient having any side effects they believe may be caused by any medication additions or changes? Yes   Side effects comments  HILTON IS HAVING SOME LOOSE STOOLS AND HIS SISTER IN LAW STATES THEY BELIEVE IT IS CAUSED BY HIS ANTIBIOTICS   Does the patient have all medications ordered at discharge? Yes   Is the patient taking all medications as directed (includes completed medication regime)? Yes   Does the patient have a primary care provider?  Yes   Comments regarding PCP PCP APPOINTMENT IS 1/27/22   Does the patient have an appointment with their PCP or specialist within 7 days of discharge? Greater than 7 days   What is preventing the patient from scheduling follow up appointments within 7 days of discharge? Earlier appointment not available   Nursing Interventions Verified appointment date/time/provider   Has the patient kept scheduled appointments due by today? N/A   Has home health visited the patient within 72 hours of discharge? N/A   Psychosocial issues? No   Did the patient receive a copy of their discharge instructions? Yes   Did the patient receive a copy of COVID-19 specific instructions? Yes   Nursing interventions Reviewed instructions with patient   What is the patient's perception of their health status since discharge? Improving   Does the patient have any of the following  symptoms? None   Nursing Interventions Nurse provided patient education   Pulse Ox monitoring Intermittent   Pulse Ox device source Patient   O2 Sat comments O2 SATS ARE STAYING IN THE 90S   O2 Sat: education provided Sat levels,  Monitoring frequency   Is the patient/caregiver able to teach back steps to recovery at home? Set small, achievable goals for return to baseline health,  Rest and rebuild strength, gradually increase activity,  Eat a well-balance diet,  Make a list of questions for provider's appointment,  Practice good oral hygiene   If the patient is a current smoker, are they able to teach back resources for cessation? Not a smoker   Is the patient/caregiver able to teach back the hierarchy of who to call/visit for symptoms/problems? PCP, Specialist, Home health nurse, Urgent Care, ED, 911 Yes   COVID-19 call completed? Yes          Katerina Puente LPN

## 2022-01-17 ENCOUNTER — HOSPITAL ENCOUNTER (OUTPATIENT)
Facility: HOSPITAL | Age: 72
Setting detail: OBSERVATION
Discharge: HOME | End: 2022-01-20
Attending: EMERGENCY MEDICINE | Admitting: INTERNAL MEDICINE

## 2022-01-17 DIAGNOSIS — K56.41 FECAL IMPACTION: ICD-10-CM

## 2022-01-17 DIAGNOSIS — N12 PYELONEPHRITIS: Primary | ICD-10-CM

## 2022-01-17 PROCEDURE — 99284 EMERGENCY DEPT VISIT MOD MDM: CPT

## 2022-01-18 ENCOUNTER — READMISSION MANAGEMENT (OUTPATIENT)
Dept: CALL CENTER | Facility: HOSPITAL | Age: 72
End: 2022-01-18

## 2022-01-18 ENCOUNTER — APPOINTMENT (OUTPATIENT)
Dept: CT IMAGING | Facility: HOSPITAL | Age: 72
End: 2022-01-18

## 2022-01-18 PROBLEM — N12 PYELONEPHRITIS: Status: ACTIVE | Noted: 2022-01-18

## 2022-01-18 LAB
ALBUMIN SERPL-MCNC: 3.4 G/DL (ref 3.5–5.2)
ALBUMIN/GLOB SERPL: 1.3 G/DL
ALP SERPL-CCNC: 46 U/L (ref 39–117)
ALT SERPL W P-5'-P-CCNC: 77 U/L (ref 1–41)
ANION GAP SERPL CALCULATED.3IONS-SCNC: 12 MMOL/L (ref 5–15)
AST SERPL-CCNC: 46 U/L (ref 1–40)
BACTERIA UR QL AUTO: ABNORMAL /HPF
BASOPHILS # BLD AUTO: 0 10*3/MM3 (ref 0–0.2)
BASOPHILS NFR BLD AUTO: 0.1 % (ref 0–1.5)
BILIRUB SERPL-MCNC: 0.3 MG/DL (ref 0–1.2)
BILIRUB UR QL STRIP: NEGATIVE
BUN SERPL-MCNC: 34 MG/DL (ref 8–23)
BUN/CREAT SERPL: 17.9 (ref 7–25)
CALCIUM SPEC-SCNC: 8.7 MG/DL (ref 8.6–10.5)
CHLORIDE SERPL-SCNC: 100 MMOL/L (ref 98–107)
CK SERPL-CCNC: 54 U/L (ref 20–200)
CLARITY UR: ABNORMAL
CO2 SERPL-SCNC: 23 MMOL/L (ref 22–29)
COLOR UR: ABNORMAL
CREAT SERPL-MCNC: 1.9 MG/DL (ref 0.76–1.27)
DEPRECATED RDW RBC AUTO: 40.7 FL (ref 37–54)
EOSINOPHIL # BLD AUTO: 0 10*3/MM3 (ref 0–0.4)
EOSINOPHIL NFR BLD AUTO: 0.1 % (ref 0.3–6.2)
ERYTHROCYTE [DISTWIDTH] IN BLOOD BY AUTOMATED COUNT: 13 % (ref 12.3–15.4)
GFR SERPL CREATININE-BSD FRML MDRD: 35 ML/MIN/1.73
GLOBULIN UR ELPH-MCNC: 2.7 GM/DL
GLUCOSE BLDC GLUCOMTR-MCNC: 62 MG/DL (ref 70–105)
GLUCOSE BLDC GLUCOMTR-MCNC: 75 MG/DL (ref 70–105)
GLUCOSE BLDC GLUCOMTR-MCNC: 93 MG/DL (ref 70–105)
GLUCOSE SERPL-MCNC: 191 MG/DL (ref 65–99)
GLUCOSE UR STRIP-MCNC: ABNORMAL MG/DL
HCT VFR BLD AUTO: 30.6 % (ref 37.5–51)
HGB BLD-MCNC: 10.3 G/DL (ref 13–17.7)
HGB UR QL STRIP.AUTO: ABNORMAL
HYALINE CASTS UR QL AUTO: ABNORMAL /LPF
INR PPP: 4.22 (ref 2–3)
KETONES UR QL STRIP: ABNORMAL
LEUKOCYTE ESTERASE UR QL STRIP.AUTO: ABNORMAL
LIPASE SERPL-CCNC: 28 U/L (ref 13–60)
LYMPHOCYTES # BLD AUTO: 0.3 10*3/MM3 (ref 0.7–3.1)
LYMPHOCYTES NFR BLD AUTO: 2.8 % (ref 19.6–45.3)
MCH RBC QN AUTO: 30.1 PG (ref 26.6–33)
MCHC RBC AUTO-ENTMCNC: 33.7 G/DL (ref 31.5–35.7)
MCV RBC AUTO: 89.3 FL (ref 79–97)
MONOCYTES # BLD AUTO: 0.6 10*3/MM3 (ref 0.1–0.9)
MONOCYTES NFR BLD AUTO: 5.2 % (ref 5–12)
NEUTROPHILS NFR BLD AUTO: 11.3 10*3/MM3 (ref 1.7–7)
NEUTROPHILS NFR BLD AUTO: 91.8 % (ref 42.7–76)
NITRITE UR QL STRIP: NEGATIVE
NRBC BLD AUTO-RTO: 0.3 /100 WBC (ref 0–0.2)
PH UR STRIP.AUTO: 7.5 [PH] (ref 5–8)
PLATELET # BLD AUTO: 291 10*3/MM3 (ref 140–450)
PMV BLD AUTO: 6.8 FL (ref 6–12)
POTASSIUM SERPL-SCNC: 5.1 MMOL/L (ref 3.5–5.2)
POTASSIUM SERPL-SCNC: 5.4 MMOL/L (ref 3.5–5.2)
PROT SERPL-MCNC: 6.1 G/DL (ref 6–8.5)
PROT UR QL STRIP: ABNORMAL
PROTHROMBIN TIME: 42.1 SECONDS (ref 19.4–28.5)
RBC # BLD AUTO: 3.43 10*6/MM3 (ref 4.14–5.8)
RBC # UR STRIP: ABNORMAL /HPF
REF LAB TEST METHOD: ABNORMAL
SODIUM SERPL-SCNC: 135 MMOL/L (ref 136–145)
SP GR UR STRIP: 1.01 (ref 1–1.03)
SQUAMOUS #/AREA URNS HPF: ABNORMAL /HPF
UROBILINOGEN UR QL STRIP: ABNORMAL
WBC # UR STRIP: ABNORMAL /HPF
WBC NRBC COR # BLD: 12.3 10*3/MM3 (ref 3.4–10.8)

## 2022-01-18 PROCEDURE — 83690 ASSAY OF LIPASE: CPT | Performed by: EMERGENCY MEDICINE

## 2022-01-18 PROCEDURE — 87086 URINE CULTURE/COLONY COUNT: CPT | Performed by: EMERGENCY MEDICINE

## 2022-01-18 PROCEDURE — G0378 HOSPITAL OBSERVATION PER HR: HCPCS

## 2022-01-18 PROCEDURE — 0 AMPICILLIN-SULBACTAM PER 1.5 G: Performed by: EMERGENCY MEDICINE

## 2022-01-18 PROCEDURE — 0 AMPICILLIN-SULBACTAM PER 1.5 G: Performed by: INTERNAL MEDICINE

## 2022-01-18 PROCEDURE — 82550 ASSAY OF CK (CPK): CPT | Performed by: EMERGENCY MEDICINE

## 2022-01-18 PROCEDURE — 85025 COMPLETE CBC W/AUTO DIFF WBC: CPT | Performed by: EMERGENCY MEDICINE

## 2022-01-18 PROCEDURE — 25010000002 ONDANSETRON PER 1 MG: Performed by: INTERNAL MEDICINE

## 2022-01-18 PROCEDURE — 74176 CT ABD & PELVIS W/O CONTRAST: CPT

## 2022-01-18 PROCEDURE — 0 MORPHINE SULFATE 4 MG/ML SOLUTION: Performed by: EMERGENCY MEDICINE

## 2022-01-18 PROCEDURE — 25010000002 ONDANSETRON PER 1 MG: Performed by: EMERGENCY MEDICINE

## 2022-01-18 PROCEDURE — 81001 URINALYSIS AUTO W/SCOPE: CPT | Performed by: EMERGENCY MEDICINE

## 2022-01-18 PROCEDURE — 84132 ASSAY OF SERUM POTASSIUM: CPT | Performed by: INTERNAL MEDICINE

## 2022-01-18 PROCEDURE — 85610 PROTHROMBIN TIME: CPT | Performed by: EMERGENCY MEDICINE

## 2022-01-18 PROCEDURE — 96365 THER/PROPH/DIAG IV INF INIT: CPT

## 2022-01-18 PROCEDURE — 96374 THER/PROPH/DIAG INJ IV PUSH: CPT

## 2022-01-18 PROCEDURE — 96375 TX/PRO/DX INJ NEW DRUG ADDON: CPT

## 2022-01-18 PROCEDURE — 82962 GLUCOSE BLOOD TEST: CPT

## 2022-01-18 PROCEDURE — 80053 COMPREHEN METABOLIC PANEL: CPT | Performed by: EMERGENCY MEDICINE

## 2022-01-18 RX ORDER — HYDROCORTISONE ACETATE 25 MG/1
25 SUPPOSITORY RECTAL 2 TIMES DAILY PRN
Status: DISCONTINUED | OUTPATIENT
Start: 2022-01-18 | End: 2022-01-20 | Stop reason: HOSPADM

## 2022-01-18 RX ORDER — AMLODIPINE BESYLATE 5 MG/1
10 TABLET ORAL NIGHTLY
Status: DISCONTINUED | OUTPATIENT
Start: 2022-01-18 | End: 2022-01-20 | Stop reason: HOSPADM

## 2022-01-18 RX ORDER — SODIUM CHLORIDE 0.9 % (FLUSH) 0.9 %
3-10 SYRINGE (ML) INJECTION AS NEEDED
Status: DISCONTINUED | OUTPATIENT
Start: 2022-01-18 | End: 2022-01-20 | Stop reason: HOSPADM

## 2022-01-18 RX ORDER — PANTOPRAZOLE SODIUM 40 MG/1
40 TABLET, DELAYED RELEASE ORAL
Status: DISCONTINUED | OUTPATIENT
Start: 2022-01-18 | End: 2022-01-20 | Stop reason: HOSPADM

## 2022-01-18 RX ORDER — ONDANSETRON 2 MG/ML
4 INJECTION INTRAMUSCULAR; INTRAVENOUS EVERY 6 HOURS PRN
Status: DISCONTINUED | OUTPATIENT
Start: 2022-01-18 | End: 2022-01-20 | Stop reason: HOSPADM

## 2022-01-18 RX ORDER — ONDANSETRON 2 MG/ML
4 INJECTION INTRAMUSCULAR; INTRAVENOUS ONCE
Status: COMPLETED | OUTPATIENT
Start: 2022-01-18 | End: 2022-01-18

## 2022-01-18 RX ORDER — MAGNESIUM CARB/ALUMINUM HYDROX 105-160MG
296 TABLET,CHEWABLE ORAL ONCE
Status: COMPLETED | OUTPATIENT
Start: 2022-01-18 | End: 2022-01-18

## 2022-01-18 RX ORDER — MORPHINE SULFATE 4 MG/ML
4 INJECTION, SOLUTION INTRAMUSCULAR; INTRAVENOUS ONCE
Status: COMPLETED | OUTPATIENT
Start: 2022-01-18 | End: 2022-01-18

## 2022-01-18 RX ORDER — GABAPENTIN 300 MG/1
300 CAPSULE ORAL 3 TIMES DAILY
Status: DISCONTINUED | OUTPATIENT
Start: 2022-01-18 | End: 2022-01-20 | Stop reason: HOSPADM

## 2022-01-18 RX ORDER — SODIUM BICARBONATE 650 MG/1
650 TABLET ORAL 2 TIMES DAILY
Status: DISCONTINUED | OUTPATIENT
Start: 2022-01-18 | End: 2022-01-20 | Stop reason: HOSPADM

## 2022-01-18 RX ORDER — ACETAMINOPHEN 325 MG/1
650 TABLET ORAL EVERY 4 HOURS PRN
Status: DISCONTINUED | OUTPATIENT
Start: 2022-01-18 | End: 2022-01-20 | Stop reason: HOSPADM

## 2022-01-18 RX ORDER — HYDRALAZINE HYDROCHLORIDE 25 MG/1
50 TABLET, FILM COATED ORAL 2 TIMES DAILY
Status: DISCONTINUED | OUTPATIENT
Start: 2022-01-18 | End: 2022-01-20 | Stop reason: HOSPADM

## 2022-01-18 RX ORDER — GLIPIZIDE 5 MG/1
2.5 TABLET ORAL
Status: DISCONTINUED | OUTPATIENT
Start: 2022-01-18 | End: 2022-01-20 | Stop reason: HOSPADM

## 2022-01-18 RX ORDER — WARFARIN SODIUM 5 MG/1
5 TABLET ORAL
Status: DISCONTINUED | OUTPATIENT
Start: 2022-01-18 | End: 2022-01-18

## 2022-01-18 RX ORDER — CHOLECALCIFEROL (VITAMIN D3) 125 MCG
5 CAPSULE ORAL NIGHTLY PRN
Status: DISCONTINUED | OUTPATIENT
Start: 2022-01-18 | End: 2022-01-20 | Stop reason: HOSPADM

## 2022-01-18 RX ORDER — SODIUM CHLORIDE 0.9 % (FLUSH) 0.9 %
3 SYRINGE (ML) INJECTION EVERY 12 HOURS SCHEDULED
Status: DISCONTINUED | OUTPATIENT
Start: 2022-01-18 | End: 2022-01-20 | Stop reason: HOSPADM

## 2022-01-18 RX ADMIN — SODIUM CHLORIDE 3 G: 900 INJECTION INTRAVENOUS at 09:52

## 2022-01-18 RX ADMIN — WITCH HAZEL 4 PAD: 500 SOLUTION RECTAL; TOPICAL at 20:17

## 2022-01-18 RX ADMIN — SODIUM CHLORIDE 1000 ML: 9 INJECTION, SOLUTION INTRAVENOUS at 00:27

## 2022-01-18 RX ADMIN — SODIUM BICARBONATE 650 MG TABLET 650 MG: at 11:18

## 2022-01-18 RX ADMIN — Medication 296 ML: at 11:18

## 2022-01-18 RX ADMIN — SODIUM BICARBONATE 650 MG TABLET 650 MG: at 20:09

## 2022-01-18 RX ADMIN — HYDROCORTISONE ACETATE 25 MG: 25 SUPPOSITORY RECTAL at 19:47

## 2022-01-18 RX ADMIN — SODIUM CHLORIDE 3 G: 900 INJECTION INTRAVENOUS at 15:19

## 2022-01-18 RX ADMIN — SODIUM CHLORIDE 3 G: 900 INJECTION INTRAVENOUS at 04:03

## 2022-01-18 RX ADMIN — GABAPENTIN 300 MG: 300 CAPSULE ORAL at 20:09

## 2022-01-18 RX ADMIN — PANTOPRAZOLE SODIUM 40 MG: 40 TABLET, DELAYED RELEASE ORAL at 11:18

## 2022-01-18 RX ADMIN — GABAPENTIN 300 MG: 300 CAPSULE ORAL at 11:18

## 2022-01-18 RX ADMIN — AMLODIPINE BESYLATE 10 MG: 5 TABLET ORAL at 20:09

## 2022-01-18 RX ADMIN — ONDANSETRON 4 MG: 2 INJECTION INTRAMUSCULAR; INTRAVENOUS at 00:29

## 2022-01-18 RX ADMIN — SODIUM CHLORIDE, PRESERVATIVE FREE 3 ML: 5 INJECTION INTRAVENOUS at 20:09

## 2022-01-18 RX ADMIN — GLIPIZIDE 2.5 MG: 5 TABLET ORAL at 11:18

## 2022-01-18 RX ADMIN — HYDRALAZINE HYDROCHLORIDE 50 MG: 25 TABLET, FILM COATED ORAL at 20:09

## 2022-01-18 RX ADMIN — GABAPENTIN 300 MG: 300 CAPSULE ORAL at 15:19

## 2022-01-18 RX ADMIN — MAGNESIUM GLUCONATE 500 MG ORAL TABLET 400 MG: 500 TABLET ORAL at 11:18

## 2022-01-18 RX ADMIN — SODIUM CHLORIDE 3 G: 900 INJECTION INTRAVENOUS at 21:58

## 2022-01-18 RX ADMIN — WITCH HAZEL 4 PAD: 500 SOLUTION RECTAL; TOPICAL at 23:53

## 2022-01-18 RX ADMIN — HYDRALAZINE HYDROCHLORIDE 50 MG: 25 TABLET, FILM COATED ORAL at 11:18

## 2022-01-18 RX ADMIN — SODIUM CHLORIDE, PRESERVATIVE FREE 3 ML: 5 INJECTION INTRAVENOUS at 11:19

## 2022-01-18 RX ADMIN — MORPHINE SULFATE 4 MG: 4 INJECTION INTRAVENOUS at 00:29

## 2022-01-18 RX ADMIN — ONDANSETRON 4 MG: 2 INJECTION INTRAMUSCULAR; INTRAVENOUS at 22:38

## 2022-01-18 NOTE — ED PROVIDER NOTES
Subjective   71-year-old male who is deaf, translation done by sister-in-law.  Patient had COVID, was discharged on January 11, 2022.  Patient had some abdominal pain and diarrhea during the COVID illness previously but worse over the past several days with bilateral flank pain, hematuria, diarrhea.  Patient tells me feels constipated despite the diarrhea.  From a lung standpoint, patient denies any chest pain or shortness of air or cough or fever.          Review of Systems   Gastrointestinal: Positive for abdominal pain and diarrhea.   Genitourinary: Positive for flank pain and hematuria.   All other systems reviewed and are negative.      Past Medical History:   Diagnosis Date   • Bladder cancer (CMS/HCC)    • Deaf    • Diabetes mellitus (CMS/HCC)    • Prostate cancer (CMS/HCC)    • Renal disorder        No Known Allergies    Past Surgical History:   Procedure Laterality Date   • BLADDER SURGERY     • CYSTECTOMY     • GASTRECTOMY     • PROSTATECTOMY         Family History   Problem Relation Age of Onset   • Cancer Father        Social History     Socioeconomic History   • Marital status:    Tobacco Use   • Smoking status: Never Smoker   • Smokeless tobacco: Never Used   Substance and Sexual Activity   • Alcohol use: No   • Drug use: No   • Sexual activity: Defer           Objective   Physical Exam  Constitutional:       Appearance: Normal appearance.   HENT:      Head: Normocephalic and atraumatic.      Mouth/Throat:      Mouth: Mucous membranes are moist.      Pharynx: Oropharynx is clear.   Eyes:      Conjunctiva/sclera: Conjunctivae normal.      Pupils: Pupils are equal, round, and reactive to light.   Cardiovascular:      Rate and Rhythm: Normal rate and regular rhythm.      Heart sounds: Normal heart sounds.   Pulmonary:      Effort: Pulmonary effort is normal.      Breath sounds: Normal breath sounds.      Comments: Urostomy right lower quadrant with some dark/bloody urine in bag  Abdominal:       General: Bowel sounds are normal. There is no distension.      Palpations: Abdomen is soft.      Tenderness: There is no abdominal tenderness.   Musculoskeletal:         General: Normal range of motion.   Skin:     General: Skin is warm and dry.      Capillary Refill: Capillary refill takes less than 2 seconds.   Neurological:      General: No focal deficit present.      Mental Status: He is alert.   Psychiatric:         Mood and Affect: Mood normal.         Behavior: Behavior normal.         Procedures           ED Course                                                 MDM  Number of Diagnoses or Management Options  Fecal impaction (HCC)  Pyelonephritis  Diagnosis management comments: Results for orders placed or performed during the hospital encounter of 01/17/22  -Protime-INR:   Specimen: Blood       Result                      Value             Ref Range           Protime                     42.1 (H)          19.4 - 28.5 *       INR                         4.22 (H)          2.00 - 3.00    -Comprehensive Metabolic Panel:   Specimen: Blood       Result                      Value             Ref Range           Glucose                     191 (H)           65 - 99 mg/dL       BUN                         34 (H)            8 - 23 mg/dL        Creatinine                  1.90 (H)          0.76 - 1.27 *       Sodium                      135 (L)           136 - 145 mm*       Potassium                   5.4 (H)           3.5 - 5.2 mm*       Chloride                    100               98 - 107 mmo*       CO2                         23.0              22.0 - 29.0 *       Calcium                     8.7               8.6 - 10.5 m*       Total Protein               6.1               6.0 - 8.5 g/*       Albumin                     3.40 (L)          3.50 - 5.20 *       ALT (SGPT)                  77 (H)            1 - 41 U/L          AST (SGOT)                  46 (H)            1 - 40 U/L          Alkaline Phosphatase         46                39 - 117 U/L        Total Bilirubin             0.3               0.0 - 1.2 mg*       eGFR Non  Amer       35 (L)            >60 mL/min/1*       Globulin                    2.7               gm/dL               A/G Ratio                   1.3               g/dL                BUN/Creatinine Ratio        17.9              7.0 - 25.0          Anion Gap                   12.0              5.0 - 15.0 m*  -Lipase:   Specimen: Blood       Result                      Value             Ref Range           Lipase                      28                13 - 60 U/L    -Urinalysis With Culture If Indicated - Urine, Clean Catch:   Specimen: Urine, Clean Catch       Result                      Value             Ref Range           Color, UA                   Orange (A)        Yellow, Straw       Appearance, UA              Cloudy (A)        Clear               pH, UA                      7.5               5.0 - 8.0           Specific Dent, UA        1.014             1.005 - 1.030       Glucose, UA                                   Negative        100 mg/dL (Trace) (A)       Ketones, UA                                   Negative        15 mg/dL (1+) (A)       Bilirubin, UA               Negative          Negative            Blood, UA                                     Negative        Large (3+) (A)       Protein, UA                                   Negative        100 mg/dL (2+) (A)       Leuk Esterase, UA                             Negative        Small (1+) (A)       Nitrite, UA                 Negative          Negative            Urobilinogen, UA            1.0 E.U./dL       0.2 - 1.0 E.*  -CBC Auto Differential:   Specimen: Blood       Result                      Value             Ref Range           WBC                         12.30 (H)         3.40 - 10.80*       RBC                         3.43 (L)          4.14 - 5.80 *       Hemoglobin                  10.3 (L)          13.0 - 17.7 *        Hematocrit                  30.6 (L)          37.5 - 51.0 %       MCV                         89.3              79.0 - 97.0 *       MCH                         30.1              26.6 - 33.0 *       MCHC                        33.7              31.5 - 35.7 *       RDW                         13.0              12.3 - 15.4 %       RDW-SD                      40.7              37.0 - 54.0 *       MPV                         6.8               6.0 - 12.0 fL       Platelets                   291               140 - 450 10*       Neutrophil %                91.8 (H)          42.7 - 76.0 %       Lymphocyte %                2.8 (L)           19.6 - 45.3 %       Monocyte %                  5.2               5.0 - 12.0 %        Eosinophil %                0.1 (L)           0.3 - 6.2 %         Basophil %                  0.1               0.0 - 1.5 %         Neutrophils, Absolute       11.30 (H)         1.70 - 7.00 *       Lymphocytes, Absolute       0.30 (L)          0.70 - 3.10 *       Monocytes, Absolute         0.60              0.10 - 0.90 *       Eosinophils, Absolute       0.00              0.00 - 0.40 *       Basophils, Absolute         0.00              0.00 - 0.20 *       nRBC                        0.3 (H)           0.0 - 0.2 /1*  -CK:   Specimen: Blood       Result                      Value             Ref Range           Creatine Kinase             54                20 - 200 U/L   -Urinalysis, Microscopic Only - Urine, Clean Catch:   Specimen: Urine, Clean Catch       Result                      Value             Ref Range           RBC, UA                                       None Seen /H*   Too Numerous to Count (A)       WBC, UA                     31-50 (A)         None Seen /H*       Bacteria, UA                None Seen         None Seen /H*       Squamous Epithelial Ce*     0-2               None Seen, 0*       Hyaline Casts, UA           None Seen         None Seen /L*       Methodology                                                    Automated Microscopy  CT Abdomen Pelvis Without Contrast   Final Result        1.  No acute abnormalities in the abdomen or pelvis.    2.  Postsurgical findings from what appear to be a cystectomy and enteric urinary diversion with right lower quadrant urostomy.    3.  Constipation but no obstruction        Electronically signed by:  Thomas Kemp M.D.      1/17/2022 11:57 PM       Patient with bilateral flank pain, leukocytosis, UTI suspicious for pyelonephritis.  Constipation and fecal impaction may be a contributor.  I did attempt to digitally disimpact this gentleman and had little success I was able to break up the stool ball, will administer enemas, IV antibiotics, [facility] accepts for Dr. Mayen       Amount and/or Complexity of Data Reviewed  Clinical lab tests: ordered and reviewed  Tests in the radiology section of CPT®: ordered and reviewed  Tests in the medicine section of CPT®: ordered and reviewed  Decide to obtain previous medical records or to obtain history from someone other than the patient: yes        Final diagnoses:   Pyelonephritis   Fecal impaction (HCC)       ED Disposition  ED Disposition     ED Disposition Condition Comment    Decision to Admit  Level of Care: Telemetry [5]   Admitting Physician: ANTONIO MAYEN [[id number]]            No follow-up provider specified.       Medication List      ASK your doctor about these medications    dexamethasone 2 MG tablet  Commonly known as: DECADRON  Take 1 tablet by mouth Daily With Breakfast for 4 days.  Ask about: Should I take this medication?             Jalen Aguiar MD  01/18/22 0177

## 2022-01-18 NOTE — ED NOTES
Pt family reports pt feels like he is severely constipated. Pt family says pt has not had normal bowel movement in 23 weeks. Pt worried he has bowel obstruction. Pt says when he passed stool it was minimal amount and soft. Pt family says he had small bowel movements these past few days.      Bigg Brown, RN  01/17/22 8570

## 2022-01-18 NOTE — PLAN OF CARE
Problem: Adult Inpatient Plan of Care  Goal: Plan of Care Review  Outcome: Ongoing, Progressing  Flowsheets (Taken 1/18/2022 1353)  Plan of Care Reviewed With: patient  Goal: Patient-Specific Goal (Individualized)  Outcome: Ongoing, Progressing  Goal: Absence of Hospital-Acquired Illness or Injury  Outcome: Ongoing, Progressing  Intervention: Identify and Manage Fall Risk  Recent Flowsheet Documentation  Taken 1/18/2022 1305 by Meli Hernandez RN  Safety Promotion/Fall Prevention:   safety round/check completed   nonskid shoes/slippers when out of bed  Taken 1/18/2022 1104 by Meli Hernandez RN  Safety Promotion/Fall Prevention:   safety round/check completed   nonskid shoes/slippers when out of bed  Taken 1/18/2022 0930 by Meli Hernandez RN  Safety Promotion/Fall Prevention:   safety round/check completed   nonskid shoes/slippers when out of bed  Taken 1/18/2022 0720 by Meli Hernandez RN  Safety Promotion/Fall Prevention:   safety round/check completed   nonskid shoes/slippers when out of bed   fall prevention program maintained  Intervention: Prevent Skin Injury  Recent Flowsheet Documentation  Taken 1/18/2022 0720 by Meli Hernandez RN  Body Position: position maintained  Skin Protection: tubing/devices free from skin contact  Intervention: Prevent Infection  Recent Flowsheet Documentation  Taken 1/18/2022 1305 by Meli Hernandez RN  Infection Prevention:   single patient room provided   rest/sleep promoted   hand hygiene promoted  Taken 1/18/2022 1104 by Meli Hernandez RN  Infection Prevention:   single patient room provided   rest/sleep promoted   hand hygiene promoted  Taken 1/18/2022 0930 by Meli Hernandez RN  Infection Prevention:   single patient room provided   rest/sleep promoted   hand hygiene promoted  Taken 1/18/2022 0720 by Meli Hernandez RN  Infection Prevention:   single patient room provided   rest/sleep promoted   hand hygiene promoted  Goal: Optimal Comfort and  Wellbeing  Outcome: Ongoing, Progressing  Intervention: Provide Person-Centered Care  Recent Flowsheet Documentation  Taken 1/18/2022 0720 by Meli Hernandez RN  Trust Relationship/Rapport:   care explained   thoughts/feelings acknowledged  Goal: Readiness for Transition of Care  Outcome: Ongoing, Progressing  Intervention: Mutually Develop Transition Plan  Recent Flowsheet Documentation  Taken 1/18/2022 0709 by Meli Hernandez RN  Transportation Anticipated: family or friend will provide  Patient/Family Anticipated Services at Transition:   Patient/Family Anticipates Transition to: home with family  Taken 1/18/2022 0704 by Meli Hernandez RN  Equipment Currently Used at Home: glucometer     Problem: Electrolyte Imbalance (Acute Kidney Injury/Impairment)  Goal: Serum Electrolyte Balance  Outcome: Ongoing, Progressing     Problem: Fluid Imbalance (Acute Kidney Injury/Impairment)  Goal: Optimal Fluid Balance  Outcome: Ongoing, Progressing     Problem: Hematologic Alteration (Acute Kidney Injury/Impairment)  Goal: Hemoglobin, Hematocrit and Platelets Within Normal Range  Outcome: Ongoing, Progressing     Problem: Oral Intake Inadequate (Acute Kidney Injury/Impairment)  Goal: Optimal Nutrition Intake  Outcome: Ongoing, Progressing     Problem: Renal Function Impairment (Acute Kidney Injury/Impairment)  Goal: Effective Renal Function  Outcome: Ongoing, Progressing  Intervention: Monitor and Support Renal Function  Recent Flowsheet Documentation  Taken 1/18/2022 1305 by Meli Hernandez RN  Medication Review/Management: medications reviewed  Taken 1/18/2022 1104 by Meli Hernandez RN  Medication Review/Management: medications reviewed  Taken 1/18/2022 0930 by Meli Hernandez RN  Medication Review/Management: medications reviewed  Taken 1/18/2022 0720 by Meli Hernandez RN  Medication Review/Management: medications reviewed     Problem: Skin Injury Risk Increased  Goal: Skin Health and  Integrity  Outcome: Ongoing, Progressing  Intervention: Optimize Skin Protection  Recent Flowsheet Documentation  Taken 1/18/2022 0720 by Meli Hernandez RN  Pressure Reduction Techniques: frequent weight shift encouraged  Head of Bed (HOB): HOB elevated  Pressure Reduction Devices: pressure-redistributing mattress utilized  Skin Protection: tubing/devices free from skin contact   Goal Outcome Evaluation:  Plan of Care Reviewed With: patient         Patient was admitted to the floor from the ER. Sister-in-law interrupting at bedside. Will continue to observe.

## 2022-01-18 NOTE — H&P
Patient Care Team:  Joey Islas MD as PCP - General (Family Medicine)  Neville Laughlin MD as Consulting Physician (Nephrology)    Chief complaint abdominal pain    Subjective     Patient is a 71 y.o. male with recent hospitalization for COVID pneumonia present with lower pelvic pain and feeling of fullness x several days.  No fever/chills.  He has a urostomy related to previous surgery for bladder cancer.  He had bloody urine output.     Review of Systems   Constitutional: Positive for activity change, appetite change and fatigue. Negative for fever.   HENT: Negative for facial swelling.    Eyes: Negative for visual disturbance.   Respiratory: Negative for cough, shortness of breath, wheezing and stridor.    Cardiovascular: Negative for chest pain, palpitations and leg swelling.   Gastrointestinal: Positive for abdominal pain, constipation and nausea. Negative for vomiting.   Genitourinary: Positive for hematuria. Negative for urgency.   Musculoskeletal: Negative for arthralgias, back pain and myalgias.   Skin: Negative for rash and wound.   Neurological: Negative for weakness.   Psychiatric/Behavioral: Negative for confusion.          History  Past Medical History:   Diagnosis Date   • Bladder cancer (CMS/HCC)    • Deaf    • Diabetes mellitus (CMS/HCC)    • Prostate cancer (CMS/HCC)    • Renal disorder      Past Surgical History:   Procedure Laterality Date   • BLADDER SURGERY     • CYSTECTOMY     • GASTRECTOMY     • PROSTATECTOMY       Family History   Problem Relation Age of Onset   • Cancer Father      Social History     Tobacco Use   • Smoking status: Never Smoker   • Smokeless tobacco: Never Used   Substance Use Topics   • Alcohol use: No   • Drug use: No     Medications Prior to Admission   Medication Sig Dispense Refill Last Dose   • acetaminophen (TYLENOL) 325 MG tablet Take 2 tablets by mouth Every 4 (Four) Hours As Needed for Mild Pain .   Past Week at Unknown time   • amLODIPine  (NORVASC) 10 MG tablet Take 10 mg by mouth Every Night.   1/17/2022 at Unknown time   • budesonide-formoterol (SYMBICORT) 160-4.5 MCG/ACT inhaler Inhale 2 puffs 2 (Two) Times a Day. 10.2 g 0 1/17/2022 at Unknown time   • cefdinir (OMNICEF) 300 MG capsule Take 1 capsule by mouth Every 12 (Twelve) Hours for 14 doses. Indications: Urinary Tract Infection 14 capsule 0 1/17/2022 at Unknown time   • gabapentin (NEURONTIN) 300 MG capsule Take 1 capsule by mouth 3 (Three) Times a Day.   1/17/2022 at Unknown time   • glimepiride (AMARYL) 1 MG tablet Take 1 mg by mouth Every Morning Before Breakfast.   1/17/2022 at Unknown time   • hydrALAZINE (APRESOLINE) 50 MG tablet Take 50 mg by mouth 2 (Two) Times a Day.   1/17/2022 at Unknown time   • HYDROcodone-acetaminophen (NORCO) 5-325 MG per tablet Take 1 tablet by mouth Every 6 (Six) Hours As Needed.   Past Week at Unknown time   • magnesium oxide (MAG-OX) 400 tablet tablet Take 1 tablet by mouth Daily. 30 each 1 1/17/2022 at Unknown time   • ondansetron (ZOFRAN) 4 MG tablet Take 4 mg by mouth Every 6 (Six) Hours As Needed for Nausea or Vomiting.   Past Week at Unknown time   • pantoprazole (PROTONIX) 40 MG EC tablet Take 1 tablet by mouth Every Morning. 30 tablet 0 1/17/2022 at Unknown time   • sodium bicarbonate 650 MG tablet Take 650 mg by mouth 2 (Two) Times a Day.   1/17/2022 at Unknown time   • warfarin (COUMADIN) 5 MG tablet Take 5 mg by mouth Daily.   1/17/2022 at Unknown time     Allergies:  Patient has no known allergies.    Objective     Vital Signs  Temp:  [97.8 °F (36.6 °C)-98 °F (36.7 °C)] 98 °F (36.7 °C)  Heart Rate:  [67-81] 67  Resp:  [18-20] 20  BP: (125-178)/(55-90) 177/78     Physical Exam:      General Appearance:    Alert, cooperative, in no acute distress   Head:    Normocephalic, without obvious abnormality, atraumatic   Eyes:            Lids and lashes normal, conjunctivae and sclerae normal, no   icterus, no pallor, corneas clear, PERRLA   Ears:    Ears  appear intact with no abnormalities noted   Throat:   No oral lesions, no thrush, oral mucosa moist   Neck:   No adenopathy, supple, trachea midline, no thyromegaly, no   carotid bruit, no JVD   Lungs:     Clear to auscultation,respirations regular, even and                  unlabored    Heart:    Regular rhythm and normal rate, normal S1 and S2, no            murmur, no gallop, no rub, no click   Chest Wall:    No abnormalities observed   Abdomen:     Mild suprapubic tenderness; urostomy has clear yellow urine   Extremities:   Moves all extremities well, no edema, no cyanosis, no             redness   Pulses:   Pulses palpable and equal bilaterally   Skin:   No bleeding, bruising or rash   Lymph nodes:   No palpable adenopathy   Neurologic:   Cranial nerves 2 - 12 grossly intact, sensation intact, DTR       present and equal bilaterally       Results Review:     Imaging Results (Last 24 Hours)     Procedure Component Value Units Date/Time    CT Abdomen Pelvis Without Contrast [[id number]] Collected: 01/18/22 0151     Updated: 01/18/22 0158    Narrative:      EXAMINATION:  CT SCAN OF THE ABDOMEN AND PELVIS WITHOUT INTRAVENOUS CONTRAST    DATE OF EXAM: 1/18/2022 1:27 AM     HISTORY: Flank pain and hematuria.      COMPARISON: None.    TECHNIQUE: CT examination of the abdomen and pelvis with sagittal and coronal reformations was performed without intravenous contrast.  CT dose lowering techniques were used, to include: automated exposure control, adjustment for patient size, and/or use   of iterative reconstruction.    Note: The exam is limited because some types of pathology may not be adequately demonstrated due to lack of contrast enhancement.       FINDINGS:    ABDOMEN/PELVIS:    Lower Chest:  Normal.     Liver: Normal.     Gallbladder/Biliary: Normal.     Pancreas: Normal.    Spleen: Normal.     Adrenal Glands: Normal.     Kidneys: Post surgical findings from what appears to be cystectomy and enteric urinary  diversion. Mild chronic hydronephrosis.    GI Tract: Constipation. No obstruction. No inflammation.    Mesentery/Peritoneum: Normal.    Vasculature: Normal.     Lymph Nodes: Normal.     Abdominal Wall: Normal.     Bladder: Normal.     Reproductive: Normal.     Musculoskeletal: Normal.        Impression:        1.  No acute abnormalities in the abdomen or pelvis.  2.  Postsurgical findings from what appear to be a cystectomy and enteric urinary diversion with right lower quadrant urostomy.  3.  Constipation but no obstruction    Electronically signed by:  Thomas Kemp M.D.    1/17/2022 11:57 PM           Lab Results (last 24 hours)     Procedure Component Value Units Date/Time    Urinalysis, Microscopic Only - Urine, Clean Catch [[id number]]  (Abnormal) Collected: 01/18/22 0112    Specimen: Urine, Clean Catch Updated: 01/18/22 0121     RBC, UA Too Numerous to Count /HPF      WBC, UA 31-50 /HPF      Bacteria, UA None Seen /HPF      Squamous Epithelial Cells, UA 0-2 /HPF      Hyaline Casts, UA None Seen /LPF      Methodology Automated Microscopy    Urine Culture - Urine, Urine, Clean Catch [[id number]] Collected: 01/18/22 0112    Specimen: Urine, Clean Catch Updated: 01/18/22 0121    Urinalysis With Culture If Indicated - Urine, Clean Catch [[id number]]  (Abnormal) Collected: 01/18/22 0112    Specimen: Urine, Clean Catch Updated: 01/18/22 0118     Color, UA Orange     Comment: Any Substance that causes an abnormal urine color can alter the accuracy of the chemical reactions.        Appearance, UA Cloudy     pH, UA 7.5     Specific Gravity, UA 1.014     Glucose,  mg/dL (Trace)     Ketones, UA 15 mg/dL (1+)     Bilirubin, UA Negative     Blood, UA Large (3+)     Protein,  mg/dL (2+)     Leuk Esterase, UA Small (1+)     Nitrite, UA Negative     Urobilinogen, UA 1.0 E.U./dL    Extra Tubes [[id number]] Collected: 01/18/22 0023    Specimen: Blood, Venous Line Updated: 01/18/22 0105    Narrative:      The  following orders were created for panel order Extra Tubes.  Procedure                               Abnormality         Status                     ---------                               -----------         ------                     [facility][[id number]]                                   Final result                 Please view results for these tests on the individual orders.    [facility] [[id number]] Collected: 01/18/22 0023    Specimen: Blood Updated: 01/18/22 0105    Comprehensive Metabolic Panel [[id number]]  (Abnormal) Collected: 01/18/22 0023    Specimen: Blood Updated: 01/18/22 0045     Glucose 191 mg/dL      BUN 34 mg/dL      Creatinine 1.90 mg/dL      Sodium 135 mmol/L      Potassium 5.4 mmol/L      Comment: Slight hemolysis detected by analyzer. Results may be affected.        Chloride 100 mmol/L      CO2 23.0 mmol/L      Calcium 8.7 mg/dL      Total Protein 6.1 g/dL      Albumin 3.40 g/dL      ALT (SGPT) 77 U/L      AST (SGOT) 46 U/L      Alkaline Phosphatase 46 U/L      Total Bilirubin 0.3 mg/dL      eGFR Non African Amer 35 mL/min/1.73      Globulin 2.7 gm/dL      A/G Ratio 1.3 g/dL      BUN/Creatinine Ratio 17.9     Anion Gap 12.0 mmol/L     Narrative:      GFR Normal >60  Chronic Kidney Disease <60  Kidney Failure <15      Lipase [[id number]]  (Normal) Collected: 01/18/22 0023    Specimen: Blood Updated: 01/18/22 0045     Lipase 28 U/L     CK [[id number]]  (Normal) Collected: 01/18/22 0023    Specimen: Blood Updated: 01/18/22 0045     Creatine Kinase 54 U/L     Protime-INR [[id number]]  (Abnormal) Collected: 01/18/22 0023    Specimen: Blood Updated: 01/18/22 0044     Protime 42.1 Seconds      INR 4.22    CBC & Differential [[id number]]  (Abnormal) Collected: 01/18/22 0023    Specimen: Blood Updated: 01/18/22 0030    Narrative:      The following orders were created for panel order CBC & Differential.  Procedure                               Abnormality         Status                     ---------                                -----------         ------                     CBC Auto Differential[[id number]]        Abnormal            Final result                 Please view results for these tests on the individual orders.    CBC Auto Differential [[id number]]  (Abnormal) Collected: 01/18/22 0023    Specimen: Blood Updated: 01/18/22 0030     WBC 12.30 10*3/mm3      RBC 3.43 10*6/mm3      Hemoglobin 10.3 g/dL      Hematocrit 30.6 %      MCV 89.3 fL      MCH 30.1 pg      MCHC 33.7 g/dL      RDW 13.0 %      RDW-SD 40.7 fl      MPV 6.8 fL      Platelets 291 10*3/mm3      Neutrophil % 91.8 %      Lymphocyte % 2.8 %      Monocyte % 5.2 %      Eosinophil % 0.1 %      Basophil % 0.1 %      Neutrophils, Absolute 11.30 10*3/mm3      Lymphocytes, Absolute 0.30 10*3/mm3      Monocytes, Absolute 0.60 10*3/mm3      Eosinophils, Absolute 0.00 10*3/mm3      Basophils, Absolute 0.00 10*3/mm3      nRBC 0.3 /100 WBC            I reviewed the patient's new clinical results.    Assessment/Plan     UTI - Continue antibiotics.  Await urine culture  Fecal impaction - Magnesium citrate today  H/O DVT - holding warfarin today.          I discussed the patient's findings and my recommendations with patient.     Samanta Rogers MD  01/18/22  10:27 EST

## 2022-01-18 NOTE — OUTREACH NOTE
COVID-19 Week 2 Survey      Responses   [facility] patient discharged from? [facility]   Does the patient have one of the following disease processes/diagnoses(primary or secondary)? COVID-19   COVID-19 underlying condition? None   Call Number Call 1   COVID-19 Week 2: Call 1 attempt successful? No  [Pt was in ED at time of call]   Discharge diagnosis Pneumonia due to COVID-19 virus          Bria Puentes RN

## 2022-01-18 NOTE — CASE MANAGEMENT/SOCIAL WORK
Discharge Planning Assessment  [facility]     Patient Name: Michael Dorantes  MRN: [id number]  Today's Date: 1/18/2022    Admit Date: 1/17/2022     Discharge Needs Assessment     Row Name 01/18/22 1553       Discharge Needs Assessment    Equipment Currently Used at Home walker, rolling; colostomy/ostomy supplies    Concerns Comments Urostomy    Row Name 01/18/22 1547       Living Environment    Lives With spouse    Name(s) of Who Lives With Patient Riddhi    Current Living Arrangements home/apartment/condo    Primary Care Provided by self; spouse/significant other    Provides Primary Care For no one    Family Caregiver if Needed spouse    Able to Return to Prior Arrangements yes       Resource/Environmental Concerns    Resource/Environmental Concerns none    Transportation Concerns car, none       Transition Planning    Patient/Family Anticipates Transition to home with family    Patient/Family Anticipated Services at Transition     Transportation Anticipated family or friend will provide       Discharge Needs Assessment    Equipment Currently Used at Home glucometer    Concerns to be Addressed denies needs/concerns at this time    Anticipated Changes Related to Illness none    Discharge Coordination/Progress DC Plan: return home with spouse.               Discharge Plan     Row Name 01/18/22 7839       Plan    Plan Comments OK to use [facility] for DC meds. Pt has walker he uses at times.    Row Name 01/18/22 1580       Plan    Plan DC Plan: return home with spouse.              Continued Care and Services - Admitted Since 1/17/2022    Coordination has not been started for this encounter.          Demographic Summary     Row Name 01/18/22 1534       General Information    Admission Type observation    Arrived From emergency department    Referral Source admission list    Reason for Consult discharge planning    Preferred Language English     Used During This Interaction no    General  Information Comments Spoke to pt and spouse Lesley in room. Pt is deaf and spouse signed.               Functional Status     Row Name 01/18/22 1536       Functional Status    Usual Activity Tolerance moderate    Current Activity Tolerance fair       Functional Status, IADL    Medications independent    Meal Preparation independent    Housekeeping assistive person    Laundry assistive person    Shopping assistive person       Mental Status    General Appearance WDL WDL       Mental Status Summary    Recent Changes in Mental Status/Cognitive Functioning no changes                Met with patient in room wearing PPE: mask.      Maintained distance greater than six feet and spent less than 15 minutes in the room.               Elijah Balderas, RN

## 2022-01-19 ENCOUNTER — READMISSION MANAGEMENT (OUTPATIENT)
Dept: CALL CENTER | Facility: HOSPITAL | Age: 72
End: 2022-01-19

## 2022-01-19 LAB
ALBUMIN SERPL-MCNC: 3 G/DL (ref 3.5–5.2)
ALBUMIN/GLOB SERPL: 1.1 G/DL
ALP SERPL-CCNC: 46 U/L (ref 39–117)
ALT SERPL W P-5'-P-CCNC: 55 U/L (ref 1–41)
ANION GAP SERPL CALCULATED.3IONS-SCNC: 8 MMOL/L (ref 5–15)
AST SERPL-CCNC: 27 U/L (ref 1–40)
BACTERIA SPEC AEROBE CULT: NORMAL
BACTERIA UR QL AUTO: ABNORMAL /HPF
BASOPHILS # BLD AUTO: 0 10*3/MM3 (ref 0–0.2)
BASOPHILS NFR BLD AUTO: 0.3 % (ref 0–1.5)
BILIRUB SERPL-MCNC: 0.3 MG/DL (ref 0–1.2)
BILIRUB UR QL STRIP: NEGATIVE
BUN SERPL-MCNC: 22 MG/DL (ref 8–23)
BUN/CREAT SERPL: 12.6 (ref 7–25)
CALCIUM SPEC-SCNC: 8.3 MG/DL (ref 8.6–10.5)
CHLORIDE SERPL-SCNC: 102 MMOL/L (ref 98–107)
CLARITY UR: ABNORMAL
CO2 SERPL-SCNC: 23 MMOL/L (ref 22–29)
COLOR UR: ABNORMAL
CREAT SERPL-MCNC: 1.74 MG/DL (ref 0.76–1.27)
DEPRECATED RDW RBC AUTO: 42 FL (ref 37–54)
EOSINOPHIL # BLD AUTO: 0 10*3/MM3 (ref 0–0.4)
EOSINOPHIL NFR BLD AUTO: 0.3 % (ref 0.3–6.2)
ERYTHROCYTE [DISTWIDTH] IN BLOOD BY AUTOMATED COUNT: 13.2 % (ref 12.3–15.4)
GFR SERPL CREATININE-BSD FRML MDRD: 39 ML/MIN/1.73
GLOBULIN UR ELPH-MCNC: 2.7 GM/DL
GLUCOSE BLDC GLUCOMTR-MCNC: 138 MG/DL (ref 70–105)
GLUCOSE BLDC GLUCOMTR-MCNC: 139 MG/DL (ref 70–105)
GLUCOSE BLDC GLUCOMTR-MCNC: 98 MG/DL (ref 70–105)
GLUCOSE SERPL-MCNC: 131 MG/DL (ref 65–99)
GLUCOSE UR STRIP-MCNC: NEGATIVE MG/DL
HCT VFR BLD AUTO: 30.1 % (ref 37.5–51)
HGB BLD-MCNC: 10.3 G/DL (ref 13–17.7)
HGB UR QL STRIP.AUTO: ABNORMAL
HYALINE CASTS UR QL AUTO: ABNORMAL /LPF
INR PPP: 3.66 (ref 2–3)
KETONES UR QL STRIP: ABNORMAL
LEUKOCYTE ESTERASE UR QL STRIP.AUTO: ABNORMAL
LYMPHOCYTES # BLD AUTO: 0.4 10*3/MM3 (ref 0.7–3.1)
LYMPHOCYTES NFR BLD AUTO: 4 % (ref 19.6–45.3)
MCH RBC QN AUTO: 31 PG (ref 26.6–33)
MCHC RBC AUTO-ENTMCNC: 34.2 G/DL (ref 31.5–35.7)
MCV RBC AUTO: 90.8 FL (ref 79–97)
MONOCYTES # BLD AUTO: 0.5 10*3/MM3 (ref 0.1–0.9)
MONOCYTES NFR BLD AUTO: 5.1 % (ref 5–12)
NEUTROPHILS NFR BLD AUTO: 8.3 10*3/MM3 (ref 1.7–7)
NEUTROPHILS NFR BLD AUTO: 90.3 % (ref 42.7–76)
NITRITE UR QL STRIP: NEGATIVE
NRBC BLD AUTO-RTO: 0.1 /100 WBC (ref 0–0.2)
PH UR STRIP.AUTO: 7 [PH] (ref 5–8)
PLATELET # BLD AUTO: 255 10*3/MM3 (ref 140–450)
PMV BLD AUTO: 7 FL (ref 6–12)
POTASSIUM SERPL-SCNC: 4.9 MMOL/L (ref 3.5–5.2)
PROT SERPL-MCNC: 5.7 G/DL (ref 6–8.5)
PROT UR QL STRIP: ABNORMAL
PROTHROMBIN TIME: 36.9 SECONDS (ref 19.4–28.5)
RBC # BLD AUTO: 3.32 10*6/MM3 (ref 4.14–5.8)
RBC # UR STRIP: ABNORMAL /HPF
REF LAB TEST METHOD: ABNORMAL
SODIUM SERPL-SCNC: 133 MMOL/L (ref 136–145)
SP GR UR STRIP: 1.02 (ref 1–1.03)
SQUAMOUS #/AREA URNS HPF: ABNORMAL /HPF
UROBILINOGEN UR QL STRIP: ABNORMAL
WBC # UR STRIP: ABNORMAL /HPF
WBC NRBC COR # BLD: 9.2 10*3/MM3 (ref 3.4–10.8)

## 2022-01-19 PROCEDURE — 87086 URINE CULTURE/COLONY COUNT: CPT | Performed by: INTERNAL MEDICINE

## 2022-01-19 PROCEDURE — 96376 TX/PRO/DX INJ SAME DRUG ADON: CPT

## 2022-01-19 PROCEDURE — 81001 URINALYSIS AUTO W/SCOPE: CPT | Performed by: INTERNAL MEDICINE

## 2022-01-19 PROCEDURE — 25010000002 ONDANSETRON PER 1 MG: Performed by: INTERNAL MEDICINE

## 2022-01-19 PROCEDURE — G0378 HOSPITAL OBSERVATION PER HR: HCPCS

## 2022-01-19 PROCEDURE — 80053 COMPREHEN METABOLIC PANEL: CPT | Performed by: INTERNAL MEDICINE

## 2022-01-19 PROCEDURE — 85610 PROTHROMBIN TIME: CPT | Performed by: INTERNAL MEDICINE

## 2022-01-19 PROCEDURE — 0 AMPICILLIN-SULBACTAM PER 1.5 G: Performed by: INTERNAL MEDICINE

## 2022-01-19 PROCEDURE — 82962 GLUCOSE BLOOD TEST: CPT

## 2022-01-19 PROCEDURE — 0 AMPICILLIN-SULBACTAM PER 1.5 G: Performed by: NURSE PRACTITIONER

## 2022-01-19 PROCEDURE — 85025 COMPLETE CBC W/AUTO DIFF WBC: CPT | Performed by: INTERNAL MEDICINE

## 2022-01-19 RX ORDER — SODIUM CHLORIDE 0.9 % (FLUSH) 0.9 %
10 SYRINGE (ML) INJECTION AS NEEDED
Status: DISCONTINUED | OUTPATIENT
Start: 2022-01-19 | End: 2022-01-20 | Stop reason: HOSPADM

## 2022-01-19 RX ORDER — SODIUM CHLORIDE 0.9 % (FLUSH) 0.9 %
10 SYRINGE (ML) INJECTION EVERY 12 HOURS SCHEDULED
Status: DISCONTINUED | OUTPATIENT
Start: 2022-01-19 | End: 2022-01-19 | Stop reason: SDUPTHER

## 2022-01-19 RX ORDER — SODIUM PHOSPHATE, DIBASIC AND SODIUM PHOSPHATE, MONOBASIC 7; 19 G/133ML; G/133ML
1 ENEMA RECTAL ONCE
Status: COMPLETED | OUTPATIENT
Start: 2022-01-19 | End: 2022-01-19

## 2022-01-19 RX ADMIN — AMLODIPINE BESYLATE 10 MG: 5 TABLET ORAL at 20:59

## 2022-01-19 RX ADMIN — GABAPENTIN 300 MG: 300 CAPSULE ORAL at 16:39

## 2022-01-19 RX ADMIN — GABAPENTIN 300 MG: 300 CAPSULE ORAL at 20:59

## 2022-01-19 RX ADMIN — PANTOPRAZOLE SODIUM 40 MG: 40 TABLET, DELAYED RELEASE ORAL at 05:51

## 2022-01-19 RX ADMIN — SODIUM CHLORIDE 3 G: 900 INJECTION INTRAVENOUS at 09:33

## 2022-01-19 RX ADMIN — SODIUM CHLORIDE 3 G: 900 INJECTION INTRAVENOUS at 04:10

## 2022-01-19 RX ADMIN — SODIUM CHLORIDE, PRESERVATIVE FREE 3 ML: 5 INJECTION INTRAVENOUS at 09:33

## 2022-01-19 RX ADMIN — HYDRALAZINE HYDROCHLORIDE 50 MG: 25 TABLET, FILM COATED ORAL at 20:58

## 2022-01-19 RX ADMIN — SODIUM PHOSPHATE 1 ENEMA: 7; 19 ENEMA RECTAL at 10:18

## 2022-01-19 RX ADMIN — HYDRALAZINE HYDROCHLORIDE 50 MG: 25 TABLET, FILM COATED ORAL at 09:33

## 2022-01-19 RX ADMIN — MAGNESIUM GLUCONATE 500 MG ORAL TABLET 400 MG: 500 TABLET ORAL at 09:33

## 2022-01-19 RX ADMIN — SODIUM BICARBONATE 650 MG TABLET 650 MG: at 09:33

## 2022-01-19 RX ADMIN — ONDANSETRON 4 MG: 2 INJECTION INTRAMUSCULAR; INTRAVENOUS at 09:33

## 2022-01-19 RX ADMIN — SODIUM CHLORIDE 3 G: 900 INJECTION INTRAVENOUS at 16:39

## 2022-01-19 RX ADMIN — SODIUM CHLORIDE 3 G: 900 INJECTION INTRAVENOUS at 22:13

## 2022-01-19 RX ADMIN — GLIPIZIDE 2.5 MG: 5 TABLET ORAL at 09:33

## 2022-01-19 RX ADMIN — SODIUM CHLORIDE, PRESERVATIVE FREE 3 ML: 5 INJECTION INTRAVENOUS at 20:59

## 2022-01-19 RX ADMIN — ACETAMINOPHEN 650 MG: 325 TABLET, FILM COATED ORAL at 00:00

## 2022-01-19 RX ADMIN — SODIUM BICARBONATE 650 MG TABLET 650 MG: at 20:59

## 2022-01-19 RX ADMIN — GABAPENTIN 300 MG: 300 CAPSULE ORAL at 09:33

## 2022-01-19 NOTE — PLAN OF CARE
Goal Outcome Evaluation:  Plan of Care Reviewed With: patient        Progress: no change  Outcome Summary: pt currently asleep, c/o pain in abdomen, did not have a good BM on this shift, only had smears a few times, c/o burning in rectum called dr, gave prn meds that were ordered to help

## 2022-01-19 NOTE — PLAN OF CARE
Goal Outcome Evaluation:              Outcome Summary: Patient given enema this AM. Effective with XL BM. MD made aware. Patient feels much better.

## 2022-01-20 ENCOUNTER — READMISSION MANAGEMENT (OUTPATIENT)
Dept: CALL CENTER | Facility: HOSPITAL | Age: 72
End: 2022-01-20

## 2022-01-20 VITALS
SYSTOLIC BLOOD PRESSURE: 145 MMHG | WEIGHT: 194.74 LBS | DIASTOLIC BLOOD PRESSURE: 72 MMHG | BODY MASS INDEX: 26.38 KG/M2 | HEART RATE: 59 BPM | TEMPERATURE: 98 F | HEIGHT: 72 IN | OXYGEN SATURATION: 96 % | RESPIRATION RATE: 14 BRPM

## 2022-01-20 PROBLEM — Z98.890 HISTORY OF UROSTOMY: Status: ACTIVE | Noted: 2022-01-20

## 2022-01-20 PROBLEM — N18.30 CKD (CHRONIC KIDNEY DISEASE) STAGE 3, GFR 30-59 ML/MIN (HCC): Status: ACTIVE | Noted: 2022-01-20

## 2022-01-20 PROBLEM — D63.8 ANEMIA, CHRONIC DISEASE: Status: ACTIVE | Noted: 2022-01-20

## 2022-01-20 PROBLEM — A49.9 UTI (URINARY TRACT INFECTION), BACTERIAL: Status: ACTIVE | Noted: 2022-01-20

## 2022-01-20 PROBLEM — K56.41 FECAL IMPACTION: Status: ACTIVE | Noted: 2022-01-20

## 2022-01-20 PROBLEM — N39.0 UTI (URINARY TRACT INFECTION), BACTERIAL: Status: ACTIVE | Noted: 2022-01-20

## 2022-01-20 LAB
ALBUMIN SERPL-MCNC: 2.5 G/DL (ref 3.5–5.2)
ALBUMIN/GLOB SERPL: 1 G/DL
ALP SERPL-CCNC: 37 U/L (ref 39–117)
ALT SERPL W P-5'-P-CCNC: 36 U/L (ref 1–41)
ANION GAP SERPL CALCULATED.3IONS-SCNC: 7 MMOL/L (ref 5–15)
AST SERPL-CCNC: 18 U/L (ref 1–40)
BACTERIA SPEC AEROBE CULT: NO GROWTH
BASOPHILS # BLD AUTO: 0 10*3/MM3 (ref 0–0.2)
BASOPHILS NFR BLD AUTO: 0.6 % (ref 0–1.5)
BILIRUB SERPL-MCNC: 0.3 MG/DL (ref 0–1.2)
BUN SERPL-MCNC: 21 MG/DL (ref 8–23)
BUN/CREAT SERPL: 9.9 (ref 7–25)
CALCIUM SPEC-SCNC: 7.9 MG/DL (ref 8.6–10.5)
CHLORIDE SERPL-SCNC: 103 MMOL/L (ref 98–107)
CO2 SERPL-SCNC: 25 MMOL/L (ref 22–29)
CREAT SERPL-MCNC: 2.13 MG/DL (ref 0.76–1.27)
DEPRECATED RDW RBC AUTO: 42.4 FL (ref 37–54)
EOSINOPHIL # BLD AUTO: 0.1 10*3/MM3 (ref 0–0.4)
EOSINOPHIL NFR BLD AUTO: 2.5 % (ref 0.3–6.2)
ERYTHROCYTE [DISTWIDTH] IN BLOOD BY AUTOMATED COUNT: 13.3 % (ref 12.3–15.4)
GFR SERPL CREATININE-BSD FRML MDRD: 31 ML/MIN/1.73
GLOBULIN UR ELPH-MCNC: 2.5 GM/DL
GLUCOSE SERPL-MCNC: 102 MG/DL (ref 65–99)
HCT VFR BLD AUTO: 26 % (ref 37.5–51)
HGB BLD-MCNC: 8.7 G/DL (ref 13–17.7)
INR PPP: 2.7 (ref 2–3)
LYMPHOCYTES # BLD AUTO: 0.9 10*3/MM3 (ref 0.7–3.1)
LYMPHOCYTES NFR BLD AUTO: 19.1 % (ref 19.6–45.3)
MCH RBC QN AUTO: 30.7 PG (ref 26.6–33)
MCHC RBC AUTO-ENTMCNC: 33.4 G/DL (ref 31.5–35.7)
MCV RBC AUTO: 91.7 FL (ref 79–97)
MONOCYTES # BLD AUTO: 0.5 10*3/MM3 (ref 0.1–0.9)
MONOCYTES NFR BLD AUTO: 10.4 % (ref 5–12)
NEUTROPHILS NFR BLD AUTO: 3.1 10*3/MM3 (ref 1.7–7)
NEUTROPHILS NFR BLD AUTO: 67.4 % (ref 42.7–76)
NRBC BLD AUTO-RTO: 0 /100 WBC (ref 0–0.2)
PLATELET # BLD AUTO: 203 10*3/MM3 (ref 140–450)
PMV BLD AUTO: 6.7 FL (ref 6–12)
POTASSIUM SERPL-SCNC: 4.7 MMOL/L (ref 3.5–5.2)
PROT SERPL-MCNC: 5 G/DL (ref 6–8.5)
PROTHROMBIN TIME: 27.8 SECONDS (ref 19.4–28.5)
RBC # BLD AUTO: 2.83 10*6/MM3 (ref 4.14–5.8)
SODIUM SERPL-SCNC: 135 MMOL/L (ref 136–145)
WBC NRBC COR # BLD: 4.5 10*3/MM3 (ref 3.4–10.8)

## 2022-01-20 PROCEDURE — 25010000002 INFLUENZA VAC SPLIT QUAD 0.5 ML SUSPENSION PREFILLED SYRINGE: Performed by: INTERNAL MEDICINE

## 2022-01-20 PROCEDURE — 80053 COMPREHEN METABOLIC PANEL: CPT | Performed by: INTERNAL MEDICINE

## 2022-01-20 PROCEDURE — G0378 HOSPITAL OBSERVATION PER HR: HCPCS

## 2022-01-20 PROCEDURE — 0 AMPICILLIN-SULBACTAM PER 1.5 G: Performed by: NURSE PRACTITIONER

## 2022-01-20 PROCEDURE — 85025 COMPLETE CBC W/AUTO DIFF WBC: CPT | Performed by: INTERNAL MEDICINE

## 2022-01-20 PROCEDURE — 90686 IIV4 VACC NO PRSV 0.5 ML IM: CPT | Performed by: INTERNAL MEDICINE

## 2022-01-20 PROCEDURE — G0008 ADMIN INFLUENZA VIRUS VAC: HCPCS | Performed by: INTERNAL MEDICINE

## 2022-01-20 PROCEDURE — 85610 PROTHROMBIN TIME: CPT | Performed by: INTERNAL MEDICINE

## 2022-01-20 RX ORDER — POLYETHYLENE GLYCOL 3350 17 G/17G
17 POWDER, FOR SOLUTION ORAL DAILY
Qty: 510 G | Refills: 3 | Status: SHIPPED | OUTPATIENT
Start: 2022-01-20

## 2022-01-20 RX ADMIN — HYDRALAZINE HYDROCHLORIDE 50 MG: 25 TABLET, FILM COATED ORAL at 08:32

## 2022-01-20 RX ADMIN — SODIUM BICARBONATE 650 MG TABLET 650 MG: at 08:32

## 2022-01-20 RX ADMIN — INFLUENZA VIRUS VACCINE 0.5 ML: 15; 15; 15; 15 SUSPENSION INTRAMUSCULAR at 12:47

## 2022-01-20 RX ADMIN — SODIUM CHLORIDE, PRESERVATIVE FREE 3 ML: 5 INJECTION INTRAVENOUS at 08:33

## 2022-01-20 RX ADMIN — MAGNESIUM GLUCONATE 500 MG ORAL TABLET 400 MG: 500 TABLET ORAL at 08:32

## 2022-01-20 RX ADMIN — GABAPENTIN 300 MG: 300 CAPSULE ORAL at 08:32

## 2022-01-20 RX ADMIN — GLIPIZIDE 2.5 MG: 5 TABLET ORAL at 08:32

## 2022-01-20 RX ADMIN — PANTOPRAZOLE SODIUM 40 MG: 40 TABLET, DELAYED RELEASE ORAL at 05:49

## 2022-01-20 RX ADMIN — SODIUM CHLORIDE 3 G: 900 INJECTION INTRAVENOUS at 04:22

## 2022-01-20 NOTE — OUTREACH NOTE
Prep Survey      Responses   [facility] patient discharged from? [facility]   Is LACE score < 7 ? Yes   Emergency Room discharge w/ pulse ox? No   Eligibility Readm Mgmt   Discharge diagnosis Fecal impaction  [Hx of Covid]   Does the patient have one of the following disease processes/diagnoses(primary or secondary)? Other   Does the patient have Home health ordered? No   Is there a DME ordered? No   Prep survey completed? Yes          Muna Burch RN

## 2022-01-20 NOTE — PLAN OF CARE
Goal Outcome Evaluation:              Outcome Summary: Patient discharging home. IV removed. Use  to go over discharge instructions and patient stated understanding.

## 2022-01-20 NOTE — PROGRESS NOTES
LOS: 0 days   Patient Care Team:  Joey Islas MD as PCP - General (Family Medicine)  Neville Laughlin MD as Consulting Physician (Nephrology)    Subjective     Interval History:    Patient Complaints: Rectal cramping/burning, severe.  Had smears of stool overnight after mag citrate yesterday.    History taken from: patient    Review of Systems   Constitutional: Positive for activity change, appetite change and fatigue. Negative for chills, diaphoresis and fever.   HENT: Negative for facial swelling.    Eyes: Negative for visual disturbance.   Respiratory: Negative for cough, shortness of breath, wheezing and stridor.    Cardiovascular: Negative for chest pain, palpitations and leg swelling.   Gastrointestinal: Positive for abdominal distention, abdominal pain, constipation and nausea. Negative for diarrhea and vomiting.   Genitourinary: Negative for urgency.   Musculoskeletal: Negative for arthralgias.   Skin: Negative for rash and wound.   Neurological: Negative for weakness.   Psychiatric/Behavioral: Negative for confusion.           Objective     Vital Signs  Temp:  [97.6 °F (36.4 °C)-99.3 °F (37.4 °C)] 98.1 °F (36.7 °C)  Heart Rate:  [61-74] 65  Resp:  [16] 16  BP: (125-179)/(65-79) 125/68    Physical Exam:     General Appearance:    Alert, cooperative, in pain   Head:    Normocephalic, without obvious abnormality, atraumatic   Eyes:            Lids and lashes normal, conjunctivae and sclerae normal, no   icterus, no pallor, corneas clear, PERRLA   Ears:    Ears appear intact with no abnormalities noted   Throat:   No oral lesions, no thrush, oral mucosa moist   Neck:   No adenopathy, supple, trachea midline, no thyromegaly, no   carotid bruit, no JVD   Lungs:     Clear to auscultation,respirations regular, even and                  unlabored    Heart:    Regular rhythm and normal rate, normal S1 and S2, no            murmur, no gallop, no rub, no click   Chest Wall:    No abnormalities  observed   Abdomen:     Normal bowel sounds, no masses, no organomegaly, soft        Non-tender non-distended, no guarding,   Extremities:   Moves all extremities well, no edema, no cyanosis, no             Redness   Pulses:   Pulses palpable and equal bilaterally   Skin:   No bleeding, bruising or rash   Lymph nodes:   No palpable adenopathy   Neurologic:   Cranial nerves 2 - 12 grossly intact, sensation intact, DTR       present and equal bilaterally   Rectal exam:  Normal tone; moderate amount of soft stool in rectum, manually removed; additional stool burden was proximal to what I could dis-impact     Results Review:    Lab Results (last 24 hours)     Procedure Component Value Units Date/Time    POC Glucose Once [[id number]]  (Normal) Collected: 01/19/22 1709    Specimen: Blood Updated: 01/19/22 1741     Glucose 98 mg/dL      Comment: Serial Number: [id number]:  [id number]       Urine Culture - Urine, Urine, Clean Catch [[id number]] Collected: 01/18/22 0112    Specimen: Urine, Clean Catch Updated: 01/19/22 1242     Urine Culture >100,000 CFU/mL Mixed Angel Isolated    Narrative:      Specimen contains mixed organisms of questionable pathogenicity which indicates contamination with commensal angel.  Further identification is unlikely to provide clinically useful information.  Suggest recollection.    POC Glucose Once [[id number]]  (Abnormal) Collected: 01/19/22 1237    Specimen: Blood Updated: 01/19/22 1238     Glucose 138 mg/dL      Comment: Serial Number: [id number]:  [id number]       POC Glucose Once [[id number]]  (Abnormal) Collected: 01/19/22 0724    Specimen: Blood Updated: 01/19/22 0730     Glucose 139 mg/dL      Comment: Serial Number: [id number]:  [id number]       Comprehensive Metabolic Panel [[id number]]  (Abnormal) Collected: 01/19/22 0104    Specimen: Blood Updated: 01/19/22 0324     Glucose 131 mg/dL      BUN 22 mg/dL      Creatinine 1.74 mg/dL      Sodium 133 mmol/L      Potassium 4.9  mmol/L      Chloride 102 mmol/L      CO2 23.0 mmol/L      Calcium 8.3 mg/dL      Total Protein 5.7 g/dL      Albumin 3.00 g/dL      ALT (SGPT) 55 U/L      AST (SGOT) 27 U/L      Alkaline Phosphatase 46 U/L      Total Bilirubin 0.3 mg/dL      eGFR Non African Amer 39 mL/min/1.73      Globulin 2.7 gm/dL      A/G Ratio 1.1 g/dL      BUN/Creatinine Ratio 12.6     Anion Gap 8.0 mmol/L     Narrative:      GFR Normal >60  Chronic Kidney Disease <60  Kidney Failure <15      Protime-INR [[id number]]  (Abnormal) Collected: 01/19/22 0104    Specimen: Blood Updated: 01/19/22 0323     Protime 36.9 Seconds      INR 3.66    CBC & Differential [[id number]]  (Abnormal) Collected: 01/19/22 0104    Specimen: Blood Updated: 01/19/22 0314    Narrative:      The following orders were created for panel order CBC & Differential.  Procedure                               Abnormality         Status                     ---------                               -----------         ------                     CBC Auto Differential[[id number]]        Abnormal            Final result                 Please view results for these tests on the individual orders.    CBC Auto Differential [[id number]]  (Abnormal) Collected: 01/19/22 0104    Specimen: Blood Updated: 01/19/22 0314     WBC 9.20 10*3/mm3      RBC 3.32 10*6/mm3      Hemoglobin 10.3 g/dL      Hematocrit 30.1 %      MCV 90.8 fL      MCH 31.0 pg      MCHC 34.2 g/dL      RDW 13.2 %      RDW-SD 42.0 fl      MPV 7.0 fL      Platelets 255 10*3/mm3      Neutrophil % 90.3 %      Lymphocyte % 4.0 %      Monocyte % 5.1 %      Eosinophil % 0.3 %      Basophil % 0.3 %      Neutrophils, Absolute 8.30 10*3/mm3      Lymphocytes, Absolute 0.40 10*3/mm3      Monocytes, Absolute 0.50 10*3/mm3      Eosinophils, Absolute 0.00 10*3/mm3      Basophils, Absolute 0.00 10*3/mm3      nRBC 0.1 /100 WBC     Potassium [[id number]]  (Normal) Collected: 01/18/22 1943    Specimen: Blood Updated: 01/18/22 2039     Potassium 5.1  mmol/L            Imaging Results (Last 24 Hours)     ** No results found for the last 24 hours. **               I reviewed the patient's new clinical results.    Medication Review:   Scheduled Meds:amLODIPine, 10 mg, Oral, Nightly  ampicillin-sulbactam, 3 g, Intravenous, Q6H  gabapentin, 300 mg, Oral, TID  glipizide, 2.5 mg, Oral, QAM AC  hydrALAZINE, 50 mg, Oral, BID  magnesium oxide, 400 mg, Oral, Daily  pantoprazole, 40 mg, Oral, Q AM  sodium bicarbonate, 650 mg, Oral, BID  sodium chloride, 3 mL, Intravenous, Q12H      Continuous Infusions:   PRN Meds:.•  acetaminophen  •  hydrocortisone  •  influenza vaccine  •  melatonin  •  ondansetron  •  sodium chloride  •  witch hazel-glycerin     Assessment/Plan       Pyelonephritis  - continue antibiotics; await final culture results  Fecal impaction - partially manually removed; repeat enema and get patient up to commode.        Plan for disposition:Likely home in am if impaction resolved.    Samanta Rogers MD  01/19/22  19:14 EST

## 2022-01-20 NOTE — DISCHARGE SUMMARY
Date of Discharge:  1/20/2022    Discharge Diagnosis:   **Fecal impaction (HCC) [K56.41]   CKD (chronic kidney disease) stage 3, GFR 30-59 ml/min (HCC) [N18.30]   History of urostomy [Z98.890]   UTI (urinary tract infection), bacterial [N39.0, A49.9]   Anemia, chronic disease [D63.8]   Diabetes mellitus with neuropathy (HCC) [E11.40]   History of DVT (deep vein thrombosis) [Z86.718]       Presenting Problem/History of Present Illness  Active Hospital Problems    Diagnosis  POA   • **Fecal impaction (HCC) [K56.41]  Yes   • CKD (chronic kidney disease) stage 3, GFR 30-59 ml/min ([facility]) [N18.30]  Yes   • History of urostomy [Z98.890]  Not Applicable   • UTI (urinary tract infection), bacterial [N39.0, A49.9]  Yes   • Anemia, chronic disease [D63.8]  Yes   • Diabetes mellitus with neuropathy ([facility]) [E11.40]  Yes   • History of DVT (deep vein thrombosis) [Z86.718]  Not Applicable      Resolved Hospital Problems   No resolved problems to display.          Hospital Course  Patient is a 71 y.o. male with recent hospitalization for COVID pneumonia presented with abdominal pain. He has a urostomy and initial urine sample was concerning for infection. Culture grew mixed angel. Repeat cultures pending at the time of discharge. He also had a fecal impaction which I think was his primary problem and source of his pain. Impaction was relieved with magnesium citrate, manual disimpaction and soapsuds enema. He passed several large bowel movements and felt much better. The time of discharge he is feeling well and has no specific complaints. He has chronic kidney disease and creatinine is elevated just slightly above his baseline. This should be repeated as an outpatient. INR is therapeutic and he will follow-up with his primary care doctor to monitor his INR as he does routinely. He did have some hemorrhoidal bleeding. Hemoglobin is slightly below his baseline and this should be repeated as well as an outpatient. Fecal impaction was  likely due to recent hospitalization during which he required narcotic pain medication and was fairly immobile.     Procedures Performed         Consults:   Consults     Date and Time Order Name Status Description    1/18/2022  4:40 AM IP Consult to Family Practice      1/6/2022  7:36 AM Inpatient Nephrology Consult Completed           Pertinent Test Results:    Lab Results (most recent)     Procedure Component Value Units Date/Time    Comprehensive Metabolic Panel [[id number]]  (Abnormal) Collected: 01/20/22 0102    Specimen: Blood Updated: 01/20/22 0212     Glucose 102 mg/dL      BUN 21 mg/dL      Creatinine 2.13 mg/dL      Sodium 135 mmol/L      Potassium 4.7 mmol/L      Chloride 103 mmol/L      CO2 25.0 mmol/L      Calcium 7.9 mg/dL      Total Protein 5.0 g/dL      Albumin 2.50 g/dL      ALT (SGPT) 36 U/L      AST (SGOT) 18 U/L      Alkaline Phosphatase 37 U/L      Total Bilirubin 0.3 mg/dL      eGFR Non African Amer 31 mL/min/1.73      Globulin 2.5 gm/dL      A/G Ratio 1.0 g/dL      BUN/Creatinine Ratio 9.9     Anion Gap 7.0 mmol/L     Narrative:      GFR Normal >60  Chronic Kidney Disease <60  Kidney Failure <15      Protime-INR [[id number]]  (Normal) Collected: 01/20/22 0102    Specimen: Blood Updated: 01/20/22 0158     Protime 27.8 Seconds      INR 2.70    CBC & Differential [[id number]]  (Abnormal) Collected: 01/20/22 0102    Specimen: Blood Updated: 01/20/22 0156    Narrative:      The following orders were created for panel order CBC & Differential.  Procedure                               Abnormality         Status                     ---------                               -----------         ------                     CBC Auto Differential[[id number]]        Abnormal            Final result                 Please view results for these tests on the individual orders.    CBC Auto Differential [[id number]]  (Abnormal) Collected: 01/20/22 0102    Specimen: Blood Updated: 01/20/22 0156     WBC 4.50 10*3/mm3       RBC 2.83 10*6/mm3      Hemoglobin 8.7 g/dL      Hematocrit 26.0 %      MCV 91.7 fL      MCH 30.7 pg      MCHC 33.4 g/dL      RDW 13.3 %      RDW-SD 42.4 fl      MPV 6.7 fL      Platelets 203 10*3/mm3      Neutrophil % 67.4 %      Lymphocyte % 19.1 %      Monocyte % 10.4 %      Eosinophil % 2.5 %      Basophil % 0.6 %      Neutrophils, Absolute 3.10 10*3/mm3      Lymphocytes, Absolute 0.90 10*3/mm3      Monocytes, Absolute 0.50 10*3/mm3      Eosinophils, Absolute 0.10 10*3/mm3      Basophils, Absolute 0.00 10*3/mm3      nRBC 0.0 /100 WBC     Urinalysis, Microscopic Only - Urine, Clean Catch [[id number]]  (Abnormal) Collected: 01/19/22 2102    Specimen: Urine, Clean Catch Updated: 01/19/22 2145     RBC, UA Too Numerous to Count /HPF      WBC, UA 21-30 /HPF      Bacteria, UA None Seen /HPF      Squamous Epithelial Cells, UA 0-2 /HPF      Hyaline Casts, UA 3-6 /LPF      Methodology Automated Microscopy    Urine Culture - Urine, Urine, Clean Catch [[id number]] Collected: 01/19/22 2102    Specimen: Urine, Clean Catch Updated: 01/19/22 2145    Urinalysis With Culture If Indicated - Urine, Clean Catch [[id number]]  (Abnormal) Collected: 01/19/22 2102    Specimen: Urine, Clean Catch Updated: 01/19/22 2115     Color, UA Red     Comment: Any Substance that causes an abnormal urine color can alter the accuracy of the chemical reactions.        Appearance, UA Cloudy     pH, UA 7.0     Specific Gravity, UA 1.018     Glucose, UA Negative     Ketones, UA Trace     Bilirubin, UA Negative     Blood, UA Large (3+)     Protein,  mg/dL (2+)     Leuk Esterase, UA Small (1+)     Nitrite, UA Negative     Urobilinogen, UA 1.0 E.U./dL    POC Glucose Once [[id number]]  (Normal) Collected: 01/19/22 1709    Specimen: Blood Updated: 01/19/22 1741     Glucose 98 mg/dL      Comment: Serial Number: [id number]:  [id number]       Urine Culture - Urine, Urine, Clean Catch [[id number]] Collected: 01/18/22 0112    Specimen: Urine, Clean Catch  Updated: 01/19/22 1242     Urine Culture >100,000 CFU/mL Mixed Angel Isolated    Narrative:      Specimen contains mixed organisms of questionable pathogenicity which indicates contamination with commensal angel.  Further identification is unlikely to provide clinically useful information.  Suggest recollection.    POC Glucose Once [[id number]]  (Abnormal) Collected: 01/19/22 1237    Specimen: Blood Updated: 01/19/22 1238     Glucose 138 mg/dL      Comment: Serial Number: [id number]:  [id number]       Comprehensive Metabolic Panel [[id number]]  (Abnormal) Collected: 01/19/22 0104    Specimen: Blood Updated: 01/19/22 0324     Glucose 131 mg/dL      BUN 22 mg/dL      Creatinine 1.74 mg/dL      Sodium 133 mmol/L      Potassium 4.9 mmol/L      Chloride 102 mmol/L      CO2 23.0 mmol/L      Calcium 8.3 mg/dL      Total Protein 5.7 g/dL      Albumin 3.00 g/dL      ALT (SGPT) 55 U/L      AST (SGOT) 27 U/L      Alkaline Phosphatase 46 U/L      Total Bilirubin 0.3 mg/dL      eGFR Non African Amer 39 mL/min/1.73      Globulin 2.7 gm/dL      A/G Ratio 1.1 g/dL      BUN/Creatinine Ratio 12.6     Anion Gap 8.0 mmol/L     Narrative:      GFR Normal >60  Chronic Kidney Disease <60  Kidney Failure <15      Protime-INR [[id number]]  (Abnormal) Collected: 01/19/22 0104    Specimen: Blood Updated: 01/19/22 0323     Protime 36.9 Seconds      INR 3.66    CBC & Differential [[id number]]  (Abnormal) Collected: 01/19/22 0104    Specimen: Blood Updated: 01/19/22 0314    Narrative:      The following orders were created for panel order CBC & Differential.  Procedure                               Abnormality         Status                     ---------                               -----------         ------                     CBC Auto Differential[[id number]]        Abnormal            Final result                 Please view results for these tests on the individual orders.    CBC Auto Differential [[id number]]  (Abnormal) Collected:  01/19/22 0104    Specimen: Blood Updated: 01/19/22 0314     WBC 9.20 10*3/mm3      RBC 3.32 10*6/mm3      Hemoglobin 10.3 g/dL      Hematocrit 30.1 %      MCV 90.8 fL      MCH 31.0 pg      MCHC 34.2 g/dL      RDW 13.2 %      RDW-SD 42.0 fl      MPV 7.0 fL      Platelets 255 10*3/mm3      Neutrophil % 90.3 %      Lymphocyte % 4.0 %      Monocyte % 5.1 %      Eosinophil % 0.3 %      Basophil % 0.3 %      Neutrophils, Absolute 8.30 10*3/mm3      Lymphocytes, Absolute 0.40 10*3/mm3      Monocytes, Absolute 0.50 10*3/mm3      Eosinophils, Absolute 0.00 10*3/mm3      Basophils, Absolute 0.00 10*3/mm3      nRBC 0.1 /100 WBC     Potassium [[id number]]  (Normal) Collected: 01/18/22 1943    Specimen: Blood Updated: 01/18/22 2039     Potassium 5.1 mmol/L     Urinalysis, Microscopic Only - Urine, Clean Catch [[id number]]  (Abnormal) Collected: 01/18/22 0112    Specimen: Urine, Clean Catch Updated: 01/18/22 0121     RBC, UA Too Numerous to Count /HPF      WBC, UA 31-50 /HPF      Bacteria, UA None Seen /HPF      Squamous Epithelial Cells, UA 0-2 /HPF      Hyaline Casts, UA None Seen /LPF      Methodology Automated Microscopy    Urinalysis With Culture If Indicated - Urine, Clean Catch [[id number]]  (Abnormal) Collected: 01/18/22 0112    Specimen: Urine, Clean Catch Updated: 01/18/22 0118     Color, UA Orange     Comment: Any Substance that causes an abnormal urine color can alter the accuracy of the chemical reactions.        Appearance, UA Cloudy     pH, UA 7.5     Specific Gravity, UA 1.014     Glucose,  mg/dL (Trace)     Ketones, UA 15 mg/dL (1+)     Bilirubin, UA Negative     Blood, UA Large (3+)     Protein,  mg/dL (2+)     Leuk Esterase, UA Small (1+)     Nitrite, UA Negative     Urobilinogen, UA 1.0 E.U./dL    Extra Tubes [[id number]] Collected: 01/18/22 0023    Specimen: Blood, Venous Line Updated: 01/18/22 0105    Narrative:      The following orders were created for panel order Extra Tubes.  Procedure                                Abnormality         Status                     ---------                               -----------         ------                     Gold Top - SST[[id number]]                                   Final result                 Please view results for these tests on the individual orders.    Gold Top - SST [[id number]] Collected: 01/18/22 0023    Specimen: Blood Updated: 01/18/22 0105    Lipase [[id number]]  (Normal) Collected: 01/18/22 0023    Specimen: Blood Updated: 01/18/22 0045     Lipase 28 U/L     CK [[id number]]  (Normal) Collected: 01/18/22 0023    Specimen: Blood Updated: 01/18/22 0045     Creatine Kinase 54 U/L                        Condition on Discharge: Stable    Vital Signs  Temp:  [97.6 °F (36.4 °C)-99.3 °F (37.4 °C)] 97.8 °F (36.6 °C)  Heart Rate:  [61-65] 64  Resp:  [16-18] 16  BP: (125-156)/(58-74) 156/74    Physical Exam:     General Appearance:    Alert, cooperative, in no acute distress   Head:    Normocephalic, without obvious abnormality, atraumatic   Eyes:            Lids and lashes normal, conjunctivae and sclerae normal, no   icterus, no pallor, corneas clear, PERRLA   Ears:    Ears appear intact with no abnormalities noted   Throat:   No oral lesions, no thrush, oral mucosa moist   Neck:   No adenopathy, supple, trachea midline, no thyromegaly, no   carotid bruit, no JVD   Lungs:     Clear to auscultation,respirations regular, even and                  unlabored    Heart:    Regular rhythm and normal rate, normal S1 and S2, no            murmur, no gallop, no rub, no click   Chest Wall:    No abnormalities observed   Abdomen:    Soft, nontender, nondistended, right lower quadrant ileostomy/urostomy   Extremities:   Moves all extremities well, no edema, no cyanosis, no             redness   Pulses:   Pulses palpable and equal bilaterally   Skin:   No bleeding, bruising or rash   Lymph nodes:   No palpable adenopathy   Neurologic:   Cranial nerves 2 - 12 grossly intact,  sensation intact, DTR       present and equal bilaterally       Discharge Disposition  Home or Self Care    Discharge Medications     Discharge Medications      New Medications      Instructions Start Date   influenza vac split quad 0.5 ML suspension prefilled syringe injection  Commonly known as: FLUZONE,FLUARIX,AFLURIA,FLULAVAL   0.5 mL, Intramuscular, During Hospitalization      polyethylene glycol 17 g packet  Commonly known as: MIRALAX   17 g, Oral, Daily, Hold for loose stools.         Continue These Medications      Instructions Start Date   acetaminophen 325 MG tablet  Commonly known as: TYLENOL   650 mg, Oral, Every 4 Hours PRN      amLODIPine 10 MG tablet  Commonly known as: NORVASC   10 mg, Oral, Nightly      gabapentin 300 MG capsule  Commonly known as: NEURONTIN   300 mg, Oral, 3 Times Daily      glimepiride 1 MG tablet  Commonly known as: AMARYL   1 mg, Oral, Every Morning Before Breakfast      hydrALAZINE 50 MG tablet  Commonly known as: APRESOLINE   50 mg, Oral, 2 Times Daily      magnesium oxide 400 MG tablet  Commonly known as: MAG-OX   400 mg, Oral, Daily      pantoprazole 40 MG EC tablet  Commonly known as: PROTONIX   40 mg, Oral, Every Early Morning      sodium bicarbonate 650 MG tablet   650 mg, Oral, 2 Times Daily      warfarin 5 MG tablet  Commonly known as: COUMADIN   5 mg, Oral, Daily         Stop These Medications    budesonide-formoterol 160-4.5 MCG/ACT inhaler  Commonly known as: SYMBICORT     cefdinir 300 MG capsule  Commonly known as: OMNICEF     dexamethasone 2 MG tablet  Commonly known as: DECADRON     HYDROcodone-acetaminophen 5-325 MG per tablet  Commonly known as: NORCO     ondansetron 4 MG tablet  Commonly known as: ZOFRAN            Discharge Diet: Regular    Activity at Discharge: No restrictions    Follow-up Appointments  No future appointments.      Test Results Pending at Discharge  Pending Labs     Order Current Status    Urine Culture - Urine, Urine, Clean Catch In process            Samanta Rogers MD  01/20/22  08:43 EST    Time: Discharge 25 min

## 2022-01-20 NOTE — PLAN OF CARE
"  Problem: Adult Inpatient Plan of Care  Goal: Plan of Care Review  Outcome: Ongoing, Progressing     Problem: Adult Inpatient Plan of Care  Goal: Optimal Comfort and Wellbeing  Outcome: Ongoing, Progressing  Intervention: Provide Person-Centered Care  Recent Flowsheet Documentation  Taken 1/19/2022 1910 by Symone Everett, RN  Trust Relationship/Rapport:   care explained   choices provided   Goal Outcome Evaluation:            Pt rested well overnight, no c/o pain. VSS. IV abx given per MAR, additional urine sample sent to lab per MD. Pt had multiple large BM on day shift, pt stated \"my stomach feels much better\". Pt voiced no further concerns, potential to d/c in am. Will cont to monitor.      "

## 2022-01-21 NOTE — CASE MANAGEMENT/SOCIAL WORK
Case Management Discharge Note                Selected Continued Care - Discharged on 1/20/2022 Admission date: 1/17/2022 - Discharge disposition: Home or Self Care                    Final Discharge Disposition Code: 01 - home or self-care

## 2022-04-14 ENCOUNTER — TRANSCRIBE ORDERS (OUTPATIENT)
Dept: ADMINISTRATIVE | Facility: HOSPITAL | Age: 72
End: 2022-04-14

## 2022-04-14 ENCOUNTER — LAB (OUTPATIENT)
Dept: LAB | Facility: HOSPITAL | Age: 72
End: 2022-04-14

## 2022-04-14 DIAGNOSIS — N18.30 STAGE 3 CHRONIC KIDNEY DISEASE, UNSPECIFIED WHETHER STAGE 3A OR 3B CKD: ICD-10-CM

## 2022-04-14 DIAGNOSIS — E55.9 AVITAMINOSIS D: ICD-10-CM

## 2022-04-14 DIAGNOSIS — N18.30 STAGE 3 CHRONIC KIDNEY DISEASE, UNSPECIFIED WHETHER STAGE 3A OR 3B CKD: Primary | ICD-10-CM

## 2022-04-14 DIAGNOSIS — E11.8 DIABETIC COMPLICATION: ICD-10-CM

## 2022-04-14 DIAGNOSIS — N25.81 SECONDARY HYPERPARATHYROIDISM OF RENAL ORIGIN: ICD-10-CM

## 2022-04-14 LAB
ANION GAP SERPL CALCULATED.3IONS-SCNC: 11.4 MMOL/L (ref 5–15)
BACTERIA UR QL AUTO: ABNORMAL /HPF
BASOPHILS # BLD AUTO: 0.05 10*3/MM3 (ref 0–0.2)
BASOPHILS NFR BLD AUTO: 0.7 % (ref 0–1.5)
BILIRUB UR QL STRIP: NEGATIVE
BUN SERPL-MCNC: 31 MG/DL (ref 8–23)
BUN/CREAT SERPL: 13.7 (ref 7–25)
CALCIUM SPEC-SCNC: 9.4 MG/DL (ref 8.6–10.5)
CHLORIDE SERPL-SCNC: 103 MMOL/L (ref 98–107)
CLARITY UR: ABNORMAL
CO2 SERPL-SCNC: 23.6 MMOL/L (ref 22–29)
COLOR UR: YELLOW
CREAT SERPL-MCNC: 2.27 MG/DL (ref 0.76–1.27)
CREAT UR-MCNC: 159.6 MG/DL
DEPRECATED RDW RBC AUTO: 41.3 FL (ref 37–54)
EGFRCR SERPLBLD CKD-EPI 2021: 30.1 ML/MIN/1.73
EOSINOPHIL # BLD AUTO: 0.03 10*3/MM3 (ref 0–0.4)
EOSINOPHIL NFR BLD AUTO: 0.4 % (ref 0.3–6.2)
ERYTHROCYTE [DISTWIDTH] IN BLOOD BY AUTOMATED COUNT: 13 % (ref 12.3–15.4)
GLUCOSE SERPL-MCNC: 91 MG/DL (ref 65–99)
GLUCOSE UR STRIP-MCNC: NEGATIVE MG/DL
HBA1C MFR BLD: 5.9 % (ref 3.5–5.6)
HCT VFR BLD AUTO: 36.1 % (ref 37.5–51)
HGB BLD-MCNC: 11.8 G/DL (ref 13–17.7)
HGB UR QL STRIP.AUTO: ABNORMAL
HYALINE CASTS UR QL AUTO: ABNORMAL /LPF
IMM GRANULOCYTES # BLD AUTO: 0.01 10*3/MM3 (ref 0–0.05)
IMM GRANULOCYTES NFR BLD AUTO: 0.1 % (ref 0–0.5)
KETONES UR QL STRIP: ABNORMAL
LEUKOCYTE ESTERASE UR QL STRIP.AUTO: ABNORMAL
LYMPHOCYTES # BLD AUTO: 0.71 10*3/MM3 (ref 0.7–3.1)
LYMPHOCYTES NFR BLD AUTO: 10.1 % (ref 19.6–45.3)
MCH RBC QN AUTO: 28.8 PG (ref 26.6–33)
MCHC RBC AUTO-ENTMCNC: 32.7 G/DL (ref 31.5–35.7)
MCV RBC AUTO: 88 FL (ref 79–97)
MONOCYTES # BLD AUTO: 0.66 10*3/MM3 (ref 0.1–0.9)
MONOCYTES NFR BLD AUTO: 9.4 % (ref 5–12)
NEUTROPHILS NFR BLD AUTO: 5.57 10*3/MM3 (ref 1.7–7)
NEUTROPHILS NFR BLD AUTO: 79.3 % (ref 42.7–76)
NITRITE UR QL STRIP: NEGATIVE
NRBC BLD AUTO-RTO: 0 /100 WBC (ref 0–0.2)
PH UR STRIP.AUTO: 6 [PH] (ref 5–8)
PLATELET # BLD AUTO: 248 10*3/MM3 (ref 140–450)
PMV BLD AUTO: 9.2 FL (ref 6–12)
POTASSIUM SERPL-SCNC: 4.2 MMOL/L (ref 3.5–5.2)
PROT ?TM UR-MCNC: 448.4 MG/DL
PROT UR QL STRIP: ABNORMAL
PROT/CREAT UR: 2809.5 MG/G CREA (ref 0–200)
PTH-INTACT SERPL-MCNC: 65 PG/ML (ref 15–65)
RBC # BLD AUTO: 4.1 10*6/MM3 (ref 4.14–5.8)
RBC # UR STRIP: ABNORMAL /HPF
REF LAB TEST METHOD: ABNORMAL
SODIUM SERPL-SCNC: 138 MMOL/L (ref 136–145)
SP GR UR STRIP: 1.02 (ref 1–1.03)
SQUAMOUS #/AREA URNS HPF: ABNORMAL /HPF
UROBILINOGEN UR QL STRIP: ABNORMAL
VIT B12 BLD-MCNC: 337 PG/ML (ref 211–946)
WBC # UR STRIP: ABNORMAL /HPF
WBC NRBC COR # BLD: 7.03 10*3/MM3 (ref 3.4–10.8)

## 2022-04-14 PROCEDURE — 83036 HEMOGLOBIN GLYCOSYLATED A1C: CPT

## 2022-04-14 PROCEDURE — 80048 BASIC METABOLIC PNL TOTAL CA: CPT

## 2022-04-14 PROCEDURE — 83970 ASSAY OF PARATHORMONE: CPT

## 2022-04-14 PROCEDURE — 82570 ASSAY OF URINE CREATININE: CPT

## 2022-04-14 PROCEDURE — 36415 COLL VENOUS BLD VENIPUNCTURE: CPT

## 2022-04-14 PROCEDURE — 81001 URINALYSIS AUTO W/SCOPE: CPT

## 2022-04-14 PROCEDURE — 82607 VITAMIN B-12: CPT

## 2022-04-14 PROCEDURE — 87086 URINE CULTURE/COLONY COUNT: CPT

## 2022-04-14 PROCEDURE — 84156 ASSAY OF PROTEIN URINE: CPT

## 2022-04-14 PROCEDURE — 85025 COMPLETE CBC W/AUTO DIFF WBC: CPT

## 2022-04-16 LAB — BACTERIA SPEC AEROBE CULT: NORMAL

## 2023-01-17 ENCOUNTER — APPOINTMENT (OUTPATIENT)
Dept: GENERAL RADIOLOGY | Facility: HOSPITAL | Age: 73
DRG: 178 | End: 2023-01-17
Payer: MEDICARE

## 2023-01-17 ENCOUNTER — HOSPITAL ENCOUNTER (INPATIENT)
Facility: HOSPITAL | Age: 73
LOS: 2 days | Discharge: HOME | DRG: 178 | End: 2023-01-21
Attending: INTERNAL MEDICINE | Admitting: INTERNAL MEDICINE
Payer: MEDICARE

## 2023-01-17 DIAGNOSIS — U07.1 COVID-19: Primary | ICD-10-CM

## 2023-01-17 DIAGNOSIS — R50.81 FEVER IN OTHER DISEASES: ICD-10-CM

## 2023-01-17 DIAGNOSIS — N18.4 STAGE 4 CHRONIC KIDNEY DISEASE: ICD-10-CM

## 2023-01-17 PROBLEM — R50.9 FEVER: Status: ACTIVE | Noted: 2023-01-17

## 2023-01-17 LAB
ALBUMIN SERPL-MCNC: 3.8 G/DL (ref 3.5–5.2)
ALBUMIN/GLOB SERPL: 1.5 G/DL
ALP SERPL-CCNC: 57 U/L (ref 39–117)
ALT SERPL W P-5'-P-CCNC: 13 U/L (ref 1–41)
ANION GAP SERPL CALCULATED.3IONS-SCNC: 11 MMOL/L (ref 5–15)
AST SERPL-CCNC: 16 U/L (ref 1–40)
B PARAPERT DNA SPEC QL NAA+PROBE: NOT DETECTED
B PERT DNA SPEC QL NAA+PROBE: NOT DETECTED
BACTERIA UR QL AUTO: ABNORMAL /HPF
BASOPHILS # BLD AUTO: 0 10*3/MM3 (ref 0–0.2)
BASOPHILS NFR BLD AUTO: 0.7 % (ref 0–1.5)
BILIRUB SERPL-MCNC: 0.4 MG/DL (ref 0–1.2)
BILIRUB UR QL STRIP: NEGATIVE
BUN SERPL-MCNC: 35 MG/DL (ref 8–23)
BUN/CREAT SERPL: 14.3 (ref 7–25)
C PNEUM DNA NPH QL NAA+NON-PROBE: NOT DETECTED
CALCIUM SPEC-SCNC: 8.6 MG/DL (ref 8.6–10.5)
CHLORIDE SERPL-SCNC: 103 MMOL/L (ref 98–107)
CLARITY UR: ABNORMAL
CO2 SERPL-SCNC: 25 MMOL/L (ref 22–29)
COLOR UR: YELLOW
CREAT SERPL-MCNC: 2.44 MG/DL (ref 0.76–1.27)
D-LACTATE SERPL-SCNC: 1.2 MMOL/L (ref 0.5–2)
DEPRECATED RDW RBC AUTO: 43.3 FL (ref 37–54)
EGFRCR SERPLBLD CKD-EPI 2021: 27.4 ML/MIN/1.73
EOSINOPHIL # BLD AUTO: 0 10*3/MM3 (ref 0–0.4)
EOSINOPHIL NFR BLD AUTO: 0.2 % (ref 0.3–6.2)
ERYTHROCYTE [DISTWIDTH] IN BLOOD BY AUTOMATED COUNT: 13.6 % (ref 12.3–15.4)
FLUAV SUBTYP SPEC NAA+PROBE: NOT DETECTED
FLUBV RNA ISLT QL NAA+PROBE: NOT DETECTED
GLOBULIN UR ELPH-MCNC: 2.6 GM/DL
GLUCOSE BLDC GLUCOMTR-MCNC: 71 MG/DL (ref 70–105)
GLUCOSE SERPL-MCNC: 85 MG/DL (ref 65–99)
GLUCOSE UR STRIP-MCNC: ABNORMAL MG/DL
HADV DNA SPEC NAA+PROBE: NOT DETECTED
HCOV 229E RNA SPEC QL NAA+PROBE: NOT DETECTED
HCOV HKU1 RNA SPEC QL NAA+PROBE: NOT DETECTED
HCOV NL63 RNA SPEC QL NAA+PROBE: NOT DETECTED
HCOV OC43 RNA SPEC QL NAA+PROBE: NOT DETECTED
HCT VFR BLD AUTO: 33.1 % (ref 37.5–51)
HGB BLD-MCNC: 11.4 G/DL (ref 13–17.7)
HGB UR QL STRIP.AUTO: NEGATIVE
HMPV RNA NPH QL NAA+NON-PROBE: NOT DETECTED
HPIV1 RNA ISLT QL NAA+PROBE: NOT DETECTED
HPIV2 RNA SPEC QL NAA+PROBE: NOT DETECTED
HPIV3 RNA NPH QL NAA+PROBE: NOT DETECTED
HPIV4 P GENE NPH QL NAA+PROBE: NOT DETECTED
HYALINE CASTS UR QL AUTO: ABNORMAL /LPF
INR PPP: 3 (ref 2–3)
KETONES UR QL STRIP: NEGATIVE
LEUKOCYTE ESTERASE UR QL STRIP.AUTO: ABNORMAL
LYMPHOCYTES # BLD AUTO: 0.4 10*3/MM3 (ref 0.7–3.1)
LYMPHOCYTES NFR BLD AUTO: 8.8 % (ref 19.6–45.3)
M PNEUMO IGG SER IA-ACNC: NOT DETECTED
MCH RBC QN AUTO: 29.6 PG (ref 26.6–33)
MCHC RBC AUTO-ENTMCNC: 34.3 G/DL (ref 31.5–35.7)
MCV RBC AUTO: 86.2 FL (ref 79–97)
MONOCYTES # BLD AUTO: 0.8 10*3/MM3 (ref 0.1–0.9)
MONOCYTES NFR BLD AUTO: 17 % (ref 5–12)
NEUTROPHILS NFR BLD AUTO: 3.6 10*3/MM3 (ref 1.7–7)
NEUTROPHILS NFR BLD AUTO: 73.3 % (ref 42.7–76)
NITRITE UR QL STRIP: NEGATIVE
NRBC BLD AUTO-RTO: 0 /100 WBC (ref 0–0.2)
PH UR STRIP.AUTO: >=9 [PH] (ref 5–8)
PLATELET # BLD AUTO: 187 10*3/MM3 (ref 140–450)
PMV BLD AUTO: 7 FL (ref 6–12)
POTASSIUM SERPL-SCNC: 4 MMOL/L (ref 3.5–5.2)
PROT SERPL-MCNC: 6.4 G/DL (ref 6–8.5)
PROT UR QL STRIP: ABNORMAL
PROTHROMBIN TIME: 29 SECONDS (ref 19.4–28.5)
RBC # BLD AUTO: 3.84 10*6/MM3 (ref 4.14–5.8)
RBC # UR STRIP: ABNORMAL /HPF
REF LAB TEST METHOD: ABNORMAL
RHINOVIRUS RNA SPEC NAA+PROBE: NOT DETECTED
RSV RNA NPH QL NAA+NON-PROBE: NOT DETECTED
SARS-COV-2 RNA NPH QL NAA+NON-PROBE: DETECTED
SODIUM SERPL-SCNC: 139 MMOL/L (ref 136–145)
SP GR UR STRIP: 1.02 (ref 1–1.03)
SQUAMOUS #/AREA URNS HPF: ABNORMAL /HPF
TRI-PHOS CRY URNS QL MICRO: ABNORMAL /HPF
TROPONIN T SERPL-MCNC: <0.01 NG/ML (ref 0–0.03)
UROBILINOGEN UR QL STRIP: ABNORMAL
WBC # UR STRIP: ABNORMAL /HPF
WBC NRBC COR # BLD: 4.9 10*3/MM3 (ref 3.4–10.8)

## 2023-01-17 PROCEDURE — 83605 ASSAY OF LACTIC ACID: CPT

## 2023-01-17 PROCEDURE — 87040 BLOOD CULTURE FOR BACTERIA: CPT | Performed by: NURSE PRACTITIONER

## 2023-01-17 PROCEDURE — 87147 CULTURE TYPE IMMUNOLOGIC: CPT | Performed by: NURSE PRACTITIONER

## 2023-01-17 PROCEDURE — 85025 COMPLETE CBC W/AUTO DIFF WBC: CPT | Performed by: NURSE PRACTITIONER

## 2023-01-17 PROCEDURE — 3E03329 INTRODUCTION OF OTHER ANTI-INFECTIVE INTO PERIPHERAL VEIN, PERCUTANEOUS APPROACH: ICD-10-PCS | Performed by: FAMILY MEDICINE

## 2023-01-17 PROCEDURE — 93005 ELECTROCARDIOGRAM TRACING: CPT | Performed by: NURSE PRACTITIONER

## 2023-01-17 PROCEDURE — 3E0DX3Z INTRODUCTION OF ANTI-INFLAMMATORY INTO MOUTH AND PHARYNX, EXTERNAL APPROACH: ICD-10-PCS | Performed by: FAMILY MEDICINE

## 2023-01-17 PROCEDURE — 82962 GLUCOSE BLOOD TEST: CPT

## 2023-01-17 PROCEDURE — 84484 ASSAY OF TROPONIN QUANT: CPT | Performed by: NURSE PRACTITIONER

## 2023-01-17 PROCEDURE — 80053 COMPREHEN METABOLIC PANEL: CPT | Performed by: NURSE PRACTITIONER

## 2023-01-17 PROCEDURE — XW033E5 INTRODUCTION OF REMDESIVIR ANTI-INFECTIVE INTO PERIPHERAL VEIN, PERCUTANEOUS APPROACH, NEW TECHNOLOGY GROUP 5: ICD-10-PCS | Performed by: FAMILY MEDICINE

## 2023-01-17 PROCEDURE — 71045 X-RAY EXAM CHEST 1 VIEW: CPT

## 2023-01-17 PROCEDURE — 87150 DNA/RNA AMPLIFIED PROBE: CPT | Performed by: NURSE PRACTITIONER

## 2023-01-17 PROCEDURE — 0202U NFCT DS 22 TRGT SARS-COV-2: CPT | Performed by: NURSE PRACTITIONER

## 2023-01-17 PROCEDURE — 99285 EMERGENCY DEPT VISIT HI MDM: CPT

## 2023-01-17 PROCEDURE — 85610 PROTHROMBIN TIME: CPT | Performed by: NURSE PRACTITIONER

## 2023-01-17 PROCEDURE — 81001 URINALYSIS AUTO W/SCOPE: CPT | Performed by: NURSE PRACTITIONER

## 2023-01-17 RX ORDER — ONDANSETRON 2 MG/ML
4 INJECTION INTRAMUSCULAR; INTRAVENOUS EVERY 6 HOURS PRN
Status: DISCONTINUED | OUTPATIENT
Start: 2023-01-17 | End: 2023-01-21 | Stop reason: HOSPADM

## 2023-01-17 RX ORDER — SODIUM CHLORIDE 0.9 % (FLUSH) 0.9 %
3 SYRINGE (ML) INJECTION EVERY 12 HOURS SCHEDULED
Status: DISCONTINUED | OUTPATIENT
Start: 2023-01-17 | End: 2023-01-21 | Stop reason: HOSPADM

## 2023-01-17 RX ORDER — SODIUM CHLORIDE, SODIUM LACTATE, POTASSIUM CHLORIDE, CALCIUM CHLORIDE 600; 310; 30; 20 MG/100ML; MG/100ML; MG/100ML; MG/100ML
75 INJECTION, SOLUTION INTRAVENOUS CONTINUOUS
Status: DISCONTINUED | OUTPATIENT
Start: 2023-01-17 | End: 2023-01-18

## 2023-01-17 RX ORDER — SODIUM CHLORIDE 9 MG/ML
40 INJECTION, SOLUTION INTRAVENOUS AS NEEDED
Status: DISCONTINUED | OUTPATIENT
Start: 2023-01-17 | End: 2023-01-21 | Stop reason: HOSPADM

## 2023-01-17 RX ORDER — SODIUM CHLORIDE 0.9 % (FLUSH) 0.9 %
10 SYRINGE (ML) INJECTION AS NEEDED
Status: DISCONTINUED | OUTPATIENT
Start: 2023-01-17 | End: 2023-01-21 | Stop reason: HOSPADM

## 2023-01-17 RX ORDER — PANTOPRAZOLE SODIUM 40 MG/1
40 TABLET, DELAYED RELEASE ORAL
Status: DISCONTINUED | OUTPATIENT
Start: 2023-01-18 | End: 2023-01-20

## 2023-01-17 RX ORDER — ENOXAPARIN SODIUM 100 MG/ML
40 INJECTION SUBCUTANEOUS DAILY
Status: DISCONTINUED | OUTPATIENT
Start: 2023-01-17 | End: 2023-01-18

## 2023-01-17 RX ORDER — ACETAMINOPHEN 325 MG/1
650 TABLET ORAL EVERY 4 HOURS PRN
Status: DISCONTINUED | OUTPATIENT
Start: 2023-01-17 | End: 2023-01-21 | Stop reason: HOSPADM

## 2023-01-17 RX ORDER — NITROGLYCERIN 0.4 MG/1
0.4 TABLET SUBLINGUAL
Status: DISCONTINUED | OUTPATIENT
Start: 2023-01-17 | End: 2023-01-21 | Stop reason: HOSPADM

## 2023-01-17 RX ORDER — SODIUM CHLORIDE 0.9 % (FLUSH) 0.9 %
3-10 SYRINGE (ML) INJECTION AS NEEDED
Status: DISCONTINUED | OUTPATIENT
Start: 2023-01-17 | End: 2023-01-21 | Stop reason: HOSPADM

## 2023-01-17 RX ADMIN — SODIUM CHLORIDE, POTASSIUM CHLORIDE, SODIUM LACTATE AND CALCIUM CHLORIDE 75 ML/HR: 600; 310; 30; 20 INJECTION, SOLUTION INTRAVENOUS at 21:48

## 2023-01-17 NOTE — Clinical Note
Level of Care: Telemetry [5]   Diagnosis: COVID-19 [[id number]]   Admitting Physician: ANTONIO MAYEN [[id number]]

## 2023-01-18 LAB
ALBUMIN SERPL-MCNC: 3.4 G/DL (ref 3.5–5.2)
ALP SERPL-CCNC: 55 U/L (ref 39–117)
ALT SERPL W P-5'-P-CCNC: 12 U/L (ref 1–41)
ANION GAP SERPL CALCULATED.3IONS-SCNC: 9 MMOL/L (ref 5–15)
AST SERPL-CCNC: 18 U/L (ref 1–40)
BACTERIA BLD CULT: ABNORMAL
BASOPHILS # BLD AUTO: 0 10*3/MM3 (ref 0–0.2)
BASOPHILS NFR BLD AUTO: 1 % (ref 0–1.5)
BILIRUB CONJ SERPL-MCNC: <0.2 MG/DL (ref 0–0.3)
BILIRUB INDIRECT SERPL-MCNC: ABNORMAL MG/DL
BILIRUB SERPL-MCNC: 0.3 MG/DL (ref 0–1.2)
BOTTLE TYPE: ABNORMAL
BUN SERPL-MCNC: 33 MG/DL (ref 8–23)
BUN/CREAT SERPL: 15 (ref 7–25)
CALCIUM SPEC-SCNC: 8.2 MG/DL (ref 8.6–10.5)
CHLORIDE SERPL-SCNC: 103 MMOL/L (ref 98–107)
CO2 SERPL-SCNC: 25 MMOL/L (ref 22–29)
CREAT SERPL-MCNC: 2.2 MG/DL (ref 0.76–1.27)
DEPRECATED RDW RBC AUTO: 41.6 FL (ref 37–54)
EGFRCR SERPLBLD CKD-EPI 2021: 31 ML/MIN/1.73
EOSINOPHIL # BLD AUTO: 0 10*3/MM3 (ref 0–0.4)
EOSINOPHIL NFR BLD AUTO: 0.8 % (ref 0.3–6.2)
ERYTHROCYTE [DISTWIDTH] IN BLOOD BY AUTOMATED COUNT: 13.4 % (ref 12.3–15.4)
GLUCOSE BLDC GLUCOMTR-MCNC: 136 MG/DL (ref 70–105)
GLUCOSE BLDC GLUCOMTR-MCNC: 323 MG/DL (ref 70–105)
GLUCOSE SERPL-MCNC: 238 MG/DL (ref 65–99)
HCT VFR BLD AUTO: 33.5 % (ref 37.5–51)
HGB BLD-MCNC: 10.9 G/DL (ref 13–17.7)
LYMPHOCYTES # BLD AUTO: 0.4 10*3/MM3 (ref 0.7–3.1)
LYMPHOCYTES NFR BLD AUTO: 9.3 % (ref 19.6–45.3)
MCH RBC QN AUTO: 28.7 PG (ref 26.6–33)
MCHC RBC AUTO-ENTMCNC: 32.5 G/DL (ref 31.5–35.7)
MCV RBC AUTO: 88.4 FL (ref 79–97)
MONOCYTES # BLD AUTO: 0.7 10*3/MM3 (ref 0.1–0.9)
MONOCYTES NFR BLD AUTO: 19.5 % (ref 5–12)
NEUTROPHILS NFR BLD AUTO: 2.6 10*3/MM3 (ref 1.7–7)
NEUTROPHILS NFR BLD AUTO: 69.4 % (ref 42.7–76)
NRBC BLD AUTO-RTO: 0.1 /100 WBC (ref 0–0.2)
PLATELET # BLD AUTO: 158 10*3/MM3 (ref 140–450)
PMV BLD AUTO: 7.3 FL (ref 6–12)
POTASSIUM SERPL-SCNC: 4.2 MMOL/L (ref 3.5–5.2)
PROT SERPL-MCNC: 5.7 G/DL (ref 6–8.5)
RBC # BLD AUTO: 3.79 10*6/MM3 (ref 4.14–5.8)
SODIUM SERPL-SCNC: 137 MMOL/L (ref 136–145)
WBC NRBC COR # BLD: 3.8 10*3/MM3 (ref 3.4–10.8)

## 2023-01-18 PROCEDURE — 94799 UNLISTED PULMONARY SVC/PX: CPT

## 2023-01-18 PROCEDURE — G0378 HOSPITAL OBSERVATION PER HR: HCPCS

## 2023-01-18 PROCEDURE — 85025 COMPLETE CBC W/AUTO DIFF WBC: CPT

## 2023-01-18 PROCEDURE — 63710000001 DEXAMETHASONE PER 0.25 MG: Performed by: INTERNAL MEDICINE

## 2023-01-18 PROCEDURE — 25010000002 REMDESIVIR 100 MG RECONSTITUTED SOLUTION: Performed by: INTERNAL MEDICINE

## 2023-01-18 PROCEDURE — 94640 AIRWAY INHALATION TREATMENT: CPT

## 2023-01-18 PROCEDURE — 36415 COLL VENOUS BLD VENIPUNCTURE: CPT

## 2023-01-18 PROCEDURE — 80076 HEPATIC FUNCTION PANEL: CPT | Performed by: INTERNAL MEDICINE

## 2023-01-18 PROCEDURE — 25010000002 CEFTRIAXONE PER 250 MG: Performed by: INTERNAL MEDICINE

## 2023-01-18 PROCEDURE — 82962 GLUCOSE BLOOD TEST: CPT

## 2023-01-18 PROCEDURE — 80048 BASIC METABOLIC PNL TOTAL CA: CPT

## 2023-01-18 RX ORDER — OLANZAPINE 10 MG/2ML
1 INJECTION, POWDER, LYOPHILIZED, FOR SOLUTION INTRAMUSCULAR
Status: DISCONTINUED | OUTPATIENT
Start: 2023-01-18 | End: 2023-01-19

## 2023-01-18 RX ORDER — DEXTROSE MONOHYDRATE 25 G/50ML
25 INJECTION, SOLUTION INTRAVENOUS
Status: DISCONTINUED | OUTPATIENT
Start: 2023-01-18 | End: 2023-01-21 | Stop reason: HOSPADM

## 2023-01-18 RX ORDER — POLYETHYLENE GLYCOL 3350 17 G/17G
17 POWDER, FOR SOLUTION ORAL DAILY
Status: DISCONTINUED | OUTPATIENT
Start: 2023-01-18 | End: 2023-01-21 | Stop reason: HOSPADM

## 2023-01-18 RX ORDER — ENOXAPARIN SODIUM 100 MG/ML
30 INJECTION SUBCUTANEOUS EVERY 24 HOURS
Status: DISCONTINUED | OUTPATIENT
Start: 2023-01-18 | End: 2023-01-18

## 2023-01-18 RX ORDER — AMLODIPINE BESYLATE 5 MG/1
10 TABLET ORAL NIGHTLY
Status: DISCONTINUED | OUTPATIENT
Start: 2023-01-18 | End: 2023-01-21 | Stop reason: HOSPADM

## 2023-01-18 RX ORDER — GABAPENTIN 300 MG/1
300 CAPSULE ORAL 3 TIMES DAILY
Status: DISCONTINUED | OUTPATIENT
Start: 2023-01-18 | End: 2023-01-21 | Stop reason: HOSPADM

## 2023-01-18 RX ORDER — ACETAMINOPHEN 325 MG/1
650 TABLET ORAL EVERY 4 HOURS PRN
Status: DISCONTINUED | OUTPATIENT
Start: 2023-01-18 | End: 2023-01-18 | Stop reason: SDUPTHER

## 2023-01-18 RX ORDER — GLIPIZIDE 5 MG/1
2.5 TABLET ORAL
Status: DISCONTINUED | OUTPATIENT
Start: 2023-01-18 | End: 2023-01-21 | Stop reason: HOSPADM

## 2023-01-18 RX ORDER — ALBUTEROL SULFATE 90 UG/1
2 AEROSOL, METERED RESPIRATORY (INHALATION) 2 TIMES DAILY
Status: DISCONTINUED | OUTPATIENT
Start: 2023-01-19 | End: 2023-01-21

## 2023-01-18 RX ORDER — TRIAMCINOLONE ACETONIDE 1 MG/ML
1 LOTION TOPICAL 2 TIMES DAILY
COMMUNITY

## 2023-01-18 RX ORDER — HYDRALAZINE HYDROCHLORIDE 20 MG/ML
10 INJECTION INTRAMUSCULAR; INTRAVENOUS EVERY 6 HOURS PRN
Status: DISCONTINUED | OUTPATIENT
Start: 2023-01-18 | End: 2023-01-21 | Stop reason: HOSPADM

## 2023-01-18 RX ORDER — INSULIN LISPRO 100 [IU]/ML
2-7 INJECTION, SOLUTION INTRAVENOUS; SUBCUTANEOUS
Status: DISCONTINUED | OUTPATIENT
Start: 2023-01-18 | End: 2023-01-19

## 2023-01-18 RX ORDER — ALUMINA, MAGNESIA, AND SIMETHICONE 2400; 2400; 240 MG/30ML; MG/30ML; MG/30ML
15 SUSPENSION ORAL EVERY 6 HOURS PRN
Status: DISCONTINUED | OUTPATIENT
Start: 2023-01-18 | End: 2023-01-21 | Stop reason: HOSPADM

## 2023-01-18 RX ORDER — NICOTINE POLACRILEX 4 MG
15 LOZENGE BUCCAL
Status: DISCONTINUED | OUTPATIENT
Start: 2023-01-18 | End: 2023-01-19

## 2023-01-18 RX ORDER — ALBUTEROL SULFATE 90 UG/1
2 AEROSOL, METERED RESPIRATORY (INHALATION)
Status: DISCONTINUED | OUTPATIENT
Start: 2023-01-18 | End: 2023-01-18

## 2023-01-18 RX ORDER — SODIUM BICARBONATE 650 MG/1
650 TABLET ORAL 2 TIMES DAILY
Status: DISCONTINUED | OUTPATIENT
Start: 2023-01-18 | End: 2023-01-21 | Stop reason: HOSPADM

## 2023-01-18 RX ORDER — BUDESONIDE AND FORMOTEROL FUMARATE DIHYDRATE 160; 4.5 UG/1; UG/1
2 AEROSOL RESPIRATORY (INHALATION)
Status: DISCONTINUED | OUTPATIENT
Start: 2023-01-18 | End: 2023-01-19

## 2023-01-18 RX ORDER — HYDRALAZINE HYDROCHLORIDE 25 MG/1
75 TABLET, FILM COATED ORAL 3 TIMES DAILY
Status: DISCONTINUED | OUTPATIENT
Start: 2023-01-18 | End: 2023-01-21 | Stop reason: HOSPADM

## 2023-01-18 RX ORDER — DEXAMETHASONE 4 MG/1
6 TABLET ORAL
Status: DISCONTINUED | OUTPATIENT
Start: 2023-01-18 | End: 2023-01-19

## 2023-01-18 RX ORDER — CALCIUM CARBONATE 200(500)MG
2 TABLET,CHEWABLE ORAL 3 TIMES DAILY PRN
Status: DISCONTINUED | OUTPATIENT
Start: 2023-01-18 | End: 2023-01-21 | Stop reason: HOSPADM

## 2023-01-18 RX ORDER — GUAIFENESIN 600 MG/1
600 TABLET, EXTENDED RELEASE ORAL EVERY 12 HOURS SCHEDULED
Status: DISCONTINUED | OUTPATIENT
Start: 2023-01-18 | End: 2023-01-21 | Stop reason: HOSPADM

## 2023-01-18 RX ADMIN — BUDESONIDE AND FORMOTEROL FUMARATE DIHYDRATE 2 PUFF: 160; 4.5 AEROSOL RESPIRATORY (INHALATION) at 19:55

## 2023-01-18 RX ADMIN — POLYETHYLENE GLYCOL 3350 17 G: 17 POWDER, FOR SOLUTION ORAL at 14:02

## 2023-01-18 RX ADMIN — GABAPENTIN 300 MG: 300 CAPSULE ORAL at 15:17

## 2023-01-18 RX ADMIN — ACETAMINOPHEN 650 MG: 325 TABLET, FILM COATED ORAL at 05:40

## 2023-01-18 RX ADMIN — AMLODIPINE BESYLATE 10 MG: 5 TABLET ORAL at 21:22

## 2023-01-18 RX ADMIN — HYDRALAZINE HYDROCHLORIDE 75 MG: 25 TABLET, FILM COATED ORAL at 15:17

## 2023-01-18 RX ADMIN — Medication 3 ML: at 21:22

## 2023-01-18 RX ADMIN — HYDRALAZINE HYDROCHLORIDE 75 MG: 25 TABLET, FILM COATED ORAL at 21:22

## 2023-01-18 RX ADMIN — PANTOPRAZOLE SODIUM 40 MG: 40 TABLET, DELAYED RELEASE ORAL at 05:40

## 2023-01-18 RX ADMIN — ALBUTEROL SULFATE 2 PUFF: 108 INHALANT RESPIRATORY (INHALATION) at 19:55

## 2023-01-18 RX ADMIN — GLIPIZIDE 2.5 MG: 5 TABLET ORAL at 14:08

## 2023-01-18 RX ADMIN — ALUMINUM HYDROXIDE, MAGNESIUM HYDROXIDE, AND DIMETHICONE 15 ML: 400; 400; 40 SUSPENSION ORAL at 13:59

## 2023-01-18 RX ADMIN — CEFTRIAXONE 1 G: 1 INJECTION, POWDER, FOR SOLUTION INTRAMUSCULAR; INTRAVENOUS at 14:01

## 2023-01-18 RX ADMIN — GUAIFENESIN 600 MG: 600 TABLET, EXTENDED RELEASE ORAL at 21:22

## 2023-01-18 RX ADMIN — SODIUM BICARBONATE 650 MG: 650 TABLET ORAL at 21:23

## 2023-01-18 RX ADMIN — SODIUM BICARBONATE 650 MG: 650 TABLET ORAL at 14:03

## 2023-01-18 RX ADMIN — DEXAMETHASONE 6 MG: 4 TABLET ORAL at 14:01

## 2023-01-18 RX ADMIN — REMDESIVIR 200 MG: 100 INJECTION, POWDER, LYOPHILIZED, FOR SOLUTION INTRAVENOUS at 15:17

## 2023-01-18 RX ADMIN — GUAIFENESIN 600 MG: 600 TABLET, EXTENDED RELEASE ORAL at 14:01

## 2023-01-18 RX ADMIN — GABAPENTIN 300 MG: 300 CAPSULE ORAL at 21:22

## 2023-01-18 RX ADMIN — MAGNESIUM OXIDE TAB 400 MG (241.3 MG ELEMENTAL MG) 400 MG: 400 (241.3 MG) TAB at 14:02

## 2023-01-18 NOTE — PROGRESS NOTES
LOS: 0 days   Patient Care Team:  Joey Islas MD as PCP - General (Family Medicine)  Neville Laughlin MD as Consulting Physician (Nephrology)    Subjective      Sister in law functions as  with patient's consent.    Interval History: Admitted last night for COVID with fever, low oxygen sats, nausea.    Patient Complaints: SOA improved with supplemental oxygen    History taken from: patient    Review of Systems   Constitutional: Positive for activity change, appetite change, fatigue and fever. Negative for chills and diaphoresis.   HENT: Negative for facial swelling.    Eyes: Negative for visual disturbance.   Respiratory: Positive for cough and shortness of breath. Negative for wheezing and stridor.    Cardiovascular: Negative for chest pain, palpitations and leg swelling.   Gastrointestinal: Positive for nausea. Negative for abdominal pain, diarrhea and vomiting.   Genitourinary: Negative for dysuria.   Musculoskeletal: Negative for arthralgias, back pain and gait problem.   Skin: Negative for rash and wound.   Neurological: Negative for dizziness, light-headedness and headaches.   Psychiatric/Behavioral: Negative for confusion.           Objective     Vital Signs  Temp:  [98.6 °F (37 °C)-100 °F (37.8 °C)] 99.2 °F (37.3 °C)  Heart Rate:  [58-74] 68  Resp:  [14-16] 16  BP: (126-188)/(53-75) 157/68    Physical Exam:     General Appearance:    Alert, cooperative, in no acute distress,   Head:    Normocephalic, without obvious abnormality, atraumatic   Eyes:            Lids and lashes normal, conjunctivae and sclerae normal, no   icterus, no pallor, corneas clear, PERRLA   Ears:    Ears appear intact with no abnormalities noted   Throat:   No oral lesions, no thrush, oral mucosa moist   Neck:   No adenopathy, supple, trachea midline, no thyromegaly, no   carotid bruit, no JVD   Lungs:     Few scattered rhonchi    Heart:    Regular rhythm and normal rate, normal S1 and S2, no             murmur, no gallop, no rub, no click   Chest Wall:    No abnormalities observed   Abdomen:     RLQ - urostomy   Extremities:   Moves all extremities well, no edema, no cyanosis, no             Redness   Pulses:   Pulses palpable and equal bilaterally   Skin:   No bleeding, bruising or rash   Lymph nodes:   No palpable adenopathy   Neurologic:   Cranial nerves 2 - 12 grossly intact, sensation intact, DTR       present and equal bilaterally        Results Review:    Lab Results (last 24 hours)     Procedure Component Value Units Date/Time    POC Glucose Once [[id number]]  (Abnormal) Collected: 01/18/23 1151    Specimen: Blood Updated: 01/18/23 1152     Glucose 136 mg/dL      Comment: Serial Number: [id number]:  [id number]       Basic Metabolic Panel [[id number]]  (Abnormal) Collected: 01/18/23 0416    Specimen: Blood Updated: 01/18/23 0504     Glucose 238 mg/dL      BUN 33 mg/dL      Creatinine 2.20 mg/dL      Sodium 137 mmol/L      Potassium 4.2 mmol/L      Chloride 103 mmol/L      CO2 25.0 mmol/L      Calcium 8.2 mg/dL      BUN/Creatinine Ratio 15.0     Anion Gap 9.0 mmol/L      eGFR 31.0 mL/min/1.73     Narrative:      GFR Normal >60  Chronic Kidney Disease <60  Kidney Failure <15    The GFR formula is only valid for adults with stable renal function between ages 18 and 70.    CBC & Differential [[id number]]  (Abnormal) Collected: 01/18/23 0416    Specimen: Blood Updated: 01/18/23 0437    Narrative:      The following orders were created for panel order CBC & Differential.  Procedure                               Abnormality         Status                     ---------                               -----------         ------                     CBC Auto Differential[[id number]]        Abnormal            Final result                 Please view results for these tests on the individual orders.    CBC Auto Differential [[id number]]  (Abnormal) Collected: 01/18/23 0416    Specimen: Blood Updated: 01/18/23 0437      WBC 3.80 10*3/mm3      RBC 3.79 10*6/mm3      Hemoglobin 10.9 g/dL      Hematocrit 33.5 %      MCV 88.4 fL      MCH 28.7 pg      MCHC 32.5 g/dL      RDW 13.4 %      RDW-SD 41.6 fl      MPV 7.3 fL      Platelets 158 10*3/mm3      Neutrophil % 69.4 %      Lymphocyte % 9.3 %      Monocyte % 19.5 %      Eosinophil % 0.8 %      Basophil % 1.0 %      Neutrophils, Absolute 2.60 10*3/mm3      Lymphocytes, Absolute 0.40 10*3/mm3      Monocytes, Absolute 0.70 10*3/mm3      Eosinophils, Absolute 0.00 10*3/mm3      Basophils, Absolute 0.00 10*3/mm3      nRBC 0.1 /100 WBC     POC Glucose Once [[id number]]  (Normal) Collected: 01/17/23 2142    Specimen: Blood Updated: 01/17/23 2144     Glucose 71 mg/dL      Comment: Serial Number: [id number]:  [id number]       Respiratory Panel PCR w/COVID-19(SARS-CoV-2) BEATRICE/NICHOLAS/TIERA/PAD/COR/MAD/JUHI In-House, NP Swab in UTM/VTM, 3-4 HR TAT - Swab, Nasopharynx [[id number]]  (Abnormal) Collected: 01/17/23 1936    Specimen: Swab from Nasopharynx Updated: 01/17/23 2039     ADENOVIRUS, PCR Not Detected     Coronavirus 229E Not Detected     Coronavirus HKU1 Not Detected     Coronavirus NL63 Not Detected     Coronavirus OC43 Not Detected     COVID19 Detected     Human Metapneumovirus Not Detected     Human Rhinovirus/Enterovirus Not Detected     Influenza A PCR Not Detected     Influenza B PCR Not Detected     Parainfluenza Virus 1 Not Detected     Parainfluenza Virus 2 Not Detected     Parainfluenza Virus 3 Not Detected     Parainfluenza Virus 4 Not Detected     RSV, PCR Not Detected     Bordetella pertussis pcr Not Detected     Bordetella parapertussis PCR Not Detected     Chlamydophila pneumoniae PCR Not Detected     Mycoplasma pneumo by PCR Not Detected    Narrative:      In the setting of a positive respiratory panel with a viral infection PLUS a negative procalcitonin without other underlying concern for bacterial infection, consider observing off antibiotics or discontinuation of antibiotics  and continue supportive care. If the respiratory panel is positive for atypical bacterial infection (Bordetella pertussis, Chlamydophila pneumoniae, or Mycoplasma pneumoniae), consider antibiotic de-escalation to target atypical bacterial infection.    Blood Culture - Blood, Arm, Right [[id number]] Collected: 01/17/23 2016    Specimen: Blood from Arm, Right Updated: 01/17/23 2037    Urinalysis, Microscopic Only - Urine, Clean Catch [[id number]]  (Abnormal) Collected: 01/17/23 1936    Specimen: Urine, Clean Catch Updated: 01/17/23 2034     RBC, UA 0-2 /HPF      WBC, UA 3-5 /HPF      Comment: Urine culture not indicated.        Bacteria, UA 3+ /HPF      Squamous Epithelial Cells, UA 3-6 /HPF      Hyaline Casts, UA 0-2 /LPF      Triple Phosphate Crystals, UA Large/3+ /HPF      Methodology Manual Light Microscopy    Comprehensive Metabolic Panel [[id number]]  (Abnormal) Collected: 01/17/23 1937    Specimen: Blood Updated: 01/17/23 2004     Glucose 85 mg/dL      BUN 35 mg/dL      Creatinine 2.44 mg/dL      Sodium 139 mmol/L      Potassium 4.0 mmol/L      Chloride 103 mmol/L      CO2 25.0 mmol/L      Calcium 8.6 mg/dL      Total Protein 6.4 g/dL      Albumin 3.8 g/dL      ALT (SGPT) 13 U/L      AST (SGOT) 16 U/L      Alkaline Phosphatase 57 U/L      Total Bilirubin 0.4 mg/dL      Globulin 2.6 gm/dL      A/G Ratio 1.5 g/dL      BUN/Creatinine Ratio 14.3     Anion Gap 11.0 mmol/L      eGFR 27.4 mL/min/1.73     Narrative:      GFR Normal >60  Chronic Kidney Disease <60  Kidney Failure <15    The GFR formula is only valid for adults with stable renal function between ages 18 and 70.    Troponin [[id number]]  (Normal) Collected: 01/17/23 1937    Specimen: Blood Updated: 01/17/23 2004     Troponin T <0.010 ng/mL     Narrative:      Troponin T Reference Range:  <= 0.03 ng/mL-   Negative for AMI  >0.03 ng/mL-     Abnormal for myocardial necrosis.  Clinicians would have to utilize clinical acumen, EKG, Troponin and serial changes to  determine if it is an Acute Myocardial Infarction or myocardial injury due to an underlying chronic condition.       Results may be falsely decreased if patient taking Biotin.      Protime-INR [[id number]]  (Abnormal) Collected: 01/17/23 1937    Specimen: Blood Updated: 01/17/23 2000     Protime 29.0 Seconds      INR 3.00    POC Lactate [[id number]]  (Normal) Collected: 01/17/23 1954    Specimen: Blood Updated: 01/17/23 1956     Lactate 1.2 mmol/L      Comment: Serial Number: [id number]:  [id number]       Urinalysis With Culture If Indicated - Urine, Clean Catch [[id number]]  (Abnormal) Collected: 01/17/23 1936    Specimen: Urine, Clean Catch Updated: 01/17/23 1949     Color, UA Yellow     Appearance, UA Cloudy     Comment: Result checked          pH, UA >=9.0     Specific Gravity, UA 1.017     Glucose,  mg/dL (1+)     Ketones, UA Negative     Bilirubin, UA Negative     Blood, UA Negative     Protein, UA >=300 mg/dL (3+)     Leuk Esterase, UA Moderate (2+)     Nitrite, UA Negative     Urobilinogen, UA 1.0 E.U./dL    Narrative:      In absence of clinical symptoms, the presence of pyuria, bacteria, and/or nitrites on the urinalysis result does not correlate with infection.    CBC & Differential [[id number]]  (Abnormal) Collected: 01/17/23 1937    Specimen: Blood Updated: 01/17/23 1946    Narrative:      The following orders were created for panel order CBC & Differential.  Procedure                               Abnormality         Status                     ---------                               -----------         ------                     CBC Auto Differential[[id number]]        Abnormal            Final result                 Please view results for these tests on the individual orders.    CBC Auto Differential [[id number]]  (Abnormal) Collected: 01/17/23 1937    Specimen: Blood Updated: 01/17/23 1946     WBC 4.90 10*3/mm3      RBC 3.84 10*6/mm3      Hemoglobin 11.4 g/dL      Hematocrit 33.1 %      MCV  86.2 fL      MCH 29.6 pg      MCHC 34.3 g/dL      RDW 13.6 %      RDW-SD 43.3 fl      MPV 7.0 fL      Platelets 187 10*3/mm3      Neutrophil % 73.3 %      Lymphocyte % 8.8 %      Monocyte % 17.0 %      Eosinophil % 0.2 %      Basophil % 0.7 %      Neutrophils, Absolute 3.60 10*3/mm3      Lymphocytes, Absolute 0.40 10*3/mm3      Monocytes, Absolute 0.80 10*3/mm3      Eosinophils, Absolute 0.00 10*3/mm3      Basophils, Absolute 0.00 10*3/mm3      nRBC 0.0 /100 WBC     Blood Culture - Blood, Arm, Left [[id number]] Collected: 01/17/23 1937    Specimen: Blood from Arm, Left Updated: 01/17/23 1943           Imaging Results (Last 24 Hours)     Procedure Component Value Units Date/Time    XR Chest 1 View [[id number]] Collected: 01/17/23 2125     Updated: 01/17/23 2128    Narrative:      XR CHEST 1 VW    Date of Exam: 1/17/2023 8:27 PM EST    Indication: fever/soa/back pain.    Comparison: 1/5/2022    Findings:  The heart and mediastinal contours are within normal limits. The lungs are clear. Osseous structures are unremarkable.      Impression:      Impression:  No acute process    Electronically Signed: Joey Monroy    1/17/2023 9:26 PM EST    Workstation ID: [id number]               I reviewed the patient's new clinical results.    Medication Review:   Scheduled Meds:albuterol sulfate HFA, 2 puff, Inhalation, 4x Daily - RT  budesonide-formoterol, 2 puff, Inhalation, BID - RT  dexamethasone, 6 mg, Oral, Daily With Breakfast  guaiFENesin, 600 mg, Oral, Q12H  insulin lispro, 2-7 Units, Subcutaneous, TID With Meals  pantoprazole, 40 mg, Oral, Q AM  remdesivir, 200 mg, Intravenous, Q24H   Followed by  [START ON 1/19/2023] remdesivir, 100 mg, Intravenous, Q24H  sodium chloride, 3 mL, Intravenous, Q12H      Continuous Infusions:Pharmacy Consult - Remdesivir,       PRN Meds:.•  acetaminophen  •  aluminum-magnesium hydroxide-simethicone  •  calcium carbonate  •  dextrose  •  dextrose  •  glucagon (human recombinant)  •   hydrALAZINE  •  nitroglycerin  •  ondansetron  •  Pharmacy Consult - Remdesivir  •  [COMPLETED] Insert Peripheral IV **AND** sodium chloride  •  sodium chloride  •  sodium chloride     Assessment & Plan       COVID-19    COVID-19    Fever    CKD (chronic kidney disease) stage 4, GFR 15-29 ml/min ([facility])  - continue sodium bicarb.  Monitor renal function closely.    Acute hypoxemia - starting remdesivir, dexamethasone, Mucinex, Symbicort, albuterol for COVID.  Symptoms of fever, soa with hypoxemia qualifies for treatment despite normal chest xray.  Deaf  Urostomy - doubt UTI but given fever, will err on side of caution and treat with ceftriaxone and await urine and blood cultures.  H/O DVT - warfarin; hold today; recheck INR daily  H/O fecal impaction - daily Miralax    Warfarin for DVT prophy  Pantoprazole for stress ulcer prophy    Plan for disposition:Home at discharge.    Samanta Rogers MD  01/18/23  12:16 EST

## 2023-01-18 NOTE — PLAN OF CARE
Problem: Breathing Pattern Ineffective  Goal: Effective Breathing Pattern  Outcome: Ongoing, Progressing  Intervention: Promote Improved Breathing Pattern  Description: Assess and monitor airway, breathing and circulation; maintain close surveillance for deterioration.  Maintain head of bed elevation with regular position changes to minimize ventilation-perfusion mismatch and breathlessness.  Evaluate psychosocial factors that may contribute to the anxiety-dyspnea cycle.  Acknowledge, normalize and validate intensity and complexity of patient and support system response to fear, anxiety and patient's breathlessness.  Utilize nonpharmacologic measures, such as controlled breathing and relaxation techniques, in addition to medication, to reduce pain and anxiety.  Promote early mobility or ambulation; match activity to ability and tolerance.  Initiate cough-enhancement and airway-clearance techniques with instruction.  Provide oxygen therapy judiciously to avoid hyperoxemia; adjust to achieve oxygenation goal.  Consider noninvasive or invasive positive pressure ventilation to enhance oxygenation, ventilation and reduce work of breathing.  Recent Flowsheet Documentation  Taken 1/18/2023 1201 by Julia Gutierrez, RN  Head of Bed (HOB) Positioning: HOB elevated   Goal Outcome Evaluation:  Plan of Care Reviewed With: patient, spouse, sibling           Outcome Evaluation: patient stable on 2 liters oxygen. Sister at bedside to interpret for him and his spouse. No complaints or concerns. Isolation in place for Covid.Will continue to monitor.

## 2023-01-18 NOTE — CASE MANAGEMENT/SOCIAL WORK
Discharge Planning Assessment   Obi     Patient Name: Michael Dorantes  MRN: [id number]  Today's Date: 1/18/2023    Admit Date: 1/17/2023    Plan: Covid + Home with Spouse, Watch for Oxygen needs   Discharge Needs Assessment     Row Name 01/18/23 1539       Living Environment    People in Home spouse    Current Living Arrangements home    Potentially Unsafe Housing Conditions none    Primary Care Provided by self    Provides Primary Care For no one    Family Caregiver if Needed none    Able to Return to Prior Arrangements yes       Resource/Environmental Concerns    Resource/Environmental Concerns none    Transportation Concerns none       Food Insecurity    Within the past 12 months, you worried that your food would run out before you got the money to buy more. Never true    Within the past 12 months, the food you bought just didn't last and you didn't have money to get more. Never true       Transition Planning    Patient/Family Anticipates Transition to home with family    Patient/Family Anticipated Services at Transition none    Transportation Anticipated family or friend will provide       Discharge Needs Assessment    Readmission Within the Last 30 Days no previous admission in last 30 days    Equipment Currently Used at Home --  Urostomy supplies    Concerns to be Addressed denies needs/concerns at this time    Anticipated Changes Related to Illness none    Equipment Needed After Discharge none               Discharge Plan     Row Name 01/18/23 1541       Plan    Plan Covid + Home with Spouse, Watch for Oxygen needs    Patient/Family in Agreement with Plan yes    Plan Comments Met with Patient, SPouse, and sister at bedside. Patient and spouse is deaf. Patient requested sister interpret for him,  service declined. Patient lives at home with spouse. IADL's Patient does have Urostomy that he manages himself. PCP and Pharmacy verified, denies any financial difficulties. Is agreeable to Gen if  oxygen is needed D/C BArriers: Covid +, IV ATB, Remdesivir              Continued Care and Services - Admitted Since 1/17/2023    Coordination has not been started for this encounter.       Expected Discharge Date and Time     Expected Discharge Date Expected Discharge Time    Jan 20, 2023          Demographic Summary     Row Name 01/18/23 1534       General Information    Admission Type observation    Arrived From emergency department    Required Notices Provided Observation Status Notice    Referral Source admission list    Reason for Consult discharge planning    Preferred Language English;sign language       Contact Information    Permission Granted to Share Info With family/designee               Functional Status     Row Name 01/18/23 1535       Functional Status    Usual Activity Tolerance moderate    Current Activity Tolerance moderate       Functional Status, IADL    Medications independent    Meal Preparation independent    Housekeeping independent    Laundry independent    Shopping independent       Mental Status    General Appearance WDL WDL       Mental Status Summary    Recent Changes in Mental Status/Cognitive Functioning no changes              Met with patient at bedside wearing mask and goggles, Spent less than 15 minutes in room at greater than 6 feet distance.       Radha Mcclain RN

## 2023-01-18 NOTE — ED NOTES
Sunday, pt started to be nauseous, running a fever, and having congestion. Today, O2 saturation was taken at home and was low, so mpt came here. O2 is stable on room air at the moment

## 2023-01-18 NOTE — ED PROVIDER NOTES
Subjective      Provider in Triage Note  Patient is a 72-year-old white male with history of diabetes kidney disease presents today with complaints of fever to 102 degrees, loose cough, low O2 saturation and pain in his back.  States he has not felt well for the last couple days.  Denies any chest pain or shortness of breath.  He does report some nausea but denies vomiting or diarrhea.  Denies any known ill contacts.      History of Present Illness  History was obtained with the assistance of patient's sister-in-law who interpreted via sign language at the request.  They reported he had pulse oximetry of 85% at home.  He did have 1 episode of vomiting Sunday night.  No diarrhea.  He has urostomy.  Review of Systems    Past Medical History:   Diagnosis Date   • Bladder cancer (CMS/HCC)    • Deaf    • Diabetes mellitus (CMS/HCC)    • Prostate cancer (CMS/HCC)    • Renal disorder        No Known Allergies    Past Surgical History:   Procedure Laterality Date   • BLADDER SURGERY     • CYSTECTOMY     • GASTRECTOMY     • PROSTATECTOMY         Family History   Problem Relation Age of Onset   • Cancer Father        Social History     Socioeconomic History   • Marital status:    Tobacco Use   • Smoking status: Never   • Smokeless tobacco: Never   Substance and Sexual Activity   • Alcohol use: No   • Drug use: No   • Sexual activity: Defer     Prior to Admission medications    Medication Sig Start Date End Date Taking? Authorizing Provider   acetaminophen (TYLENOL) 325 MG tablet Take 2 tablets by mouth Every 4 (Four) Hours As Needed for Mild Pain . 1/11/22   Samanta Rogers MD   amLODIPine (NORVASC) 10 MG tablet Take 10 mg by mouth Every Night.    ProviderManuel MD   gabapentin (NEURONTIN) 300 MG capsule Take 1 capsule by mouth 3 (Three) Times a Day. 1/11/22   Samanta Rogers MD   glimepiride (AMARYL) 1 MG tablet Take 1 mg by mouth Every Morning Before Breakfast.    ProviderManuel MD   hydrALAZINE (APRESOLINE)  50 MG tablet Take 50 mg by mouth 2 (Two) Times a Day.    ProviderManuel MD   influenza vac split quad (FLUZONE,FLUARIX,AFLURIA,FLULAVAL) 0.5 ML suspension prefilled syringe injection Inject 0.5 mL into the appropriate muscle as directed by prescriber During Hospitalization (flu) for up to 1 dose. 1/20/22   Samanta Rogers MD   magnesium oxide (MAG-OX) 400 tablet tablet Take 1 tablet by mouth Daily. 1/12/22   Samanta Rogers MD   pantoprazole (PROTONIX) 40 MG EC tablet Take 1 tablet by mouth Every Morning. 1/12/22   Samanta Rogers MD   polyethylene glycol (MIRALAX) 17 GM/SCOOP powder mix 1 capful (17 g) by mouth Daily. 1/20/22   Samanta Rogers MD   sodium bicarbonate 650 MG tablet Take 650 mg by mouth 2 (Two) Times a Day.    ProviderManuel MD   warfarin (COUMADIN) 5 MG tablet Take 5 mg by mouth Daily. 12/13/19   Manuel Tomas MD           Objective   Physical Exam  72-year-old male awake alert.  Generally weak.  Pupils equal round at light.  Neck supple no meningismus chest clear cardiovascular regular rate and rhythm abdomen soft nontender with urostomy noted.  Extremities without tenderness or edema.  Skin without rash noted  Procedures           ED Course      Results for orders placed or performed during the hospital encounter of 01/17/23   Respiratory Panel PCR w/COVID-19(SARS-CoV-2) [facility]/NICHOLAS/TIERA/PAD/[facility]/MAD/JUHI In-House, NP Swab in UTM/VTM, 3-4 HR TAT - Swab, Nasopharynx    Specimen: Nasopharynx; Swab   Result Value Ref Range    ADENOVIRUS, PCR Not Detected Not Detected    Coronavirus 229E Not Detected Not Detected    Coronavirus HKU1 Not Detected Not Detected    Coronavirus NL63 Not Detected Not Detected    Coronavirus OC43 Not Detected Not Detected    COVID19 Detected (C) Not Detected - Ref. Range    Human Metapneumovirus Not Detected Not Detected    Human Rhinovirus/Enterovirus Not Detected Not Detected    Influenza A PCR Not Detected Not Detected    Influenza B PCR Not Detected Not Detected     Parainfluenza Virus 1 Not Detected Not Detected    Parainfluenza Virus 2 Not Detected Not Detected    Parainfluenza Virus 3 Not Detected Not Detected    Parainfluenza Virus 4 Not Detected Not Detected    RSV, PCR Not Detected Not Detected    Bordetella pertussis pcr Not Detected Not Detected    Bordetella parapertussis PCR Not Detected Not Detected    Chlamydophila pneumoniae PCR Not Detected Not Detected    Mycoplasma pneumo by PCR Not Detected Not Detected   Comprehensive Metabolic Panel    Specimen: Blood   Result Value Ref Range    Glucose 85 65 - 99 mg/dL    BUN 35 (H) 8 - 23 mg/dL    Creatinine 2.44 (H) 0.76 - 1.27 mg/dL    Sodium 139 136 - 145 mmol/L    Potassium 4.0 3.5 - 5.2 mmol/L    Chloride 103 98 - 107 mmol/L    CO2 25.0 22.0 - 29.0 mmol/L    Calcium 8.6 8.6 - 10.5 mg/dL    Total Protein 6.4 6.0 - 8.5 g/dL    Albumin 3.8 3.5 - 5.2 g/dL    ALT (SGPT) 13 1 - 41 U/L    AST (SGOT) 16 1 - 40 U/L    Alkaline Phosphatase 57 39 - 117 U/L    Total Bilirubin 0.4 0.0 - 1.2 mg/dL    Globulin 2.6 gm/dL    A/G Ratio 1.5 g/dL    BUN/Creatinine Ratio 14.3 7.0 - 25.0    Anion Gap 11.0 5.0 - 15.0 mmol/L    eGFR 27.4 (L) >60.0 mL/min/1.73   Urinalysis With Culture If Indicated - Urine, Clean Catch    Specimen: Urine, Clean Catch   Result Value Ref Range    Color, UA Yellow Yellow, Straw    Appearance, UA Cloudy (A) Clear    pH, UA >=9.0 (H) 5.0 - 8.0    Specific Gravity, UA 1.017 1.005 - 1.030    Glucose,  mg/dL (1+) (A) Negative    Ketones, UA Negative Negative    Bilirubin, UA Negative Negative    Blood, UA Negative Negative    Protein, UA >=300 mg/dL (3+) (A) Negative    Leuk Esterase, UA Moderate (2+) (A) Negative    Nitrite, UA Negative Negative    Urobilinogen, UA 1.0 E.U./dL 0.2 - 1.0 E.U./dL   Troponin    Specimen: Blood   Result Value Ref Range    Troponin T <0.010 0.000 - 0.030 ng/mL   Protime-INR    Specimen: Blood   Result Value Ref Range    Protime 29.0 (H) 19.4 - 28.5 Seconds    INR 3.00 2.00 - 3.00  "  CBC Auto Differential    Specimen: Blood   Result Value Ref Range    WBC 4.90 3.40 - 10.80 10*3/mm3    RBC 3.84 (L) 4.14 - 5.80 10*6/mm3    Hemoglobin 11.4 (L) 13.0 - 17.7 g/dL    Hematocrit 33.1 (L) 37.5 - 51.0 %    MCV 86.2 79.0 - 97.0 fL    MCH 29.6 26.6 - 33.0 pg    MCHC 34.3 31.5 - 35.7 g/dL    RDW 13.6 12.3 - 15.4 %    RDW-SD 43.3 37.0 - 54.0 fl    MPV 7.0 6.0 - 12.0 fL    Platelets 187 140 - 450 10*3/mm3    Neutrophil % 73.3 42.7 - 76.0 %    Lymphocyte % 8.8 (L) 19.6 - 45.3 %    Monocyte % 17.0 (H) 5.0 - 12.0 %    Eosinophil % 0.2 (L) 0.3 - 6.2 %    Basophil % 0.7 0.0 - 1.5 %    Neutrophils, Absolute 3.60 1.70 - 7.00 10*3/mm3    Lymphocytes, Absolute 0.40 (L) 0.70 - 3.10 10*3/mm3    Monocytes, Absolute 0.80 0.10 - 0.90 10*3/mm3    Eosinophils, Absolute 0.00 0.00 - 0.40 10*3/mm3    Basophils, Absolute 0.00 0.00 - 0.20 10*3/mm3    nRBC 0.0 0.0 - 0.2 /100 WBC   Urinalysis, Microscopic Only - Urine, Clean Catch    Specimen: Urine, Clean Catch   Result Value Ref Range    RBC, UA 0-2 (A) None Seen /HPF    WBC, UA 3-5 (A) None Seen /HPF    Bacteria, UA 3+ (A) None Seen /HPF    Squamous Epithelial Cells, UA 3-6 (A) None Seen, 0-2 /HPF    Hyaline Casts, UA 0-2 None Seen /LPF    Triple Phosphate Crystals, UA Large/3+ None Seen /HPF    Methodology Manual Light Microscopy    POC Lactate    Specimen: Blood   Result Value Ref Range    Lactate 1.2 0.5 - 2.0 mmol/L   ECG 12 Lead Dyspnea   Result Value Ref Range    QT Interval 425 ms     XR Chest 1 View    Result Date: 1/17/2023  Impression: No acute process Electronically Signed: Joey Monroy  1/17/2023 9:26 PM EST  Workstation ID: [id number]    Medications   sodium chloride 0.9 % flush 10 mL (has no administration in time range)     /53   Pulse 58   Temp 100 °F (37.8 °C) (Oral)   Resp 16   Ht 182.9 cm (72\")   Wt 84.6 kg (186 lb 8.2 oz)   SpO2 93%   BMI 25.30 kg/m²                                          MDM  Chart review: Patient had admission 1 year ago " for fecal impaction and possible urinary tract infection.  Comorbidity: As per past history  Differential: COVID, influenza, pneumonia, sepsis  My EKG interpretation: Sinus rhythm right bundle branch block left anterior fascicular block rate of 62.  Compared to previous no significant change noted  Lab: COVID-19 detected, urinalysis 3-5 white cells 3+ bacteria, white count 4.9 with 73 segs no bands on differential.  Comprehensive metabolic panel BUN 25 creatinine 2.44 otherwise normal  Radiology: Reviewed chest x-ray and independently interpreted.  No acute cardiopulmonary abnormality noted  Discussion/treatment: Patient had IV placed.  His renal function is at baseline.  He has had decreased p.o. intake will place him on IV fluids at low rate.  Patient was discussed with the nurse practitioner for Dr. Rogers.  Since he had decreased O2 saturation at home will bring him for observation.  He has remained in the low 90s at rest here.  Patient was evaluated using appropriate PPE      Final diagnoses:   COVID-19   Fever in other diseases   Stage 4 chronic kidney disease (HCC)       ED Disposition  ED Disposition     None          No follow-up provider specified.       Medication List      No changes were made to your prescriptions during this visit.          Juanito Reddy MD  01/17/23 [id number]

## 2023-01-18 NOTE — H&P
Patient Care Team:  Joey Islas MD as PCP - General (Family Medicine)  Neville Laughlin MD as Consulting Physician (Nephrology)    Chief complaint fever, nausea    Subjective     Patient is a 72 y.o. male with history of diabetes and kidney disease who presents with complaints of fever Tmax 102, nausea, cough, and low O2 sats(80's) today at home and back pain. Denies any chest pain, shortness of breath, vomiting, diarrhea, recent sick contacts.   In the Ed patient was found to test positive for COVID 19.   Patient does have urostomy.    Onset of symptoms was 2 days      Review of Systems   Constitutional: Positive for fever.   HENT: Negative.    Eyes: Negative.    Respiratory: Positive for cough.    Cardiovascular: Negative.    Gastrointestinal: Positive for nausea.   Endocrine: Negative.    Genitourinary: Negative.    Musculoskeletal: Positive for back pain.   Skin: Negative.    Neurological: Negative.    Psychiatric/Behavioral: Negative.           History  Past Medical History:   Diagnosis Date   • Bladder cancer (HCC)    • Deaf    • Diabetes mellitus (HCC)    • Prostate cancer (HCC)    • Renal disorder      Past Surgical History:   Procedure Laterality Date   • BLADDER SURGERY     • CYSTECTOMY     • GASTRECTOMY     • PROSTATECTOMY       Family History   Problem Relation Age of Onset   • Cancer Father      Social History     Tobacco Use   • Smoking status: Never   • Smokeless tobacco: Never   Substance Use Topics   • Alcohol use: No   • Drug use: No     (Not in a hospital admission)    Allergies:  Patient has no known allergies.    Objective     Vital Signs  Temp:  [98.6 °F (37 °C)-100 °F (37.8 °C)] 98.6 °F (37 °C)  Heart Rate:  [58-67] 60  Resp:  [16] 16  BP: (126-127)/(53-54) 126/53     Physical Exam:      General Appearance:    Alert, cooperative, in no acute distress   Head:    Normocephalic, without obvious abnormality, atraumatic   Eyes:            Lids and lashes normal, conjunctivae  and sclerae normal, no   icterus, no pallor, corneas clear, PERRLA   Ears:    Ears appear intact with no abnormalities noted   Throat:   No oral lesions, no thrush, oral mucosa moist   Neck:   No adenopathy, supple, trachea midline, no thyromegaly, no   carotid bruit, no JVD   Lungs:     Clear to auscultation,respirations regular, even and                  unlabored    Heart:    Regular rhythm and normal rate, normal S1 and S2, no            murmur, no gallop, no rub, no click   Chest Wall:    No abnormalities observed   Abdomen:     Normal bowel sounds, no masses, no organomegaly, soft        non-tender, non-distended, no guarding, no rebound                tenderness   Extremities:   Moves all extremities well, no edema, no cyanosis, no             redness   Pulses:   Pulses palpable and equal bilaterally   Skin:   No bleeding, bruising or rash   Lymph nodes:   No palpable adenopathy   Neurologic:   No focal deficits noted       Results Review:     Imaging Results (Last 24 Hours)     Procedure Component Value Units Date/Time    XR Chest 1 View [[id number]] Collected: 01/17/23 2125     Updated: 01/17/23 2128    Narrative:      XR CHEST 1 VW    Date of Exam: 1/17/2023 8:27 PM EST    Indication: fever/soa/back pain.    Comparison: 1/5/2022    Findings:  The heart and mediastinal contours are within normal limits. The lungs are clear. Osseous structures are unremarkable.      Impression:      Impression:  No acute process    Electronically Signed: Joey Monroy    1/17/2023 9:26 PM EST    Workstation ID: [id number]           Lab Results (last 24 hours)     Procedure Component Value Units Date/Time    POC Glucose Once [[id number]]  (Normal) Collected: 01/17/23 2142    Specimen: Blood Updated: 01/17/23 2144     Glucose 71 mg/dL      Comment: Serial Number: [id number]:  [id number]       Respiratory Panel PCR w/COVID-19(SARS-CoV-2) [facility]ICE/NICHOLAS/TIERA/PAD/COR/MAD/JUHI In-House, NP Swab in UTM/VTM, 3-4 HR TAT - Swab,  Nasopharynx [[id number]]  (Abnormal) Collected: 01/17/23 1936    Specimen: Swab from Nasopharynx Updated: 01/17/23 2039     ADENOVIRUS, PCR Not Detected     Coronavirus 229E Not Detected     Coronavirus HKU1 Not Detected     Coronavirus NL63 Not Detected     Coronavirus OC43 Not Detected     COVID19 Detected     Human Metapneumovirus Not Detected     Human Rhinovirus/Enterovirus Not Detected     Influenza A PCR Not Detected     Influenza B PCR Not Detected     Parainfluenza Virus 1 Not Detected     Parainfluenza Virus 2 Not Detected     Parainfluenza Virus 3 Not Detected     Parainfluenza Virus 4 Not Detected     RSV, PCR Not Detected     Bordetella pertussis pcr Not Detected     Bordetella parapertussis PCR Not Detected     Chlamydophila pneumoniae PCR Not Detected     Mycoplasma pneumo by PCR Not Detected    Narrative:      In the setting of a positive respiratory panel with a viral infection PLUS a negative procalcitonin without other underlying concern for bacterial infection, consider observing off antibiotics or discontinuation of antibiotics and continue supportive care. If the respiratory panel is positive for atypical bacterial infection (Bordetella pertussis, Chlamydophila pneumoniae, or Mycoplasma pneumoniae), consider antibiotic de-escalation to target atypical bacterial infection.    Blood Culture - Blood, Arm, Right [[id number]] Collected: 01/17/23 2016    Specimen: Blood from Arm, Right Updated: 01/17/23 2037    Urinalysis, Microscopic Only - Urine, Clean Catch [[id number]]  (Abnormal) Collected: 01/17/23 1936    Specimen: Urine, Clean Catch Updated: 01/17/23 2034     RBC, UA 0-2 /HPF      WBC, UA 3-5 /HPF      Comment: Urine culture not indicated.        Bacteria, UA 3+ /HPF      Squamous Epithelial Cells, UA 3-6 /HPF      Hyaline Casts, UA 0-2 /LPF      Triple Phosphate Crystals, UA Large/3+ /HPF      Methodology Manual Light Microscopy    Comprehensive Metabolic Panel [[id number]]  (Abnormal)  Collected: 01/17/23 1937    Specimen: Blood Updated: 01/17/23 2004     Glucose 85 mg/dL      BUN 35 mg/dL      Creatinine 2.44 mg/dL      Sodium 139 mmol/L      Potassium 4.0 mmol/L      Chloride 103 mmol/L      CO2 25.0 mmol/L      Calcium 8.6 mg/dL      Total Protein 6.4 g/dL      Albumin 3.8 g/dL      ALT (SGPT) 13 U/L      AST (SGOT) 16 U/L      Alkaline Phosphatase 57 U/L      Total Bilirubin 0.4 mg/dL      Globulin 2.6 gm/dL      A/G Ratio 1.5 g/dL      BUN/Creatinine Ratio 14.3     Anion Gap 11.0 mmol/L      eGFR 27.4 mL/min/1.73     Narrative:      GFR Normal >60  Chronic Kidney Disease <60  Kidney Failure <15    The GFR formula is only valid for adults with stable renal function between ages 18 and 70.    Troponin [[id number]]  (Normal) Collected: 01/17/23 1937    Specimen: Blood Updated: 01/17/23 2004     Troponin T <0.010 ng/mL     Narrative:      Troponin T Reference Range:  <= 0.03 ng/mL-   Negative for AMI  >0.03 ng/mL-     Abnormal for myocardial necrosis.  Clinicians would have to utilize clinical acumen, EKG, Troponin and serial changes to determine if it is an Acute Myocardial Infarction or myocardial injury due to an underlying chronic condition.       Results may be falsely decreased if patient taking Biotin.      Protime-INR [[id number]]  (Abnormal) Collected: 01/17/23 1937    Specimen: Blood Updated: 01/17/23 2000     Protime 29.0 Seconds      INR 3.00    POC Lactate [[id number]]  (Normal) Collected: 01/17/23 1954    Specimen: Blood Updated: 01/17/23 1956     Lactate 1.2 mmol/L      Comment: Serial Number: [id number]:  [id number]       Urinalysis With Culture If Indicated - Urine, Clean Catch [[id number]]  (Abnormal) Collected: 01/17/23 1936    Specimen: Urine, Clean Catch Updated: 01/17/23 1949     Color, UA Yellow     Appearance, UA Cloudy     Comment: Result checked          pH, UA >=9.0     Specific Gravity, UA 1.017     Glucose,  mg/dL (1+)     Ketones, UA Negative     Bilirubin,  UA Negative     Blood, UA Negative     Protein, UA >=300 mg/dL (3+)     Leuk Esterase, UA Moderate (2+)     Nitrite, UA Negative     Urobilinogen, UA 1.0 E.U./dL    Narrative:      In absence of clinical symptoms, the presence of pyuria, bacteria, and/or nitrites on the urinalysis result does not correlate with infection.    CBC & Differential [[id number]]  (Abnormal) Collected: 01/17/23 1937    Specimen: Blood Updated: 01/17/23 1946    Narrative:      The following orders were created for panel order CBC & Differential.  Procedure                               Abnormality         Status                     ---------                               -----------         ------                     CBC Auto Differential[[id number]]        Abnormal            Final result                 Please view results for these tests on the individual orders.    CBC Auto Differential [[id number]]  (Abnormal) Collected: 01/17/23 1937    Specimen: Blood Updated: 01/17/23 1946     WBC 4.90 10*3/mm3      RBC 3.84 10*6/mm3      Hemoglobin 11.4 g/dL      Hematocrit 33.1 %      MCV 86.2 fL      MCH 29.6 pg      MCHC 34.3 g/dL      RDW 13.6 %      RDW-SD 43.3 fl      MPV 7.0 fL      Platelets 187 10*3/mm3      Neutrophil % 73.3 %      Lymphocyte % 8.8 %      Monocyte % 17.0 %      Eosinophil % 0.2 %      Basophil % 0.7 %      Neutrophils, Absolute 3.60 10*3/mm3      Lymphocytes, Absolute 0.40 10*3/mm3      Monocytes, Absolute 0.80 10*3/mm3      Eosinophils, Absolute 0.00 10*3/mm3      Basophils, Absolute 0.00 10*3/mm3      nRBC 0.0 /100 WBC     Blood Culture - Blood, Arm, Left [[id number]] Collected: 01/17/23 1937    Specimen: Blood from Arm, Left Updated: 01/17/23 1943           I reviewed the patient's new clinical results.    Assessment & Plan       COVID-19  -EKG sinus rhythm, RBBB, left anterior fascicular block, no changed from previous  -Urinalysis 3-5 white cells, +3 bacteria, WBC 4.9,   -CXR with no acute findings  -titrate O2 as  needed    CKD stage 4  -BUN 25, Creatine 2.44 at baseline    Fever  -symptomatic care      DVT prophylaxis- SCD's  GI prophylaxis- protonix    I discussed the patient's findings and my recommendations with patient.     Tianna Pond, APRN  01/17/23  23:19 EST

## 2023-01-19 LAB
ALBUMIN SERPL-MCNC: 3.3 G/DL (ref 3.5–5.2)
ALP SERPL-CCNC: 54 U/L (ref 39–117)
ALT SERPL W P-5'-P-CCNC: 12 U/L (ref 1–41)
ANION GAP SERPL CALCULATED.3IONS-SCNC: 12 MMOL/L (ref 5–15)
AST SERPL-CCNC: 15 U/L (ref 1–40)
BACTERIA SPEC AEROBE CULT: ABNORMAL
BACTERIA UR QL AUTO: ABNORMAL /HPF
BASOPHILS # BLD AUTO: 0 10*3/MM3 (ref 0–0.2)
BASOPHILS NFR BLD AUTO: 0.1 % (ref 0–1.5)
BILIRUB CONJ SERPL-MCNC: <0.2 MG/DL (ref 0–0.3)
BILIRUB INDIRECT SERPL-MCNC: ABNORMAL MG/DL
BILIRUB SERPL-MCNC: <0.2 MG/DL (ref 0–1.2)
BILIRUB UR QL STRIP: NEGATIVE
BUN SERPL-MCNC: 43 MG/DL (ref 8–23)
BUN/CREAT SERPL: 16.9 (ref 7–25)
CALCIUM SPEC-SCNC: 7.9 MG/DL (ref 8.6–10.5)
CHLORIDE SERPL-SCNC: 99 MMOL/L (ref 98–107)
CLARITY UR: ABNORMAL
CO2 SERPL-SCNC: 21 MMOL/L (ref 22–29)
COLOR UR: ABNORMAL
CREAT SERPL-MCNC: 2.55 MG/DL (ref 0.76–1.27)
DEPRECATED RDW RBC AUTO: 43.3 FL (ref 37–54)
EGFRCR SERPLBLD CKD-EPI 2021: 26 ML/MIN/1.73
EOSINOPHIL # BLD AUTO: 0 10*3/MM3 (ref 0–0.4)
EOSINOPHIL NFR BLD AUTO: 0 % (ref 0.3–6.2)
ERYTHROCYTE [DISTWIDTH] IN BLOOD BY AUTOMATED COUNT: 13.6 % (ref 12.3–15.4)
GLUCOSE BLDC GLUCOMTR-MCNC: 230 MG/DL (ref 70–105)
GLUCOSE BLDC GLUCOMTR-MCNC: 264 MG/DL (ref 70–105)
GLUCOSE BLDC GLUCOMTR-MCNC: 266 MG/DL (ref 70–105)
GLUCOSE BLDC GLUCOMTR-MCNC: 274 MG/DL (ref 70–105)
GLUCOSE BLDC GLUCOMTR-MCNC: 354 MG/DL (ref 70–105)
GLUCOSE SERPL-MCNC: 306 MG/DL (ref 65–99)
GLUCOSE UR STRIP-MCNC: ABNORMAL MG/DL
GRAM STN SPEC: ABNORMAL
HCT VFR BLD AUTO: 34.5 % (ref 37.5–51)
HGB BLD-MCNC: 11.5 G/DL (ref 13–17.7)
HGB UR QL STRIP.AUTO: ABNORMAL
HYALINE CASTS UR QL AUTO: ABNORMAL /LPF
INR PPP: 3.16 (ref 2–3)
ISOLATED FROM: ABNORMAL
KETONES UR QL STRIP: NEGATIVE
LEUKOCYTE ESTERASE UR QL STRIP.AUTO: ABNORMAL
LYMPHOCYTES # BLD AUTO: 0.3 10*3/MM3 (ref 0.7–3.1)
LYMPHOCYTES NFR BLD AUTO: 4.9 % (ref 19.6–45.3)
MCH RBC QN AUTO: 28.9 PG (ref 26.6–33)
MCHC RBC AUTO-ENTMCNC: 33.2 G/DL (ref 31.5–35.7)
MCV RBC AUTO: 86.9 FL (ref 79–97)
MONOCYTES # BLD AUTO: 0.1 10*3/MM3 (ref 0.1–0.9)
MONOCYTES NFR BLD AUTO: 2.7 % (ref 5–12)
NEUTROPHILS NFR BLD AUTO: 5 10*3/MM3 (ref 1.7–7)
NEUTROPHILS NFR BLD AUTO: 92.3 % (ref 42.7–76)
NITRITE UR QL STRIP: NEGATIVE
NRBC BLD AUTO-RTO: 0 /100 WBC (ref 0–0.2)
PH UR STRIP.AUTO: >=9 [PH] (ref 5–8)
PLATELET # BLD AUTO: 186 10*3/MM3 (ref 140–450)
PMV BLD AUTO: 7.5 FL (ref 6–12)
POTASSIUM SERPL-SCNC: 4.7 MMOL/L (ref 3.5–5.2)
PROT SERPL-MCNC: 6 G/DL (ref 6–8.5)
PROT UR QL STRIP: ABNORMAL
PROTHROMBIN TIME: 30.5 SECONDS (ref 19.4–28.5)
RBC # BLD AUTO: 3.97 10*6/MM3 (ref 4.14–5.8)
RBC # UR STRIP: ABNORMAL /HPF
REF LAB TEST METHOD: ABNORMAL
SODIUM SERPL-SCNC: 132 MMOL/L (ref 136–145)
SP GR UR STRIP: 1.02 (ref 1–1.03)
SQUAMOUS #/AREA URNS HPF: ABNORMAL /HPF
TRI-PHOS CRY URNS QL MICRO: ABNORMAL /HPF
UROBILINOGEN UR QL STRIP: ABNORMAL
WBC # UR STRIP: ABNORMAL /HPF
WBC NRBC COR # BLD: 5.4 10*3/MM3 (ref 3.4–10.8)
YEAST URNS QL MICRO: ABNORMAL /HPF

## 2023-01-19 PROCEDURE — 80048 BASIC METABOLIC PNL TOTAL CA: CPT | Performed by: INTERNAL MEDICINE

## 2023-01-19 PROCEDURE — 82962 GLUCOSE BLOOD TEST: CPT

## 2023-01-19 PROCEDURE — 25010000002 CEFTRIAXONE PER 250 MG: Performed by: INTERNAL MEDICINE

## 2023-01-19 PROCEDURE — 63710000001 DEXAMETHASONE PER 0.25 MG: Performed by: INTERNAL MEDICINE

## 2023-01-19 PROCEDURE — 85025 COMPLETE CBC W/AUTO DIFF WBC: CPT | Performed by: INTERNAL MEDICINE

## 2023-01-19 PROCEDURE — 87086 URINE CULTURE/COLONY COUNT: CPT | Performed by: NURSE PRACTITIONER

## 2023-01-19 PROCEDURE — 63710000001 INSULIN LISPRO (HUMAN) PER 5 UNITS: Performed by: INTERNAL MEDICINE

## 2023-01-19 PROCEDURE — 97161 PT EVAL LOW COMPLEX 20 MIN: CPT

## 2023-01-19 PROCEDURE — 80076 HEPATIC FUNCTION PANEL: CPT | Performed by: INTERNAL MEDICINE

## 2023-01-19 PROCEDURE — 94799 UNLISTED PULMONARY SVC/PX: CPT

## 2023-01-19 PROCEDURE — 85610 PROTHROMBIN TIME: CPT | Performed by: INTERNAL MEDICINE

## 2023-01-19 PROCEDURE — 81001 URINALYSIS AUTO W/SCOPE: CPT | Performed by: NURSE PRACTITIONER

## 2023-01-19 PROCEDURE — 36415 COLL VENOUS BLD VENIPUNCTURE: CPT | Performed by: INTERNAL MEDICINE

## 2023-01-19 PROCEDURE — 25010000002 REMDESIVIR 100 MG/20ML SOLUTION 1 EACH VIAL: Performed by: INTERNAL MEDICINE

## 2023-01-19 RX ORDER — OLANZAPINE 10 MG/2ML
1 INJECTION, POWDER, LYOPHILIZED, FOR SOLUTION INTRAMUSCULAR
Status: DISCONTINUED | OUTPATIENT
Start: 2023-01-19 | End: 2023-01-21 | Stop reason: HOSPADM

## 2023-01-19 RX ORDER — DEXTROSE MONOHYDRATE 25 G/50ML
25 INJECTION, SOLUTION INTRAVENOUS
Status: DISCONTINUED | OUTPATIENT
Start: 2023-01-19 | End: 2023-01-21 | Stop reason: HOSPADM

## 2023-01-19 RX ORDER — DEXAMETHASONE 0.5 MG/1
2 TABLET ORAL
Status: DISCONTINUED | OUTPATIENT
Start: 2023-01-20 | End: 2023-01-21

## 2023-01-19 RX ORDER — SORBITOL SOLUTION 70 %
50 SOLUTION, ORAL MISCELLANEOUS DAILY
Status: DISCONTINUED | OUTPATIENT
Start: 2023-01-19 | End: 2023-01-21 | Stop reason: HOSPADM

## 2023-01-19 RX ORDER — INSULIN LISPRO 100 [IU]/ML
4-24 INJECTION, SOLUTION INTRAVENOUS; SUBCUTANEOUS
Status: DISCONTINUED | OUTPATIENT
Start: 2023-01-20 | End: 2023-01-21 | Stop reason: HOSPADM

## 2023-01-19 RX ORDER — NICOTINE POLACRILEX 4 MG
15 LOZENGE BUCCAL
Status: DISCONTINUED | OUTPATIENT
Start: 2023-01-19 | End: 2023-01-21 | Stop reason: HOSPADM

## 2023-01-19 RX ADMIN — MAGNESIUM OXIDE TAB 400 MG (241.3 MG ELEMENTAL MG) 400 MG: 400 (241.3 MG) TAB at 08:19

## 2023-01-19 RX ADMIN — HYDRALAZINE HYDROCHLORIDE 75 MG: 25 TABLET, FILM COATED ORAL at 21:22

## 2023-01-19 RX ADMIN — POLYETHYLENE GLYCOL 3350 17 G: 17 POWDER, FOR SOLUTION ORAL at 08:17

## 2023-01-19 RX ADMIN — GUAIFENESIN 600 MG: 600 TABLET, EXTENDED RELEASE ORAL at 08:18

## 2023-01-19 RX ADMIN — INSULIN LISPRO 4 UNITS: 100 INJECTION, SOLUTION INTRAVENOUS; SUBCUTANEOUS at 12:44

## 2023-01-19 RX ADMIN — GABAPENTIN 300 MG: 300 CAPSULE ORAL at 08:17

## 2023-01-19 RX ADMIN — REMDESIVIR 100 MG: 100 INJECTION, POWDER, LYOPHILIZED, FOR SOLUTION INTRAVENOUS at 14:58

## 2023-01-19 RX ADMIN — BUDESONIDE AND FORMOTEROL FUMARATE DIHYDRATE 2 PUFF: 160; 4.5 AEROSOL RESPIRATORY (INHALATION) at 09:04

## 2023-01-19 RX ADMIN — ALBUTEROL SULFATE 2 PUFF: 90 AEROSOL, METERED RESPIRATORY (INHALATION) at 09:00

## 2023-01-19 RX ADMIN — SODIUM BICARBONATE 650 MG: 650 TABLET ORAL at 21:22

## 2023-01-19 RX ADMIN — SODIUM BICARBONATE 650 MG: 650 TABLET ORAL at 08:19

## 2023-01-19 RX ADMIN — GUAIFENESIN 600 MG: 600 TABLET, EXTENDED RELEASE ORAL at 21:22

## 2023-01-19 RX ADMIN — INSULIN LISPRO 3 UNITS: 100 INJECTION, SOLUTION INTRAVENOUS; SUBCUTANEOUS at 08:17

## 2023-01-19 RX ADMIN — CEFTRIAXONE 1 G: 1 INJECTION, POWDER, FOR SOLUTION INTRAMUSCULAR; INTRAVENOUS at 12:45

## 2023-01-19 RX ADMIN — GABAPENTIN 300 MG: 300 CAPSULE ORAL at 21:22

## 2023-01-19 RX ADMIN — DEXAMETHASONE 6 MG: 4 TABLET ORAL at 08:18

## 2023-01-19 RX ADMIN — Medication 3 ML: at 21:22

## 2023-01-19 RX ADMIN — ALBUTEROL SULFATE 2 PUFF: 90 AEROSOL, METERED RESPIRATORY (INHALATION) at 19:11

## 2023-01-19 RX ADMIN — AMLODIPINE BESYLATE 10 MG: 5 TABLET ORAL at 21:22

## 2023-01-19 RX ADMIN — GABAPENTIN 300 MG: 300 CAPSULE ORAL at 17:13

## 2023-01-19 RX ADMIN — HYDRALAZINE HYDROCHLORIDE 75 MG: 25 TABLET, FILM COATED ORAL at 17:13

## 2023-01-19 RX ADMIN — SORBITOL SOLUTION (BULK) 50 ML: 70 SOLUTION at 14:58

## 2023-01-19 RX ADMIN — PANTOPRAZOLE SODIUM 40 MG: 40 TABLET, DELAYED RELEASE ORAL at 05:26

## 2023-01-19 RX ADMIN — HYDRALAZINE HYDROCHLORIDE 75 MG: 25 TABLET, FILM COATED ORAL at 08:17

## 2023-01-19 RX ADMIN — Medication 3 ML: at 08:18

## 2023-01-19 RX ADMIN — INSULIN LISPRO 4 UNITS: 100 INJECTION, SOLUTION INTRAVENOUS; SUBCUTANEOUS at 17:13

## 2023-01-19 NOTE — THERAPY EVALUATION
Patient Name: Michael Dorantes  : 1950    MRN: [id number]                              Today's Date: 2023       Admit Date: 2023    Visit Dx:     ICD-10-CM ICD-9-CM   1. COVID-19  U07.1 079.89   2. Fever in other diseases  R50.81 780.61   3. Stage 4 chronic kidney disease (HCC)  N18.4 585.4     Patient Active Problem List   Diagnosis   • DVT femoral (deep venous thrombosis) with thrombophlebitis, left (HCC)   • Pneumonia due to COVID-19 virus   • Diabetes mellitus with neuropathy (HCC)   • History of DVT (deep vein thrombosis)   • LEO (acute kidney injury) (HCC)   • COVID-19   • Hypoxia   • Cytokine release syndrome, grade 2   • Pyelonephritis   • Fecal impaction (HCC)   • CKD (chronic kidney disease) stage 3, GFR 30-59 ml/min (HCC)   • History of urostomy   • UTI (urinary tract infection), bacterial   • Anemia, chronic disease   • COVID-19   • Fever   • CKD (chronic kidney disease) stage 4, GFR 15-29 ml/min (HCC)     Past Medical History:   Diagnosis Date   • Bladder cancer (HCC)    • Deaf    • Diabetes mellitus (HCC)    • Prostate cancer (HCC)    • Renal disorder      Past Surgical History:   Procedure Laterality Date   • BLADDER SURGERY     • CYSTECTOMY     • GASTRECTOMY     • PROSTATECTOMY        General Information     [facility] Name 23 1405          Physical Therapy Time and Intention    Document Type evaluation  -     Mode of Treatment physical therapy  -     Row Name 23 1405          General Information    Patient Profile Reviewed yes  -     Prior Level of Function independent:  -     Existing Precautions/Restrictions no known precautions/restrictions  -     Barriers to Rehab hearing deficit  pt is deaf  -     Row Name 23 1405          Living Environment    People in Home spouse  -     Row Name 23 1405          Home Main Entrance    Number of Stairs, Main Entrance none  -     Row Name 23 1405          Stairs Within Home, Primary    Number of Stairs,  Within Home, Primary none  -     Row Name 01/19/23 1405          Cognition    Orientation Status (Cognition) oriented x 4  -           User Key  (r) = Recorded By, (t) = Taken By, (c) = Cosigned By    Initials Name Provider Type     Concha Forde, PT Physical Therapist               Mobility     Row Name 01/19/23 1405          Bed Mobility    Bed Mobility bed mobility (all) activities  -     All Activities, [place] (Bed Mobility) independent  -     Row Name 01/19/23 1405          Sit-Stand Transfer    Sit-Stand [place] (Transfers) independent  -[facility] Name 01/19/23 1405          Gait/Stairs (Locomotion)    [place] Level (Gait) independent  -     Distance in Feet (Gait) up in room x 50'  -     Comment, (Gait/Stairs) steady gait, no LOB  -           User Key  (r) = Recorded By, (t) = Taken By, (c) = Cosigned By    Initials Name Provider Type     Concha Forde, PT Physical Therapist               Obj/Interventions     Row Name 01/19/23 1405          Range of Motion Comprehensive    General Range of Motion no range of motion deficits identified  -[facility]     Row Name 01/19/23 1405          Strength Comprehensive (MMT)    General Manual Muscle Testing (MMT) Assessment no strength deficits identified  -[facility] Name 01/19/23 1405          Balance    Balance Assessment sitting static balance;sitting dynamic balance;standing static balance;standing dynamic balance  -     Static Sitting Balance independent  -     Dynamic Sitting Balance independent  -     Static Standing Balance independent  -     Dynamic Standing Balance independent  -           User Key  (r) = Recorded By, (t) = Taken By, (c) = Cosigned By    Initials Name Provider Type     Concha Forde, PT Physical Therapist               Goals/Plan    No documentation.                Clinical Impression     Row Name 01/19/23 1406          Pain    Pretreatment Pain Rating 0/10 - no pain  -     Posttreatment Pain Rating 0/10  - no pain  -     Row Name 01/19/23 1406          Plan of Care Review    Plan of Care Reviewed With patient  via   -     Outcome Evaluation 73 yo male admitted with fever and nausea.  Pt is COVID + and getting Remdesivir.  Pt is deaf, utilized IPad with  throughout evaluation.  Pt reports no complaints, on 2L O2 with sats mid to high 90s.  Pt is ind with all mobiltiy and ambulates in room x 50' on RA with sats 93%.  Pt up to chair, RN aware.  No further therapy needs, safe for home at d/c.  -     Row Name 01/19/23 1406          Therapy Assessment/Plan (PT)    Criteria for Skilled Interventions Met (PT) no;does not meet criteria for skilled intervention  -     Therapy Frequency (PT) evaluation only  -[facility] Name 01/19/23 1406          Vital Signs    Pre SpO2 (%) 96  -     O2 Delivery Pre Treatment nasal cannula  2L  -     Intra SpO2 (%) 93  -     O2 Delivery Intra Treatment room air  -     O2 Delivery Post Treatment nasal cannula  -[facility] Name 01/19/23 1406          Positioning and Restraints    Pre-Treatment Position in bed  -     Post Treatment Position chair  -     In Chair notified nsg;call light within reach  -           User Key  (r) = Recorded By, (t) = Taken By, (c) = Cosigned By    Initials Name Provider Type     Concha Forde, PT Physical Therapist               Outcome Measures     Row Name 01/19/23 0805          How much help from another person do you currently need...    Turning from your back to your side while in flat bed without using bedrails? 4  -SH     Moving from lying on back to sitting on the side of a flat bed without bedrails? 4  -SH     Moving to and from a bed to a chair (including a wheelchair)? 4  -SH     Standing up from a chair using your arms (e.g., wheelchair, bedside chair)? 4  -SH     Climbing 3-5 steps with a railing? 4  -SH     To walk in hospital room? 4  -SH     AM-PAC 6 Clicks Score (PT) 24  -SH      Highest level of mobility 8 --> Walked 250 feet or more  -           User Key  (r) = Recorded By, (t) = Taken By, (c) = Cosigned By    Initials Name Provider Type     Ailyn Chilel, RN Registered Nurse                             Physical Therapy Education     Title: PT OT SLP Therapies (Done)     Topic: Physical Therapy (Done)     Point: Mobility training (Done)     Learning Progress Summary           Patient Acceptance, I, DU by  at 1/19/2023 1408                               User Key     Initials Effective Dates Name Provider Type [facility] 06/16/21 -  Concha Forde PT Physical Therapist PT              PT Recommendation and Plan     Plan of Care Reviewed With: patient (via )  Outcome Evaluation: 73 yo male admitted with fever and nausea.  Pt is COVID + and getting Remdesivir.  Pt is deaf, utilized IPad with  throughout evaluation.  Pt reports no complaints, on 2L O2 with sats mid to high 90s.  Pt is ind with all mobiltiy and ambulates in room x 50' on RA with sats 93%.  Pt up to chair, RN aware.  No further therapy needs, safe for home at d/c.     Time Calculation:    PT Charges     Row Name 01/19/23 1400             Time Calculation    Start Time 0936  -      Stop Time 0952  -      Time Calculation (min) 16 min  -      PT Received On 01/19/23  -         Time Calculation- PT    Total Timed Code Minutes- PT 0 minute(s)  -            User Key  (r) = Recorded By, (t) = Taken By, (c) = Cosigned By    Initials Name Provider Type     Concha Forde PT Physical Therapist              Therapy Charges for Today     Code Description Service Date Service Provider Modifiers Qty    38466506004 HC PT EVAL LOW COMPLEXITY 3 1/19/2023 Concha Forde, PT GP 1          PT G-Codes  AM-PAC 6 Clicks Score (PT): 24  PT Discharge Summary  Anticipated Discharge Disposition (PT): home    Concha Forde PT  1/19/2023

## 2023-01-19 NOTE — PLAN OF CARE
Problem: Adult Inpatient Plan of Care  Goal: Plan of Care Review  Outcome: Ongoing, Progressing  Goal: Patient-Specific Goal (Individualized)  Outcome: Ongoing, Progressing  Goal: Absence of Hospital-Acquired Illness or Injury  Outcome: Ongoing, Progressing  Intervention: Identify and Manage Fall Risk  Recent Flowsheet Documentation  Taken 1/19/2023 1800 by Ailyn Chilel RN  Safety Promotion/Fall Prevention: safety round/check completed  Taken 1/19/2023 1700 by Ailyn Chilel RN  Safety Promotion/Fall Prevention: safety round/check completed  Taken 1/19/2023 1600 by Ailyn Chilel RN  Safety Promotion/Fall Prevention: safety round/check completed  Taken 1/19/2023 1300 by Ailyn Chilel RN  Safety Promotion/Fall Prevention: safety round/check completed  Taken 1/19/2023 1200 by Ailyn Chilel RN  Safety Promotion/Fall Prevention: safety round/check completed  Taken 1/19/2023 0805 by Ailyn Chilel RN  Safety Promotion/Fall Prevention: safety round/check completed  Intervention: Prevent Skin Injury  Recent Flowsheet Documentation  Taken 1/19/2023 0805 by Ailyn Chilel RN  Body Position: position changed independently  Intervention: Prevent and Manage VTE (Venous Thromboembolism) Risk  Recent Flowsheet Documentation  Taken 1/19/2023 0805 by Ailyn Chilel RN  Activity Management: activity adjusted per tolerance  VTE Prevention/Management:   bilateral   sequential compression devices off   patient refused intervention  Goal: Optimal Comfort and Wellbeing  Outcome: Ongoing, Progressing  Intervention: Provide Person-Centered Care  Recent Flowsheet Documentation  Taken 1/19/2023 0805 by Ailyn Chilel RN  Trust Relationship/Rapport: care explained  Goal: Readiness for Transition of Care  Outcome: Ongoing, Progressing   Goal Outcome Evaluation:

## 2023-01-19 NOTE — CONSULTS
NEPHROLOGY CONSULTATION-----[facility]    [facility]  [phone]  Saeed Briscoe MD    Patient Care Team:  Joey Islas MD as PCP - General (Family Medicine)  Neville Laughlin MD as Consulting Physician (Nephrology)    CC/REASON FOR CONSULTATION: Elevated creatinne    PHYSICIAN REQUESTING CONSULTATION: Dr Rogers    History of Present Illness  72 year old male with PMHx CKD, HTN, DM, hx Urostomy presented with fever and SOA. He is noted to have COVID 19 infection. His CR is 2.2. He is followed by Dr Laughlin for CKD. Baseline CR 1.7-2. He is deaf, and most discussion with patient by writing down.     Review of Systems   As noted above, otherwise limited due to clinical condition.    Past Medical History:   Diagnosis Date   • Bladder cancer (HCC)    • Deaf    • Diabetes mellitus (HCC)    • Prostate cancer (HCC)    • Renal disorder        Past Surgical History:   Procedure Laterality Date   • BLADDER SURGERY     • CYSTECTOMY     • GASTRECTOMY     • PROSTATECTOMY         Family History   Problem Relation Age of Onset   • Cancer Father        Social History     Tobacco Use   • Smoking status: Never   • Smokeless tobacco: Never   Substance Use Topics   • Alcohol use: No   • Drug use: No       Home Meds: (Not in a hospital admission)     Prior to Admission medications    Medication Sig Start Date End Date Taking? Authorizing Provider   amLODIPine (NORVASC) 10 MG tablet Take 10 mg by mouth Every Night.   Yes ProviderManuel MD   gabapentin (NEURONTIN) 300 MG capsule Take 1 capsule by mouth 3 (Three) Times a Day. 1/11/22  Yes Samanta Rogers MD   glimepiride (AMARYL) 1 MG tablet Take 1 mg by mouth Every Morning Before Breakfast.   Yes ProviderManuel MD   hydrALAZINE (APRESOLINE) 50 MG tablet Take 75 mg by mouth 3 (Three) Times a Day.   Yes Manuel Tomas MD   sodium bicarbonate 650 MG tablet Take 650 mg by mouth 2 (Two) Times a  Day.   Yes ProviderManuel MD   triamcinolone (KENALOG) 0.1 % lotion Apply 1 application topically to the appropriate area as directed 2 (Two) Times a Day.   Yes Manuel Tomas MD   warfarin (COUMADIN) 5 MG tablet Take 5 mg by mouth Daily. 12/13/19  Yes Manule Tomas MD   acetaminophen (TYLENOL) 325 MG tablet Take 2 tablets by mouth Every 4 (Four) Hours As Needed for Mild Pain . 1/11/22   Samanta Rogers MD   magnesium oxide (MAG-OX) 400 tablet tablet Take 1 tablet by mouth Daily. 1/12/22   Samanta Rogers MD   polyethylene glycol (MIRALAX) 17 GM/SCOOP powder mix 1 capful (17 g) by mouth Daily. 1/20/22   Samanta Rogers MD         Scheduled Meds:  albuterol sulfate HFA, 2 puff, Inhalation, BID  amLODIPine, 10 mg, Oral, Nightly  cefTRIAXone, 1 g, Intravenous, Q24H  [START ON 1/20/2023] dexamethasone, 2 mg, Oral, Daily With Breakfast  gabapentin, 300 mg, Oral, TID  glipizide, 2.5 mg, Oral, QAM AC  guaiFENesin, 600 mg, Oral, Q12H  hydrALAZINE, 75 mg, Oral, TID  insulin lispro, 2-7 Units, Subcutaneous, TID With Meals  magnesium oxide, 400 mg, Oral, Daily  pantoprazole, 40 mg, Oral, Q AM  polyethylene glycol, 17 g, Oral, Daily  remdesivir, 100 mg, Intravenous, Q24H  sodium bicarbonate, 650 mg, Oral, BID  sodium chloride, 3 mL, Intravenous, Q12H        Continuous Infusions:  Pharmacy Consult - Remdesivir,         PRN Meds:  •  acetaminophen  •  aluminum-magnesium hydroxide-simethicone  •  calcium carbonate  •  dextrose  •  dextrose  •  glucagon (human recombinant)  •  hydrALAZINE  •  nitroglycerin  •  ondansetron  •  Pharmacy Consult - Remdesivir  •  [COMPLETED] Insert Peripheral IV **AND** sodium chloride  •  sodium chloride  •  sodium chloride    Allergies:  Patient has no known allergies.    OBJECTIVE    Vital Signs  Temp:  [98.1 °F (36.7 °C)-100.3 °F (37.9 °C)] 98.3 °F (36.8 °C)  Heart Rate:  [59-77] 72  Resp:  [13-18] 17  BP: (117-167)/(56-76) 136/56    No intake/output data recorded.  I/O last 3  completed shifts:  In: 236 [P.O.:236]  Out: 1475 [Urine:1475]    Physical Exam:  General Appearance: alert, appears stated age and cooperative  Head: normocephalic, without obvious abnormality and atraumatic  Eyes: conjunctivae and sclerae normal and no icterus  Neck: supple and no JVD  Lungs: diminished breath sounds.  Heart: regular rhythm & normal rate and normal S1, S2  Chest Wall: no abnormalities observed  Abdomen: normal bowel sounds and soft, nontender. Urostomy present  Extremities: moves extremities well, no edema, no cyanosis  Skin: no bleeding, bruising or rash  Neurologic: Alert, and oriented. No focal deficits    Results Review:    I reviewed the patient's new clinical results.    WBC WBC   Date Value Ref Range Status   01/18/2023 3.80 3.40 - 10.80 10*3/mm3 Final   01/17/2023 4.90 3.40 - 10.80 10*3/mm3 Final      HGB Hemoglobin   Date Value Ref Range Status   01/18/2023 10.9 (L) 13.0 - 17.7 g/dL Final   01/17/2023 11.4 (L) 13.0 - 17.7 g/dL Final      HCT Hematocrit   Date Value Ref Range Status   01/18/2023 33.5 (L) 37.5 - 51.0 % Final   01/17/2023 33.1 (L) 37.5 - 51.0 % Final      Platelets No results found for: LABPLAT   MCV MCV   Date Value Ref Range Status   01/18/2023 88.4 79.0 - 97.0 fL Final   01/17/2023 86.2 79.0 - 97.0 fL Final          Sodium Sodium   Date Value Ref Range Status   01/18/2023 137 136 - 145 mmol/L Final   01/17/2023 139 136 - 145 mmol/L Final      Potassium Potassium   Date Value Ref Range Status   01/18/2023 4.2 3.5 - 5.2 mmol/L Final   01/17/2023 4.0 3.5 - 5.2 mmol/L Final      Chloride Chloride   Date Value Ref Range Status   01/18/2023 103 98 - 107 mmol/L Final   01/17/2023 103 98 - 107 mmol/L Final      CO2 CO2   Date Value Ref Range Status   01/18/2023 25.0 22.0 - 29.0 mmol/L Final   01/17/2023 25.0 22.0 - 29.0 mmol/L Final      BUN BUN   Date Value Ref Range Status   01/18/2023 33 (H) 8 - 23 mg/dL Final   01/17/2023 35 (H) 8 - 23 mg/dL Final      Creatinine Creatinine    Date Value Ref Range Status   01/18/2023 2.20 (H) 0.76 - 1.27 mg/dL Final   01/17/2023 2.44 (H) 0.76 - 1.27 mg/dL Final      Calcium Calcium   Date Value Ref Range Status   01/18/2023 8.2 (L) 8.6 - 10.5 mg/dL Final   01/17/2023 8.6 8.6 - 10.5 mg/dL Final      PO4 No results found for: CAPO4   Albumin Albumin   Date Value Ref Range Status   01/18/2023 3.4 (L) 3.5 - 5.2 g/dL Final   01/17/2023 3.8 3.5 - 5.2 g/dL Final      Magnesium No results found for: MG   Uric Acid No results found for: URICACID       Imaging Results (Last 72 Hours)     Procedure Component Value Units Date/Time    XR Chest 1 View [[id number]] Collected: 01/17/23 2125     Updated: 01/17/23 2128    Narrative:      XR CHEST 1 VW    Date of Exam: 1/17/2023 8:27 PM EST    Indication: fever/soa/back pain.    Comparison: 1/5/2022    Findings:  The heart and mediastinal contours are within normal limits. The lungs are clear. Osseous structures are unremarkable.      Impression:      Impression:  No acute process    Electronically Signed: Joey Monroy    1/17/2023 9:26 PM EST    Workstation ID: [id number]            Results for orders placed during the hospital encounter of 01/17/23    XR Chest 1 View    Narrative  XR CHEST 1 VW    Date of Exam: 1/17/2023 8:27 PM EST    Indication: fever/soa/back pain.    Comparison: 1/5/2022    Findings:  The heart and mediastinal contours are within normal limits. The lungs are clear. Osseous structures are unremarkable.    Impression  Impression:  No acute process    Electronically Signed: Joey Monroy  1/17/2023 9:26 PM EST  Workstation ID: [id number]      Results for orders placed during the hospital encounter of 01/05/22    XR Chest 1 View    Narrative  XR CHEST 1 VW    Date of Exam: 1/5/2022 23:38 EST    Indication: dyspnea.  Covid positive.    Comparison Exams: None available.    Technique: Portable AP chest    FINDINGS:  Ill-defined infiltrates are seen throughout the central to peripheral aspect of the lungs  bilaterally with mild interstitial changes. No pleural effusions are observed. The cardiac silhouette is within normal limits for size for the portable study. The  mediastinum is unremarkable. No acute osseous abnormalities are identified.    Impression  Ill-defined infiltrates are noted bilaterally with mild interstitial changes. The findings suggest atypical/viral infection or multifocal pneumonia and suggest changes of Covid 19 infection. Recommend follow-up to ensure improvement/resolution.    Electronically Signed: Bran Colin MD 1/6/2022 0:03 EST      Results for orders placed in visit on 12/13/18    SCANNED - IMAGING        Results for orders placed during the hospital encounter of 10/17/19    Duplex Venous Lower Extremity - Left    Interpretation Summary  · Acute left lower extremity deep vein thrombosis noted in the common femoral, proximal femoral, mid femoral, distal femoral and popliteal.      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 Slight bump in CR, probably related to diuretics. But mostly stable  · DM  · HTN  · COVID 19+  · Hx bladder/prostate cancer--s/p urotomy  · Hearing impaired    CR stable  BP OK  Avoid ACEi/NSAIDS/nephrotoxins  Closely monitor renal function, fluid status, electrolytes.      I discussed the patient's findings and my recommendations with patient and consulting provider    Will follow along closely. Thank you for allowing us to see this patient in renal consultation.    [facility]  [phone]  MD Saeed Henriquez MD  01/19/23  11:25 EST

## 2023-01-19 NOTE — SIGNIFICANT NOTE
01/19/23 1411   OTHER   Discipline occupational therapist   Rehab Time/Intention   Session Not Performed other (see comments)  (OT screen completed. Does not appear to have OT needs at this time. Please reorder if necessary.)

## 2023-01-19 NOTE — PROGRESS NOTES
LOS: 0 days   Patient Care Team:  Joey Islas MD as PCP - General (Family Medicine)  Neville Laughlin MD as Consulting Physician (Nephrology)    Subjective     Patient denies any complaints    Review of Systems   Constitutional: Positive for activity change, appetite change and fatigue.   HENT: Negative.    Respiratory: Positive for shortness of breath.    Cardiovascular: Negative.    Gastrointestinal: Negative.    Genitourinary: Negative.    Musculoskeletal: Negative.    Neurological: Positive for weakness.   Psychiatric/Behavioral: Negative.            Objective     Vital Signs  Temp:  [98.1 °F (36.7 °C)-100.3 °F (37.9 °C)] 98.3 °F (36.8 °C)  Heart Rate:  [59-77] 61  Resp:  [13-18] 13  BP: (117-167)/(56-76) 136/56      Physical Exam  Vitals reviewed.   HENT:      Head: Normocephalic and atraumatic.      Right Ear: External ear normal.      Left Ear: External ear normal.      Nose: Nose normal.      Mouth/Throat:      Mouth: Mucous membranes are moist.   Eyes:      General:         Right eye: No discharge.         Left eye: No discharge.   Cardiovascular:      Rate and Rhythm: Normal rate and regular rhythm.      Pulses: Normal pulses.      Heart sounds: Normal heart sounds.   Pulmonary:      Effort: Pulmonary effort is normal.      Breath sounds: Normal breath sounds.   Abdominal:      General: Bowel sounds are normal.      Palpations: Abdomen is soft.   Musculoskeletal:         General: Normal range of motion.      Cervical back: Normal range of motion.   Skin:     General: Skin is warm and dry.   Neurological:      Mental Status: He is alert and oriented to person, place, and time.   Psychiatric:         Behavior: Behavior normal.              Results Review:    Lab Results (last 24 hours)     Procedure Component Value Units Date/Time    POC Glucose Once [[id number]]  (Abnormal) Collected: 01/19/23 0745    Specimen: Blood Updated: 01/19/23 0748     Glucose 230 mg/dL      Comment: Serial  Number: [id number]:  [id number]       POC Glucose Once [[id number]]  (Abnormal) Collected: 01/18/23 2105    Specimen: Blood Updated: 01/18/23 2107     Glucose 323 mg/dL      Comment: Serial Number: [id number]:  [id number]       Blood Culture - Blood, Arm, Right [[id number]]  (Abnormal) Collected: 01/17/23 2016    Specimen: Blood from Arm, Right Updated: 01/18/23 2018     Blood Culture Abnormal Stain     Gram Stain Aerobic Bottle Gram positive cocci in clusters    Blood Culture ID, PCR - Blood, Arm, Right [[id number]]  (Abnormal) Collected: 01/17/23 2016    Specimen: Blood from Arm, Right Updated: 01/18/23 2018     BCID, PCR Staph spp, not aureus or lugdunensis. Identification by BCID2 PCR.     BOTTLE TYPE Aerobic Bottle    Blood Culture - Blood, Arm, Left [[id number]]  (Normal) Collected: 01/17/23 1937    Specimen: Blood from Arm, Left Updated: 01/18/23 1945     Blood Culture No growth at 24 hours    Hepatic Function Panel [[id number]]  (Abnormal) Collected: 01/18/23 0416    Specimen: Blood Updated: 01/18/23 1320     Total Protein 5.7 g/dL      Albumin 3.4 g/dL      ALT (SGPT) 12 U/L      AST (SGOT) 18 U/L      Alkaline Phosphatase 55 U/L      Total Bilirubin 0.3 mg/dL      Bilirubin, Direct <0.2 mg/dL      Bilirubin, Indirect --     Comment: Unable to calculate       POC Glucose Once [[id number]]  (Abnormal) Collected: 01/18/23 1151    Specimen: Blood Updated: 01/18/23 1152     Glucose 136 mg/dL      Comment: Serial Number: [id number]:  [id number]              Imaging Results (Last 24 Hours)     ** No results found for the last 24 hours. **               I reviewed the patient's new clinical results.    Medication Review:   Scheduled Meds:albuterol sulfate HFA, 2 puff, Inhalation, BID  amLODIPine, 10 mg, Oral, Nightly  budesonide-formoterol, 2 puff, Inhalation, BID - RT  cefTRIAXone, 1 g, Intravenous, Q24H  dexamethasone, 6 mg, Oral, Daily With Breakfast  gabapentin, 300 mg, Oral, TID  glipizide, 2.5  mg, Oral, QAM AC  guaiFENesin, 600 mg, Oral, Q12H  hydrALAZINE, 75 mg, Oral, TID  insulin lispro, 2-7 Units, Subcutaneous, TID With Meals  magnesium oxide, 400 mg, Oral, Daily  pantoprazole, 40 mg, Oral, Q AM  polyethylene glycol, 17 g, Oral, Daily  remdesivir, 100 mg, Intravenous, Q24H  sodium bicarbonate, 650 mg, Oral, BID  sodium chloride, 3 mL, Intravenous, Q12H      Continuous Infusions:Pharmacy Consult - Remdesivir,       PRN Meds:.•  acetaminophen  •  aluminum-magnesium hydroxide-simethicone  •  calcium carbonate  •  dextrose  •  dextrose  •  glucagon (human recombinant)  •  hydrALAZINE  •  nitroglycerin  •  ondansetron  •  Pharmacy Consult - Remdesivir  •  [COMPLETED] Insert Peripheral IV **AND** sodium chloride  •  sodium chloride  •  sodium chloride     Interval History:    1/19 improving currently on 2 liters with no distress    Assessment & Plan     COVID 19 +  Acute hypoxemia - oxygenation on room reported was in 80'2 at home  - remdesivir, will decrease steroids and dc symbicort    +BC appear contaminated in one site; no fever today and no leukocytosis 2nd BC pending  Deaf  Diabetes mellitus-ss  Urostomy - ceftriaxone and await urine and blood cultures.  H/O DVT - warfarin; hold today; recheck INR daily  H/O fecal impaction - daily Miralax  CKD- will consult nephrology normally 1.8 elevated  Hx bladder cancer/prostate    Warfarin for DVT prophy  Pantoprazole for stress ulcer prophy          Plan for disposition:Home    JOANNE Taylor  01/19/23  09:47 EST

## 2023-01-19 NOTE — PLAN OF CARE
Problem: Breathing Pattern Ineffective  Goal: Effective Breathing Pattern  Outcome: Met     Problem: Adult Inpatient Plan of Care  Goal: Plan of Care Review  Outcome: Met   Goal Outcome Evaluation:  Plan of Care Reviewed With: patient    Patient had no complaints of pain on this shift. The patient has slept most of the shift. Patient is able to make needs known and the call light is with in reach. Will continue with patients care.

## 2023-01-19 NOTE — CASE MANAGEMENT/SOCIAL WORK
Continued Stay Note  BOZENA Crocker     Patient Name: Michael Dorantes  MRN: [id number]  Today's Date: 1/19/2023    Admit Date: 1/17/2023    Plan: Covid +, Deaf,  Home with Wife, Watch for oxygen need   Discharge Plan     Row Name 01/19/23 1204       Plan    Plan Covid +, Deaf,  Home with Wife, Watch for oxygen need    Plan Comments D/C Barriers: Monitoring creatinine, Nephrology consult, IV Remdesiver            Phone communication or documentation only - no physical contact with patient or family.        Radha Mcclain RN

## 2023-01-19 NOTE — PLAN OF CARE
Goal Outcome Evaluation:  Plan of Care Reviewed With: patient (via )           Outcome Evaluation: 73 yo male admitted with fever and nausea.  Pt is COVID + and getting Remdesivir.  Pt is deaf, utilized IPad with  throughout evaluation.  Pt reports no complaints, on 2L O2 with sats mid to high 90s.  Pt is ind with all mobiltiy and ambulates in room x 50' on RA with sats 93%.  Pt up to chair, RN aware.  No further therapy needs, safe for home at d/c.

## 2023-01-20 LAB
ALBUMIN SERPL-MCNC: 3.3 G/DL (ref 3.5–5.2)
ALP SERPL-CCNC: 52 U/L (ref 39–117)
ALT SERPL W P-5'-P-CCNC: 11 U/L (ref 1–41)
ANION GAP SERPL CALCULATED.3IONS-SCNC: 9 MMOL/L (ref 5–15)
AST SERPL-CCNC: 12 U/L (ref 1–40)
BACTERIA SPEC AEROBE CULT: NORMAL
BASOPHILS # BLD AUTO: 0 10*3/MM3 (ref 0–0.2)
BASOPHILS NFR BLD AUTO: 0.1 % (ref 0–1.5)
BILIRUB CONJ SERPL-MCNC: <0.2 MG/DL (ref 0–0.3)
BILIRUB INDIRECT SERPL-MCNC: ABNORMAL MG/DL
BILIRUB SERPL-MCNC: <0.2 MG/DL (ref 0–1.2)
BUN SERPL-MCNC: 44 MG/DL (ref 8–23)
BUN/CREAT SERPL: 18 (ref 7–25)
CALCIUM SPEC-SCNC: 7.6 MG/DL (ref 8.6–10.5)
CHLORIDE SERPL-SCNC: 99 MMOL/L (ref 98–107)
CO2 SERPL-SCNC: 24 MMOL/L (ref 22–29)
CREAT SERPL-MCNC: 2.45 MG/DL (ref 0.76–1.27)
DEPRECATED RDW RBC AUTO: 42.9 FL (ref 37–54)
EGFRCR SERPLBLD CKD-EPI 2021: 27.3 ML/MIN/1.73
EOSINOPHIL # BLD AUTO: 0 10*3/MM3 (ref 0–0.4)
EOSINOPHIL NFR BLD AUTO: 0 % (ref 0.3–6.2)
ERYTHROCYTE [DISTWIDTH] IN BLOOD BY AUTOMATED COUNT: 13.6 % (ref 12.3–15.4)
GLUCOSE BLDC GLUCOMTR-MCNC: 170 MG/DL (ref 70–105)
GLUCOSE BLDC GLUCOMTR-MCNC: 232 MG/DL (ref 70–105)
GLUCOSE BLDC GLUCOMTR-MCNC: 318 MG/DL (ref 70–105)
GLUCOSE SERPL-MCNC: 308 MG/DL (ref 65–99)
HCT VFR BLD AUTO: 32 % (ref 37.5–51)
HGB BLD-MCNC: 10.8 G/DL (ref 13–17.7)
INR PPP: 2.73 (ref 2–3)
LYMPHOCYTES # BLD AUTO: 0.4 10*3/MM3 (ref 0.7–3.1)
LYMPHOCYTES NFR BLD AUTO: 5.8 % (ref 19.6–45.3)
MCH RBC QN AUTO: 28.8 PG (ref 26.6–33)
MCHC RBC AUTO-ENTMCNC: 33.6 G/DL (ref 31.5–35.7)
MCV RBC AUTO: 85.8 FL (ref 79–97)
MONOCYTES # BLD AUTO: 0.3 10*3/MM3 (ref 0.1–0.9)
MONOCYTES NFR BLD AUTO: 5.2 % (ref 5–12)
NEUTROPHILS NFR BLD AUTO: 5.3 10*3/MM3 (ref 1.7–7)
NEUTROPHILS NFR BLD AUTO: 88.9 % (ref 42.7–76)
NRBC BLD AUTO-RTO: 0 /100 WBC (ref 0–0.2)
PHOSPHATE SERPL-MCNC: 2 MG/DL (ref 2.5–4.5)
PLATELET # BLD AUTO: 191 10*3/MM3 (ref 140–450)
PMV BLD AUTO: 7.5 FL (ref 6–12)
POTASSIUM SERPL-SCNC: 4.8 MMOL/L (ref 3.5–5.2)
PROT SERPL-MCNC: 5.6 G/DL (ref 6–8.5)
PROTHROMBIN TIME: 26.6 SECONDS (ref 19.4–28.5)
RBC # BLD AUTO: 3.73 10*6/MM3 (ref 4.14–5.8)
SODIUM SERPL-SCNC: 132 MMOL/L (ref 136–145)
WBC NRBC COR # BLD: 6 10*3/MM3 (ref 3.4–10.8)

## 2023-01-20 PROCEDURE — 94799 UNLISTED PULMONARY SVC/PX: CPT

## 2023-01-20 PROCEDURE — 84100 ASSAY OF PHOSPHORUS: CPT | Performed by: INTERNAL MEDICINE

## 2023-01-20 PROCEDURE — 85025 COMPLETE CBC W/AUTO DIFF WBC: CPT | Performed by: INTERNAL MEDICINE

## 2023-01-20 PROCEDURE — 82962 GLUCOSE BLOOD TEST: CPT

## 2023-01-20 PROCEDURE — 63710000001 INSULIN REGULAR HUMAN PER 5 UNITS: Performed by: INTERNAL MEDICINE

## 2023-01-20 PROCEDURE — 80076 HEPATIC FUNCTION PANEL: CPT | Performed by: INTERNAL MEDICINE

## 2023-01-20 PROCEDURE — 94664 DEMO&/EVAL PT USE INHALER: CPT

## 2023-01-20 PROCEDURE — 63710000001 INSULIN LISPRO (HUMAN) PER 5 UNITS: Performed by: INTERNAL MEDICINE

## 2023-01-20 PROCEDURE — 85610 PROTHROMBIN TIME: CPT | Performed by: INTERNAL MEDICINE

## 2023-01-20 PROCEDURE — 80048 BASIC METABOLIC PNL TOTAL CA: CPT | Performed by: INTERNAL MEDICINE

## 2023-01-20 PROCEDURE — 25010000002 CEFTRIAXONE PER 250 MG: Performed by: INTERNAL MEDICINE

## 2023-01-20 PROCEDURE — 25010000002 REMDESIVIR 100 MG/20ML SOLUTION 1 EACH VIAL: Performed by: INTERNAL MEDICINE

## 2023-01-20 PROCEDURE — 94761 N-INVAS EAR/PLS OXIMETRY MLT: CPT

## 2023-01-20 RX ORDER — SORBITOL SOLUTION 70 %
50 SOLUTION, ORAL MISCELLANEOUS ONCE
Status: COMPLETED | OUTPATIENT
Start: 2023-01-20 | End: 2023-01-20

## 2023-01-20 RX ADMIN — POLYETHYLENE GLYCOL 3350 17 G: 17 POWDER, FOR SOLUTION ORAL at 10:00

## 2023-01-20 RX ADMIN — MAGNESIUM OXIDE TAB 400 MG (241.3 MG ELEMENTAL MG) 400 MG: 400 (241.3 MG) TAB at 10:00

## 2023-01-20 RX ADMIN — HYDRALAZINE HYDROCHLORIDE 75 MG: 25 TABLET, FILM COATED ORAL at 16:29

## 2023-01-20 RX ADMIN — GABAPENTIN 300 MG: 300 CAPSULE ORAL at 10:00

## 2023-01-20 RX ADMIN — INSULIN LISPRO 4 UNITS: 100 INJECTION, SOLUTION INTRAVENOUS; SUBCUTANEOUS at 18:37

## 2023-01-20 RX ADMIN — ALBUTEROL SULFATE 2 PUFF: 90 AEROSOL, METERED RESPIRATORY (INHALATION) at 20:57

## 2023-01-20 RX ADMIN — SODIUM BICARBONATE 650 MG: 650 TABLET ORAL at 10:00

## 2023-01-20 RX ADMIN — GABAPENTIN 300 MG: 300 CAPSULE ORAL at 16:30

## 2023-01-20 RX ADMIN — GABAPENTIN 300 MG: 300 CAPSULE ORAL at 22:00

## 2023-01-20 RX ADMIN — ALBUTEROL SULFATE 2 PUFF: 90 AEROSOL, METERED RESPIRATORY (INHALATION) at 06:30

## 2023-01-20 RX ADMIN — INSULIN HUMAN 2 UNITS: 100 INJECTION, SOLUTION PARENTERAL at 10:17

## 2023-01-20 RX ADMIN — Medication 3 ML: at 10:00

## 2023-01-20 RX ADMIN — HYDRALAZINE HYDROCHLORIDE 75 MG: 25 TABLET, FILM COATED ORAL at 10:00

## 2023-01-20 RX ADMIN — REMDESIVIR 100 MG: 100 INJECTION, POWDER, LYOPHILIZED, FOR SOLUTION INTRAVENOUS at 14:39

## 2023-01-20 RX ADMIN — INSULIN LISPRO 8 UNITS: 100 INJECTION, SOLUTION INTRAVENOUS; SUBCUTANEOUS at 10:16

## 2023-01-20 RX ADMIN — AMLODIPINE BESYLATE 10 MG: 5 TABLET ORAL at 22:00

## 2023-01-20 RX ADMIN — HYDRALAZINE HYDROCHLORIDE 75 MG: 25 TABLET, FILM COATED ORAL at 22:00

## 2023-01-20 RX ADMIN — SORBITOL SOLUTION (BULK) 50 ML: 70 SOLUTION at 10:00

## 2023-01-20 RX ADMIN — SORBITOL SOLUTION (BULK) 50 ML: 70 SOLUTION at 12:10

## 2023-01-20 RX ADMIN — GUAIFENESIN 600 MG: 600 TABLET, EXTENDED RELEASE ORAL at 22:00

## 2023-01-20 RX ADMIN — Medication 3 ML: at 22:00

## 2023-01-20 RX ADMIN — GLIPIZIDE 2.5 MG: 5 TABLET ORAL at 10:00

## 2023-01-20 RX ADMIN — SODIUM BICARBONATE 650 MG: 650 TABLET ORAL at 22:01

## 2023-01-20 RX ADMIN — CEFTRIAXONE 1 G: 1 INJECTION, POWDER, FOR SOLUTION INTRAMUSCULAR; INTRAVENOUS at 13:32

## 2023-01-20 RX ADMIN — PANTOPRAZOLE SODIUM 40 MG: 40 TABLET, DELAYED RELEASE ORAL at 04:59

## 2023-01-20 RX ADMIN — INSULIN LISPRO 16 UNITS: 100 INJECTION, SOLUTION INTRAVENOUS; SUBCUTANEOUS at 12:09

## 2023-01-20 RX ADMIN — DEXAMETHASONE 2 MG: 0.5 TABLET ORAL at 10:00

## 2023-01-20 RX ADMIN — GUAIFENESIN 600 MG: 600 TABLET, EXTENDED RELEASE ORAL at 10:00

## 2023-01-20 NOTE — PLAN OF CARE
Goal Outcome Evaluation:  Plan of Care Reviewed With: patient           Outcome Evaluation: Pt remains on covid isolation at this time, currently getting IV abx and IV Remdesivir at this time. Will continue to monitor.
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
## 2023-01-20 NOTE — CASE MANAGEMENT/SOCIAL WORK
Continued Stay Note  BOZENA Crocker     Patient Name: Michael Dorantes  MRN: [id number]  Today's Date: 1/20/2023    Admit Date: 1/17/2023    Plan: Covid +, Deaf, Home with Wife, Watch for oxygen need   Discharge Plan     Row Name 01/20/23 1819       Plan    Plan Covid +, Deaf, Home with Wife, Watch for oxygen need    Patient/Family in Agreement with Plan yes    Plan Comments D/C Barriers: Monitoring creatinine, Nephrology consult, IV Remdesevir, IV antibiotics.              Expected Discharge Date and Time     Expected Discharge Date Expected Discharge Time    Jan 23, 2023           Phone communication or documentation only - no physical contact with patient or family.  Kassie Odonnell RN     Office Phone [phone]  Office Cell [phone]

## 2023-01-20 NOTE — PROGRESS NOTES
"NEPHROLOGY PROGRESS NOTE------[facility]    [facility]  [phone]  Saeed Briscoe MD      Patient Care Team:  Joey Islas MD as PCP - General (Family Medicine)  Neville Laughlin MD as Consulting Physician (Nephrology)      Provider:  Saeed Briscoe MD  Patient Name: Michael Dorantes  :  1950    SUBJECTIVE:  F/U CKD  Seen with sign   No chest pain or SOA. Coughing some  Voiding by self    Medication:  albuterol sulfate HFA, 2 puff, Inhalation, BID  amLODIPine, 10 mg, Oral, Nightly  cefTRIAXone, 1 g, Intravenous, Q24H  dexamethasone, 2 mg, Oral, Daily With Breakfast  gabapentin, 300 mg, Oral, TID  glipizide, 2.5 mg, Oral, QAM AC  guaiFENesin, 600 mg, Oral, Q12H  hydrALAZINE, 75 mg, Oral, TID  insulin lispro, 4-24 Units, Subcutaneous, TID With Meals  insulin regular, 2 Units, Subcutaneous, Daily With Breakfast  magnesium oxide, 400 mg, Oral, Daily  polyethylene glycol, 17 g, Oral, Daily  remdesivir, 100 mg, Intravenous, Q24H  sodium bicarbonate, 650 mg, Oral, BID  sodium chloride, 3 mL, Intravenous, Q12H  sorbitol, 50 mL, Oral, Daily      Pharmacy Consult - Remdesivir,         OBJECTIVE    Vital Sign Min/Max for last 24 hours  Temp  Min: 97.4 °F (36.3 °C)  Max: 97.8 °F (36.6 °C)   BP  Min: 145/65  Max: 168/54   Pulse  Min: 65  Max: 72   Resp  Min: 14  Max: 20   SpO2  Min: 95 %  Max: 97 %   No data recorded   Weight  Min: 86.8 kg (191 lb 5.8 oz)  Max: 86.8 kg (191 lb 5.8 oz)     Flowsheet Rows    Flowsheet Row First Filed Value   Admission Height 182.9 cm (72\") Documented at 2023   Admission Weight 84.6 kg (186 lb 8.2 oz) Documented at 2023          No intake/output data recorded.  I/O last 3 completed shifts:  In: 416 [P.O.:416]  Out: 2000 [Urine:2000]    Physical Exam:  General Appearance: alert, appears stated age and cooperative  Head: normocephalic, without obvious abnormality and atraumatic  Eyes: " conjunctivae and sclerae normal and no icterus  Neck: supple and no JVD  Lungs: clear to auscultation and respirations regular  Heart: regular rhythm & normal rate and normal S1, S2  Chest: Wall no abnormalities observed  Abdomen: normal bowel sounds and soft, nontender  Extremities: moves extremities well, no edema, no cyanosis and no redness  Skin: no bleeding, bruising or rash, turgor normal, color normal and no lesions noted  Neurologic: Alert, and oriented. No focal deficits    Labs:    WBC WBC   Date Value Ref Range Status   01/20/2023 6.00 3.40 - 10.80 10*3/mm3 Final   01/19/2023 5.40 3.40 - 10.80 10*3/mm3 Final   01/18/2023 3.80 3.40 - 10.80 10*3/mm3 Final   01/17/2023 4.90 3.40 - 10.80 10*3/mm3 Final      HGB Hemoglobin   Date Value Ref Range Status   01/20/2023 10.8 (L) 13.0 - 17.7 g/dL Final   01/19/2023 11.5 (L) 13.0 - 17.7 g/dL Final   01/18/2023 10.9 (L) 13.0 - 17.7 g/dL Final   01/17/2023 11.4 (L) 13.0 - 17.7 g/dL Final      HCT Hematocrit   Date Value Ref Range Status   01/20/2023 32.0 (L) 37.5 - 51.0 % Final   01/19/2023 34.5 (L) 37.5 - 51.0 % Final   01/18/2023 33.5 (L) 37.5 - 51.0 % Final   01/17/2023 33.1 (L) 37.5 - 51.0 % Final      Platelets No results found for: LABPLAT   MCV MCV   Date Value Ref Range Status   01/20/2023 85.8 79.0 - 97.0 fL Final   01/19/2023 86.9 79.0 - 97.0 fL Final   01/18/2023 88.4 79.0 - 97.0 fL Final   01/17/2023 86.2 79.0 - 97.0 fL Final          Sodium Sodium   Date Value Ref Range Status   01/20/2023 132 (L) 136 - 145 mmol/L Final   01/19/2023 132 (L) 136 - 145 mmol/L Final   01/18/2023 137 136 - 145 mmol/L Final   01/17/2023 139 136 - 145 mmol/L Final      Potassium Potassium   Date Value Ref Range Status   01/20/2023 4.8 3.5 - 5.2 mmol/L Final   01/19/2023 4.7 3.5 - 5.2 mmol/L Final     Comment:     Slight hemolysis detected by analyzer. Results may be affected.   01/18/2023 4.2 3.5 - 5.2 mmol/L Final   01/17/2023 4.0 3.5 - 5.2 mmol/L Final      Chloride Chloride    Date Value Ref Range Status   01/20/2023 99 98 - 107 mmol/L Final   01/19/2023 99 98 - 107 mmol/L Final   01/18/2023 103 98 - 107 mmol/L Final   01/17/2023 103 98 - 107 mmol/L Final      CO2 CO2   Date Value Ref Range Status   01/20/2023 24.0 22.0 - 29.0 mmol/L Final   01/19/2023 21.0 (L) 22.0 - 29.0 mmol/L Final   01/18/2023 25.0 22.0 - 29.0 mmol/L Final   01/17/2023 25.0 22.0 - 29.0 mmol/L Final      BUN BUN   Date Value Ref Range Status   01/20/2023 44 (H) 8 - 23 mg/dL Final   01/19/2023 43 (H) 8 - 23 mg/dL Final   01/18/2023 33 (H) 8 - 23 mg/dL Final   01/17/2023 35 (H) 8 - 23 mg/dL Final      Creatinine Creatinine   Date Value Ref Range Status   01/20/2023 2.45 (H) 0.76 - 1.27 mg/dL Final   01/19/2023 2.55 (H) 0.76 - 1.27 mg/dL Final   01/18/2023 2.20 (H) 0.76 - 1.27 mg/dL Final   01/17/2023 2.44 (H) 0.76 - 1.27 mg/dL Final      Calcium Calcium   Date Value Ref Range Status   01/20/2023 7.6 (L) 8.6 - 10.5 mg/dL Final   01/19/2023 7.9 (L) 8.6 - 10.5 mg/dL Final   01/18/2023 8.2 (L) 8.6 - 10.5 mg/dL Final   01/17/2023 8.6 8.6 - 10.5 mg/dL Final      PO4 No components found for: PO4   Albumin Albumin   Date Value Ref Range Status   01/20/2023 3.3 (L) 3.5 - 5.2 g/dL Final   01/19/2023 3.3 (L) 3.5 - 5.2 g/dL Final   01/18/2023 3.4 (L) 3.5 - 5.2 g/dL Final   01/17/2023 3.8 3.5 - 5.2 g/dL Final      Magnesium No results found for: MG   Uric Acid No components found for: URIC ACID     Imaging Results (Last 72 Hours)     Procedure Component Value Units Date/Time    XR Chest 1 View [[id number]] Collected: 01/17/23 2125     Updated: 01/17/23 2128    Narrative:      XR CHEST 1 VW    Date of Exam: 1/17/2023 8:27 PM EST    Indication: fever/soa/back pain.    Comparison: 1/5/2022    Findings:  The heart and mediastinal contours are within normal limits. The lungs are clear. Osseous structures are unremarkable.      Impression:      Impression:  No acute process    Electronically Signed: Joey Monroy    1/17/2023 9:26 PM  EST    Workstation ID: [id number]          Results for orders placed during the hospital encounter of 01/17/23    XR Chest 1 View    Narrative  XR CHEST 1 VW    Date of Exam: 1/17/2023 8:27 PM EST    Indication: fever/soa/back pain.    Comparison: 1/5/2022    Findings:  The heart and mediastinal contours are within normal limits. The lungs are clear. Osseous structures are unremarkable.    Impression  Impression:  No acute process    Electronically Signed: Joey Monroy  1/17/2023 9:26 PM EST  Workstation ID: [id number]      Results for orders placed during the hospital encounter of 01/05/22    XR Chest 1 View    Narrative  XR CHEST 1 VW    Date of Exam: 1/5/2022 23:38 EST    Indication: dyspnea.  Covid positive.    Comparison Exams: None available.    Technique: Portable AP chest    FINDINGS:  Ill-defined infiltrates are seen throughout the central to peripheral aspect of the lungs bilaterally with mild interstitial changes. No pleural effusions are observed. The cardiac silhouette is within normal limits for size for the portable study. The  mediastinum is unremarkable. No acute osseous abnormalities are identified.    Impression  Ill-defined infiltrates are noted bilaterally with mild interstitial changes. The findings suggest atypical/viral infection or multifocal pneumonia and suggest changes of Covid 19 infection. Recommend follow-up to ensure improvement/resolution.    Electronically Signed: Bran Colin MD 1/6/2022 0:03 EST      Results for orders placed in visit on 12/13/18    SCANNED - IMAGING      Results for orders placed during the hospital encounter of 10/17/19    Duplex Venous Lower Extremity - Left    Interpretation Summary  · Acute left lower extremity deep vein thrombosis noted in the common femoral, proximal femoral, mid femoral, distal femoral and popliteal.        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 Slight bump in CR, probably related to diuretics.   • DM  • HTN  • COVID 19+  • Hx bladder/prostate cancer--s/p urotomy  • Hearing impaired     CR slightly up, BP OK  Holding diuretics  Closely monitor renal function, fluid status, electrolytes.        Saeed Briscoe MD  [facility]  [phone]  01/20/23  12:17 EST

## 2023-01-20 NOTE — NURSING NOTE
Pt has  until 2pm 1/21/23. If pt needs an  past this time [facility] needs to be notified to arrange for . Call [phone]

## 2023-01-20 NOTE — PLAN OF CARE
Goal Outcome Evaluation:  Plan of Care Reviewed With: patient        Progress: improving   Interpretor at bedside. Pt denies shortness of breath or pain. Pt has been resting comfortably with no complaints. Currently on 2L O2. Will need 6 minute walk prior to discharge. Will continue to monitor...

## 2023-01-20 NOTE — PROGRESS NOTES
LOS: 1 day   Patient Care Team:  Joey Islas MD as PCP - General (Family Medicine)  Neville Laughlin MD as Consulting Physician (Nephrology)    Subjective     Patient denies any complaints    Review of Systems   Constitutional: Positive for activity change. Negative for appetite change and fatigue.   HENT: Negative.    Respiratory: Negative for shortness of breath.    Cardiovascular: Negative.    Gastrointestinal: Negative.    Genitourinary: Negative.    Musculoskeletal: Negative.    Neurological: Negative for weakness.   Psychiatric/Behavioral: Negative.            Objective     Vital Signs  Temp:  [97.4 °F (36.3 °C)-97.8 °F (36.6 °C)] 97.6 °F (36.4 °C)  Heart Rate:  [65-72] 66  Resp:  [12-18] 18  BP: (134-168)/(54-68) 145/65      Physical Exam  Vitals reviewed.   HENT:      Head: Normocephalic and atraumatic.      Right Ear: External ear normal.      Left Ear: External ear normal.      Nose: Nose normal.      Mouth/Throat:      Mouth: Mucous membranes are moist.   Eyes:      General:         Right eye: No discharge.         Left eye: No discharge.   Cardiovascular:      Rate and Rhythm: Normal rate and regular rhythm.      Pulses: Normal pulses.      Heart sounds: Normal heart sounds.   Pulmonary:      Effort: Pulmonary effort is normal.      Breath sounds: Normal breath sounds.   Abdominal:      General: Bowel sounds are normal.      Palpations: Abdomen is soft.   Musculoskeletal:         General: Normal range of motion.      Cervical back: Normal range of motion.   Skin:     General: Skin is warm and dry.   Neurological:      Mental Status: He is alert and oriented to person, place, and time.   Psychiatric:         Behavior: Behavior normal.              Results Review:    Lab Results (last 24 hours)     Procedure Component Value Units Date/Time    POC Glucose Once [[id number]]  (Abnormal) Collected: 01/20/23 0740    Specimen: Blood Updated: 01/20/23 0742     Glucose 232 mg/dL      Comment:  Serial Number: [id number]:  [id number]       Basic Metabolic Panel [[id number]]  (Abnormal) Collected: 01/20/23 0422    Specimen: Blood Updated: 01/20/23 0506     Glucose 308 mg/dL      BUN 44 mg/dL      Creatinine 2.45 mg/dL      Sodium 132 mmol/L      Potassium 4.8 mmol/L      Chloride 99 mmol/L      CO2 24.0 mmol/L      Calcium 7.6 mg/dL      BUN/Creatinine Ratio 18.0     Anion Gap 9.0 mmol/L      eGFR 27.3 mL/min/1.73     Narrative:      GFR Normal >60  Chronic Kidney Disease <60  Kidney Failure <15    The GFR formula is only valid for adults with stable renal function between ages 18 and 70.    Hepatic Function Panel [[id number]]  (Abnormal) Collected: 01/20/23 0422    Specimen: Blood Updated: 01/20/23 0506     Total Protein 5.6 g/dL      Albumin 3.3 g/dL      ALT (SGPT) 11 U/L      AST (SGOT) 12 U/L      Alkaline Phosphatase 52 U/L      Total Bilirubin <0.2 mg/dL      Bilirubin, Direct <0.2 mg/dL      Bilirubin, Indirect --     Comment: Unable to calculate       Phosphorus [[id number]]  (Abnormal) Collected: 01/20/23 0422    Specimen: Blood Updated: 01/20/23 0506     Phosphorus 2.0 mg/dL     Protime-INR [[id number]]  (Normal) Collected: 01/20/23 0422    Specimen: Blood Updated: 01/20/23 0452     Protime 26.6 Seconds      INR 2.73    CBC & Differential [[id number]]  (Abnormal) Collected: 01/20/23 0422    Specimen: Blood Updated: 01/20/23 0441    Narrative:      The following orders were created for panel order CBC & Differential.  Procedure                               Abnormality         Status                     ---------                               -----------         ------                     CBC Auto Differential[[id number]]        Abnormal            Final result                 Please view results for these tests on the individual orders.    CBC Auto Differential [[id number]]  (Abnormal) Collected: 01/20/23 0422    Specimen: Blood Updated: 01/20/23 0441     WBC 6.00 10*3/mm3      RBC 3.73 10*6/mm3       Hemoglobin 10.8 g/dL      Hematocrit 32.0 %      MCV 85.8 fL      MCH 28.8 pg      MCHC 33.6 g/dL      RDW 13.6 %      RDW-SD 42.9 fl      MPV 7.5 fL      Platelets 191 10*3/mm3      Neutrophil % 88.9 %      Lymphocyte % 5.8 %      Monocyte % 5.2 %      Eosinophil % 0.0 %      Basophil % 0.1 %      Neutrophils, Absolute 5.30 10*3/mm3      Lymphocytes, Absolute 0.40 10*3/mm3      Monocytes, Absolute 0.30 10*3/mm3      Eosinophils, Absolute 0.00 10*3/mm3      Basophils, Absolute 0.00 10*3/mm3      nRBC 0.0 /100 WBC     POC Glucose Once [[id number]]  (Abnormal) Collected: 01/19/23 2300    Specimen: Blood Updated: 01/19/23 2302     Glucose 354 mg/dL      Comment: Serial Number: [id number]:  [id number]       Urinalysis, Microscopic Only - Urostomy [[id number]]  (Abnormal) Collected: 01/19/23 2128    Specimen: Urine from Urostomy Updated: 01/19/23 2226     RBC, UA 13-20 /HPF      WBC, UA 6-12 /HPF      Bacteria, UA 4+ /HPF      Squamous Epithelial Cells, UA 0-2 /HPF      Yeast, UA Small/1+ Budding Yeast /HPF      Hyaline Casts, UA None Seen /LPF      Triple Phosphate Crystals, UA Large/3+ /HPF      Methodology Manual Light Microscopy    Urine Culture - Urine, Urostomy [[id number]] Collected: 01/19/23 2128    Specimen: Urine from Urostomy Updated: 01/19/23 2226    Urinalysis With Culture If Indicated - Urostomy [[id number]]  (Abnormal) Collected: 01/19/23 2128    Specimen: Urine from Urostomy Updated: 01/19/23 2207     Color, UA Fabi     Appearance, UA Turbid     pH, UA >=9.0     Specific Gravity, UA 1.020     Glucose,  mg/dL (1+)     Ketones, UA Negative     Bilirubin, UA Negative     Blood, UA Moderate (2+)     Protein, UA >=300 mg/dL (3+)     Leuk Esterase, UA Moderate (2+)     Nitrite, UA Negative     Urobilinogen, UA 0.2 E.U./dL    Narrative:      In absence of clinical symptoms, the presence of pyuria, bacteria, and/or nitrites on the urinalysis result does not correlate with infection.    POC Glucose  Once [[id number]]  (Abnormal) Collected: 01/19/23 1946    Specimen: Blood Updated: 01/19/23 1948     Glucose 266 mg/dL      Comment: Serial Number: [id number]:  [id number]       Blood Culture - Blood, Arm, Left [[id number]]  (Normal) Collected: 01/17/23 1937    Specimen: Blood from Arm, Left Updated: 01/19/23 1945     Blood Culture No growth at 2 days    CBC & Differential [[id number]]  (Abnormal) Collected: 01/19/23 1726    Specimen: Blood Updated: 01/19/23 1913    Narrative:      The following orders were created for panel order CBC & Differential.  Procedure                               Abnormality         Status                     ---------                               -----------         ------                     CBC Auto Differential[[id number]]        Abnormal            Final result               Scan Slide[[id number]]                                                                    Please view results for these tests on the individual orders.    CBC Auto Differential [[id number]]  (Abnormal) Collected: 01/19/23 1849    Specimen: Blood Updated: 01/19/23 1913     WBC 5.40 10*3/mm3      RBC 3.97 10*6/mm3      Hemoglobin 11.5 g/dL      Hematocrit 34.5 %      MCV 86.9 fL      MCH 28.9 pg      MCHC 33.2 g/dL      RDW 13.6 %      RDW-SD 43.3 fl      MPV 7.5 fL      Platelets 186 10*3/mm3      Neutrophil % 92.3 %      Lymphocyte % 4.9 %      Monocyte % 2.7 %      Eosinophil % 0.0 %      Basophil % 0.1 %      Neutrophils, Absolute 5.00 10*3/mm3      Lymphocytes, Absolute 0.30 10*3/mm3      Monocytes, Absolute 0.10 10*3/mm3      Eosinophils, Absolute 0.00 10*3/mm3      Basophils, Absolute 0.00 10*3/mm3      nRBC 0.0 /100 WBC     Basic Metabolic Panel [[id number]]  (Abnormal) Collected: 01/19/23 1726    Specimen: Blood Updated: 01/19/23 1754     Glucose 306 mg/dL      BUN 43 mg/dL      Creatinine 2.55 mg/dL      Sodium 132 mmol/L      Potassium 4.7 mmol/L      Comment: Slight hemolysis detected by analyzer.  Results may be affected.        Chloride 99 mmol/L      CO2 21.0 mmol/L      Calcium 7.9 mg/dL      BUN/Creatinine Ratio 16.9     Anion Gap 12.0 mmol/L      eGFR 26.0 mL/min/1.73     Narrative:      GFR Normal >60  Chronic Kidney Disease <60  Kidney Failure <15    The GFR formula is only valid for adults with stable renal function between ages 18 and 70.    Hepatic Function Panel [[id number]]  (Abnormal) Collected: 01/19/23 1726    Specimen: Blood Updated: 01/19/23 1754     Total Protein 6.0 g/dL      Albumin 3.3 g/dL      ALT (SGPT) 12 U/L      AST (SGOT) 15 U/L      Alkaline Phosphatase 54 U/L      Total Bilirubin <0.2 mg/dL      Bilirubin, Direct <0.2 mg/dL      Bilirubin, Indirect --     Comment: Unable to calculate       Protime-INR [[id number]]  (Abnormal) Collected: 01/19/23 1726    Specimen: Blood Updated: 01/19/23 1750     Protime 30.5 Seconds      INR 3.16    POC Glucose Once [[id number]]  (Abnormal) Collected: 01/19/23 1609    Specimen: Blood Updated: 01/19/23 1611     Glucose 274 mg/dL      Comment: Serial Number: [id number]:  [id number]       POC Glucose Once [[id number]]  (Abnormal) Collected: 01/19/23 1130    Specimen: Blood Updated: 01/19/23 1135     Glucose 264 mg/dL      Comment: Serial Number: [id number]:  [id number]              Imaging Results (Last 24 Hours)     ** No results found for the last 24 hours. **               I reviewed the patient's new clinical results.    Medication Review:   Scheduled Meds:albuterol sulfate HFA, 2 puff, Inhalation, BID  amLODIPine, 10 mg, Oral, Nightly  cefTRIAXone, 1 g, Intravenous, Q24H  dexamethasone, 2 mg, Oral, Daily With Breakfast  gabapentin, 300 mg, Oral, TID  glipizide, 2.5 mg, Oral, QAM AC  guaiFENesin, 600 mg, Oral, Q12H  hydrALAZINE, 75 mg, Oral, TID  insulin lispro, 4-24 Units, Subcutaneous, TID With Meals  insulin regular, 2 Units, Subcutaneous, Daily With Breakfast  magnesium oxide, 400 mg, Oral, Daily  polyethylene glycol, 17 g, Oral,  Daily  remdesivir, 100 mg, Intravenous, Q24H  sodium bicarbonate, 650 mg, Oral, BID  sodium chloride, 3 mL, Intravenous, Q12H  sorbitol, 50 mL, Oral, Daily      Continuous Infusions:Pharmacy Consult - Remdesivir,       PRN Meds:.•  acetaminophen  •  aluminum-magnesium hydroxide-simethicone  •  calcium carbonate  •  dextrose  •  dextrose  •  dextrose  •  glucagon (human recombinant)  •  hydrALAZINE  •  nitroglycerin  •  ondansetron  •  Pharmacy Consult - Remdesivir  •  [COMPLETED] Insert Peripheral IV **AND** sodium chloride  •  sodium chloride  •  sodium chloride     Interval History:    1/19 improving currently on 2 liters with no distress  1/20 continues to improved will give bowel purge today    Assessment & Plan     COVID 19 +  Acute hypoxemia - oxygenation on room reported was in 80'2 at home  - remdesivir, will decrease steroids and dc symbicort    +BC appear contaminated in one site; no fever today and no leukocytosis 2nd BC pending  Deaf  Diabetes mellitus-ss  Urostomy - ceftriaxone and await urine and blood cultures.  H/O DVT - warfarin; hold today; recheck INR daily  H/O fecal impaction - daily Miralax  CKD- will consult nephrology normally 1.8 elevated  Hx bladder cancer/prostate    Warfarin for DVT prophy  Pantoprazole for stress ulcer prophy          Plan for disposition:Home day or 2    JOANNE Taylor  01/20/23  10:22 EST

## 2023-01-20 NOTE — NURSING NOTE
Nursing report ED to floor  Michael Dorantes  72 y.o.  male    HPI:   Chief Complaint   Patient presents with   • Fever     Pt reports fever as high as 102, cough, back ache and low O2 sats at home since Sunday.         Admitting doctor:   Samanta Rogers MD    Admitting diagnosis:   The primary encounter diagnosis was COVID-19. Diagnoses of Fever in other diseases and Stage 4 chronic kidney disease (HCC) were also pertinent to this visit.    Code status:   Current Code Status     Date Active Code Status Order ID Comments User Context       1/17/2023 2337 CPR (Attempt to Resuscitate) [id number]  Tianna Pond, APRN ED      Question Answer    Code Status (Patient has no pulse and is not breathing) CPR (Attempt to Resuscitate)    Medical Interventions (Patient has pulse or is breathing) Full Support    Level Of Support Discussed With Patient                Allergies:   Patient has no known allergies.    Isolation:  Enhanced Droplet/Contact      Fall Risk:  Fall Risk Assessment was completed, and patient is at low risk for falls.   Predictive Model Details         69 (High) Factor Value    Calculated 1/19/2023 22:07 Age 72    Risk of Fall Model Number of Distinct Medication Classes administered 14     Musculoskeletal Assessment WDL     Active Peripheral IV Present     Imaging order in this encounter Present     Number of administrations of Anti-Convulsants 3     Financial Class Other     Magnesium not on file     Calcium 7.9 mg/dL     Days after Admission 2.108     Diastolic BP 63     Albumin 3.3 g/dL     Creatinine 2.55 mg/dL     Drug Use No     Chloride 99 mmol/L     Number of administrations of Anti-Hypertensives 3     Julien Scale 21     Sex Male     Number of administrations of Ulcer Drugs 1     Gastrointestinal Assessment WDL     Cardiac Assessment WDL     Respiratory Rate 14     Potassium 4.7 mmol/L     Total Bilirubin <0.2 mg/dL     Skin Assessment X     Peripheral Vascular Assessment X     ALT 12 U/L         Weight:        01/17/23  1919   Weight: 84.6 kg (186 lb 8.2 oz)       Intake and Output    Intake/Output Summary (Last 24 hours) at 1/19/2023 2233  Last data filed at 1/19/2023 1949  Gross per 24 hour   Intake --   Output 1350 ml   Net -1350 ml       Diet:   Dietary Orders (From admission, onward)     Start     Ordered    01/18/23 1144  Diet: Diabetic Diets; Consistent Carbohydrate; Texture: Regular Texture (IDDSI 7); Fluid Consistency: Thin (IDDSI 0)  Diet Effective Now        References:    Diet Order [facility]   Question Answer Comment   Diets: Diabetic Diets    Diabetic Diet: Consistent Carbohydrate    Texture: Regular Texture (IDDSI 7)    Fluid Consistency: Thin (IDDSI 0)        01/18/23 1144                 Most recent vitals:   Vitals:    01/19/23 1103 01/19/23 1132 01/19/23 1543 01/19/23 2020   BP:  134/65 166/68 147/63   BP Location:  Left arm Left arm    Patient Position:  Sitting Sitting    Pulse: 72 70 70 67   Resp: 17 12 15 14   Temp:  97.4 °F (36.3 °C) 97.7 °F (36.5 °C) 97.8 °F (36.6 °C)   TempSrc:  Oral Oral    SpO2: 96% 96% 95% 95%   Weight:       Height:           Active LDAs/IV Access:   Lines, Drains & Airways     Active LDAs     Name Placement date Placement time Site Days    Peripheral IV 01/17/23 1937 Right Forearm 01/17/23 1937  Forearm  2    Urostomy RLQ 10/17/19  2230  in place on admission  RLQ  1190                Skin Condition:   Skin Assessments (last day)     Date/Time Skin WDL Skin Temperature Skin Moisture Skin Integrity Sensory Perception Moisture Activity Mobility Nutrition Friction and Shear Julien Score    01/18/23 0300 WDL -- -- -- 4-->no impairment 4-->rarely moist 4-->walks frequently 4-->no limitation 3-->adequate 3-->no apparent problem 22 01/18/23 1201 WDL -- -- -- 4-->no impairment 4-->rarely moist 3-->walks occasionally 3-->slightly limited 3-->adequate 2-->potential problem 19 01/18/23 2001 X;characteristics -- -- drain/device(s) 4-->no impairment 4-->rarely moist  3-->walks occasionally 4-->no limitation 3-->adequate 3-->no apparent problem 21    01/19/23 0805 X;characteristics warm dry -- 4-->no impairment 4-->rarely moist 4-->walks frequently 3-->slightly limited 3-->adequate 3-->no apparent problem 21    01/19/23 1200 X;characteristics warm dry -- -- -- -- -- -- -- --    01/19/23 1600 X;characteristics warm dry -- -- -- -- -- -- -- --    01/19/23 2000 X;characteristics warm dry -- 4-->no impairment 4-->rarely moist 4-->walks frequently 3-->slightly limited 3-->adequate 3-->no apparent problem 21           Labs (abnormal labs have a star):   Labs Reviewed   RESPIRATORY PANEL PCR W/ COVID-19 (SARS-COV-2) BEATRICE/NICHOLAS/TIERA/PAD/COR/MAD/JUHI IN-HOUSE, NP SWAB IN [facility], 3-4 HR TAT - Abnormal; Notable for the following components:       Result Value    COVID19 Detected (*)     All other components within normal limits    Narrative:     In the setting of a positive respiratory panel with a viral infection PLUS a negative procalcitonin without other underlying concern for bacterial infection, consider observing off antibiotics or discontinuation of antibiotics and continue supportive care. If the respiratory panel is positive for atypical bacterial infection (Bordetella pertussis, Chlamydophila pneumoniae, or Mycoplasma pneumoniae), consider antibiotic de-escalation to target atypical bacterial infection.   BLOOD CULTURE - Abnormal; Notable for the following components:    Blood Culture Staphylococcus, coagulase negative (*)     Gram Stain Aerobic Bottle Gram positive cocci in clusters (*)     All other components within normal limits    Narrative:     Probable contaminant requires clinical correlation, susceptibility not performed unless requested by physician.     BLOOD CULTURE ID - Abnormal; Notable for the following components:    BCID, PCR   (*)     Value: Staph spp, not aureus or lugdunensis. Identification by BCID2 PCR.    All other components within normal limits   COMPREHENSIVE  METABOLIC PANEL - Abnormal; Notable for the following components:    BUN 35 (*)     Creatinine 2.44 (*)     eGFR 27.4 (*)     All other components within normal limits    Narrative:     GFR Normal >60  Chronic Kidney Disease <60  Kidney Failure <15    The GFR formula is only valid for adults with stable renal function between ages 18 and 70.   URINALYSIS W/ CULTURE IF INDICATED - Abnormal; Notable for the following components:    Appearance, UA Cloudy (*)     pH, UA >=9.0 (*)     Glucose,  mg/dL (1+) (*)     Protein, UA >=300 mg/dL (3+) (*)     Leuk Esterase, UA Moderate (2+) (*)     All other components within normal limits    Narrative:     In absence of clinical symptoms, the presence of pyuria, bacteria, and/or nitrites on the urinalysis result does not correlate with infection.   PROTIME-INR - Abnormal; Notable for the following components:    Protime 29.0 (*)     All other components within normal limits   CBC WITH AUTO DIFFERENTIAL - Abnormal; Notable for the following components:    RBC 3.84 (*)     Hemoglobin 11.4 (*)     Hematocrit 33.1 (*)     Lymphocyte % 8.8 (*)     Monocyte % 17.0 (*)     Eosinophil % 0.2 (*)     Lymphocytes, Absolute 0.40 (*)     All other components within normal limits   URINALYSIS, MICROSCOPIC ONLY - Abnormal; Notable for the following components:    RBC, UA 0-2 (*)     WBC, UA 3-5 (*)     Bacteria, UA 3+ (*)     Squamous Epithelial Cells, UA 3-6 (*)     All other components within normal limits   BASIC METABOLIC PANEL - Abnormal; Notable for the following components:    Glucose 238 (*)     BUN 33 (*)     Creatinine 2.20 (*)     Calcium 8.2 (*)     eGFR 31.0 (*)     All other components within normal limits    Narrative:     GFR Normal >60  Chronic Kidney Disease <60  Kidney Failure <15    The GFR formula is only valid for adults with stable renal function between ages 18 and 70.   CBC WITH AUTO DIFFERENTIAL - Abnormal; Notable for the following components:    RBC 3.79 (*)      Hemoglobin 10.9 (*)     Hematocrit 33.5 (*)     Lymphocyte % 9.3 (*)     Monocyte % 19.5 (*)     Lymphocytes, Absolute 0.40 (*)     All other components within normal limits   HEPATIC FUNCTION PANEL - Abnormal; Notable for the following components:    Total Protein 5.7 (*)     Albumin 3.4 (*)     All other components within normal limits   URINALYSIS W/ CULTURE IF INDICATED - Abnormal; Notable for the following components:    Color, UA Fabi (*)     Appearance, UA Turbid (*)     pH, UA >=9.0 (*)     Glucose,  mg/dL (1+) (*)     Blood, UA Moderate (2+) (*)     Protein, UA >=300 mg/dL (3+) (*)     Leuk Esterase, UA Moderate (2+) (*)     All other components within normal limits    Narrative:     In absence of clinical symptoms, the presence of pyuria, bacteria, and/or nitrites on the urinalysis result does not correlate with infection.   BASIC METABOLIC PANEL - Abnormal; Notable for the following components:    Glucose 306 (*)     BUN 43 (*)     Creatinine 2.55 (*)     Sodium 132 (*)     CO2 21.0 (*)     Calcium 7.9 (*)     eGFR 26.0 (*)     All other components within normal limits    Narrative:     GFR Normal >60  Chronic Kidney Disease <60  Kidney Failure <15    The GFR formula is only valid for adults with stable renal function between ages 18 and 70.   HEPATIC FUNCTION PANEL - Abnormal; Notable for the following components:    Albumin 3.3 (*)     All other components within normal limits   PROTIME-INR - Abnormal; Notable for the following components:    Protime 30.5 (*)     INR 3.16 (*)     All other components within normal limits   CBC WITH AUTO DIFFERENTIAL - Abnormal; Notable for the following components:    RBC 3.97 (*)     Hemoglobin 11.5 (*)     Hematocrit 34.5 (*)     Neutrophil % 92.3 (*)     Lymphocyte % 4.9 (*)     Monocyte % 2.7 (*)     Eosinophil % 0.0 (*)     Lymphocytes, Absolute 0.30 (*)     All other components within normal limits   URINALYSIS, MICROSCOPIC ONLY - Abnormal; Notable for  the following components:    RBC, UA 13-20 (*)     WBC, UA 6-12 (*)     Bacteria, UA 4+ (*)     All other components within normal limits   POCT GLUCOSE FINGERSTICK - Abnormal; Notable for the following components:    Glucose 136 (*)     All other components within normal limits   POCT GLUCOSE FINGERSTICK - Abnormal; Notable for the following components:    Glucose 323 (*)     All other components within normal limits   POCT GLUCOSE FINGERSTICK - Abnormal; Notable for the following components:    Glucose 230 (*)     All other components within normal limits   POCT GLUCOSE FINGERSTICK - Abnormal; Notable for the following components:    Glucose 264 (*)     All other components within normal limits   POCT GLUCOSE FINGERSTICK - Abnormal; Notable for the following components:    Glucose 274 (*)     All other components within normal limits   POCT GLUCOSE FINGERSTICK - Abnormal; Notable for the following components:    Glucose 266 (*)     All other components within normal limits   BLOOD CULTURE - Normal   TROPONIN (IN-HOUSE) - Normal    Narrative:     Troponin T Reference Range:  <= 0.03 ng/mL-   Negative for AMI  >0.03 ng/mL-     Abnormal for myocardial necrosis.  Clinicians would have to utilize clinical acumen, EKG, Troponin and serial changes to determine if it is an Acute Myocardial Infarction or myocardial injury due to an underlying chronic condition.       Results may be falsely decreased if patient taking Biotin.     POC LACTATE - Normal   POCT GLUCOSE FINGERSTICK - Normal   URINE CULTURE   HEPATIC FUNCTION PANEL   PROTIME-INR   CBC WITH AUTO DIFFERENTIAL   PHOSPHORUS   BASIC METABOLIC PANEL   POC LACTATE   POCT GLUCOSE FINGERSTICK   POCT GLUCOSE FINGERSTICK   POCT GLUCOSE FINGERSTICK   POCT GLUCOSE FINGERSTICK   POCT GLUCOSE FINGERSTICK   POCT GLUCOSE FINGERSTICK   POCT GLUCOSE FINGERSTICK   POCT GLUCOSE FINGERSTICK   POCT GLUCOSE FINGERSTICK   POCT GLUCOSE FINGERSTICK   POCT GLUCOSE FINGERSTICK   POCT GLUCOSE  FINGERSTICK   POCT GLUCOSE FINGERSTICK   POCT GLUCOSE FINGERSTICK   POCT GLUCOSE FINGERSTICK   POCT GLUCOSE FINGERSTICK   POCT GLUCOSE FINGERSTICK   POCT GLUCOSE FINGERSTICK   POCT GLUCOSE FINGERSTICK   POCT GLUCOSE FINGERSTICK   CBC AND DIFFERENTIAL    Narrative:     The following orders were created for panel order CBC & Differential.  Procedure                               Abnormality         Status                     ---------                               -----------         ------                     CBC Auto Differential[[id number]]        Abnormal            Final result                 Please view results for these tests on the individual orders.   CBC AND DIFFERENTIAL    Narrative:     The following orders were created for panel order CBC & Differential.  Procedure                               Abnormality         Status                     ---------                               -----------         ------                     CBC Auto Differential[[id number]]        Abnormal            Final result                 Please view results for these tests on the individual orders.   CBC AND DIFFERENTIAL    Narrative:     The following orders were created for panel order CBC & Differential.  Procedure                               Abnormality         Status                     ---------                               -----------         ------                     CBC Auto Differential[[id number]]        Abnormal            Final result               Scan Slide[[id number]]                                                                    Please view results for these tests on the individual orders.   CBC AND DIFFERENTIAL    Narrative:     The following orders were created for panel order CBC & Differential.  Procedure                               Abnormality         Status                     ---------                               -----------         ------                     CBC Auto Differential[[id number]]                                                          Please view results for these tests on the individual orders.       LOC: Person, Place, Time and Situation    Telemetry:  Med/Surg    Cardiac Monitoring Ordered: yes    EKG:   ECG 12 Lead Dyspnea   Preliminary Result   HEART RATE= 62  bpm   RR Interval= 964  ms   CO Interval= 158  ms   P Horizontal Axis= -7  deg   P Front Axis= 39  deg   QRSD Interval= 145  ms   QT Interval= 425  ms   QRS Axis= -23  deg   T Wave Axis= 36  deg   - ABNORMAL ECG -   Sinus rhythm   Right bundle branch block   Electronically Signed By:    Date and Time of Study: 2023-01-17 19:23:44          Medications Given in the ED:   Medications   sodium chloride 0.9 % flush 10 mL (has no administration in time range)   sodium chloride 0.9 % flush 3 mL (3 mL Intravenous Given 1/19/23 2122)   sodium chloride 0.9 % flush 3-10 mL (has no administration in time range)   sodium chloride 0.9 % infusion 40 mL (has no administration in time range)   nitroglycerin (NITROSTAT) SL tablet 0.4 mg (has no administration in time range)   acetaminophen (TYLENOL) tablet 650 mg (650 mg Oral Given 1/18/23 0540)   ondansetron (ZOFRAN) injection 4 mg (4 mg Intravenous Not Given 1/18/23 1110)   pantoprazole (PROTONIX) EC tablet 40 mg (40 mg Oral Given 1/19/23 0526)   hydrALAZINE (APRESOLINE) injection 10 mg (has no administration in time range)   aluminum-magnesium hydroxide-simethicone (MAALOX MAX) 400-400-40 MG/5ML suspension 15 mL (15 mL Oral Given 1/18/23 1359)   calcium carbonate (TUMS) chewable tablet 500 mg (200 mg elemental) (has no administration in time range)   Pharmacy Consult - Remdesivir (has no administration in time range)   remdesivir 200 mg in 290 mL NS (0 mg Intravenous Stopped 1/18/23 2104)     Followed by   remdesivir 100 mg in sodium chloride 0.9 % 250 mL IVPB (powder vial) (100 mg Intravenous New Bag 1/19/23 9112)   guaiFENesin (MUCINEX) 12 hr tablet 600 mg (600 mg Oral Given 1/19/23 2122)    amLODIPine (NORVASC) tablet 10 mg (10 mg Oral Given 1/19/23 2122)   gabapentin (NEURONTIN) capsule 300 mg (300 mg Oral Given 1/19/23 2122)   glipizide (GLUCOTROL) tablet 2.5 mg (2.5 mg Oral Not Given 1/19/23 0819)   hydrALAZINE (APRESOLINE) tablet 75 mg (75 mg Oral Given 1/19/23 2122)   magnesium oxide (MAG-OX) tablet 400 mg (400 mg Oral Given 1/19/23 0819)   polyethylene glycol (MIRALAX) packet 17 g (17 g Oral Given 1/19/23 0817)   sodium bicarbonate tablet 650 mg (650 mg Oral Given 1/19/23 2122)   dextrose (D50W) (25 g/50 mL) IV injection 25 g (has no administration in time range)   cefTRIAXone (ROCEPHIN) 1 g in sodium chloride 0.9 % 100 mL IVPB (0 g Intravenous Stopped 1/19/23 1315)   albuterol sulfate HFA (PROVENTIL HFA;VENTOLIN HFA;PROAIR HFA) inhaler 2 puff (2 puffs Inhalation Given 1/19/23 1911)   dexamethasone (DECADRON) tablet 2 mg (has no administration in time range)   sorbitol solution 50 mL (50 mL Oral Given 1/19/23 1458)   dextrose (GLUTOSE) oral gel 15 g (has no administration in time range)   dextrose (D50W) (25 g/50 mL) IV injection 25 g (has no administration in time range)   glucagon (human recombinant) (GLUCAGEN DIAGNOSTIC) 1 mg in sterile water (preservative free) 1 mL injection (has no administration in time range)   insulin lispro (ADMELOG) injection 4-24 Units (has no administration in time range)       Imaging results:  No radiology results for the last day    Social issues:   Social History     Socioeconomic History   • Marital status:    Tobacco Use   • Smoking status: Never   • Smokeless tobacco: Never   Vaping Use   • Vaping Use: Never used   Substance and Sexual Activity   • Alcohol use: No   • Drug use: No   • Sexual activity: Defer       NIH Stroke Scale:  Interval: (not recorded)  1a. Level of Consciousness: (not recorded)  1b. LOC Questions: (not recorded)  1c. LOC Commands: (not recorded)  2. Best Gaze: (not recorded)  3. Visual: (not recorded)  4. Facial Palsy: (not  recorded)  5a. Motor Arm, Left: (not recorded)  5b. Motor Arm, Right: (not recorded)  6a. Motor Leg, Left: (not recorded)  6b. Motor Leg, Right: (not recorded)  7. Limb Ataxia: (not recorded)  8. Sensory: (not recorded)  9. Best Language: (not recorded)  10. Dysarthria: (not recorded)  11. Extinction and Inattention (formerly Neglect): (not recorded)    Total (NIH Stroke Scale): (not recorded)     Additional notable assessment information:     Nursing report ED to floor:  Taiwo Chilel RN   01/19/23 22:33 EST

## 2023-01-21 ENCOUNTER — READMISSION MANAGEMENT (OUTPATIENT)
Dept: CALL CENTER | Facility: HOSPITAL | Age: 73
End: 2023-01-21
Payer: MEDICARE

## 2023-01-21 VITALS
BODY MASS INDEX: 25.92 KG/M2 | TEMPERATURE: 98.4 F | HEIGHT: 72 IN | DIASTOLIC BLOOD PRESSURE: 74 MMHG | RESPIRATION RATE: 18 BRPM | HEART RATE: 77 BPM | OXYGEN SATURATION: 93 % | WEIGHT: 191.36 LBS | SYSTOLIC BLOOD PRESSURE: 152 MMHG

## 2023-01-21 PROBLEM — R50.9 FEVER: Status: ACTIVE | Noted: 2023-01-21

## 2023-01-21 LAB
ALBUMIN SERPL-MCNC: 3.2 G/DL (ref 3.5–5.2)
ALP SERPL-CCNC: 53 U/L (ref 39–117)
ALT SERPL W P-5'-P-CCNC: 25 U/L (ref 1–41)
ANION GAP SERPL CALCULATED.3IONS-SCNC: 9 MMOL/L (ref 5–15)
AST SERPL-CCNC: 23 U/L (ref 1–40)
BASOPHILS # BLD AUTO: 0 10*3/MM3 (ref 0–0.2)
BASOPHILS NFR BLD AUTO: 0.1 % (ref 0–1.5)
BILIRUB CONJ SERPL-MCNC: <0.2 MG/DL (ref 0–0.3)
BILIRUB INDIRECT SERPL-MCNC: ABNORMAL MG/DL
BILIRUB SERPL-MCNC: 0.2 MG/DL (ref 0–1.2)
BUN SERPL-MCNC: 44 MG/DL (ref 8–23)
BUN/CREAT SERPL: 22.4 (ref 7–25)
CALCIUM SPEC-SCNC: 7.7 MG/DL (ref 8.6–10.5)
CHLORIDE SERPL-SCNC: 102 MMOL/L (ref 98–107)
CO2 SERPL-SCNC: 24 MMOL/L (ref 22–29)
CREAT SERPL-MCNC: 1.96 MG/DL (ref 0.76–1.27)
DEPRECATED RDW RBC AUTO: 41.6 FL (ref 37–54)
EGFRCR SERPLBLD CKD-EPI 2021: 35.7 ML/MIN/1.73
EOSINOPHIL # BLD AUTO: 0 10*3/MM3 (ref 0–0.4)
EOSINOPHIL NFR BLD AUTO: 0 % (ref 0.3–6.2)
ERYTHROCYTE [DISTWIDTH] IN BLOOD BY AUTOMATED COUNT: 13.7 % (ref 12.3–15.4)
GLUCOSE BLDC GLUCOMTR-MCNC: 155 MG/DL (ref 70–105)
GLUCOSE BLDC GLUCOMTR-MCNC: 199 MG/DL (ref 70–105)
GLUCOSE SERPL-MCNC: 190 MG/DL (ref 65–99)
HCT VFR BLD AUTO: 33.7 % (ref 37.5–51)
HGB BLD-MCNC: 10.9 G/DL (ref 13–17.7)
INR PPP: 1.81 (ref 2–3)
LYMPHOCYTES # BLD AUTO: 0.5 10*3/MM3 (ref 0.7–3.1)
LYMPHOCYTES NFR BLD AUTO: 7.6 % (ref 19.6–45.3)
MCH RBC QN AUTO: 28.6 PG (ref 26.6–33)
MCHC RBC AUTO-ENTMCNC: 32.4 G/DL (ref 31.5–35.7)
MCV RBC AUTO: 88.1 FL (ref 79–97)
MONOCYTES # BLD AUTO: 0.4 10*3/MM3 (ref 0.1–0.9)
MONOCYTES NFR BLD AUTO: 6.5 % (ref 5–12)
NEUTROPHILS NFR BLD AUTO: 5.5 10*3/MM3 (ref 1.7–7)
NEUTROPHILS NFR BLD AUTO: 85.8 % (ref 42.7–76)
NRBC BLD AUTO-RTO: 0 /100 WBC (ref 0–0.2)
PHOSPHATE SERPL-MCNC: 2.8 MG/DL (ref 2.5–4.5)
PLATELET # BLD AUTO: 209 10*3/MM3 (ref 140–450)
PMV BLD AUTO: 7.4 FL (ref 6–12)
POTASSIUM SERPL-SCNC: 4.4 MMOL/L (ref 3.5–5.2)
PROT SERPL-MCNC: 5.5 G/DL (ref 6–8.5)
PROTHROMBIN TIME: 18 SECONDS (ref 19.4–28.5)
RBC # BLD AUTO: 3.83 10*6/MM3 (ref 4.14–5.8)
SODIUM SERPL-SCNC: 135 MMOL/L (ref 136–145)
WBC NRBC COR # BLD: 6.4 10*3/MM3 (ref 3.4–10.8)

## 2023-01-21 PROCEDURE — 63710000001 INSULIN REGULAR HUMAN PER 5 UNITS: Performed by: INTERNAL MEDICINE

## 2023-01-21 PROCEDURE — 94799 UNLISTED PULMONARY SVC/PX: CPT

## 2023-01-21 PROCEDURE — 80048 BASIC METABOLIC PNL TOTAL CA: CPT | Performed by: INTERNAL MEDICINE

## 2023-01-21 PROCEDURE — 36415 COLL VENOUS BLD VENIPUNCTURE: CPT | Performed by: INTERNAL MEDICINE

## 2023-01-21 PROCEDURE — 84100 ASSAY OF PHOSPHORUS: CPT | Performed by: INTERNAL MEDICINE

## 2023-01-21 PROCEDURE — 80076 HEPATIC FUNCTION PANEL: CPT | Performed by: INTERNAL MEDICINE

## 2023-01-21 PROCEDURE — 85025 COMPLETE CBC W/AUTO DIFF WBC: CPT | Performed by: INTERNAL MEDICINE

## 2023-01-21 PROCEDURE — 85610 PROTHROMBIN TIME: CPT | Performed by: INTERNAL MEDICINE

## 2023-01-21 PROCEDURE — 94664 DEMO&/EVAL PT USE INHALER: CPT

## 2023-01-21 PROCEDURE — 82962 GLUCOSE BLOOD TEST: CPT

## 2023-01-21 PROCEDURE — 63710000001 INSULIN LISPRO (HUMAN) PER 5 UNITS: Performed by: INTERNAL MEDICINE

## 2023-01-21 RX ADMIN — GLIPIZIDE 2.5 MG: 5 TABLET ORAL at 10:17

## 2023-01-21 RX ADMIN — INSULIN LISPRO 4 UNITS: 100 INJECTION, SOLUTION INTRAVENOUS; SUBCUTANEOUS at 10:18

## 2023-01-21 RX ADMIN — INSULIN LISPRO 4 UNITS: 100 INJECTION, SOLUTION INTRAVENOUS; SUBCUTANEOUS at 13:01

## 2023-01-21 RX ADMIN — MAGNESIUM OXIDE TAB 400 MG (241.3 MG ELEMENTAL MG) 400 MG: 400 (241.3 MG) TAB at 10:14

## 2023-01-21 RX ADMIN — Medication 3 ML: at 10:21

## 2023-01-21 RX ADMIN — HYDRALAZINE HYDROCHLORIDE 75 MG: 25 TABLET, FILM COATED ORAL at 10:14

## 2023-01-21 RX ADMIN — SORBITOL SOLUTION (BULK) 50 ML: 70 SOLUTION at 10:17

## 2023-01-21 RX ADMIN — SODIUM BICARBONATE 650 MG: 650 TABLET ORAL at 10:21

## 2023-01-21 RX ADMIN — GUAIFENESIN 600 MG: 600 TABLET, EXTENDED RELEASE ORAL at 10:14

## 2023-01-21 RX ADMIN — GABAPENTIN 300 MG: 300 CAPSULE ORAL at 10:14

## 2023-01-21 RX ADMIN — POLYETHYLENE GLYCOL 3350 17 G: 17 POWDER, FOR SOLUTION ORAL at 10:17

## 2023-01-21 RX ADMIN — INSULIN HUMAN 2 UNITS: 100 INJECTION, SOLUTION PARENTERAL at 10:20

## 2023-01-21 RX ADMIN — ALBUTEROL SULFATE 2 PUFF: 90 AEROSOL, METERED RESPIRATORY (INHALATION) at 08:30

## 2023-01-21 RX ADMIN — DEXAMETHASONE 2 MG: 0.5 TABLET ORAL at 10:20

## 2023-01-21 NOTE — DISCHARGE SUMMARY
Date of Discharge:  1/21/2023    Discharge Diagnosis:   COVID-19  Acute hypoxemia  Urinary tract infection without hematuria  Diabetes  Urostomy  History of DVT on anticoagulation  Constipation  Chronic kidney disease  History of bladder and prostate cancer  Hearing impaired  Presenting Problem/History of Present Illness  Active Hospital Problems    Diagnosis  POA   • **COVID-19 [U07.1]  Yes   • Fever [R50.9]  Unknown   • COVID-19 [U07.1]  Yes   • Fever [R50.9]  Yes   • Stage 4 chronic kidney disease (HCC) [N18.4]  Yes      Resolved Hospital Problems   No resolved problems to display.          Hospital Course    Patient is a 72 y.o. male presented with diabetes and kidney disease who presents with complaints of fever Tmax 102, nausea, cough, and low O2 sats(80's) today at home and back pain.  Patient was COVID-19 positive and had a urinary tract infection without hematuria.  He was treated with oxygen supplementation and only required 2 L, remdesivir, steroids and antibiotics.  Patient has been on room air, hemodynamically stable and denies any complaints.  He has chronic kidney disease and creatinine was above baseline with nephrology following and has improved.  Our office will call him on Monday with a follow-up appointment for PCP and he will need to follow-up with nephrology in 2 weeks.  He is agreeable to this plan and discharge.    Procedures Performed         Consults:   Consults     Date and Time Order Name Status Description    1/19/2023  9:13 AM Inpatient Nephrology Consult Completed     1/17/2023  9:28 PM Family Medicine Consult            Pertinent Test Results:    Lab Results (most recent)     Procedure Component Value Units Date/Time    POC Glucose Once [[id number]]  (Abnormal) Collected: 01/21/23 0736    Specimen: Blood Updated: 01/21/23 0738     Glucose 155 mg/dL      Comment: Serial Number: [id number]:  [id number]       Protime-INR [[id number]]  (Abnormal) Collected: 01/21/23 2875     Specimen: Blood Updated: 01/21/23 0635     Protime 18.0 Seconds      INR 1.81    Phosphorus [[id number]]  (Normal) Collected: 01/21/23 0453    Specimen: Blood Updated: 01/21/23 0634     Phosphorus 2.8 mg/dL     Basic Metabolic Panel [[id number]]  (Abnormal) Collected: 01/21/23 0453    Specimen: Blood Updated: 01/21/23 0630     Glucose 190 mg/dL      BUN 44 mg/dL      Creatinine 1.96 mg/dL      Sodium 135 mmol/L      Potassium 4.4 mmol/L      Chloride 102 mmol/L      CO2 24.0 mmol/L      Calcium 7.7 mg/dL      BUN/Creatinine Ratio 22.4     Anion Gap 9.0 mmol/L      eGFR 35.7 mL/min/1.73     Narrative:      GFR Normal >60  Chronic Kidney Disease <60  Kidney Failure <15    The GFR formula is only valid for adults with stable renal function between ages 18 and 70.    Hepatic Function Panel [[id number]]  (Abnormal) Collected: 01/21/23 0453    Specimen: Blood Updated: 01/21/23 0630     Total Protein 5.5 g/dL      Albumin 3.2 g/dL      ALT (SGPT) 25 U/L      AST (SGOT) 23 U/L      Alkaline Phosphatase 53 U/L      Total Bilirubin 0.2 mg/dL      Bilirubin, Direct <0.2 mg/dL      Bilirubin, Indirect --     Comment: Unable to calculate       CBC & Differential [[id number]]  (Abnormal) Collected: 01/21/23 0453    Specimen: Blood Updated: 01/21/23 0607    Narrative:      The following orders were created for panel order CBC & Differential.  Procedure                               Abnormality         Status                     ---------                               -----------         ------                     CBC Auto Differential[[id number]]        Abnormal            Final result                 Please view results for these tests on the individual orders.    CBC Auto Differential [[id number]]  (Abnormal) Collected: 01/21/23 0453    Specimen: Blood Updated: 01/21/23 0607     WBC 6.40 10*3/mm3      RBC 3.83 10*6/mm3      Hemoglobin 10.9 g/dL      Hematocrit 33.7 %      MCV 88.1 fL      MCH 28.6 pg      MCHC 32.4 g/dL      RDW 13.7  %      RDW-SD 41.6 fl      MPV 7.4 fL      Platelets 209 10*3/mm3      Neutrophil % 85.8 %      Lymphocyte % 7.6 %      Monocyte % 6.5 %      Eosinophil % 0.0 %      Basophil % 0.1 %      Neutrophils, Absolute 5.50 10*3/mm3      Lymphocytes, Absolute 0.50 10*3/mm3      Monocytes, Absolute 0.40 10*3/mm3      Eosinophils, Absolute 0.00 10*3/mm3      Basophils, Absolute 0.00 10*3/mm3      nRBC 0.0 /100 WBC     Blood Culture - Blood, Arm, Left [[id number]]  (Normal) Collected: 01/17/23 1937    Specimen: Blood from Arm, Left Updated: 01/20/23 1946     Blood Culture No growth at 3 days    POC Glucose Once [[id number]]  (Abnormal) Collected: 01/20/23 1829    Specimen: Blood Updated: 01/20/23 1830     Glucose 170 mg/dL      Comment: Serial Number: [id number]:  [id number]       Urine Culture - Urine, Urostomy [[id number]] Collected: 01/19/23 2128    Specimen: Urine from Urostomy Updated: 01/20/23 1508     Urine Culture >100,000 CFU/mL Mixed Christianne Isolated    Narrative:      Specimen contains mixed organisms of questionable pathogenicity suggestive of contamination. If symptoms persist, suggest recollection.  Colonization of the urinary tract without infection is common. Treatment is discouraged unless the patient is symptomatic, pregnant, or undergoing an invasive urologic procedure.    Basic Metabolic Panel [[id number]]  (Abnormal) Collected: 01/20/23 0422    Specimen: Blood Updated: 01/20/23 0506     Glucose 308 mg/dL      BUN 44 mg/dL      Creatinine 2.45 mg/dL      Sodium 132 mmol/L      Potassium 4.8 mmol/L      Chloride 99 mmol/L      CO2 24.0 mmol/L      Calcium 7.6 mg/dL      BUN/Creatinine Ratio 18.0     Anion Gap 9.0 mmol/L      eGFR 27.3 mL/min/1.73     Narrative:      GFR Normal >60  Chronic Kidney Disease <60  Kidney Failure <15    The GFR formula is only valid for adults with stable renal function between ages 18 and 70.    Hepatic Function Panel [[id number]]  (Abnormal) Collected: 01/20/23 0422    Specimen:  Blood Updated: 01/20/23 0506     Total Protein 5.6 g/dL      Albumin 3.3 g/dL      ALT (SGPT) 11 U/L      AST (SGOT) 12 U/L      Alkaline Phosphatase 52 U/L      Total Bilirubin <0.2 mg/dL      Bilirubin, Direct <0.2 mg/dL      Bilirubin, Indirect --     Comment: Unable to calculate       Phosphorus [[id number]]  (Abnormal) Collected: 01/20/23 0422    Specimen: Blood Updated: 01/20/23 0506     Phosphorus 2.0 mg/dL     Protime-INR [[id number]]  (Normal) Collected: 01/20/23 0422    Specimen: Blood Updated: 01/20/23 0452     Protime 26.6 Seconds      INR 2.73    CBC & Differential [[id number]]  (Abnormal) Collected: 01/20/23 0422    Specimen: Blood Updated: 01/20/23 0441    Narrative:      The following orders were created for panel order CBC & Differential.  Procedure                               Abnormality         Status                     ---------                               -----------         ------                     CBC Auto Differential[[id number]]        Abnormal            Final result                 Please view results for these tests on the individual orders.    CBC Auto Differential [[id number]]  (Abnormal) Collected: 01/20/23 0422    Specimen: Blood Updated: 01/20/23 0441     WBC 6.00 10*3/mm3      RBC 3.73 10*6/mm3      Hemoglobin 10.8 g/dL      Hematocrit 32.0 %      MCV 85.8 fL      MCH 28.8 pg      MCHC 33.6 g/dL      RDW 13.6 %      RDW-SD 42.9 fl      MPV 7.5 fL      Platelets 191 10*3/mm3      Neutrophil % 88.9 %      Lymphocyte % 5.8 %      Monocyte % 5.2 %      Eosinophil % 0.0 %      Basophil % 0.1 %      Neutrophils, Absolute 5.30 10*3/mm3      Lymphocytes, Absolute 0.40 10*3/mm3      Monocytes, Absolute 0.30 10*3/mm3      Eosinophils, Absolute 0.00 10*3/mm3      Basophils, Absolute 0.00 10*3/mm3      nRBC 0.0 /100 WBC     Urinalysis, Microscopic Only - Urostomy [[id number]]  (Abnormal) Collected: 01/19/23 2128    Specimen: Urine from Urostomy Updated: 01/19/23 2226     RBC, UA 13-20 /HPF       WBC, UA 6-12 /HPF      Bacteria, UA 4+ /HPF      Squamous Epithelial Cells, UA 0-2 /HPF      Yeast, UA Small/1+ Budding Yeast /HPF      Hyaline Casts, UA None Seen /LPF      Triple Phosphate Crystals, UA Large/3+ /HPF      Methodology Manual Light Microscopy    Urinalysis With Culture If Indicated - Urostomy [[id number]]  (Abnormal) Collected: 01/19/23 2128    Specimen: Urine from Urostomy Updated: 01/19/23 2207     Color, UA Fabi     Appearance, UA Turbid     pH, UA >=9.0     Specific Gravity, UA 1.020     Glucose,  mg/dL (1+)     Ketones, UA Negative     Bilirubin, UA Negative     Blood, UA Moderate (2+)     Protein, UA >=300 mg/dL (3+)     Leuk Esterase, UA Moderate (2+)     Nitrite, UA Negative     Urobilinogen, UA 0.2 E.U./dL    Narrative:      In absence of clinical symptoms, the presence of pyuria, bacteria, and/or nitrites on the urinalysis result does not correlate with infection.    Blood Culture - Blood, Arm, Right [[id number]]  (Abnormal) Collected: 01/17/23 2016    Specimen: Blood from Arm, Right Updated: 01/19/23 1016     Blood Culture Staphylococcus, coagulase negative     Isolated from Aerobic Bottle     Gram Stain Aerobic Bottle Gram positive cocci in clusters    Narrative:      Probable contaminant requires clinical correlation, susceptibility not performed unless requested by physician.      Blood Culture ID, PCR - Blood, Arm, Right [[id number]]  (Abnormal) Collected: 01/17/23 2016    Specimen: Blood from Arm, Right Updated: 01/18/23 2018     BCID, PCR Staph spp, not aureus or lugdunensis. Identification by BCID2 PCR.     BOTTLE TYPE Aerobic Bottle    Respiratory Panel PCR w/COVID-19(SARS-CoV-2) BEATRICE/NICHOLAS/TIERA/PAD/COR/MAD/JUHI In-House, NP Swab in UTM/VTM, 3-4 HR TAT - Swab, Nasopharynx [[id number]]  (Abnormal) Collected: 01/17/23 1936    Specimen: Swab from Nasopharynx Updated: 01/17/23 2039     ADENOVIRUS, PCR Not Detected     Coronavirus 229E Not Detected     Coronavirus HKU1 Not Detected      Coronavirus NL63 Not Detected     Coronavirus OC43 Not Detected     COVID19 Detected     Human Metapneumovirus Not Detected     Human Rhinovirus/Enterovirus Not Detected     Influenza A PCR Not Detected     Influenza B PCR Not Detected     Parainfluenza Virus 1 Not Detected     Parainfluenza Virus 2 Not Detected     Parainfluenza Virus 3 Not Detected     Parainfluenza Virus 4 Not Detected     RSV, PCR Not Detected     Bordetella pertussis pcr Not Detected     Bordetella parapertussis PCR Not Detected     Chlamydophila pneumoniae PCR Not Detected     Mycoplasma pneumo by PCR Not Detected    Narrative:      In the setting of a positive respiratory panel with a viral infection PLUS a negative procalcitonin without other underlying concern for bacterial infection, consider observing off antibiotics or discontinuation of antibiotics and continue supportive care. If the respiratory panel is positive for atypical bacterial infection (Bordetella pertussis, Chlamydophila pneumoniae, or Mycoplasma pneumoniae), consider antibiotic de-escalation to target atypical bacterial infection.    Urinalysis, Microscopic Only - Urine, Clean Catch [[id number]]  (Abnormal) Collected: 01/17/23 1936    Specimen: Urine, Clean Catch Updated: 01/17/23 2034     RBC, UA 0-2 /HPF      WBC, UA 3-5 /HPF      Comment: Urine culture not indicated.        Bacteria, UA 3+ /HPF      Squamous Epithelial Cells, UA 3-6 /HPF      Hyaline Casts, UA 0-2 /LPF      Triple Phosphate Crystals, UA Large/3+ /HPF      Methodology Manual Light Microscopy    Comprehensive Metabolic Panel [[id number]]  (Abnormal) Collected: 01/17/23 1937    Specimen: Blood Updated: 01/17/23 2004     Glucose 85 mg/dL      BUN 35 mg/dL      Creatinine 2.44 mg/dL      Sodium 139 mmol/L      Potassium 4.0 mmol/L      Chloride 103 mmol/L      CO2 25.0 mmol/L      Calcium 8.6 mg/dL      Total Protein 6.4 g/dL      Albumin 3.8 g/dL      ALT (SGPT) 13 U/L      AST (SGOT) 16 U/L      Alkaline  Phosphatase 57 U/L      Total Bilirubin 0.4 mg/dL      Globulin 2.6 gm/dL      A/G Ratio 1.5 g/dL      BUN/Creatinine Ratio 14.3     Anion Gap 11.0 mmol/L      eGFR 27.4 mL/min/1.73     Narrative:      GFR Normal >60  Chronic Kidney Disease <60  Kidney Failure <15    The GFR formula is only valid for adults with stable renal function between ages 18 and 70.    Troponin [[id number]]  (Normal) Collected: 01/17/23 1937    Specimen: Blood Updated: 01/17/23 2004     Troponin T <0.010 ng/mL     Narrative:      Troponin T Reference Range:  <= 0.03 ng/mL-   Negative for AMI  >0.03 ng/mL-     Abnormal for myocardial necrosis.  Clinicians would have to utilize clinical acumen, EKG, Troponin and serial changes to determine if it is an Acute Myocardial Infarction or myocardial injury due to an underlying chronic condition.       Results may be falsely decreased if patient taking Biotin.      POC Lactate [[id number]]  (Normal) Collected: 01/17/23 1954    Specimen: Blood Updated: 01/17/23 1956     Lactate 1.2 mmol/L      Comment: Serial Number: [id number]:  [id number]       Urinalysis With Culture If Indicated - Urine, Clean Catch [[id number]]  (Abnormal) Collected: 01/17/23 1936    Specimen: Urine, Clean Catch Updated: 01/17/23 1949     Color, UA Yellow     Appearance, UA Cloudy     Comment: Result checked          pH, UA >=9.0     Specific Gravity, UA 1.017     Glucose,  mg/dL (1+)     Ketones, UA Negative     Bilirubin, UA Negative     Blood, UA Negative     Protein, UA >=300 mg/dL (3+)     Leuk Esterase, UA Moderate (2+)     Nitrite, UA Negative     Urobilinogen, UA 1.0 E.U./dL    Narrative:      In absence of clinical symptoms, the presence of pyuria, bacteria, and/or nitrites on the urinalysis result does not correlate with infection.                     Condition on Discharge:  Stable    Vital Signs  Temp:  [97.5 °F (36.4 °C)-98.4 °F (36.9 °C)] 98.2 °F (36.8 °C)  Heart Rate:  [62-74] 62  Resp:  [17-20] 18  BP:  (146-178)/(65-77) 146/69      Physical Exam  Vitals reviewed.   Constitutional:       Appearance: He is not ill-appearing.   HENT:      Head: Normocephalic and atraumatic.      Right Ear: External ear normal.      Left Ear: External ear normal.      Nose: Nose normal.      Mouth/Throat:      Mouth: Mucous membranes are moist.   Eyes:      General:         Right eye: No discharge.         Left eye: No discharge.   Cardiovascular:      Rate and Rhythm: Normal rate and regular rhythm.      Pulses: Normal pulses.      Heart sounds: Normal heart sounds.   Pulmonary:      Effort: Pulmonary effort is normal.      Breath sounds: Normal breath sounds.   Abdominal:      General: Bowel sounds are normal.      Palpations: Abdomen is soft.   Musculoskeletal:         General: Normal range of motion.      Cervical back: Normal range of motion.   Skin:     General: Skin is warm and dry.   Neurological:      Mental Status: He is alert and oriented to person, place, and time.   Psychiatric:         Behavior: Behavior normal.              Discharge Disposition  Home or Self Care    Discharge Medications     Discharge Medications      Continue These Medications      Instructions Start Date   acetaminophen 325 MG tablet  Commonly known as: TYLENOL   650 mg, Oral, Every 4 Hours PRN      amLODIPine 10 MG tablet  Commonly known as: NORVASC   10 mg, Oral, Nightly      gabapentin 300 MG capsule  Commonly known as: NEURONTIN   300 mg, Oral, 3 Times Daily      glimepiride 1 MG tablet  Commonly known as: AMARYL   1 mg, Oral, Every Morning Before Breakfast      hydrALAZINE 50 MG tablet  Commonly known as: APRESOLINE   75 mg, Oral, 3 Times Daily      magnesium oxide 400 MG tablet  Commonly known as: MAG-OX   400 mg, Oral, Daily      polyethylene glycol 17 GM/SCOOP powder  Commonly known as: MIRALAX   mix 1 capful (17 g) by mouth Daily.      sodium bicarbonate 650 MG tablet   650 mg, Oral, 2 Times Daily      triamcinolone 0.1 % lotion  Commonly  known as: KENALOG   1 application, Topical, 2 Times Daily      warfarin 5 MG tablet  Commonly known as: COUMADIN   5 mg, Oral, Daily             Discharge Diet:   Diet Instructions     Diet: Renal      Discharge Diet: Renal          Activity at Discharge:   Activity Instructions     Gradually Increase Activity Until at Pre-Hospitalization Level            Follow-up Appointments  No future appointments.  Additional Instructions for the Follow-ups that You Need to Schedule     Discharge Follow-up with PCP   As directed       Currently Documented PCP:    Joey Islas MD    PCP Phone Number:    [phone]     Follow Up Details: Office will call on Monday with appointment for follow up               Test Results Pending at Discharge  Pending Labs     Order Current Status    Blood Culture - Blood, Arm, Left Preliminary result           JOANNE Taylor  01/21/23  10:09 EST    Time: Discharge 25 min

## 2023-01-21 NOTE — CASE MANAGEMENT/SOCIAL WORK
Continued Stay Note  BOZENA Crocker     Patient Name: Michael Dorantes  MRN: [id number]  Today's Date: 1/21/2023    Admit Date: 1/17/2023    Plan: Covid +, Deaf, Home with Wife, Watch for oxygen need   Discharge Plan     Row Name 01/21/23 1405       Plan    Plan Comments dc orders noted with no new needs identified.    Final Discharge Disposition Code 01 - home or self-care    Final Note home.                Expected Discharge Date and Time     Expected Discharge Date Expected Discharge Time    Jan 21, 2023       Phone communication or documentation only - no physical contact with patient or family.        Tanja Castellano RN

## 2023-01-21 NOTE — PROGRESS NOTES
"NEPHROLOGY PROGRESS NOTE------[facility]    [facility]  [phone]  Saeed Briscoe MD      Patient Care Team:  Joey Islas MD as PCP - General (Family Medicine)  Neville Laughlin MD as Consulting Physician (Nephrology)      Provider:  Saeed Briscoe MD  Patient Name: Michael Dorantes  :  1950    SUBJECTIVE:  F/U CKD  Seen with sign   No chest pain or SOA. Coughing some  Voiding by self    Medication:  amLODIPine, 10 mg, Oral, Nightly  gabapentin, 300 mg, Oral, TID  glipizide, 2.5 mg, Oral, QAM AC  guaiFENesin, 600 mg, Oral, Q12H  hydrALAZINE, 75 mg, Oral, TID  insulin lispro, 4-24 Units, Subcutaneous, TID With Meals  insulin regular, 2 Units, Subcutaneous, Daily With Breakfast  magnesium oxide, 400 mg, Oral, Daily  polyethylene glycol, 17 g, Oral, Daily  sodium bicarbonate, 650 mg, Oral, BID  sodium chloride, 3 mL, Intravenous, Q12H  sorbitol, 50 mL, Oral, Daily      Pharmacy Consult - Remdesivir,         OBJECTIVE    Vital Sign Min/Max for last 24 hours  Temp  Min: 97.5 °F (36.4 °C)  Max: 98.4 °F (36.9 °C)   BP  Min: 146/69  Max: 178/77   Pulse  Min: 62  Max: 74   Resp  Min: 17  Max: 18   SpO2  Min: 90 %  Max: 99 %   No data recorded   No data recorded     Flowsheet Rows    Flowsheet Row First Filed Value   Admission Height 182.9 cm (72\") Documented at 2023   Admission Weight 84.6 kg (186 lb 8.2 oz) Documented at 2023          I/O this shift:  In: 240 [P.O.:240]  Out: -   I/O last 3 completed shifts:  In: 1140 [P.O.:1140]  Out: 3000 [Urine:3000]    Physical Exam:  General Appearance: alert, appears stated age and cooperative  Head: normocephalic, without obvious abnormality and atraumatic  Eyes: conjunctivae and sclerae normal and no icterus  Neck: supple and no JVD  Lungs: clear to auscultation and respirations regular  Heart: regular rhythm & normal rate and normal S1, S2  Chest: Wall no " abnormalities observed  Abdomen: normal bowel sounds and soft, nontender  Extremities: moves extremities well, no edema, no cyanosis and no redness  Skin: no bleeding, bruising or rash, turgor normal, color normal and no lesions noted  Neurologic: Alert, and oriented. No focal deficits    Labs:    WBC WBC   Date Value Ref Range Status   01/21/2023 6.40 3.40 - 10.80 10*3/mm3 Final   01/20/2023 6.00 3.40 - 10.80 10*3/mm3 Final   01/19/2023 5.40 3.40 - 10.80 10*3/mm3 Final      HGB Hemoglobin   Date Value Ref Range Status   01/21/2023 10.9 (L) 13.0 - 17.7 g/dL Final   01/20/2023 10.8 (L) 13.0 - 17.7 g/dL Final   01/19/2023 11.5 (L) 13.0 - 17.7 g/dL Final      HCT Hematocrit   Date Value Ref Range Status   01/21/2023 33.7 (L) 37.5 - 51.0 % Final   01/20/2023 32.0 (L) 37.5 - 51.0 % Final   01/19/2023 34.5 (L) 37.5 - 51.0 % Final      Platelets No results found for: LABPLAT   MCV MCV   Date Value Ref Range Status   01/21/2023 88.1 79.0 - 97.0 fL Final   01/20/2023 85.8 79.0 - 97.0 fL Final   01/19/2023 86.9 79.0 - 97.0 fL Final          Sodium Sodium   Date Value Ref Range Status   01/21/2023 135 (L) 136 - 145 mmol/L Final   01/20/2023 132 (L) 136 - 145 mmol/L Final   01/19/2023 132 (L) 136 - 145 mmol/L Final      Potassium Potassium   Date Value Ref Range Status   01/21/2023 4.4 3.5 - 5.2 mmol/L Final   01/20/2023 4.8 3.5 - 5.2 mmol/L Final   01/19/2023 4.7 3.5 - 5.2 mmol/L Final     Comment:     Slight hemolysis detected by analyzer. Results may be affected.      Chloride Chloride   Date Value Ref Range Status   01/21/2023 102 98 - 107 mmol/L Final   01/20/2023 99 98 - 107 mmol/L Final   01/19/2023 99 98 - 107 mmol/L Final      CO2 CO2   Date Value Ref Range Status   01/21/2023 24.0 22.0 - 29.0 mmol/L Final   01/20/2023 24.0 22.0 - 29.0 mmol/L Final   01/19/2023 21.0 (L) 22.0 - 29.0 mmol/L Final      BUN BUN   Date Value Ref Range Status   01/21/2023 44 (H) 8 - 23 mg/dL Final   01/20/2023 44 (H) 8 - 23 mg/dL Final    01/19/2023 43 (H) 8 - 23 mg/dL Final      Creatinine Creatinine   Date Value Ref Range Status   01/21/2023 1.96 (H) 0.76 - 1.27 mg/dL Final   01/20/2023 2.45 (H) 0.76 - 1.27 mg/dL Final   01/19/2023 2.55 (H) 0.76 - 1.27 mg/dL Final      Calcium Calcium   Date Value Ref Range Status   01/21/2023 7.7 (L) 8.6 - 10.5 mg/dL Final   01/20/2023 7.6 (L) 8.6 - 10.5 mg/dL Final   01/19/2023 7.9 (L) 8.6 - 10.5 mg/dL Final      PO4 No components found for: PO4   Albumin Albumin   Date Value Ref Range Status   01/21/2023 3.2 (L) 3.5 - 5.2 g/dL Final   01/20/2023 3.3 (L) 3.5 - 5.2 g/dL Final   01/19/2023 3.3 (L) 3.5 - 5.2 g/dL Final      Magnesium No results found for: MG   Uric Acid No components found for: URIC ACID     Imaging Results (Last 72 Hours)     ** No results found for the last 72 hours. **          Results for orders placed during the hospital encounter of 01/17/23    XR Chest 1 View    Narrative  XR CHEST 1 VW    Date of Exam: 1/17/2023 8:27 PM EST    Indication: fever/soa/back pain.    Comparison: 1/5/2022    Findings:  The heart and mediastinal contours are within normal limits. The lungs are clear. Osseous structures are unremarkable.    Impression  Impression:  No acute process    Electronically Signed: Joey Monroy  1/17/2023 9:26 PM EST  Workstation ID: [id number]      Results for orders placed during the hospital encounter of 01/05/22    XR Chest 1 View    Narrative  XR CHEST 1 VW    Date of Exam: 1/5/2022 23:38 EST    Indication: dyspnea.  Covid positive.    Comparison Exams: None available.    Technique: Portable AP chest    FINDINGS:  Ill-defined infiltrates are seen throughout the central to peripheral aspect of the lungs bilaterally with mild interstitial changes. No pleural effusions are observed. The cardiac silhouette is within normal limits for size for the portable study. The  mediastinum is unremarkable. No acute osseous abnormalities are identified.    Impression  Ill-defined infiltrates are  noted bilaterally with mild interstitial changes. The findings suggest atypical/viral infection or multifocal pneumonia and suggest changes of Covid 19 infection. Recommend follow-up to ensure improvement/resolution.    Electronically Signed: Bran Colin MD 1/6/2022 0:03 EST      Results for orders placed in visit on 12/13/18    SCANNED - IMAGING      Results for orders placed during the hospital encounter of 10/17/19    Duplex Venous Lower Extremity - Left    Interpretation Summary  · Acute left lower extremity deep vein thrombosis noted in the common femoral, proximal femoral, mid femoral, distal femoral and popliteal.        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 Slight bump in CR, probably related to diuretics.   • DM  • HTN  • COVID 19+  • Hx bladder/prostate cancer--s/p urotomy  • Hearing impaired     CR Stable, BP OK  No obj to d/c  F/U with Dr Laughlin in 4 weeks        Saeed Briscoe MD  [facility]  [phone]  01/21/23  11:17 EST

## 2023-01-21 NOTE — PLAN OF CARE
Goal Outcome Evaluation:  Plan of Care Reviewed With: patient        Progress: improving  Outcome Evaluation: Pt remains on COVID isolation at this time. Pt rest really well throughout the night. Urine has some sedimentation blocked the cath this morning. Patency regained after irrigation. Titrate oxygen to room air, oxygen sat sits at 90% as of right now. Will follow up oxygen sat again in 30 mins Pt still on ABX. No other concern at this time.

## 2023-01-22 LAB — BACTERIA SPEC AEROBE CULT: NORMAL

## 2023-01-22 NOTE — OUTREACH NOTE
Prep Survey    Flowsheet Row Responses   [facility] facility patient discharged from? [facility]   Is LACE score < 7 ? No   Eligibility Readm Mgmt   Discharge diagnosis COVID-19 Acute hypoxemia   Does the patient have one of the following disease processes/diagnoses(primary or secondary)? Other   Does the patient have Home health ordered? No   Is there a DME ordered? No   Prep survey completed? Yes          WONG VELAZCO - Registered Nurse

## 2023-01-24 ENCOUNTER — READMISSION MANAGEMENT (OUTPATIENT)
Dept: CALL CENTER | Facility: HOSPITAL | Age: 73
End: 2023-01-24
Payer: MEDICARE

## 2023-01-24 LAB — QT INTERVAL: 425 MS

## 2023-01-24 NOTE — OUTREACH NOTE
Medical Week 1 Survey    Flowsheet Row Responses   [facility] facility patient discharged from? [facility]   Does the patient have one of the following disease processes/diagnoses(primary or secondary)? Other   Week 1 attempt successful? No   Unsuccessful attempts Attempt 1          SOULEYMANE VELAZCO - Licensed Nurse

## 2023-01-27 ENCOUNTER — READMISSION MANAGEMENT (OUTPATIENT)
Dept: CALL CENTER | Facility: HOSPITAL | Age: 73
End: 2023-01-27
Payer: MEDICARE

## 2023-01-27 NOTE — OUTREACH NOTE
Medical Week 1 Survey    Flowsheet Row Responses   [facility] facility patient discharged from? [facility]   Does the patient have one of the following disease processes/diagnoses(primary or secondary)? Other   Week 1 attempt successful? No   Unsuccessful attempts Attempt 2          BONITA MONTES DE [facility] - Registered Nurse

## 2023-01-31 ENCOUNTER — READMISSION MANAGEMENT (OUTPATIENT)
Dept: CALL CENTER | Facility: HOSPITAL | Age: 73
End: 2023-01-31
Payer: MEDICARE

## 2023-01-31 NOTE — OUTREACH NOTE
Medical Week 1 Survey    Flowsheet Row Responses   [facility] patient discharged from? [facility]   Does the patient have one of the following disease processes/diagnoses(primary or secondary)? Other   Week 1 attempt successful? No   Unsuccessful attempts Attempt 3   Revoke Decline to participate          MARY MALONE - Registered Nurse

## 2023-11-08 ENCOUNTER — APPOINTMENT (OUTPATIENT)
Dept: CT IMAGING | Facility: HOSPITAL | Age: 73
DRG: 177 | End: 2023-11-08
Payer: MEDICARE

## 2023-11-08 ENCOUNTER — HOSPITAL ENCOUNTER (INPATIENT)
Facility: HOSPITAL | Age: 73
LOS: 3 days | Discharge: HOME | DRG: 177 | End: 2023-11-13
Attending: EMERGENCY MEDICINE | Admitting: FAMILY MEDICINE
Payer: MEDICARE

## 2023-11-08 DIAGNOSIS — J18.9 PNEUMONIA OF LEFT LOWER LOBE DUE TO INFECTIOUS ORGANISM: Primary | ICD-10-CM

## 2023-11-08 DIAGNOSIS — R91.8 MULTIPLE LUNG NODULES: ICD-10-CM

## 2023-11-08 DIAGNOSIS — R59.0 MEDIASTINAL LYMPHADENOPATHY: ICD-10-CM

## 2023-11-08 DIAGNOSIS — R09.02 HYPOXEMIA: ICD-10-CM

## 2023-11-08 LAB
ALBUMIN SERPL-MCNC: 3.5 G/DL (ref 3.5–5.2)
ALBUMIN/GLOB SERPL: 1.3 G/DL
ALP SERPL-CCNC: 77 U/L (ref 39–117)
ALT SERPL W P-5'-P-CCNC: 57 U/L (ref 1–41)
AMORPH URATE CRY URNS QL MICRO: ABNORMAL /HPF
ANION GAP SERPL CALCULATED.3IONS-SCNC: 9 MMOL/L (ref 5–15)
AST SERPL-CCNC: 52 U/L (ref 1–40)
B PARAPERT DNA SPEC QL NAA+PROBE: NOT DETECTED
B PERT DNA SPEC QL NAA+PROBE: NOT DETECTED
BACTERIA UR QL AUTO: ABNORMAL /HPF
BASOPHILS # BLD AUTO: 0 10*3/MM3 (ref 0–0.2)
BASOPHILS NFR BLD AUTO: 0.5 % (ref 0–1.5)
BILIRUB SERPL-MCNC: 0.4 MG/DL (ref 0–1.2)
BILIRUB UR QL STRIP: NEGATIVE
BUN SERPL-MCNC: 40 MG/DL (ref 8–23)
BUN/CREAT SERPL: 15.9 (ref 7–25)
C PNEUM DNA NPH QL NAA+NON-PROBE: NOT DETECTED
CALCIUM SPEC-SCNC: 8.8 MG/DL (ref 8.6–10.5)
CHLORIDE SERPL-SCNC: 101 MMOL/L (ref 98–107)
CLARITY UR: ABNORMAL
CO2 SERPL-SCNC: 26 MMOL/L (ref 22–29)
COLOR UR: ABNORMAL
CREAT SERPL-MCNC: 2.51 MG/DL (ref 0.76–1.27)
D-LACTATE SERPL-SCNC: 0.3 MMOL/L (ref 0.3–2)
DEPRECATED RDW RBC AUTO: 43.3 FL (ref 37–54)
EGFRCR SERPLBLD CKD-EPI 2021: 26.3 ML/MIN/1.73
EOSINOPHIL # BLD AUTO: 0 10*3/MM3 (ref 0–0.4)
EOSINOPHIL NFR BLD AUTO: 1.1 % (ref 0.3–6.2)
ERYTHROCYTE [DISTWIDTH] IN BLOOD BY AUTOMATED COUNT: 13.8 % (ref 12.3–15.4)
FLUAV SUBTYP SPEC NAA+PROBE: NOT DETECTED
FLUBV RNA ISLT QL NAA+PROBE: NOT DETECTED
GLOBULIN UR ELPH-MCNC: 2.8 GM/DL
GLUCOSE BLDC GLUCOMTR-MCNC: 163 MG/DL (ref 70–105)
GLUCOSE BLDC GLUCOMTR-MCNC: 87 MG/DL (ref 70–105)
GLUCOSE SERPL-MCNC: 127 MG/DL (ref 65–99)
GLUCOSE UR STRIP-MCNC: ABNORMAL MG/DL
HADV DNA SPEC NAA+PROBE: NOT DETECTED
HCOV 229E RNA SPEC QL NAA+PROBE: NOT DETECTED
HCOV HKU1 RNA SPEC QL NAA+PROBE: NOT DETECTED
HCOV NL63 RNA SPEC QL NAA+PROBE: NOT DETECTED
HCOV OC43 RNA SPEC QL NAA+PROBE: NOT DETECTED
HCT VFR BLD AUTO: 32.6 % (ref 37.5–51)
HGB BLD-MCNC: 10.9 G/DL (ref 13–17.7)
HGB UR QL STRIP.AUTO: ABNORMAL
HMPV RNA NPH QL NAA+NON-PROBE: NOT DETECTED
HPIV1 RNA ISLT QL NAA+PROBE: NOT DETECTED
HPIV2 RNA SPEC QL NAA+PROBE: NOT DETECTED
HPIV3 RNA NPH QL NAA+PROBE: NOT DETECTED
HPIV4 P GENE NPH QL NAA+PROBE: NOT DETECTED
HYALINE CASTS UR QL AUTO: ABNORMAL /LPF
INR PPP: 1.8 (ref 2–3)
KETONES UR QL STRIP: NEGATIVE
LEUKOCYTE ESTERASE UR QL STRIP.AUTO: ABNORMAL
LYMPHOCYTES # BLD AUTO: 0.8 10*3/MM3 (ref 0.7–3.1)
LYMPHOCYTES NFR BLD AUTO: 18.8 % (ref 19.6–45.3)
M PNEUMO IGG SER IA-ACNC: NOT DETECTED
MCH RBC QN AUTO: 30.1 PG (ref 26.6–33)
MCHC RBC AUTO-ENTMCNC: 33.3 G/DL (ref 31.5–35.7)
MCV RBC AUTO: 90.3 FL (ref 79–97)
MONOCYTES # BLD AUTO: 1 10*3/MM3 (ref 0.1–0.9)
MONOCYTES NFR BLD AUTO: 22.8 % (ref 5–12)
NEUTROPHILS NFR BLD AUTO: 2.5 10*3/MM3 (ref 1.7–7)
NEUTROPHILS NFR BLD AUTO: 56.8 % (ref 42.7–76)
NITRITE UR QL STRIP: NEGATIVE
NRBC BLD AUTO-RTO: 0 /100 WBC (ref 0–0.2)
PH UR STRIP.AUTO: >=9 [PH] (ref 5–8)
PLATELET # BLD AUTO: 152 10*3/MM3 (ref 140–450)
PMV BLD AUTO: 7.2 FL (ref 6–12)
POTASSIUM SERPL-SCNC: 4 MMOL/L (ref 3.5–5.2)
PROT SERPL-MCNC: 6.3 G/DL (ref 6–8.5)
PROT UR QL STRIP: ABNORMAL
PROTHROMBIN TIME: 18.8 SECONDS (ref 19.4–28.5)
RBC # BLD AUTO: 3.61 10*6/MM3 (ref 4.14–5.8)
RBC # UR STRIP: ABNORMAL /HPF
REF LAB TEST METHOD: ABNORMAL
RHINOVIRUS RNA SPEC NAA+PROBE: NOT DETECTED
RSV RNA NPH QL NAA+NON-PROBE: NOT DETECTED
SARS-COV-2 RNA NPH QL NAA+NON-PROBE: NOT DETECTED
SODIUM SERPL-SCNC: 136 MMOL/L (ref 136–145)
SP GR UR STRIP: 1.02 (ref 1–1.03)
SQUAMOUS #/AREA URNS HPF: ABNORMAL /HPF
TRI-PHOS CRY URNS QL MICRO: ABNORMAL /HPF
UROBILINOGEN UR QL STRIP: ABNORMAL
WBC # UR STRIP: ABNORMAL /HPF
WBC NRBC COR # BLD: 4.4 10*3/MM3 (ref 3.4–10.8)

## 2023-11-08 PROCEDURE — 94640 AIRWAY INHALATION TREATMENT: CPT

## 2023-11-08 PROCEDURE — 94664 DEMO&/EVAL PT USE INHALER: CPT

## 2023-11-08 PROCEDURE — 85025 COMPLETE CBC W/AUTO DIFF WBC: CPT | Performed by: EMERGENCY MEDICINE

## 2023-11-08 PROCEDURE — 80053 COMPREHEN METABOLIC PANEL: CPT | Performed by: EMERGENCY MEDICINE

## 2023-11-08 PROCEDURE — 36415 COLL VENOUS BLD VENIPUNCTURE: CPT

## 2023-11-08 PROCEDURE — 82948 REAGENT STRIP/BLOOD GLUCOSE: CPT

## 2023-11-08 PROCEDURE — 0202U NFCT DS 22 TRGT SARS-COV-2: CPT | Performed by: EMERGENCY MEDICINE

## 2023-11-08 PROCEDURE — 83605 ASSAY OF LACTIC ACID: CPT

## 2023-11-08 PROCEDURE — 87040 BLOOD CULTURE FOR BACTERIA: CPT | Performed by: EMERGENCY MEDICINE

## 2023-11-08 PROCEDURE — 94761 N-INVAS EAR/PLS OXIMETRY MLT: CPT

## 2023-11-08 PROCEDURE — G0378 HOSPITAL OBSERVATION PER HR: HCPCS

## 2023-11-08 PROCEDURE — 94799 UNLISTED PULMONARY SVC/PX: CPT

## 2023-11-08 PROCEDURE — 85610 PROTHROMBIN TIME: CPT | Performed by: EMERGENCY MEDICINE

## 2023-11-08 PROCEDURE — 99285 EMERGENCY DEPT VISIT HI MDM: CPT

## 2023-11-08 PROCEDURE — 71250 CT THORAX DX C-: CPT

## 2023-11-08 PROCEDURE — 25010000002 CEFTRIAXONE PER 250 MG: Performed by: EMERGENCY MEDICINE

## 2023-11-08 PROCEDURE — 81001 URINALYSIS AUTO W/SCOPE: CPT | Performed by: EMERGENCY MEDICINE

## 2023-11-08 RX ORDER — ALBUTEROL SULFATE 90 UG/1
2 AEROSOL, METERED RESPIRATORY (INHALATION) ONCE
Status: COMPLETED | OUTPATIENT
Start: 2023-11-08 | End: 2023-11-08

## 2023-11-08 RX ORDER — CHLORAL HYDRATE 500 MG
1000 CAPSULE ORAL
COMMUNITY

## 2023-11-08 RX ORDER — MELATONIN
1000 DAILY
COMMUNITY

## 2023-11-08 RX ADMIN — DOXYCYCLINE 100 MG: 100 INJECTION, POWDER, LYOPHILIZED, FOR SOLUTION INTRAVENOUS at 18:50

## 2023-11-08 RX ADMIN — ALBUTEROL SULFATE 2 PUFF: 108 AEROSOL, METERED RESPIRATORY (INHALATION) at 13:15

## 2023-11-08 RX ADMIN — CEFTRIAXONE 1000 MG: 1 INJECTION, POWDER, FOR SOLUTION INTRAMUSCULAR; INTRAVENOUS at 18:50

## 2023-11-08 NOTE — ED PROVIDER NOTES
Subjective   History of Present Illness  73-year-old male complaint of intermittent fevers last 5 days patient had 1-3 fever on the first day of illness and also had 103 fever this morning.  The patient has had 2 negative COVID test.  The patient had decreased O2 saturation in the low 80s at home while walking.  The patient has had loose stools but no vomiting or diarrhea.  He reports extensive muscle aches and pains when he spikes a fever.  He denies night sweats or hemoptysis      Review of Systems   Constitutional:  Positive for chills, fatigue and fever.   Respiratory:  Positive for cough, chest tightness, shortness of breath and wheezing.    Cardiovascular:  Positive for chest pain. Negative for palpitations and leg swelling.   Gastrointestinal:  Negative for nausea.   Musculoskeletal:  Positive for arthralgias and myalgias.   Hematological:  Does not bruise/bleed easily.   All other systems reviewed and are negative.      Past Medical History:   Diagnosis Date    Bladder cancer     Deaf     Diabetes mellitus     Prostate cancer     Renal disorder        No Known Allergies    Past Surgical History:   Procedure Laterality Date    BLADDER SURGERY      CYSTECTOMY      GASTRECTOMY      PROSTATECTOMY         Family History   Problem Relation Age of Onset    Cancer Father        Social History     Socioeconomic History    Marital status:    Tobacco Use    Smoking status: Never    Smokeless tobacco: Never   Vaping Use    Vaping Use: Never used   Substance and Sexual Activity    Alcohol use: No    Drug use: No    Sexual activity: Defer           Objective   Physical Exam  Alert Pavel Coma Scale 15 deaf, examined with assistance of    HEENT: Pupils equal and reactive to light. Conjunctivae are not injected. Normal tympanic membranes. Oropharynx and nares are normal.   Neck: Supple. Midline trachea. No JVD. No goiter.   Chest: Extensive left lower lobe Rales are noted there are scattered wheezes  and rhonchi bilaterally and equal breath sounds bilaterally, regular rate and rhythm without murmur or rub.   Abdomen: Positive bowel sounds, nontender, nondistended. No rebound or peritoneal signs. No CVA tenderness.   Extremities no clubbing. cyanosis or edema. Motor sensory exam is normal. The full range of motion is intact   Skin: Warm and dry, no rashes or petechia.   Lymphatic: No regional lymphadenopathy. No calf pain, swelling or Homans sign    Procedures           ED Course      Labs Reviewed   COMPREHENSIVE METABOLIC PANEL - Abnormal; Notable for the following components:       Result Value    Glucose 127 (*)     BUN 40 (*)     Creatinine 2.51 (*)     ALT (SGPT) 57 (*)     AST (SGOT) 52 (*)     eGFR 26.3 (*)     All other components within normal limits    Narrative:     GFR Normal >60  Chronic Kidney Disease <60  Kidney Failure <15    The GFR formula is only valid for adults with stable renal function between ages 18 and 70.   URINALYSIS W/ CULTURE IF INDICATED - Abnormal; Notable for the following components:    Color, UA Dark Yellow (*)     Appearance, UA Turbid (*)     pH, UA >=9.0 (*)     Glucose,  mg/dL (Trace) (*)     Blood, UA Trace (*)     Protein, UA >=300 mg/dL (3+) (*)     Leuk Esterase, UA Moderate (2+) (*)     All other components within normal limits    Narrative:     In absence of clinical symptoms, the presence of pyuria, bacteria, and/or nitrites on the urinalysis result does not correlate with infection.   PROTIME-INR - Abnormal; Notable for the following components:    Protime 18.8 (*)     INR 1.80 (*)     All other components within normal limits   CBC WITH AUTO DIFFERENTIAL - Abnormal; Notable for the following components:    RBC 3.61 (*)     Hemoglobin 10.9 (*)     Hematocrit 32.6 (*)     Lymphocyte % 18.8 (*)     Monocyte % 22.8 (*)     Monocytes, Absolute 1.00 (*)     All other components within normal limits   URINALYSIS, MICROSCOPIC ONLY - Abnormal; Notable for the  following components:    WBC, UA 3-5 (*)     Bacteria, UA 1+ (*)     All other components within normal limits   RESPIRATORY PANEL PCR W/ COVID-19 (SARS-COV-2), NP SWAB IN UTM/VTP, 3-4 HR TAT - Normal    Narrative:     In the setting of a positive respiratory panel with a viral infection PLUS a negative procalcitonin without other underlying concern for bacterial infection, consider observing off antibiotics or discontinuation of antibiotics and continue supportive care. If the respiratory panel is positive for atypical bacterial infection (Bordetella pertussis, Chlamydophila pneumoniae, or Mycoplasma pneumoniae), consider antibiotic de-escalation to target atypical bacterial infection.   POC LACTATE - Normal   POCT GLUCOSE FINGERSTICK - Normal   BLOOD CULTURE   BLOOD CULTURE   POC LACTATE   CBC AND DIFFERENTIAL    Narrative:     The following orders were created for panel order CBC & Differential.  Procedure                               Abnormality         Status                     ---------                               -----------         ------                     CBC Auto Differential[[id number]]        Abnormal            Final result               Scan Slide[[id number]]                                                                    Please view results for these tests on the individual orders.     Medications   albuterol sulfate HFA (PROVENTIL HFA;VENTOLIN HFA;PROAIR HFA) inhaler 2 puff (2 puffs Inhalation Given 11/8/23 1315)     CT Chest Without Contrast Diagnostic    Result Date: 11/8/2023  Impression: 1. Clustered parenchymal nodular densities within the superior left lower lobe are favored represent benign infectious/inflammatory nodules, dominant measuring 10 mm. 3-month CT chest follow-up is recommended. 2. Tiny biapical parenchymal nodular densities measuring between 3 to 5 mm are present. Benign etiology is favored. Attention at surveillance imaging is suggested. 3. Small esophageal hiatal hernia.  Electronically Signed: Trinity Negron MD  11/8/2023 1:58 PM EST  Workstation ID: [id number]                                        Medical Decision Making  Patient will be started on IV ceftriaxone and doxycycline.  Extended ER throughput and a overcrowding over volume environment.  The patient and family were agreeable to this plan of treatment Case was discussed with Dr. Simpson    Amount and/or Complexity of Data Reviewed  Labs: ordered.  Radiology: ordered.    Risk  Prescription drug management.        Final diagnoses:   Pneumonia of left lower lobe due to infectious organism   Multiple lung nodules   Mediastinal lymphadenopathy   Hypoxemia       ED Disposition  ED Disposition       ED Disposition   Decision to Admit    Condition   --    Comment   Level of Care: Telemetry [5]   Diagnosis: Left lower lobe pneumonia [[id number]]   Admitting Physician: CANDIDO SIMPSON [[id number]]   Attending Physician: CANDIDO SIMPSON [[id number]]                 No follow-up provider specified.       Medication List      No changes were made to your prescriptions during this visit.            Reynaldo Irizarry MD  11/08/23 4057

## 2023-11-09 LAB
GLUCOSE BLDC GLUCOMTR-MCNC: 136 MG/DL (ref 70–105)
GLUCOSE BLDC GLUCOMTR-MCNC: 144 MG/DL (ref 70–105)
GLUCOSE BLDC GLUCOMTR-MCNC: 163 MG/DL (ref 70–105)
GLUCOSE BLDC GLUCOMTR-MCNC: 75 MG/DL (ref 70–105)
INR PPP: 2.01 (ref 2–3)
PROTHROMBIN TIME: 20.8 SECONDS (ref 19.4–28.5)

## 2023-11-09 PROCEDURE — 94799 UNLISTED PULMONARY SVC/PX: CPT

## 2023-11-09 PROCEDURE — 25010000002 CEFTRIAXONE PER 250 MG: Performed by: FAMILY MEDICINE

## 2023-11-09 PROCEDURE — 25810000003 SODIUM CHLORIDE 0.9 % SOLUTION: Performed by: INTERNAL MEDICINE

## 2023-11-09 PROCEDURE — 97162 PT EVAL MOD COMPLEX 30 MIN: CPT

## 2023-11-09 PROCEDURE — 85610 PROTHROMBIN TIME: CPT | Performed by: FAMILY MEDICINE

## 2023-11-09 PROCEDURE — 87040 BLOOD CULTURE FOR BACTERIA: CPT | Performed by: FAMILY MEDICINE

## 2023-11-09 PROCEDURE — 94664 DEMO&/EVAL PT USE INHALER: CPT

## 2023-11-09 PROCEDURE — 94761 N-INVAS EAR/PLS OXIMETRY MLT: CPT

## 2023-11-09 PROCEDURE — 82948 REAGENT STRIP/BLOOD GLUCOSE: CPT

## 2023-11-09 PROCEDURE — G0378 HOSPITAL OBSERVATION PER HR: HCPCS

## 2023-11-09 RX ORDER — SODIUM CHLORIDE 9 MG/ML
75 INJECTION, SOLUTION INTRAVENOUS CONTINUOUS
Status: DISCONTINUED | OUTPATIENT
Start: 2023-11-09 | End: 2023-11-11

## 2023-11-09 RX ORDER — SODIUM BICARBONATE 650 MG/1
650 TABLET ORAL 2 TIMES DAILY
Status: DISCONTINUED | OUTPATIENT
Start: 2023-11-09 | End: 2023-11-13 | Stop reason: HOSPADM

## 2023-11-09 RX ORDER — WARFARIN SODIUM 5 MG/1
5 TABLET ORAL
Status: DISCONTINUED | OUTPATIENT
Start: 2023-11-09 | End: 2023-11-13

## 2023-11-09 RX ORDER — IPRATROPIUM BROMIDE AND ALBUTEROL SULFATE 2.5; .5 MG/3ML; MG/3ML
3 SOLUTION RESPIRATORY (INHALATION) EVERY 6 HOURS PRN
Status: DISCONTINUED | OUTPATIENT
Start: 2023-11-09 | End: 2023-11-13 | Stop reason: SDUPTHER

## 2023-11-09 RX ORDER — GLIPIZIDE 5 MG/1
2.5 TABLET ORAL
Status: DISCONTINUED | OUTPATIENT
Start: 2023-11-09 | End: 2023-11-13 | Stop reason: HOSPADM

## 2023-11-09 RX ORDER — GABAPENTIN 100 MG/1
100 CAPSULE ORAL 3 TIMES DAILY
Status: DISCONTINUED | OUTPATIENT
Start: 2023-11-09 | End: 2023-11-13 | Stop reason: HOSPADM

## 2023-11-09 RX ORDER — AMLODIPINE BESYLATE 5 MG/1
10 TABLET ORAL NIGHTLY
Status: DISCONTINUED | OUTPATIENT
Start: 2023-11-09 | End: 2023-11-13 | Stop reason: HOSPADM

## 2023-11-09 RX ORDER — IPRATROPIUM BROMIDE AND ALBUTEROL SULFATE 2.5; .5 MG/3ML; MG/3ML
3 SOLUTION RESPIRATORY (INHALATION)
Status: DISCONTINUED | OUTPATIENT
Start: 2023-11-09 | End: 2023-11-13

## 2023-11-09 RX ORDER — ACETAMINOPHEN 325 MG/1
325 TABLET ORAL EVERY 4 HOURS PRN
Status: DISCONTINUED | OUTPATIENT
Start: 2023-11-09 | End: 2023-11-13 | Stop reason: HOSPADM

## 2023-11-09 RX ORDER — IPRATROPIUM BROMIDE AND ALBUTEROL SULFATE 2.5; .5 MG/3ML; MG/3ML
3 SOLUTION RESPIRATORY (INHALATION)
Status: DISCONTINUED | OUTPATIENT
Start: 2023-11-09 | End: 2023-11-09

## 2023-11-09 RX ORDER — ONDANSETRON 2 MG/ML
4 INJECTION INTRAMUSCULAR; INTRAVENOUS EVERY 6 HOURS PRN
Status: DISCONTINUED | OUTPATIENT
Start: 2023-11-09 | End: 2023-11-13 | Stop reason: HOSPADM

## 2023-11-09 RX ADMIN — SODIUM CHLORIDE 75 ML/HR: 9 INJECTION, SOLUTION INTRAVENOUS at 13:45

## 2023-11-09 RX ADMIN — DOXYCYCLINE 100 MG: 100 INJECTION, POWDER, LYOPHILIZED, FOR SOLUTION INTRAVENOUS at 21:43

## 2023-11-09 RX ADMIN — GABAPENTIN 100 MG: 100 CAPSULE ORAL at 11:33

## 2023-11-09 RX ADMIN — IPRATROPIUM BROMIDE AND ALBUTEROL SULFATE 3 ML: .5; 3 SOLUTION RESPIRATORY (INHALATION) at 14:46

## 2023-11-09 RX ADMIN — DOXYCYCLINE 100 MG: 100 INJECTION, POWDER, LYOPHILIZED, FOR SOLUTION INTRAVENOUS at 12:10

## 2023-11-09 RX ADMIN — SODIUM BICARBONATE 650 MG: 650 TABLET ORAL at 21:28

## 2023-11-09 RX ADMIN — GABAPENTIN 100 MG: 100 CAPSULE ORAL at 17:54

## 2023-11-09 RX ADMIN — GABAPENTIN 100 MG: 100 CAPSULE ORAL at 21:28

## 2023-11-09 RX ADMIN — IPRATROPIUM BROMIDE AND ALBUTEROL SULFATE 3 ML: .5; 3 SOLUTION RESPIRATORY (INHALATION) at 19:02

## 2023-11-09 RX ADMIN — GLIPIZIDE 2.5 MG: 5 TABLET ORAL at 11:33

## 2023-11-09 RX ADMIN — CEFTRIAXONE 1000 MG: 1 INJECTION, POWDER, FOR SOLUTION INTRAMUSCULAR; INTRAVENOUS at 17:53

## 2023-11-09 RX ADMIN — SODIUM BICARBONATE 650 MG: 650 TABLET ORAL at 11:33

## 2023-11-09 RX ADMIN — WARFARIN SODIUM 5 MG: 5 TABLET ORAL at 17:54

## 2023-11-09 RX ADMIN — AMLODIPINE BESYLATE 10 MG: 5 TABLET ORAL at 21:29

## 2023-11-09 RX ADMIN — IPRATROPIUM BROMIDE AND ALBUTEROL SULFATE 3 ML: .5; 3 SOLUTION RESPIRATORY (INHALATION) at 11:09

## 2023-11-09 RX ADMIN — IPRATROPIUM BROMIDE AND ALBUTEROL SULFATE 3 ML: .5; 3 SOLUTION RESPIRATORY (INHALATION) at 07:04

## 2023-11-09 NOTE — ED NOTES
Nursing report ED to floor  Michael Dorantes  73 y.o.  male    HPI:   Chief Complaint   Patient presents with    Fever     Fever on and off over the last week, low oxygen today,.  Tylenol at 6am this morning, reported 103 fever. Pt is American sign language, Ipad is at bedside. Pt had family at triage offered Ipad for triage.             Admitting doctor:   Rommel Villalta MD    Admitting diagnosis:   The primary encounter diagnosis was Pneumonia of left lower lobe due to infectious organism. Diagnoses of Multiple lung nodules, Mediastinal lymphadenopathy, and Hypoxemia were also pertinent to this visit.    Code status:   Current Code Status       Date Active Code Status Order ID Comments User Context       Prior            Allergies:   Patient has no known allergies.    Isolation:  No active isolations     Fall Risk:  Fall Risk Assessment was completed, and patient is at moderate risk for falls.   Predictive Model Details         15 (Low) Factor Value    Calculated 11/8/2023 21:01 Age 73    Risk of Fall Model Musculoskeletal Assessment WDL     Active Peripheral IV Present     Imaging order in this encounter Present     Skin Assessment WDL     Financial Class Other     Magnesium not on file     Diastolic BP 59     Number of Distinct Medication Classes administered 4     Creatinine 2.51 mg/dL     Drug Use No     Julien Scale not on file     Albumin 3.5 g/dL     Calcium 8.8 mg/dL     Peripheral Vascular Assessment WDL     Clinically Relevant Sex Not Female     Chloride 101 mmol/L     ALT 57 U/L     Cardiac Assessment WDL     Days after Admission 0.369     Respiratory Rate 16     Gastrointestinal Assessment X     Total Bilirubin 0.4 mg/dL     Potassium 4 mmol/L         Weight:       11/08/23  1207   Weight: 81.4 kg (179 lb 7.3 oz)       Intake and Output    Intake/Output Summary (Last 24 hours) at 11/8/2023 2101  Last data filed at 11/8/2023 2100  Gross per 24 hour   Intake 200 ml   Output 60 ml   Net 140 ml       Diet:         Most recent vitals:   Vitals:    11/08/23 1716 11/08/23 1746 11/08/23 1846 11/08/23 2046   BP: 146/59 146/78 157/56 152/59   BP Location:       Patient Position:       Pulse: 72 71 71 64   Resp:       Temp:       TempSrc:       SpO2: 95% 95% 96% 95%   Weight:       Height:           Active LDAs/IV Access:   Lines, Drains & Airways       Active LDAs       Name Placement date Placement time Site Days    Peripheral IV 11/08/23 1309 Left Antecubital 11/08/23  1309  Antecubital  less than 1    Peripheral IV 11/08/23 1848 Right Antecubital 11/08/23  1848  Antecubital  less than 1    Urostomy RLQ 10/17/19  2230  in place on admission  RLQ  1482                    Skin Condition:   Skin Assessments (last day)       None             Labs (abnormal labs have a star):   Labs Reviewed   COMPREHENSIVE METABOLIC PANEL - Abnormal; Notable for the following components:       Result Value    Glucose 127 (*)     BUN 40 (*)     Creatinine 2.51 (*)     ALT (SGPT) 57 (*)     AST (SGOT) 52 (*)     eGFR 26.3 (*)     All other components within normal limits    Narrative:     GFR Normal >60  Chronic Kidney Disease <60  Kidney Failure <15    The GFR formula is only valid for adults with stable renal function between ages 18 and 70.   URINALYSIS W/ CULTURE IF INDICATED - Abnormal; Notable for the following components:    Color, UA Dark Yellow (*)     Appearance, UA Turbid (*)     pH, UA >=9.0 (*)     Glucose,  mg/dL (Trace) (*)     Blood, UA Trace (*)     Protein, UA >=300 mg/dL (3+) (*)     Leuk Esterase, UA Moderate (2+) (*)     All other components within normal limits    Narrative:     In absence of clinical symptoms, the presence of pyuria, bacteria, and/or nitrites on the urinalysis result does not correlate with infection.   PROTIME-INR - Abnormal; Notable for the following components:    Protime 18.8 (*)     INR 1.80 (*)     All other components within normal limits   CBC WITH AUTO DIFFERENTIAL - Abnormal; Notable for  the following components:    RBC 3.61 (*)     Hemoglobin 10.9 (*)     Hematocrit 32.6 (*)     Lymphocyte % 18.8 (*)     Monocyte % 22.8 (*)     Monocytes, Absolute 1.00 (*)     All other components within normal limits   URINALYSIS, MICROSCOPIC ONLY - Abnormal; Notable for the following components:    WBC, UA 3-5 (*)     Bacteria, UA 1+ (*)     All other components within normal limits   RESPIRATORY PANEL PCR W/ COVID-19 (SARS-COV-2), NP SWAB IN UTM/VTP, 3-4 HR TAT - Normal    Narrative:     In the setting of a positive respiratory panel with a viral infection PLUS a negative procalcitonin without other underlying concern for bacterial infection, consider observing off antibiotics or discontinuation of antibiotics and continue supportive care. If the respiratory panel is positive for atypical bacterial infection (Bordetella pertussis, Chlamydophila pneumoniae, or Mycoplasma pneumoniae), consider antibiotic de-escalation to target atypical bacterial infection.   POC LACTATE - Normal   POCT GLUCOSE FINGERSTICK - Normal   BLOOD CULTURE   BLOOD CULTURE   POC LACTATE   CBC AND DIFFERENTIAL    Narrative:     The following orders were created for panel order CBC & Differential.  Procedure                               Abnormality         Status                     ---------                               -----------         ------                     CBC Auto Differential[[id number]]        Abnormal            Final result               Scan Slide[[id number]]                                                                    Please view results for these tests on the individual orders.       LOC: Person, Place, Time, and Situation    Telemetry:  Telemetry    Cardiac Monitoring Ordered: no    EKG:   No orders to display       Medications Given in the ED:   Medications   cefTRIAXone (ROCEPHIN) 1,000 mg in sodium chloride 0.9 % 100 mL IVPB (0 mg Intravenous Stopped 11/8/23 2100)   albuterol sulfate HFA (PROVENTIL HFA;VENTOLIN  HFA;PROAIR HFA) inhaler 2 puff (2 puffs Inhalation Given 11/8/23 1315)   doxycycline (VIBRAMYCIN) 100 mg in sodium chloride 0.9 % 100 mL IVPB (0 mg Intravenous Stopped 11/8/23 2100)       Imaging results:  CT Chest Without Contrast Diagnostic    Result Date: 11/8/2023  Impression: 1. Clustered parenchymal nodular densities within the superior left lower lobe are favored represent benign infectious/inflammatory nodules, dominant measuring 10 mm. 3-month CT chest follow-up is recommended. 2. Tiny biapical parenchymal nodular densities measuring between 3 to 5 mm are present. Benign etiology is favored. Attention at surveillance imaging is suggested. 3. Small esophageal hiatal hernia. Electronically Signed: Trinity Negron MD  11/8/2023 1:58 PM EST  Workstation ID: [id number]     Social issues:   Social History     Socioeconomic History    Marital status:    Tobacco Use    Smoking status: Never    Smokeless tobacco: Never   Vaping Use    Vaping Use: Never used   Substance and Sexual Activity    Alcohol use: No    Drug use: No    Sexual activity: Defer       NIH Stroke Scale:  Interval: (not recorded)  1a. Level of Consciousness: (not recorded)  1b. LOC Questions: (not recorded)  1c. LOC Commands: (not recorded)  2. Best Gaze: (not recorded)  3. Visual: (not recorded)  4. Facial Palsy: (not recorded)  5a. Motor Arm, Left: (not recorded)  5b. Motor Arm, Right: (not recorded)  6a. Motor Leg, Left: (not recorded)  6b. Motor Leg, Right: (not recorded)  7. Limb Ataxia: (not recorded)  8. Sensory: (not recorded)  9. Best Language: (not recorded)  10. Dysarthria: (not recorded)  11. Extinction and Inattention (formerly Neglect): (not recorded)    Total (NIH Stroke Scale): (not recorded)     Additional notable assessment information:     Nursing report ED to floor:  3A    Radha Ware RN   11/08/23 21:01 EST

## 2023-11-09 NOTE — PLAN OF CARE
Goal Outcome Evaluation:  Plan of Care Reviewed With: patient        Progress: no change            Problem: Adult Inpatient Plan of Care  Goal: Plan of Care Review  Outcome: Ongoing, Progressing  Flowsheets (Taken 11/9/2023 1413)  Progress: no change  Plan of Care Reviewed With: patient     Problem: Diabetes Comorbidity  Goal: Blood Glucose Level Within Targeted Range  Outcome: Ongoing, Progressing     Problem: Fall Injury Risk  Goal: Absence of Fall and Fall-Related Injury  Outcome: Ongoing, Progressing  Intervention: Promote Injury-Free Environment  Recent Flowsheet Documentation  Taken 11/9/2023 1355 by Valery Rehman RN  Safety Promotion/Fall Prevention: safety round/check completed  Taken 11/9/2023 1213 by Valery Rehman RN  Safety Promotion/Fall Prevention: safety round/check completed  Taken 11/9/2023 1046 by Valery Rehman RN  Safety Promotion/Fall Prevention: safety round/check completed  Taken 11/9/2023 0829 by Valery Rehman RN  Safety Promotion/Fall Prevention: safety round/check completed  Taken 11/9/2023 0647 by Valery Rehman RN  Safety Promotion/Fall Prevention: safety round/check completed     Problem: Infection (Pneumonia)  Goal: Resolution of Infection Signs and Symptoms  Outcome: Ongoing, Progressing     Problem: Fluid and Electrolyte Imbalance (Urinary Diversion)  Goal: Fluid and Electrolyte Balance  Outcome: Ongoing, Progressing     Problem: Urine Elimination Impaired (Urinary Diversion)  Goal: Effective Urinary Elimination  Outcome: Ongoing, Progressing     Patient has adequate output through urostomy. Blood cultures drawn x2. Patient and spouse are deaf and communicate with sign language. Patient denies pain, currently resting in bed, no distress noted at this time.

## 2023-11-09 NOTE — CASE MANAGEMENT/SOCIAL WORK
Discharge Planning Assessment  [facility]     Patient Name: Michael Dorantes  MRN: [id number]  Today's Date: 11/9/2023    Admit Date: 11/8/2023    Plan: DC PLAN: Return home with wife.       Discharge Needs Assessment       Row Name 11/09/23 1310       Living Environment    People in Home spouse    Current Living Arrangements home    Potentially Unsafe Housing Conditions none    In the past 12 months has the electric, gas, oil, or water company threatened to shut off services in your home? No    Primary Care Provided by self    Family Caregiver if Needed spouse    Quality of Family Relationships helpful;involved    Able to Return to Prior Arrangements yes       Resource/Environmental Concerns    Resource/Environmental Concerns none       Food Insecurity    Within the past 12 months, you worried that your food would run out before you got the money to buy more. Never true       Transition Planning    Patient/Family Anticipates Transition to home with family    Patient/Family Anticipated Services at Transition     Transportation Anticipated family or friend will provide;car, drives self       Discharge Needs Assessment    Equipment Currently Used at Home none                   Discharge Plan       Row Name 11/09/23 1310       Plan    Plan DC PLAN: Return home with wife.    Patient/Family in Agreement with Plan yes    Plan Comments Met with patient at bedside, Patient is deaf. Family at bedside using an interperter with sign language. Patient from home with wife. Independent with ADL's wife will help when needed. PCP is [facility], pharmacy is [facility]. able to afford medications and denies any transportation issues, wife will provide. Denies any concerns about return home. DC BARRIERS: IV Rocephin, Doxy, Duonebs.                  Continued Care and Services - Admitted Since 11/8/2023    Coordination has not been started for this encounter.       Expected Discharge Date and Time       Expected Discharge Date Expected  Discharge Time    Nov 10, 2023            Demographic Summary       Row Name 11/09/23 1309       General Information    Admission Type observation    Referral Source admission list    Reason for Consult discharge planning    Preferred Language English       Contact Information    Permission Granted to Share Info With     Contact Information Obtained for                    Functional Status       Row Name 11/09/23 1309       Functional Status    Usual Activity Tolerance moderate    Current Activity Tolerance moderate       Assessment of Health Literacy    How often do you have someone help you read hospital materials? Sometimes    How often do you have problems learning about your medical condition because of difficulty understanding written information? Sometimes    How often do you have a problem understanding what is told to you about your medical condition? Sometimes    How confident are you filling out medical forms by yourself? Somewhat    Health Literacy Moderate       Functional Status, IADL    Medications assistive person    Meal Preparation assistive person    Housekeeping assistive person    Laundry assistive person    Shopping assistive person    IADL Comments Wife assists       Mental Status    General Appearance WDL WDL                     Patient Forms       Row Name 11/09/23 1307       Patient Forms    Important Message from Medicare (IMM) Delivered  Lemus 11/8/23 per registration    Delivered to Patient    Method of delivery In person                  Lorri Humphrey RN

## 2023-11-09 NOTE — CONSULTS
NEPHROLOGY CONSULTATION-----[facility]    [facility]  [phone]  Saeed Briscoe MD    Patient Care Team:  Joey Islas MD as PCP - General (Family Medicine)  Neville Laughlin MD as Consulting Physician (Nephrology)    CC/REASON FOR CONSULTATION: Elevated creatinine    PHYSICIAN REQUESTING CONSULTATION: Dr. Villalta    History of Present Illness  70-year-old male with past medical history of diabetes, CKD stage III presented to ER with complaints of fever for last 4 to 5 days.  His creatinine was noted to be 2.5 yesterday.  Previously creatinine 1.9 from January 2023.   No vomiting.  He is not on diuretics, ACE inhibitors or ARB's.  Blood pressure not low.  No chest pain, but has had some shortness of air.  His O2 sats were in the low 80s.  He has had some muscle aches and pains. Hearing impaired, hx from family/interpreting for him.    Review of Systems   As noted above otherwise 10 systems reviewed and were negative.    Past Medical History:   Diagnosis Date    Bladder cancer     Deaf     Diabetes mellitus     Prostate cancer     Renal disorder        Past Surgical History:   Procedure Laterality Date    BLADDER SURGERY      CYSTECTOMY      GASTRECTOMY      PROSTATECTOMY         Family History   Problem Relation Age of Onset    Cancer Father        Social History     Tobacco Use    Smoking status: Never    Smokeless tobacco: Never   Vaping Use    Vaping Use: Never used   Substance Use Topics    Alcohol use: No    Drug use: No       Home Meds:   Medications Prior to Admission   Medication Sig Dispense Refill Last Dose    amLODIPine (NORVASC) 10 MG tablet Take 1 tablet by mouth Every Night.       Cholecalciferol 25 MCG (1000 UT) tablet Take 1 tablet by mouth Daily.       gabapentin (NEURONTIN) 300 MG capsule Take 1 capsule by mouth 3 (Three) Times a Day.       glimepiride (AMARYL) 1 MG tablet Take 1 tablet by mouth Every Morning Before  Breakfast.       hydrALAZINE (APRESOLINE) 50 MG tablet Take 1 tablet by mouth 3 (Three) Times a Day.       Omega-3 Fatty Acids (fish oil) 1000 MG capsule capsule Take 1 capsule by mouth Daily With Breakfast.       sodium bicarbonate 650 MG tablet Take 1 tablet by mouth 2 (Two) Times a Day.       warfarin (COUMADIN) 5 MG tablet Take 1 tablet by mouth Daily.          Scheduled Meds:  amLODIPine, 10 mg, Oral, Nightly  cefTRIAXone, 1,000 mg, Intravenous, Q24H  doxycycline, 100 mg, Intravenous, Q12H  gabapentin, 100 mg, Oral, TID  glipizide, 2.5 mg, Oral, QAM AC  ipratropium-albuterol, 3 mL, Nebulization, 4x Daily - RT  sodium bicarbonate, 650 mg, Oral, BID  warfarin, 5 mg, Oral, Daily        Continuous Infusions:       PRN Meds:    acetaminophen    ipratropium-albuterol    ondansetron    Allergies:  Patient has no known allergies.    OBJECTIVE    Vital Signs  Temp:  [98.7 °F (37.1 °C)-100.3 °F (37.9 °C)] 98.7 °F (37.1 °C)  Heart Rate:  [55-72] 61  Resp:  [16-20] 18  BP: (122-159)/(47-78) 148/63    I/O this shift:  In: 480 [P.O.:480]  Out: 400 [Urine:400]  I/O last 3 completed shifts:  In: 560 [P.O.:360; IV Piggyback:200]  Out: 510 [Urine:510]    Physical Exam:  General Appearance: alert, appears stated age and cooperative  Head: normocephalic, without obvious abnormality and atraumatic  Eyes: conjunctivae and sclerae normal and no icterus  Neck: supple and no JVD  Lungs: Bilateral rhonchi and wheezing noted.  Heart: regular rhythm & normal rate and normal S1, S2  Chest Wall: no abnormalities observed  Abdomen: normal bowel sounds and soft, nontender  Extremities: moves extremities well, no edema, no cyanosis  Skin: no bleeding, bruising or rash  Neurologic: Alert, and oriented. No focal deficits    Results Review:    I reviewed the patient's new clinical results.    WBC WBC   Date Value Ref Range Status   11/08/2023 4.40 3.40 - 10.80 10*3/mm3 Final      HGB Hemoglobin   Date Value Ref Range Status   11/08/2023 10.9 (L)  "13.0 - 17.7 g/dL Final      HCT Hematocrit   Date Value Ref Range Status   11/08/2023 32.6 (L) 37.5 - 51.0 % Final      Platelets No results found for: \"LABPLAT\"   MCV MCV   Date Value Ref Range Status   11/08/2023 90.3 79.0 - 97.0 fL Final          Sodium Sodium   Date Value Ref Range Status   11/08/2023 136 136 - 145 mmol/L Final      Potassium Potassium   Date Value Ref Range Status   11/08/2023 4.0 3.5 - 5.2 mmol/L Final      Chloride Chloride   Date Value Ref Range Status   11/08/2023 101 98 - 107 mmol/L Final      CO2 CO2   Date Value Ref Range Status   11/08/2023 26.0 22.0 - 29.0 mmol/L Final      BUN BUN   Date Value Ref Range Status   11/08/2023 40 (H) 8 - 23 mg/dL Final      Creatinine Creatinine   Date Value Ref Range Status   11/08/2023 2.51 (H) 0.76 - 1.27 mg/dL Final      Calcium Calcium   Date Value Ref Range Status   11/08/2023 8.8 8.6 - 10.5 mg/dL Final      PO4 No results found for: \"CAPO4\"   Albumin Albumin   Date Value Ref Range Status   11/08/2023 3.5 3.5 - 5.2 g/dL Final      Magnesium No results found for: \"MG\"   Uric Acid No results found for: \"URICACID\"       Imaging Results (Last 72 Hours)       Procedure Component Value Units Date/Time    CT Chest Without Contrast Diagnostic [[id number]] Collected: 11/08/23 1350     Updated: 11/08/23 1401    Narrative:      CT CHEST WO CONTRAST DIAGNOSTIC    Date of Exam: 11/8/2023 1:48 PM EST    Indication: intermittent fever, nonprod cough.    Comparison: AP portable chest 1/17/2023. No prior CT chest for comparison at this institution.    Technique: Axial CT images were obtained of the chest without contrast administration.  Sagittal and coronal reconstructions were performed.  Automated exposure control and iterative reconstruction methods were used.      Findings:  Patchy nodular density is demonstrated within the superior left lower lobe abutting the major fissure, dominant component measuring 1 cm (series 5 image 35).    Small parenchymal nodular " densities are seen within the lung apices. At the right apex, these measure 5 mm and 4 mm and 4 mm (series 5 image 15). At the left apex, these measure 3 mm (image 12), 7 mm (image 13), and 4 mm (image 16).    Small noncalcified nodule seen in the lateral subpleural right lower lobe measuring 3 mm (image 54).    Some of the images are degraded by respiratory motion.    Borderline enlarged left hilar lymph node measures 10 mm short axis (series 2 image 41). No pathologic enlarged mediastinal lymph nodes are identified.    Heart size is at upper limits of normal. Moderate coronary artery calcifications are present. There is mild thoracic aortic calcific atherosclerosis.    Heterogeneous multinodular thyroid gland does not appear appreciably enlarged.    Small esophageal hiatal hernia is suspected. The remainder of the imaged upper abdominal organs demonstrate a normal noncontrast appearance.    No acute or suspicious osseous abnormalities are identified.      Impression:      Impression:    1. Clustered parenchymal nodular densities within the superior left lower lobe are favored represent benign infectious/inflammatory nodules, dominant measuring 10 mm. 3-month CT chest follow-up is recommended.  2. Tiny biapical parenchymal nodular densities measuring between 3 to 5 mm are present. Benign etiology is favored. Attention at surveillance imaging is suggested.  3. Small esophageal hiatal hernia.      Electronically Signed: Trinity Negron MD    11/8/2023 1:58 PM EST    Workstation ID: [id number]              Results for orders placed during the hospital encounter of 01/17/23    XR Chest 1 View    Narrative  XR CHEST 1 VW    Date of Exam: 1/17/2023 8:27 PM EST    Indication: fever/soa/back pain.    Comparison: 1/5/2022    Findings:  The heart and mediastinal contours are within normal limits. The lungs are clear. Osseous structures are unremarkable.    Impression  Impression:  No acute process    Electronically Signed:  Joey Monroy  1/17/2023 9:26 PM EST  Workstation ID: [id number]      Results for orders placed during the hospital encounter of 01/05/22    XR Chest 1 View    Narrative  XR CHEST 1 VW    Date of Exam: 1/5/2022 23:38 EST    Indication: dyspnea.  Covid positive.    Comparison Exams: None available.    Technique: Portable AP chest    FINDINGS:  Ill-defined infiltrates are seen throughout the central to peripheral aspect of the lungs bilaterally with mild interstitial changes. No pleural effusions are observed. The cardiac silhouette is within normal limits for size for the portable study. The  mediastinum is unremarkable. No acute osseous abnormalities are identified.    Impression  Ill-defined infiltrates are noted bilaterally with mild interstitial changes. The findings suggest atypical/viral infection or multifocal pneumonia and suggest changes of Covid 19 infection. Recommend follow-up to ensure improvement/resolution.    Electronically Signed: Bran Colin MD 1/6/2022 0:03 EST      Results for orders placed in visit on 12/13/18    SCANNED - IMAGING        Results for orders placed during the hospital encounter of 10/17/19    Duplex Venous Lower Extremity - Left    Interpretation Summary  · Acute left lower extremity deep vein thrombosis noted in the common femoral, proximal femoral, mid femoral, distal femoral and popliteal.      ASSESSMENT / PLAN      Left lower lobe pneumonia    LEO-likely prerenal and or ATN in setting of decreased p.o. 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  Baseline creatinine around 2.  Left lower lobe pneumonia  Diabetes  UTI-urine culture    Gently hydrate with normal saline  Check labs today  Closely monitor renal function fluid status electrolytes        I discussed the patient's findings and my recommendations with patient, nursing staff, and consulting provider    Will follow along closely. Thank you for  allowing us to see this patient in renal consultation.    [facility]  [phone]  MD Saeed Henriquez MD  11/09/23  11:38 EST

## 2023-11-09 NOTE — H&P
Patient Care Team:  Joey Islas MD as PCP - General (Family Medicine)  Neville Laughlin MD as Consulting Physician (Nephrology)    Chief Complaint  Subjective     The patient is a 73 y.o. male who presents with hyperpyrexia cough and shortness of breath with evidence of a community-acquired pneumonia present upon admission    HPI  The patient was in his usual state of health until approximately 5 days prior to presentation when he began to experience some progressive shortness of breath coughing and some hyperpyrexia.  He attempted to use home medications but decompensated and presented to [facility] emergency room for evaluation where he was admitted.  Review of Systems  Review of Systems   Constitutional:  Positive for activity change.   Respiratory:  Positive for cough and shortness of breath.    Neurological:  Positive for weakness.       History  Past Medical History:   Diagnosis Date    Bladder cancer     Deaf     Diabetes mellitus     Prostate cancer     Renal disorder      Past Surgical History:   Procedure Laterality Date    BLADDER SURGERY      CYSTECTOMY      GASTRECTOMY      PROSTATECTOMY       Family History   Problem Relation Age of Onset    Cancer Father      Social History     Tobacco Use    Smoking status: Never    Smokeless tobacco: Never   Vaping Use    Vaping Use: Never used   Substance Use Topics    Alcohol use: No    Drug use: No     Allergies:  Patient has no known allergies.    Objective     Vital Signs  Temp:  [98.7 °F (37.1 °C)-100.3 °F (37.9 °C)] 98.7 °F (37.1 °C)  Heart Rate:  [55-72] 71  Resp:  [16-20] 19  BP: (122-159)/(47-78) 148/63      Physical Exam:   Physical Exam  Vitals and nursing note reviewed.   Constitutional:       Appearance: Normal appearance.   Cardiovascular:      Rate and Rhythm: Normal rate.      Heart sounds: Normal heart sounds.   Pulmonary:      Breath sounds: Normal breath sounds.      Comments: Poor air exchange  Skin:     General: Skin  is warm.   Neurological:      Mental Status: He is alert.              Results Review:   CBC    Results from last 7 days   Lab Units 11/08/23  1310   WBC 10*3/mm3 4.40   HEMOGLOBIN g/dL 10.9*   PLATELETS 10*3/mm3 152     BMP   Results from last 7 days   Lab Units 11/08/23  1310   SODIUM mmol/L 136   POTASSIUM mmol/L 4.0   CHLORIDE mmol/L 101   CO2 mmol/L 26.0   BUN mg/dL 40*   CREATININE mg/dL 2.51*   GLUCOSE mg/dL 127*     Cr Clearance Estimated Creatinine Clearance: 30.2 mL/min (A) (by C-G formula based on SCr of 2.51 mg/dL (H)).  Coag   Results from last 7 days   Lab Units 11/08/23  1310   INR  1.80*     HbA1C   Lab Results   Component Value Date    HGBA1C 5.9 (H) 04/14/2022    HGBA1C 6.0 (H) 01/06/2022    HGBA1C 5.8 (H) 10/29/2020     Blood Glucose   Glucose   Date/Time Value Ref Range Status   11/09/2023 0752 75 70 - 105 mg/dL Final     Comment:     Serial Number: [id number]:  [id number]   11/08/2023 2121 163 (H) 70 - 105 mg/dL Final     Comment:     Serial Number: [id number]:  [id number]   11/08/2023 1600 87 70 - 105 mg/dL Final     Comment:     Serial Number: [id number]:  [id number]     Infection     CMP   Results from last 7 days   Lab Units 11/08/23  1310   SODIUM mmol/L 136   POTASSIUM mmol/L 4.0   CHLORIDE mmol/L 101   CO2 mmol/L 26.0   BUN mg/dL 40*   CREATININE mg/dL 2.51*   GLUCOSE mg/dL 127*   ALBUMIN g/dL 3.5   BILIRUBIN mg/dL 0.4   ALK PHOS U/L 77   AST (SGOT) U/L 52*   ALT (SGPT) U/L 57*     UA    Results from last 7 days   Lab Units 11/08/23  1335   NITRITE UA  Negative   WBC UA /HPF 3-5*   BACTERIA UA /HPF 1+*   SQUAM EPITHEL UA /HPF 0-2     Radiology(recent) CT Chest Without Contrast Diagnostic    Result Date: 11/8/2023  Impression: 1. Clustered parenchymal nodular densities within the superior left lower lobe are favored represent benign infectious/inflammatory nodules, dominant measuring 10 mm. 3-month CT chest follow-up is recommended. 2. Tiny biapical parenchymal nodular  densities measuring between 3 to 5 mm are present. Benign etiology is favored. Attention at surveillance imaging is suggested. 3. Small esophageal hiatal hernia. 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     Rommel Villalta MD  11/09/23  08:54 EST

## 2023-11-09 NOTE — CONSULTS
Group: [facility]         CONSULT NOTE    Patient Identification:  Michael Dorantes  73 y.o.  male  1950  [id number]            Requesting physician: Attending physician    Reason for Consultation:  pneumonia        History of Present Illness:   73-year-old male admitted with increasing shortness of breath and cough and fever,  .  CT chest left lower lobe nodular densities   Viral panel is negative    Assessment:     Left lower lobe pneumonia with nodular densities     Viral panel is negative\     Hypoxia currently on 2.5 L      Recommendations:     Antibiotics Rocephin and doxycycline     Oxygen titration     Bronchodilators  Anticoagulation currently on warfarin        Review of Sytems:  Review of Systems   Respiratory:  Positive for cough and shortness of breath. Negative for wheezing and stridor.    Cardiovascular:  Negative for chest pain, palpitations and leg swelling.       Past Medical History:  Past Medical History:   Diagnosis Date    Bladder cancer     Deaf     Diabetes mellitus     Prostate cancer     Renal disorder        Past Surgical History:  Past Surgical History:   Procedure Laterality Date    BLADDER SURGERY      CYSTECTOMY      GASTRECTOMY      PROSTATECTOMY          Home Meds:  Medications Prior to Admission   Medication Sig Dispense Refill Last Dose    amLODIPine (NORVASC) 10 MG tablet Take 1 tablet by mouth Every Night.       Cholecalciferol 25 MCG (1000 UT) tablet Take 1 tablet by mouth Daily.       gabapentin (NEURONTIN) 300 MG capsule Take 1 capsule by mouth 3 (Three) Times a Day.       glimepiride (AMARYL) 1 MG tablet Take 1 tablet by mouth Every Morning Before Breakfast.       hydrALAZINE (APRESOLINE) 50 MG tablet Take 1 tablet by mouth 3 (Three) Times a Day.       Omega-3 Fatty Acids (fish oil) 1000 MG capsule capsule Take 1 capsule by mouth Daily With Breakfast.       sodium bicarbonate 650 MG tablet Take 1 tablet by mouth 2 (Two) Times a Day.       warfarin (COUMADIN) 5  "MG tablet Take 1 tablet by mouth Daily.          Allergies:  No Known Allergies    Social History:   Social History     Socioeconomic History    Marital status:    Tobacco Use    Smoking status: Never    Smokeless tobacco: Never   Vaping Use    Vaping Use: Never used   Substance and Sexual Activity    Alcohol use: No    Drug use: No    Sexual activity: Defer       Family History:  Family History   Problem Relation Age of Onset    Cancer Father        Physical Exam:  /63 (BP Location: Left arm, Patient Position: Lying)   Pulse 61   Temp 98.7 °F (37.1 °C) (Oral)   Resp 18   Ht 182.9 cm (72\")   Wt 81.4 kg (179 lb 7.3 oz)   SpO2 99%   BMI 24.34 kg/m²  Body mass index is 24.34 kg/m². 99% 81.4 kg (179 lb 7.3 oz)  Physical Exam  Cardiovascular:      Heart sounds: Murmur heard.   Pulmonary:      Effort: No respiratory distress.      Breath sounds: No stridor. Rhonchi and rales present. No wheezing.   Chest:      Chest wall: No tenderness.       LABS:  Lab Results   Component Value Date    CALCIUM 8.8 11/08/2023    PHOS 2.8 01/21/2023     Results from last 7 days   Lab Units 11/08/23  1310   SODIUM mmol/L 136   POTASSIUM mmol/L 4.0   CHLORIDE mmol/L 101   CO2 mmol/L 26.0   BUN mg/dL 40*   CREATININE mg/dL 2.51*   GLUCOSE mg/dL 127*   CALCIUM mg/dL 8.8   WBC 10*3/mm3 4.40   HEMOGLOBIN g/dL 10.9*   PLATELETS 10*3/mm3 152   ALT (SGPT) U/L 57*   AST (SGOT) U/L 52*     Lab Results   Component Value Date    CKTOTAL 54 01/18/2022    TROPONINT <0.010 01/17/2023         Results from last 7 days   Lab Units 11/08/23  1335   BLOODCX  No growth at 24 hours     Results from last 7 days   Lab Units 11/08/23  1338   LACTATE mmol/L 0.3         Results from last 7 days   Lab Units 11/08/23  1309   ADENOVIRUS DETECTION BY PCR  Not Detected   CORONAVIRUS 229E  Not Detected   CORONAVIRUS HKU1  Not Detected   CORONAVIRUS NL63  Not Detected   CORONAVIRUS OC43  Not Detected   HUMAN METAPNEUMOVIRUS  Not Detected   HUMAN " RHINOVIRUS/ENTEROVIRUS  Not Detected   INFLUENZA B PCR  Not Detected   PARAINFLUENZA 1  Not Detected   PARAINFLUENZA VIRUS 2  Not Detected   PARAINFLUENZA VIRUS 3  Not Detected   PARAINFLUENZA VIRUS 4  Not Detected   BORDETELLA PERTUSSIS PCR  Not Detected   CHLAMYDOPHILA PNEUMONIAE PCR  Not Detected   MYCOPLAMA PNEUMO PCR  Not Detected   INFLUENZA A PCR  Not Detected   RSV, PCR  Not Detected     Results from last 7 days   Lab Units 11/09/23  1137 11/08/23  1310   INR  2.01 1.80*     Results from last 7 days   Lab Units 11/08/23  1335   BLOODCX  No growth at 24 hours     Lab Results   Component Value Date    TSH 2.950 01/06/2022     Estimated Creatinine Clearance: 30.2 mL/min (A) (by C-G formula based on SCr of 2.51 mg/dL (H)).  Results from last 7 days   Lab Units 11/08/23  1335   NITRITE UA  Negative   WBC UA /HPF 3-5*   BACTERIA UA /HPF 1+*   SQUAM EPITHEL UA /HPF 0-2        Imaging:  Imaging Results (Last 24 Hours)       ** No results found for the last 24 hours. **              Current Meds:   SCHEDULE  amLODIPine, 10 mg, Oral, Nightly  cefTRIAXone, 1,000 mg, Intravenous, Q24H  doxycycline, 100 mg, Intravenous, Q12H  gabapentin, 100 mg, Oral, TID  glipizide, 2.5 mg, Oral, QAM AC  ipratropium-albuterol, 3 mL, Nebulization, 4x Daily - RT  sodium bicarbonate, 650 mg, Oral, BID  warfarin, 5 mg, Oral, Daily      Infusions  sodium chloride, 75 mL/hr, Last Rate: 75 mL/hr (11/09/23 1345)      PRNs    acetaminophen    ipratropium-albuterol    ondansetron        Fawn Ac MD  11/9/2023  14:08 EST      Much of this encounter note is an electronic transcription/translation of spoken language to printed text using Dragon Software.

## 2023-11-09 NOTE — PLAN OF CARE
Goal Outcome Evaluation:  Plan of Care Reviewed With: patient, spouse, sibling           Outcome Evaluation: Pt is a 72 y/o male with intermittent fever x 5 days with c/o progressive SOB, cough and evidence of community acquired PNA.  O2 sats 80% with ambulation at home.  CT chest: parenchymal nodular densities superior L lobe.  Sister-in-law acting as ALS  for pt and wife.  Sister-in-law reporting hospital is attempting to find  for pt.  Pt lives with his wife in a 2 story home with no steps to enter into home.  Pt's bedroom/bathroom on main floor.  Pt was independent with all functional mobility tasks and did not use an assistive device prior to hospitalization.  Pt does not wear O2 at home.  Pt currently on 2.5L O2, IV, telemetry and urostomy.  RN requesting pt to attempt ambulation on room air and monitor O2 sats.  Pt was Mod I for bed mobility, CGA/SBA for transfers and ambulated 60' with CGA/SBA with 1 lateral LOB to R side that pt was able to self-correct.  Pt's O2 sats after ambulation on room air- 87%.  PLB/DB exercises completed and O2 sats still remained 87%.  Re-donned nasal cannula and O2 sats increased to 97%.  RN notified and 6 minute walking test with RT recommended.  Recommend HHPT.      Anticipated Discharge Disposition (PT): home with assist

## 2023-11-10 LAB
ALBUMIN SERPL-MCNC: 3 G/DL (ref 3.5–5.2)
ALBUMIN SERPL-MCNC: 3.3 G/DL (ref 3.5–5.2)
ANION GAP SERPL CALCULATED.3IONS-SCNC: 7 MMOL/L (ref 5–15)
ANION GAP SERPL CALCULATED.3IONS-SCNC: 8 MMOL/L (ref 5–15)
BUN SERPL-MCNC: 26 MG/DL (ref 8–23)
BUN SERPL-MCNC: 27 MG/DL (ref 8–23)
BUN/CREAT SERPL: 13.2 (ref 7–25)
BUN/CREAT SERPL: 14.3 (ref 7–25)
CALCIUM SPEC-SCNC: 8.3 MG/DL (ref 8.6–10.5)
CALCIUM SPEC-SCNC: 8.4 MG/DL (ref 8.6–10.5)
CHLORIDE SERPL-SCNC: 102 MMOL/L (ref 98–107)
CHLORIDE SERPL-SCNC: 102 MMOL/L (ref 98–107)
CO2 SERPL-SCNC: 24 MMOL/L (ref 22–29)
CO2 SERPL-SCNC: 25 MMOL/L (ref 22–29)
CREAT SERPL-MCNC: 1.89 MG/DL (ref 0.76–1.27)
CREAT SERPL-MCNC: 1.97 MG/DL (ref 0.76–1.27)
DEPRECATED RDW RBC AUTO: 43.8 FL (ref 37–54)
EGFRCR SERPLBLD CKD-EPI 2021: 35.2 ML/MIN/1.73
EGFRCR SERPLBLD CKD-EPI 2021: 37 ML/MIN/1.73
ERYTHROCYTE [DISTWIDTH] IN BLOOD BY AUTOMATED COUNT: 13.5 % (ref 12.3–15.4)
GLUCOSE BLDC GLUCOMTR-MCNC: 128 MG/DL (ref 70–105)
GLUCOSE BLDC GLUCOMTR-MCNC: 137 MG/DL (ref 70–105)
GLUCOSE BLDC GLUCOMTR-MCNC: 84 MG/DL (ref 70–105)
GLUCOSE SERPL-MCNC: 127 MG/DL (ref 65–99)
GLUCOSE SERPL-MCNC: 175 MG/DL (ref 65–99)
HCT VFR BLD AUTO: 29.5 % (ref 37.5–51)
HGB BLD-MCNC: 9.8 G/DL (ref 13–17.7)
INR PPP: 1.94 (ref 2–3)
INR PPP: 1.96 (ref 2–3)
MCH RBC QN AUTO: 29.5 PG (ref 26.6–33)
MCHC RBC AUTO-ENTMCNC: 33.4 G/DL (ref 31.5–35.7)
MCV RBC AUTO: 88.2 FL (ref 79–97)
PHOSPHATE SERPL-MCNC: 2.8 MG/DL (ref 2.5–4.5)
PHOSPHATE SERPL-MCNC: 3 MG/DL (ref 2.5–4.5)
PLATELET # BLD AUTO: 168 10*3/MM3 (ref 140–450)
PMV BLD AUTO: 7.7 FL (ref 6–12)
POTASSIUM SERPL-SCNC: 4.6 MMOL/L (ref 3.5–5.2)
POTASSIUM SERPL-SCNC: 4.7 MMOL/L (ref 3.5–5.2)
PROTHROMBIN TIME: 20.1 SECONDS (ref 19.4–28.5)
PROTHROMBIN TIME: 20.3 SECONDS (ref 19.4–28.5)
RBC # BLD AUTO: 3.34 10*6/MM3 (ref 4.14–5.8)
SODIUM SERPL-SCNC: 133 MMOL/L (ref 136–145)
SODIUM SERPL-SCNC: 135 MMOL/L (ref 136–145)
WBC NRBC COR # BLD: 3.8 10*3/MM3 (ref 3.4–10.8)

## 2023-11-10 PROCEDURE — 94799 UNLISTED PULMONARY SVC/PX: CPT

## 2023-11-10 PROCEDURE — 82948 REAGENT STRIP/BLOOD GLUCOSE: CPT

## 2023-11-10 PROCEDURE — 25010000002 CEFTRIAXONE PER 250 MG: Performed by: FAMILY MEDICINE

## 2023-11-10 PROCEDURE — 94664 DEMO&/EVAL PT USE INHALER: CPT

## 2023-11-10 PROCEDURE — 85610 PROTHROMBIN TIME: CPT | Performed by: FAMILY MEDICINE

## 2023-11-10 PROCEDURE — 80069 RENAL FUNCTION PANEL: CPT | Performed by: FAMILY MEDICINE

## 2023-11-10 PROCEDURE — 85027 COMPLETE CBC AUTOMATED: CPT | Performed by: FAMILY MEDICINE

## 2023-11-10 PROCEDURE — 25810000003 SODIUM CHLORIDE 0.9 % SOLUTION: Performed by: INTERNAL MEDICINE

## 2023-11-10 PROCEDURE — 94761 N-INVAS EAR/PLS OXIMETRY MLT: CPT

## 2023-11-10 RX ADMIN — AMLODIPINE BESYLATE 10 MG: 5 TABLET ORAL at 21:35

## 2023-11-10 RX ADMIN — IPRATROPIUM BROMIDE AND ALBUTEROL SULFATE 3 ML: .5; 3 SOLUTION RESPIRATORY (INHALATION) at 08:45

## 2023-11-10 RX ADMIN — DOXYCYCLINE 100 MG: 100 INJECTION, POWDER, LYOPHILIZED, FOR SOLUTION INTRAVENOUS at 21:34

## 2023-11-10 RX ADMIN — WARFARIN SODIUM 5 MG: 5 TABLET ORAL at 18:11

## 2023-11-10 RX ADMIN — GABAPENTIN 100 MG: 100 CAPSULE ORAL at 21:35

## 2023-11-10 RX ADMIN — GABAPENTIN 100 MG: 100 CAPSULE ORAL at 09:46

## 2023-11-10 RX ADMIN — SODIUM CHLORIDE 75 ML/HR: 9 INJECTION, SOLUTION INTRAVENOUS at 18:09

## 2023-11-10 RX ADMIN — DOXYCYCLINE 100 MG: 100 INJECTION, POWDER, LYOPHILIZED, FOR SOLUTION INTRAVENOUS at 09:52

## 2023-11-10 RX ADMIN — IPRATROPIUM BROMIDE AND ALBUTEROL SULFATE 3 ML: .5; 3 SOLUTION RESPIRATORY (INHALATION) at 15:42

## 2023-11-10 RX ADMIN — CEFTRIAXONE 1000 MG: 1 INJECTION, POWDER, FOR SOLUTION INTRAMUSCULAR; INTRAVENOUS at 18:10

## 2023-11-10 RX ADMIN — GLIPIZIDE 2.5 MG: 5 TABLET ORAL at 09:46

## 2023-11-10 RX ADMIN — IPRATROPIUM BROMIDE AND ALBUTEROL SULFATE 3 ML: .5; 3 SOLUTION RESPIRATORY (INHALATION) at 11:15

## 2023-11-10 RX ADMIN — SODIUM BICARBONATE 650 MG: 650 TABLET ORAL at 21:35

## 2023-11-10 RX ADMIN — IPRATROPIUM BROMIDE AND ALBUTEROL SULFATE 3 ML: .5; 3 SOLUTION RESPIRATORY (INHALATION) at 21:00

## 2023-11-10 RX ADMIN — GABAPENTIN 100 MG: 100 CAPSULE ORAL at 18:11

## 2023-11-10 RX ADMIN — SODIUM BICARBONATE 650 MG: 650 TABLET ORAL at 09:46

## 2023-11-10 NOTE — PROGRESS NOTES
LOS: 0 days   Patient Care Team:  Joey Islas MD as PCP - General (Family Medicine)  Neville Laughlin MD as Consulting Physician (Nephrology)    Subjective:  About the same    Objective:   Afebrile      Review of Systems:   Review of Systems   Constitutional:  Positive for activity change.   Respiratory:  Positive for shortness of breath.            Vital Signs  Temp:  [98.6 °F (37 °C)-99 °F (37.2 °C)] 98.6 °F (37 °C)  Heart Rate:  [61-69] 69  Resp:  [16-18] 16  BP: (137-160)/(61-65) 160/62    Physical Exam:  Physical Exam  Vitals and nursing note reviewed.   Constitutional:       Appearance: Normal appearance.   Cardiovascular:      Rate and Rhythm: Normal rate.      Heart sounds: Normal heart sounds.   Pulmonary:      Breath sounds: Normal breath sounds.   Skin:     General: Skin is warm.   Neurological:      Mental Status: He is alert.          Radiology:  CT Chest Without Contrast Diagnostic    Result Date: 11/8/2023  Impression: 1. Clustered parenchymal nodular densities within the superior left lower lobe are favored represent benign infectious/inflammatory nodules, dominant measuring 10 mm. 3-month CT chest follow-up is recommended. 2. Tiny biapical parenchymal nodular densities measuring between 3 to 5 mm are present. Benign etiology is favored. Attention at surveillance imaging is suggested. 3. Small esophageal hiatal hernia. Electronically Signed: Trinity Negron MD  11/8/2023 1:58 PM EST  Workstation ID: [id number]        Results Review:     I reviewed the patient's new clinical results.  I reviewed the patient's new imaging results and agree with the interpretation.    Medication Review:   Scheduled Meds:amLODIPine, 10 mg, Oral, Nightly  cefTRIAXone, 1,000 mg, Intravenous, Q24H  doxycycline, 100 mg, Intravenous, Q12H  gabapentin, 100 mg, Oral, TID  glipizide, 2.5 mg, Oral, QAM AC  ipratropium-albuterol, 3 mL, Nebulization, 4x Daily - RT  sodium bicarbonate, 650 mg, Oral,  BID  warfarin, 5 mg, Oral, Daily      Continuous Infusions:sodium chloride, 75 mL/hr, Last Rate: 75 mL/hr (11/09/23 1759)      PRN Meds:.  acetaminophen    ipratropium-albuterol    ondansetron    Labs:    CBC    Results from last 7 days   Lab Units 11/08/23  1310   WBC 10*3/mm3 4.40   HEMOGLOBIN g/dL 10.9*   PLATELETS 10*3/mm3 152     BMP   Results from last 7 days   Lab Units 11/08/23  1310   SODIUM mmol/L 136   POTASSIUM mmol/L 4.0   CHLORIDE mmol/L 101   CO2 mmol/L 26.0   BUN mg/dL 40*   CREATININE mg/dL 2.51*   GLUCOSE mg/dL 127*     Cr Clearance Estimated Creatinine Clearance: 30.2 mL/min (A) (by C-G formula based on SCr of 2.51 mg/dL (H)).  Coag   Results from last 7 days   Lab Units 11/09/23  1137 11/08/23  1310   INR  2.01 1.80*     HbA1C   Lab Results   Component Value Date    HGBA1C 5.9 (H) 04/14/2022    HGBA1C 6.0 (H) 01/06/2022    HGBA1C 5.8 (H) 10/29/2020     Blood Glucose   Glucose   Date/Time Value Ref Range Status   11/10/2023 0745 84 70 - 105 mg/dL Final     Comment:     Serial Number: [id number]:  [id number]   11/09/2023 2251 163 (H) 70 - 105 mg/dL Final     Comment:     Serial Number: [id number]:  [id number]   11/09/2023 1620 144 (H) 70 - 105 mg/dL Final     Comment:     Serial Number: [id number]:  [id number]   11/09/2023 1208 136 (H) 70 - 105 mg/dL Final     Comment:     Serial Number: [id number]:  [id number]   11/09/2023 0752 75 70 - 105 mg/dL Final     Comment:     Serial Number: [id number]:  [id number]   11/08/2023 2121 163 (H) 70 - 105 mg/dL Final     Comment:     Serial Number: [id number]:  [id number]   11/08/2023 1600 87 70 - 105 mg/dL Final     Comment:     Serial Number: [id number]:  [id number]     Infection   Results from last 7 days   Lab Units 11/08/23  1555 11/08/23  1335   BLOODCX  No growth at 24 hours No growth at 24 hours     CMP   Results from last 7 days   Lab Units 11/08/23  1310   SODIUM mmol/L 136   POTASSIUM mmol/L 4.0   CHLORIDE  mmol/L 101   CO2 mmol/L 26.0   BUN mg/dL 40*   CREATININE mg/dL 2.51*   GLUCOSE mg/dL 127*   ALBUMIN g/dL 3.5   BILIRUBIN mg/dL 0.4   ALK PHOS U/L 77   AST (SGOT) U/L 52*   ALT (SGPT) U/L 57*     UA    Results from last 7 days   Lab Units 11/08/23  1335   NITRITE UA  Negative   WBC UA /HPF 3-5*   BACTERIA UA /HPF 1+*   SQUAM EPITHEL UA /HPF 0-2     Radiology(recent) CT Chest Without Contrast Diagnostic    Result Date: 11/8/2023  Impression: 1. Clustered parenchymal nodular densities within the superior left lower lobe are favored represent benign infectious/inflammatory nodules, dominant measuring 10 mm. 3-month CT chest follow-up is recommended. 2. Tiny biapical parenchymal nodular densities measuring between 3 to 5 mm are present. Benign etiology is favored. Attention at surveillance imaging is suggested. 3. Small esophageal hiatal hernia. 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

## 2023-11-10 NOTE — PROGRESS NOTES
Daily Progress Note        Left lower lobe pneumonia    Assessment:      Left lower lobe pneumonia with nodular densities      Viral panel is negative\      Hypoxia currently on 2 L    Patient is deaf         Recommendations:      Antibiotics Rocephin and doxycycline      Oxygen titration      Bronchodilators  Anticoagulation currently on warfarin             LOS: 0 days     Subjective         Objective     Vital signs for last 24 hours:  Vitals:    11/10/23 0100 11/10/23 0746 11/10/23 0845 11/10/23 0848   BP: 154/64 160/62     BP Location: Left arm Left arm     Patient Position: Lying Lying     Pulse:  69 69 69   Resp: 18 16 16 16   Temp: 99 °F (37.2 °C) 98.6 °F (37 °C)     TempSrc: Axillary Oral     SpO2:   95% 95%   Weight:       Height:           Intake/Output last 3 shifts:  I/O last 3 completed shifts:  In: 2040 [P.O.:840; I.V.:1000; IV Piggyback:200]  Out: 1450 [Urine:1450]  Intake/Output this shift:  No intake/output data recorded.      Radiology  Imaging Results (Last 24 Hours)       ** No results found for the last 24 hours. **            Labs:  Results from last 7 days   Lab Units 11/10/23  0915   WBC 10*3/mm3 3.80   HEMOGLOBIN g/dL 9.8*   HEMATOCRIT % 29.5*   PLATELETS 10*3/mm3 168     Results from last 7 days   Lab Units 11/10/23  0915 11/08/23  1310   SODIUM mmol/L 135* 136   POTASSIUM mmol/L 4.6 4.0   CHLORIDE mmol/L 102 101   CO2 mmol/L 25.0 26.0   BUN mg/dL 26* 40*   CREATININE mg/dL 1.97* 2.51*   CALCIUM mg/dL 8.3* 8.8   BILIRUBIN mg/dL  --  0.4   ALK PHOS U/L  --  77   ALT (SGPT) U/L  --  57*   AST (SGOT) U/L  --  52*   GLUCOSE mg/dL 175* 127*         Results from last 7 days   Lab Units 11/10/23  0915 11/08/23  1310   ALBUMIN g/dL 3.3* 3.5                 Results from last 7 days   Lab Units 11/10/23  0915 11/09/23  1137 11/08/23  1310   INR  1.96* 2.01 1.80*               Meds:   SCHEDULE  amLODIPine, 10 mg, Oral, Nightly  cefTRIAXone, 1,000 mg, Intravenous, Q24H  doxycycline, 100 mg,  Intravenous, Q12H  gabapentin, 100 mg, Oral, TID  glipizide, 2.5 mg, Oral, QAM AC  ipratropium-albuterol, 3 mL, Nebulization, 4x Daily - RT  sodium bicarbonate, 650 mg, Oral, BID  warfarin, 5 mg, Oral, Daily      Infusions  sodium chloride, 75 mL/hr, Last Rate: 75 mL/hr (11/09/23 5856)      PRNs    acetaminophen    ipratropium-albuterol    ondansetron    Physical Exam:  Physical Exam  Cardiovascular:      Heart sounds: No murmur heard.     No gallop.   Pulmonary:      Effort: No respiratory distress.      Breath sounds: No stridor. Rhonchi and rales present. No wheezing.   Chest:      Chest wall: No tenderness.         ROS  Review of Systems   Respiratory:  Positive for cough and shortness of breath. Negative for wheezing and stridor.    Cardiovascular:  Negative for chest pain, palpitations and leg swelling.             Total time spent with patient greater than: 45 Minutes

## 2023-11-10 NOTE — CASE MANAGEMENT/SOCIAL WORK
Continued Stay Note  BOZENA Crocker     Patient Name: Michael Dorantes  MRN: [id number]  Today's Date: 11/10/2023    Admit Date: 11/8/2023    Plan: DC PLAN: Return home with wife.       Discharge Plan       Row Name 11/10/23 1337       Plan    Plan DC PLAN: Return home with wife.    Plan Comments DC BARRIERS: Renal following, Continue IVF, monitor BUN/CRT. Pending urine cultures.                      Expected Discharge Date and Time       Expected Discharge Date Expected Discharge Time    Nov 11, 2023             Lorri Humphrey RN

## 2023-11-10 NOTE — PLAN OF CARE
Goal Outcome Evaluation:      Pt with no complaints at this time. Vital signs stable. Up to bathroom earlier in shift. Call light within reach. Plan of care ongoing.

## 2023-11-10 NOTE — PLAN OF CARE
Goal Outcome Evaluation:         Patient has  outside room. Patient is A&O x4. Patient receiving IV abx.

## 2023-11-10 NOTE — CASE MANAGEMENT/SOCIAL WORK
Social Work Assessment   Obi     Patient Name: Michael Dorantes  MRN: [id number]  Today's Date: 11/10/2023    Admit Date: 11/8/2023       Demographic Summary       Row Name 11/10/23 1127       General Information    Reason for Consult care coordination/care conference    General Information Comments SW was consulted re; patient in need of  in person .  SW addressed this yesterday via SC with nurse and confired today that  is present.           MARIA ISABEL Benoit MSW    Phone: [phone]  Cell: [phone]  Fax: [phone]  [email or web address]

## 2023-11-10 NOTE — PROGRESS NOTES
"NEPHROLOGY PROGRESS NOTE------[facility]    [facility]  [phone]  Saeed Briscoe MD      Patient Care Team:  Joey Islas MD as PCP - General (Family Medicine)  Neville Laughlin MD as Consulting Physician (Nephrology)      Provider:  Saeed Briscoe MD  Patient Name: Michael Droantes  :  1950    SUBJECTIVE:  Follow-up LEO/CKD  No chest pain or shortness of breath  Patient is hearing impaired, communication via sign .    Medication:  amLODIPine, 10 mg, Oral, Nightly  cefTRIAXone, 1,000 mg, Intravenous, Q24H  doxycycline, 100 mg, Intravenous, Q12H  gabapentin, 100 mg, Oral, TID  glipizide, 2.5 mg, Oral, QAM AC  ipratropium-albuterol, 3 mL, Nebulization, 4x Daily - RT  sodium bicarbonate, 650 mg, Oral, BID  warfarin, 5 mg, Oral, Daily      sodium chloride, 75 mL/hr, Last Rate: 75 mL/hr (23 3389)        OBJECTIVE    Vital Sign Min/Max for last 24 hours  Temp  Min: 98.6 °F (37 °C)  Max: 99 °F (37.2 °C)   BP  Min: 137/61  Max: 160/62   Pulse  Min: 62  Max: 69   Resp  Min: 16  Max: 18   SpO2  Min: 95 %  Max: 100 %   No data recorded   No data recorded     Flowsheet Rows      Flowsheet Row First Filed Value   Admission Height 182.9 cm (72\") Documented at 2023 1207   Admission Weight 81.4 kg (179 lb 7.3 oz) Documented at 2023 1207            No intake/output data recorded.  I/O last 3 completed shifts:  In:  [P.O.:840; I.V.:1000; IV Piggyback:200]  Out: 1450 [Urine:1450]    Physical Exam:  General Appearance: alert, appears stated age and cooperative  Head: normocephalic, without obvious abnormality and atraumatic  Eyes: conjunctivae and sclerae normal and no icterus  Neck: supple and no JVD  Lungs: Scattered rhonchi  Heart: regular rhythm & normal rate and normal S1, S2  Chest: Wall no abnormalities observed  Abdomen: normal bowel sounds and soft, nontender  Extremities: moves extremities well, no edema, no " "cyanosis and no redness  Skin: no bleeding, bruising or rash, turgor normal, color normal and no lesions noted  Neurologic: Alert, and oriented. No focal deficits    Labs:    WBC WBC   Date Value Ref Range Status   11/10/2023 3.80 3.40 - 10.80 10*3/mm3 Final   11/08/2023 4.40 3.40 - 10.80 10*3/mm3 Final      HGB Hemoglobin   Date Value Ref Range Status   11/10/2023 9.8 (L) 13.0 - 17.7 g/dL Final   11/08/2023 10.9 (L) 13.0 - 17.7 g/dL Final      HCT Hematocrit   Date Value Ref Range Status   11/10/2023 29.5 (L) 37.5 - 51.0 % Final   11/08/2023 32.6 (L) 37.5 - 51.0 % Final      Platelets No results found for: \"LABPLAT\"   MCV MCV   Date Value Ref Range Status   11/10/2023 88.2 79.0 - 97.0 fL Final   11/08/2023 90.3 79.0 - 97.0 fL Final          Sodium Sodium   Date Value Ref Range Status   11/10/2023 135 (L) 136 - 145 mmol/L Final   11/08/2023 136 136 - 145 mmol/L Final      Potassium Potassium   Date Value Ref Range Status   11/10/2023 4.6 3.5 - 5.2 mmol/L Final   11/08/2023 4.0 3.5 - 5.2 mmol/L Final      Chloride Chloride   Date Value Ref Range Status   11/10/2023 102 98 - 107 mmol/L Final   11/08/2023 101 98 - 107 mmol/L Final      CO2 CO2   Date Value Ref Range Status   11/10/2023 25.0 22.0 - 29.0 mmol/L Final   11/08/2023 26.0 22.0 - 29.0 mmol/L Final      BUN BUN   Date Value Ref Range Status   11/10/2023 26 (H) 8 - 23 mg/dL Final   11/08/2023 40 (H) 8 - 23 mg/dL Final      Creatinine Creatinine   Date Value Ref Range Status   11/10/2023 1.97 (H) 0.76 - 1.27 mg/dL Final   11/08/2023 2.51 (H) 0.76 - 1.27 mg/dL Final      Calcium Calcium   Date Value Ref Range Status   11/10/2023 8.3 (L) 8.6 - 10.5 mg/dL Final   11/08/2023 8.8 8.6 - 10.5 mg/dL Final      PO4 No components found for: \"PO4\"   Albumin Albumin   Date Value Ref Range Status   11/10/2023 3.3 (L) 3.5 - 5.2 g/dL Final   11/08/2023 3.5 3.5 - 5.2 g/dL Final      Magnesium No results found for: \"MG\"   Uric Acid No components found for: \"URIC ACID\" "     Imaging Results (Last 72 Hours)       Procedure Component Value Units Date/Time    CT Chest Without Contrast Diagnostic [[id number]] Collected: 11/08/23 1350     Updated: 11/08/23 1401    Narrative:      CT CHEST WO CONTRAST DIAGNOSTIC    Date of Exam: 11/8/2023 1:48 PM EST    Indication: intermittent fever, nonprod cough.    Comparison: AP portable chest 1/17/2023. No prior CT chest for comparison at this institution.    Technique: Axial CT images were obtained of the chest without contrast administration.  Sagittal and coronal reconstructions were performed.  Automated exposure control and iterative reconstruction methods were used.      Findings:  Patchy nodular density is demonstrated within the superior left lower lobe abutting the major fissure, dominant component measuring 1 cm (series 5 image 35).    Small parenchymal nodular densities are seen within the lung apices. At the right apex, these measure 5 mm and 4 mm and 4 mm (series 5 image 15). At the left apex, these measure 3 mm (image 12), 7 mm (image 13), and 4 mm (image 16).    Small noncalcified nodule seen in the lateral subpleural right lower lobe measuring 3 mm (image 54).    Some of the images are degraded by respiratory motion.    Borderline enlarged left hilar lymph node measures 10 mm short axis (series 2 image 41). No pathologic enlarged mediastinal lymph nodes are identified.    Heart size is at upper limits of normal. Moderate coronary artery calcifications are present. There is mild thoracic aortic calcific atherosclerosis.    Heterogeneous multinodular thyroid gland does not appear appreciably enlarged.    Small esophageal hiatal hernia is suspected. The remainder of the imaged upper abdominal organs demonstrate a normal noncontrast appearance.    No acute or suspicious osseous abnormalities are identified.      Impression:      Impression:    1. Clustered parenchymal nodular densities within the superior left lower lobe are favored  represent benign infectious/inflammatory nodules, dominant measuring 10 mm. 3-month CT chest follow-up is recommended.  2. Tiny biapical parenchymal nodular densities measuring between 3 to 5 mm are present. Benign etiology is favored. Attention at surveillance imaging is suggested.  3. Small esophageal hiatal hernia.      Electronically Signed: Trinity Negron MD    11/8/2023 1:58 PM EST    Workstation ID: [id number]            Results for orders placed during the hospital encounter of 01/17/23    XR Chest 1 View    Narrative  XR CHEST 1 VW    Date of Exam: 1/17/2023 8:27 PM EST    Indication: fever/soa/back pain.    Comparison: 1/5/2022    Findings:  The heart and mediastinal contours are within normal limits. The lungs are clear. Osseous structures are unremarkable.    Impression  Impression:  No acute process    Electronically Signed: Joey Monroy  1/17/2023 9:26 PM EST  Workstation ID: [id number]      Results for orders placed during the hospital encounter of 01/05/22    XR Chest 1 View    Narrative  XR CHEST 1 VW    Date of Exam: 1/5/2022 23:38 EST    Indication: dyspnea.  Covid positive.    Comparison Exams: None available.    Technique: Portable AP chest    FINDINGS:  Ill-defined infiltrates are seen throughout the central to peripheral aspect of the lungs bilaterally with mild interstitial changes. No pleural effusions are observed. The cardiac silhouette is within normal limits for size for the portable study. The  mediastinum is unremarkable. No acute osseous abnormalities are identified.    Impression  Ill-defined infiltrates are noted bilaterally with mild interstitial changes. The findings suggest atypical/viral infection or multifocal pneumonia and suggest changes of Covid 19 infection. Recommend follow-up to ensure improvement/resolution.    Electronically Signed: Bran Colin MD 1/6/2022 0:03 EST      Results for orders placed in visit on 12/13/18    SCANNED - IMAGING      Results for orders placed  during the hospital encounter of 10/17/19    Duplex Venous Lower Extremity - Left    Interpretation Summary  · Acute left lower extremity deep vein thrombosis noted in the common femoral, proximal femoral, mid femoral, distal femoral and popliteal.        ASSESSMENT / PLAN      Left lower lobe pneumonia    LEO-likely prerenal and or ATN in setting of decreased p.o. 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  Baseline creatinine around 2.  Left lower lobe pneumonia  Diabetes  UTI-urine culture     Creatinine improved  Continue IV hydration until a.m.  Antibiotics per Dr. Villalta  Closely monitor renal function fluid status electrolytes        Saeed Briscoe MD  [facility]  [phone]  11/10/23  11:22 EST

## 2023-11-11 LAB
L PNEUMO1 AG UR QL IA: NEGATIVE
S PNEUM AG SPEC QL LA: NEGATIVE

## 2023-11-11 PROCEDURE — 97116 GAIT TRAINING THERAPY: CPT

## 2023-11-11 PROCEDURE — 94799 UNLISTED PULMONARY SVC/PX: CPT

## 2023-11-11 PROCEDURE — 87899 AGENT NOS ASSAY W/OPTIC: CPT | Performed by: INTERNAL MEDICINE

## 2023-11-11 PROCEDURE — 94761 N-INVAS EAR/PLS OXIMETRY MLT: CPT

## 2023-11-11 PROCEDURE — 94664 DEMO&/EVAL PT USE INHALER: CPT

## 2023-11-11 PROCEDURE — 87449 NOS EACH ORGANISM AG IA: CPT | Performed by: INTERNAL MEDICINE

## 2023-11-11 RX ORDER — CEFDINIR 300 MG/1
300 CAPSULE ORAL EVERY 12 HOURS SCHEDULED
Status: DISCONTINUED | OUTPATIENT
Start: 2023-11-11 | End: 2023-11-13 | Stop reason: HOSPADM

## 2023-11-11 RX ADMIN — CEFDINIR 300 MG: 300 CAPSULE ORAL at 21:02

## 2023-11-11 RX ADMIN — GABAPENTIN 100 MG: 100 CAPSULE ORAL at 21:01

## 2023-11-11 RX ADMIN — DOXYCYCLINE 100 MG: 100 INJECTION, POWDER, LYOPHILIZED, FOR SOLUTION INTRAVENOUS at 08:45

## 2023-11-11 RX ADMIN — IPRATROPIUM BROMIDE AND ALBUTEROL SULFATE 3 ML: .5; 3 SOLUTION RESPIRATORY (INHALATION) at 07:15

## 2023-11-11 RX ADMIN — GABAPENTIN 100 MG: 100 CAPSULE ORAL at 08:45

## 2023-11-11 RX ADMIN — SODIUM BICARBONATE 650 MG: 650 TABLET ORAL at 08:45

## 2023-11-11 RX ADMIN — WARFARIN SODIUM 5 MG: 5 TABLET ORAL at 17:07

## 2023-11-11 RX ADMIN — CEFDINIR 300 MG: 300 CAPSULE ORAL at 11:49

## 2023-11-11 RX ADMIN — GABAPENTIN 100 MG: 100 CAPSULE ORAL at 17:07

## 2023-11-11 RX ADMIN — GLIPIZIDE 2.5 MG: 5 TABLET ORAL at 08:45

## 2023-11-11 RX ADMIN — IPRATROPIUM BROMIDE AND ALBUTEROL SULFATE 3 ML: .5; 3 SOLUTION RESPIRATORY (INHALATION) at 18:46

## 2023-11-11 RX ADMIN — IPRATROPIUM BROMIDE AND ALBUTEROL SULFATE 3 ML: .5; 3 SOLUTION RESPIRATORY (INHALATION) at 11:15

## 2023-11-11 RX ADMIN — AMLODIPINE BESYLATE 10 MG: 5 TABLET ORAL at 21:01

## 2023-11-11 RX ADMIN — SODIUM BICARBONATE 650 MG: 650 TABLET ORAL at 21:02

## 2023-11-11 RX ADMIN — BENZOCAINE 6 MG-MENTHOL 10 MG LOZENGES 1 LOZENGE: at 17:07

## 2023-11-11 RX ADMIN — IPRATROPIUM BROMIDE AND ALBUTEROL SULFATE 3 ML: .5; 3 SOLUTION RESPIRATORY (INHALATION) at 15:32

## 2023-11-11 NOTE — PLAN OF CARE
Problem: Adult Inpatient Plan of Care  Goal: Plan of Care Review  Outcome: Ongoing, Progressing  Goal: Patient-Specific Goal (Individualized)  Outcome: Ongoing, Progressing  Goal: Absence of Hospital-Acquired Illness or Injury  Outcome: Ongoing, Progressing  Intervention: Identify and Manage Fall Risk  Description: Perform standard risk assessment on admission using a validated tool or comprehensive approach appropriate to the patient; reassess fall risk frequently, with change in status or transfer to another level of care.  Communicate fall injury risk to interprofessional healthcare team.  Determine need for increased observation, equipment and environmental modification, such as low bed, signage and supportive, nonskid footwear.  Adjust safety measures to individual developmental age, stage and identified risk factors.  Reinforce the importance of safety and physical activity with patient and family.  Perform regular intentional rounding to assess need for position change, pain assessment and personal needs, including assistance with toileting.  Recent Flowsheet Documentation  Taken 11/11/2023 0434 by Haim Gama LPN  Safety Promotion/Fall Prevention:   clutter free environment maintained   room organization consistent   safety round/check completed  Taken 11/10/2023 2224 by Haim Gama LPN  Safety Promotion/Fall Prevention:   activity supervised   assistive device/personal items within reach   room organization consistent   safety round/check completed  Taken 11/10/2023 1907 by Haim Gama LPN  Safety Promotion/Fall Prevention:   assistive device/personal items within reach   clutter free environment maintained   room organization consistent   safety round/check completed  Intervention: Prevent and Manage VTE (Venous Thromboembolism) Risk  Description: Assess for VTE (venous thromboembolism) risk.  Encourage and assist with early ambulation.  Initiate and maintain compression or other therapy,  as indicated, based on identified risk in accordance with organizational protocol and provider order.  Encourage both active and passive leg exercises while in bed, if unable to ambulate.  Recent Flowsheet Documentation  Taken 11/10/2023 1907 by Haim Gama LPN  Range of Motion: active ROM (range of motion) encouraged  Intervention: Prevent Infection  Description: Maintain skin and mucous membrane integrity; promote hand, oral and pulmonary hygiene.  Optimize fluid balance, nutrition, sleep and glycemic control to maximize infection resistance.  Identify potential sources of infection early to prevent or mitigate progression of infection (e.g., wound, lines, devices).  Evaluate ongoing need for invasive devices; remove promptly when no longer indicated.  Recent Flowsheet Documentation  Taken 11/11/2023 0434 by Haim Gama LPN  Infection Prevention: single patient room provided  Taken 11/11/2023 0025 by Haim Gama LPN  Infection Prevention: single patient room provided  Taken 11/10/2023 2224 by Haim Gama LPN  Infection Prevention: single patient room provided  Taken 11/10/2023 1907 by Haim Gama LPN  Infection Prevention: single patient room provided  Goal: Optimal Comfort and Wellbeing  Outcome: Ongoing, Progressing  Intervention: Provide Person-Centered Care  Description: Use a family-focused approach to care.  Develop trust and rapport by proactively providing information, encouraging questions, addressing concerns and offering reassurance.  Acknowledge emotional response to hospitalization.  Recognize and utilize personal coping strategies.  Honor spiritual and cultural preferences.  Recent Flowsheet Documentation  Taken 11/10/2023 1907 by Haim Gama LPN  Trust Relationship/Rapport: care explained  Goal: Readiness for Transition of Care  Outcome: Ongoing, Progressing     Problem: Asthma Comorbidity  Goal: Maintenance of Asthma Control  Outcome: Ongoing,  Progressing  Intervention: Maintain Asthma Symptom Control  Description: Evaluate adherence to self-management (asthma action plan), such as medication, symptom-control, trigger-avoidance and self-monitoring.  Advocate for continuation of home regimen, including medication, method of delivery, schedule and symptom monitoring; acknowledge preferred modality and routine.  Minimize exposure to potential triggers, such as perfume, cleaning chemicals and all types of smoke.  Assess for proper use of inhaled medication and delivery technique; assist or reinstruct if needed.  Evaluate effectiveness of coping skills; encourage expression of feelings, expectations and concerns related to disease management and quality of life; reinforce education to enhance management plan and wellbeing.  Recent Flowsheet Documentation  Taken 11/11/2023 0434 by Haim Gama LPN  Medication Review/Management: medications reviewed  Taken 11/10/2023 2224 by Haim Gama LPN  Medication Review/Management: medications reviewed  Taken 11/10/2023 1907 by Haim Gama LPN  Medication Review/Management: medications reviewed     Problem: Behavioral Health Comorbidity  Goal: Maintenance of Behavioral Health Symptom Control  Outcome: Ongoing, Progressing  Intervention: Maintain Behavioral Health Symptom Control  Description: Confirm mental health diagnosis and current treatment.  Evaluate adherence to previously identified self-management plan.  Advocate continuation of home strategies, including medication.  Evaluate effectiveness of self-management strategies and coping skills.  Communicate with providers to ensure continuity and follow-up at transition.  Recent Flowsheet Documentation  Taken 11/11/2023 0434 by Haim Gama LPN  Medication Review/Management: medications reviewed  Taken 11/10/2023 2224 by Haim Gmaa LPN  Medication Review/Management: medications reviewed  Taken 11/10/2023 1907 by Haim Gama LPN  Medication  Review/Management: medications reviewed     Problem: COPD (Chronic Obstructive Pulmonary Disease) Comorbidity  Goal: Maintenance of COPD Symptom Control  Outcome: Ongoing, Progressing  Intervention: Maintain COPD-Symptom Control  Description: Evaluate adherence to management plan (e.g., medication, trigger avoidance, infection prevention, self-monitoring).  Advocate for continuation of home regimen, including medication, method of delivery, schedule and symptom monitoring.  Anticipate the need for breathing techniques and activity pacing to minimize fatigue and breathlessness.  Assess for proper use of inhaled medication and delivery technique; assist or reinstruct if needed.  Evaluate effectiveness of coping skills; encourage expression of feelings, expectations and concerns related to disease management and quality of life; reinforce education to enhance management plan and wellbeing.  Recent Flowsheet Documentation  Taken 11/11/2023 0434 by Haim Gama LPN  Medication Review/Management: medications reviewed  Taken 11/10/2023 2224 by Haim Gama LPN  Medication Review/Management: medications reviewed  Taken 11/10/2023 1907 by Haim Gama LPN  Supportive Measures: active listening utilized  Medication Review/Management: medications reviewed     Problem: Diabetes Comorbidity  Goal: Blood Glucose Level Within Targeted Range  Outcome: Ongoing, Progressing     Problem: Heart Failure Comorbidity  Goal: Maintenance of Heart Failure Symptom Control  Outcome: Ongoing, Progressing  Intervention: Maintain Heart Failure-Management  Description: Evaluate adherence to home heart failure self-care regimen (e.g., medication, fluid balance, sodium intake, daily weight, physical activity, telemonitoring, support).  Advocate continuation of home medication and schedule.  Consider pharmacologic therapy administration time and effects (e.g., avoid giving diuretic prior to bedtime or nitrates on empty stomach).  Monitor  response to pharmacologic therapy, including weight fluctuations, blood pressure and electrolyte levels.  Monitor for signs and symptoms of anxiety and depression, including severity and duration; if present, provide psychosocial support.  Consider need for heart failure clinic or palliative care consult.  Recent Flowsheet Documentation  Taken 11/11/2023 0434 by Haim Gama LPN  Medication Review/Management: medications reviewed  Taken 11/10/2023 2224 by Haim Gama LPN  Medication Review/Management: medications reviewed  Taken 11/10/2023 1907 by Haim Gama LPN  Medication Review/Management: medications reviewed     Problem: Hypertension Comorbidity  Goal: Blood Pressure in Desired Range  Outcome: Ongoing, Progressing  Intervention: Maintain Blood Pressure Management  Description: Evaluate adherence to home antihypertensive regimen (e.g., exercise and activity, diet modification, medication).  Provide scheduled antihypertensive medication; consider administration time and effects (e.g., avoid giving diuretic prior to bedtime).  Monitor response to antihypertensive medication therapy (e.g., blood pressure, electrolyte levels, medication effects).  Minimize risk of orthostatic hypotension; encourage caution with position changes, particularly if elderly.  Recent Flowsheet Documentation  Taken 11/11/2023 0434 by Haim Gama LPN  Medication Review/Management: medications reviewed  Taken 11/10/2023 2224 by Haim Gama LPN  Medication Review/Management: medications reviewed  Taken 11/10/2023 1907 by Haim Gama LPN  Medication Review/Management: medications reviewed     Problem: Obstructive Sleep Apnea Risk or Actual Comorbidity Management  Goal: Unobstructed Breathing During Sleep  Outcome: Ongoing, Progressing     Problem: Osteoarthritis Comorbidity  Goal: Maintenance of Osteoarthritis Symptom Control  Outcome: Ongoing, Progressing  Intervention: Maintain Osteoarthritis Symptom  Control  Description: Evaluate adherence to self-management plan, such as medication, exercise and weight management.  Advocate for continuation of home regimen, such as medication, physical activity and thermal agents; monitor response.  Encourage participation in functional activities, such as mobility and ADLs (activities of daily living) to minimize decline associated with inactivity.  Facilitate use of patient-specific assistive devices, equipment or orthoses.  Evaluate effectiveness of coping skills; encourage expression of feelings, expectations and concerns related to disease management and quality of life; reinforce education to enhance management plan and wellbeing.  Recent Flowsheet Documentation  Taken 11/11/2023 0434 by Haim Gama LPN  Medication Review/Management: medications reviewed  Taken 11/10/2023 2224 by Haim Gama LPN  Medication Review/Management: medications reviewed  Taken 11/10/2023 1907 by Haim Gama LPN  Medication Review/Management: medications reviewed     Problem: Pain Chronic (Persistent) (Comorbidity Management)  Goal: Acceptable Pain Control and Functional Ability  Outcome: Ongoing, Progressing  Intervention: Manage Persistent Pain  Description: Evaluate pain level, effect of treatment and patient response at regular intervals.  Minimize pain stimuli; coordinate care and adjust environment (e.g., light, noise, unnecessary movement); promote sleep/rest.  Match pharmacologic analgesia to severity and type of pain mechanism (e.g., neuropathic, muscle, inflammatory); consider multimodal approach (e.g., nonopioid, opioid, adjuvant).  Provide medication at regular intervals; titrate to patient response.  Manage breakthrough pain with additional doses; consider rotation or switching medication.  Monitor for signs of substance tolerance (increased dose to reach desired effect, decreased effect with same dose).  Avoid abrupt withdrawal of medication, especially agents  capable of causing physical dependence.  Manage medication-induced effects, such as constipation, nausea, pruritus, urinary retention, somnolence and dizziness.  Provide multimodal treatment interventions, such as physical activity, therapeutic exercise, yoga, TENS (transcutaneous electrical nerve stimulation) and manual therapy.  Train in functional activity modifications, such as body mechanics, posture, ergonomics, energy conservation and activity pacing.  Consider addition of complementary or alternative therapy, such as acupuncture, hypnosis or therapeutic touch.  Recent Flowsheet Documentation  Taken 11/11/2023 0434 by Haim Gama LPN  Medication Review/Management: medications reviewed  Taken 11/10/2023 2224 by Haim Gama LPN  Medication Review/Management: medications reviewed  Taken 11/10/2023 1907 by Haim Gama LPN  Bowel Elimination Promotion: adequate fluid intake promoted  Medication Review/Management: medications reviewed  Intervention: Optimize Psychosocial Wellbeing  Description: Facilitate patient’s self-control over pain by providing pain information and allowing choices in treatment.  Consider and address emotional response to pain.  Explore and promote use of coping strategies; address barriers to successful coping.  Evaluate and assist with psychosocial, cultural and spiritual factors impacting pain.  Modify pain perception by using techniques, such as distraction, mindfulness, guided imagery, meditation or music.  Assess and monitor for signs and symptoms of behavioral health concerns, such as unhealthy substance use, depression and suicidal ideation.  Consider referral for ongoing coping support, such as cognitive behavioral therapy and mindfulness-based stress reduction.  Recent Flowsheet Documentation  Taken 11/10/2023 1907 by Haim Gama LPN  Supportive Measures: active listening utilized  Diversional Activities: television  Family/Support System Care: self-care  encouraged     Problem: Seizure Disorder Comorbidity  Goal: Maintenance of Seizure Control  Outcome: Ongoing, Progressing     Problem: Fall Injury Risk  Goal: Absence of Fall and Fall-Related Injury  Outcome: Ongoing, Progressing  Intervention: Identify and Manage Contributors  Description: Develop a fall prevention plan with the patient and caregiver/family.  Provide reorientation, appropriate sensory stimulation and routines with changes in mental status to decrease risk of fall.  Promote use of personal vision and auditory aids.  Assess assistance level required for safe and effective self-care; provide support as needed, such as toileting, mobilization. For age 65 and older, implement timed toileting with assistance.  Encourage physical activity, such as performance of mobility and self-care at highest level of patient ability, multicomponent exercise program and provision of appropriate assistive devices.  If fall occurs, assess the severity of injury; implement fall injury protocol. Determine the cause and revise fall injury prevention plan.  Regularly review medication contribution to fall risk; adjust medication administration times to minimize risk of falling.  Consider risk related to polypharmacy and age.  Balance adequate pain management with potential for oversedation.  Recent Flowsheet Documentation  Taken 11/11/2023 0434 by Haim Gama LPN  Medication Review/Management: medications reviewed  Taken 11/10/2023 2224 by Haim Gama LPN  Medication Review/Management: medications reviewed  Taken 11/10/2023 1907 by Haim Gama LPN  Medication Review/Management: medications reviewed  Intervention: Promote Injury-Free Environment  Description: Provide a safe, barrier-free environment that encourages independent activity.  Keep care area uncluttered and well-lighted.  Determine need for increased observation or monitoring.  Avoid use of devices that minimize mobility, such as restraints or  indwelling urinary catheter.  Recent Flowsheet Documentation  Taken 11/11/2023 0434 by Haim Gama LPN  Safety Promotion/Fall Prevention:   clutter free environment maintained   room organization consistent   safety round/check completed  Taken 11/10/2023 2224 by Haim Gama LPN  Safety Promotion/Fall Prevention:   activity supervised   assistive device/personal items within reach   room organization consistent   safety round/check completed  Taken 11/10/2023 1907 by Haim Gama LPN  Safety Promotion/Fall Prevention:   assistive device/personal items within reach   clutter free environment maintained   room organization consistent   safety round/check completed     Problem: Fluid Imbalance (Pneumonia)  Goal: Fluid Balance  Outcome: Ongoing, Progressing     Problem: Infection (Pneumonia)  Goal: Resolution of Infection Signs and Symptoms  Outcome: Ongoing, Progressing     Problem: Respiratory Compromise (Pneumonia)  Goal: Effective Oxygenation and Ventilation  Outcome: Ongoing, Progressing  Intervention: Promote Airway Secretion Clearance  Description: Assess the effectiveness of pulmonary hygiene and ability to perform airway clearance techniques.  Promote early mobility or ambulation; match activity to ability and tolerance.  Encourage deep breathing and lung expansion therapy to prevent atelectasis; adjust treatment to patient’s response.  Anticipate the need to splint chest or abdominal wall with cough to minimize discomfort; assist if needed.  Initiate cough-enhancement and airway-clearance techniques with instruction.  Consider inhaled pharmacologic therapy (e.g., beta-2 agonist, mucolytic, corticosteroid, antimicrobial) to improve mucus clearance, inflammation, cough response and air flow.  Recent Flowsheet Documentation  Taken 11/10/2023 1907 by Haim Gama LPN  Cough And Deep Breathing: done independently per patient     Problem: Adjustment to Surgery (Urinary Diversion)  Goal:  Psychosocial Adjustment Initiation  Outcome: Ongoing, Progressing  Intervention: Support Psychosocial Response to Surgery  Description: Provide opportunity for expression of thoughts, feelings and concerns regarding disrupted body function and loss of control.  Utilize nonpharmacologic strategies to decrease surgery-related anxiety or stress and restore a sense of control.  Acknowledge, normalize and validate potential life-long and/or temporary lifestyle adjustments.  Assess and monitor patient perception of body image (e.g., appearance, attractiveness, sexuality).  Empathetically discuss feelings related to stigmatization of ostomy odor, leakage and appearance; strategize ways to maintain quality of life.  Recent Flowsheet Documentation  Taken 11/10/2023 1907 by Haim Gama LPN  Supportive Measures: active listening utilized     Problem: Bleeding (Urinary Diversion)  Goal: Absence of Bleeding  Outcome: Ongoing, Progressing     Problem: Bowel Motility Impaired (Urinary Diversion)  Goal: Effective Bowel Elimination  Outcome: Ongoing, Progressing  Intervention: Enhance Bowel Motility and Elimination  Description: Initiate early feeding (oral, enteral) to maintain gut integrity and function; if oral intake is not feasible (e.g., nausea and vomiting), gum chewing can be an alternative strategy.  Promote activity and mobility.  Evaluate factors that may contribute to decreased bowel motility and constipation (e.g., iron supplement, opiate use, pain or fear); anticipate need for stool softener or change in pain regimen.  Continue home bowel regimen, if possible.  Establish regular, unhurried time for elimination.  Promote privacy and comfort; position to facilitate elimination.  Monitor stool characteristics, abdominal girth, bowel sounds, expression of symptoms and relief.  Recent Flowsheet Documentation  Taken 11/10/2023 1907 by Haim Gama LPN  Bowel Elimination Promotion: adequate fluid intake promoted      Problem: Fluid and Electrolyte Imbalance (Urinary Diversion)  Goal: Fluid and Electrolyte Balance  Outcome: Ongoing, Progressing     Problem: Infection (Urinary Diversion)  Goal: Absence of Infection Signs and Symptoms  Outcome: Ongoing, Progressing  Intervention: Prevent or Manage Infection  Description: Optimize activity and mobility to maximize infection resistance (e.g., reposition, sit in chair, ambulate).  Maintain normothermia and glycemic control to maximize infection resistance.  Maintain dressing and closed drainage system integrity to reduce the risk for infection; inspect incision as visible.  Implement transmission-based precautions and isolation, as indicated, to prevent spread of infection.  Discontinue prophylactic antimicrobial agent within 24 hours after procedure, as directed.  Identify early signs of sepsis, such as increased heart rate and decreased blood pressure, as well as changes in mental state, respiratory pattern or peripheral perfusion.  Prepare for rapid sepsis management, including lactate level, intravenous access, fluid administration and oxygen therapy.  Provide fever-reduction and comfort measures.  Recent Flowsheet Documentation  Taken 11/11/2023 0434 by Haim Gama LPN  Infection Prevention: single patient room provided  Taken 11/11/2023 0025 by Haim Gama LPN  Infection Prevention: single patient room provided  Taken 11/10/2023 2224 by Haim Gama LPN  Infection Prevention: single patient room provided  Taken 11/10/2023 1907 by Haim Gama LPN  Infection Prevention: single patient room provided     Problem: Ongoing Anesthesia Effects (Urinary Diversion)  Goal: Anesthesia/Sedation Recovery  Outcome: Ongoing, Progressing  Intervention: Optimize Anesthesia Recovery  Description: Assess and monitor airway, breathing and circulation; maintain close surveillance for deterioration.  Implement continuous monitoring, such as cardiorespiratory, blood pressure,  temperature, pulse oximetry and capnography.  Elevate head of bed, if able; facilitate regular position changes.  Assess neurocognitive function and for risks that may lead to postoperative delirium, such as decreased level of consciousness, pain and agitation; offer reassurance; answer questions.  Assess and monitor neurovascular and neuromuscular function, such as motor strength, tone, posture, peripheral pulses and extremity sensation; protect areas of decreased sensation from heat, cold, medical devices or objects.  Individualize frequency and intensity of monitoring based on sedation or anesthesia administered, identified risk factors, ongoing assessment and organizational protocol.  Prepare for administration of pharmacologic therapy, such as reversal agent, antiemetic or antipruritic medication, to manage sedation or anesthesia effects.  Adjust environment to maintain safety (e.g., fall precautions, safety equipment).  Recent Flowsheet Documentation  Taken 11/11/2023 0434 by Haim Gama LPN  Safety Promotion/Fall Prevention:   clutter free environment maintained   room organization consistent   safety round/check completed  Taken 11/10/2023 2224 by Haim Gama LPN  Safety Promotion/Fall Prevention:   activity supervised   assistive device/personal items within reach   room organization consistent   safety round/check completed  Taken 11/10/2023 1907 by Haim Gama LPN  Safety Promotion/Fall Prevention:   assistive device/personal items within reach   clutter free environment maintained   room organization consistent   safety round/check completed     Problem: Pain (Urinary Diversion)  Goal: Acceptable Pain Control  Outcome: Ongoing, Progressing  Intervention: Prevent or Manage Pain  Description: Develop mutual pain management plan with patient and caregiver/family; review regularly.  Evaluate risk for opioid use; individualize pharmacologic pain management plan and titrate medication to patient  response.  Combine multimodal analgesia and nonpharmacologic strategies to help potentiate synergistic effects and enhance comfort (e.g., complementary therapy, diversional activity).  Provide around-the-clock dosing of pain medication to keep pain levels in control.  Manage medication-induced effects, such as respiratory depression, constipation, nausea, vomiting.  Minimize pain stimuli; coordinate care and adjust environment (e.g., light, noise, unnecessary movement); promote sleep/rest for optimal healing.  Recent Flowsheet Documentation  Taken 11/10/2023 1907 by Haim Gama LPN  Diversional Activities: television     Problem: Postoperative Nausea and Vomiting (Urinary Diversion)  Goal: Nausea and Vomiting Relief  Outcome: Ongoing, Progressing     Problem: Postoperative Stoma Care (Urinary Diversion)  Goal: Optimal Stoma Healing  Outcome: Ongoing, Progressing     Problem: Respiratory Compromise (Urinary Diversion)  Goal: Effective Oxygenation and Ventilation  Outcome: Ongoing, Progressing     Problem: Urine Elimination Impaired (Urinary Diversion)  Goal: Effective Urinary Elimination  Outcome: Ongoing, Progressing   Goal Outcome Evaluation:   Patient is AOX4 . Patient is deaf and Ipad for the online american sign language. Per patients request to leave rene catheter bag on the ground at bedside that is hook up to the patient's Urostomy bag. Patient educated on risk for infection.

## 2023-11-11 NOTE — PLAN OF CARE
Problem: Adult Inpatient Plan of Care  Goal: Plan of Care Review  Outcome: Ongoing, Progressing  Flowsheets (Taken 11/11/2023 1631)  Progress: improving  Plan of Care Reviewed With: patient  Outcome Evaluation: Patient rested throughout the day with the  sitting outside the room. Patient has no complaints at this time.  Goal: Patient-Specific Goal (Individualized)  Outcome: Ongoing, Progressing  Goal: Absence of Hospital-Acquired Illness or Injury  Outcome: Ongoing, Progressing  Intervention: Identify and Manage Fall Risk  Recent Flowsheet Documentation  Taken 11/11/2023 1621 by Haim Blake RN  Safety Promotion/Fall Prevention: safety round/check completed  Taken 11/11/2023 1432 by Haim Blake RN  Safety Promotion/Fall Prevention: safety round/check completed  Taken 11/11/2023 1158 by Haim Blake RN  Safety Promotion/Fall Prevention: safety round/check completed  Taken 11/11/2023 1033 by Haim Blake RN  Safety Promotion/Fall Prevention: safety round/check completed  Taken 11/11/2023 0845 by Haim Blake RN  Safety Promotion/Fall Prevention: safety round/check completed  Intervention: Prevent Skin Injury  Recent Flowsheet Documentation  Taken 11/11/2023 0845 by Haim Blake RN  Skin Protection:   adhesive use limited   skin-to-device areas padded   skin-to-skin areas padded  Intervention: Prevent and Manage VTE (Venous Thromboembolism) Risk  Recent Flowsheet Documentation  Taken 11/11/2023 0845 by Haim Blake RN  VTE Prevention/Management:   bilateral   sequential compression devices off  Intervention: Prevent Infection  Recent Flowsheet Documentation  Taken 11/11/2023 0845 by Haim Blaek RN  Infection Prevention:   single patient room provided   rest/sleep promoted   personal protective equipment utilized   hand hygiene promoted  Goal: Optimal Comfort and Wellbeing  Outcome: Ongoing, Progressing  Intervention: Provide Person-Centered Care  Recent Flowsheet  Documentation  Taken 11/11/2023 0845 by Haim Blake RN  Trust Relationship/Rapport:   care explained   choices provided  Goal: Readiness for Transition of Care  Outcome: Ongoing, Progressing     Problem: Asthma Comorbidity  Goal: Maintenance of Asthma Control  Outcome: Ongoing, Progressing  Intervention: Maintain Asthma Symptom Control  Recent Flowsheet Documentation  Taken 11/11/2023 1621 by Haim Blake RN  Medication Review/Management: medications reviewed  Taken 11/11/2023 1432 by Haim Blake RN  Medication Review/Management: medications reviewed  Taken 11/11/2023 1158 by Haim Blake RN  Medication Review/Management: medications reviewed  Taken 11/11/2023 1033 by Haim Blake RN  Medication Review/Management: medications reviewed  Taken 11/11/2023 0845 by Haim Blake RN  Medication Review/Management: medications reviewed     Problem: Behavioral Health Comorbidity  Goal: Maintenance of Behavioral Health Symptom Control  Outcome: Ongoing, Progressing  Intervention: Maintain Behavioral Health Symptom Control  Recent Flowsheet Documentation  Taken 11/11/2023 1621 by Haim Blake RN  Medication Review/Management: medications reviewed  Taken 11/11/2023 1432 by Haim Blake RN  Medication Review/Management: medications reviewed  Taken 11/11/2023 1158 by Haim Blake RN  Medication Review/Management: medications reviewed  Taken 11/11/2023 1033 by Haim Blake RN  Medication Review/Management: medications reviewed  Taken 11/11/2023 0845 by Haim Blake RN  Medication Review/Management: medications reviewed     Problem: COPD (Chronic Obstructive Pulmonary Disease) Comorbidity  Goal: Maintenance of COPD Symptom Control  Outcome: Ongoing, Progressing  Intervention: Maintain COPD-Symptom Control  Recent Flowsheet Documentation  Taken 11/11/2023 1621 by Haim Blake RN  Medication Review/Management: medications reviewed  Taken 11/11/2023 1432 by Haim Blake  RN  Medication Review/Management: medications reviewed  Taken 11/11/2023 1158 by Haim Blake RN  Medication Review/Management: medications reviewed  Taken 11/11/2023 1033 by Haim Blake RN  Medication Review/Management: medications reviewed  Taken 11/11/2023 0845 by Haim Blake RN  Supportive Measures: active listening utilized  Medication Review/Management: medications reviewed     Problem: Diabetes Comorbidity  Goal: Blood Glucose Level Within Targeted Range  Outcome: Ongoing, Progressing     Problem: Heart Failure Comorbidity  Goal: Maintenance of Heart Failure Symptom Control  Outcome: Ongoing, Progressing  Intervention: Maintain Heart Failure-Management  Recent Flowsheet Documentation  Taken 11/11/2023 1621 by Haim Blake RN  Medication Review/Management: medications reviewed  Taken 11/11/2023 1432 by Haim Blake RN  Medication Review/Management: medications reviewed  Taken 11/11/2023 1158 by Haim Blake RN  Medication Review/Management: medications reviewed  Taken 11/11/2023 1033 by Haim Blake RN  Medication Review/Management: medications reviewed  Taken 11/11/2023 0845 by Haim Blake RN  Medication Review/Management: medications reviewed     Problem: Hypertension Comorbidity  Goal: Blood Pressure in Desired Range  Outcome: Ongoing, Progressing  Intervention: Maintain Blood Pressure Management  Recent Flowsheet Documentation  Taken 11/11/2023 1621 by Haim Blake RN  Medication Review/Management: medications reviewed  Taken 11/11/2023 1432 by Haim Blake RN  Medication Review/Management: medications reviewed  Taken 11/11/2023 1158 by Haim Blake RN  Medication Review/Management: medications reviewed  Taken 11/11/2023 1033 by Haim Blake RN  Medication Review/Management: medications reviewed  Taken 11/11/2023 0845 by Haim Blake RN  Medication Review/Management: medications reviewed     Problem: Obstructive Sleep Apnea Risk or Actual Comorbidity  Management  Goal: Unobstructed Breathing During Sleep  Outcome: Ongoing, Progressing     Problem: Osteoarthritis Comorbidity  Goal: Maintenance of Osteoarthritis Symptom Control  Outcome: Ongoing, Progressing  Intervention: Maintain Osteoarthritis Symptom Control  Recent Flowsheet Documentation  Taken 11/11/2023 1621 by Haim Blake RN  Medication Review/Management: medications reviewed  Taken 11/11/2023 1432 by Haim Blake RN  Medication Review/Management: medications reviewed  Taken 11/11/2023 1158 by Haim Blake RN  Medication Review/Management: medications reviewed  Taken 11/11/2023 1033 by Haim Blake RN  Medication Review/Management: medications reviewed  Taken 11/11/2023 0845 by Haim Blake RN  Medication Review/Management: medications reviewed     Problem: Pain Chronic (Persistent) (Comorbidity Management)  Goal: Acceptable Pain Control and Functional Ability  Outcome: Ongoing, Progressing  Intervention: Manage Persistent Pain  Recent Flowsheet Documentation  Taken 11/11/2023 1621 by Haim Blake RN  Medication Review/Management: medications reviewed  Taken 11/11/2023 1432 by Haim Blake RN  Medication Review/Management: medications reviewed  Taken 11/11/2023 1158 by Haim Blake RN  Medication Review/Management: medications reviewed  Taken 11/11/2023 1033 by Haim Blake RN  Medication Review/Management: medications reviewed  Taken 11/11/2023 0845 by Haim Blake RN  Medication Review/Management: medications reviewed  Intervention: Optimize Psychosocial Wellbeing  Recent Flowsheet Documentation  Taken 11/11/2023 0845 by Haim Blake RN  Supportive Measures: active listening utilized  Diversional Activities:   television   smartphone     Problem: Seizure Disorder Comorbidity  Goal: Maintenance of Seizure Control  Outcome: Ongoing, Progressing     Problem: Fall Injury Risk  Goal: Absence of Fall and Fall-Related Injury  Outcome: Ongoing,  Progressing  Intervention: Identify and Manage Contributors  Recent Flowsheet Documentation  Taken 11/11/2023 1621 by Haim Blake RN  Medication Review/Management: medications reviewed  Taken 11/11/2023 1432 by Haim Blake RN  Medication Review/Management: medications reviewed  Taken 11/11/2023 1158 by Haim Blake RN  Medication Review/Management: medications reviewed  Taken 11/11/2023 1033 by Haim Blake RN  Medication Review/Management: medications reviewed  Taken 11/11/2023 0845 by Haim Blake RN  Medication Review/Management: medications reviewed  Intervention: Promote Injury-Free Environment  Recent Flowsheet Documentation  Taken 11/11/2023 1621 by Haim Blake RN  Safety Promotion/Fall Prevention: safety round/check completed  Taken 11/11/2023 1432 by Haim Blake RN  Safety Promotion/Fall Prevention: safety round/check completed  Taken 11/11/2023 1158 by Haim Blake RN  Safety Promotion/Fall Prevention: safety round/check completed  Taken 11/11/2023 1033 by Haim Blake RN  Safety Promotion/Fall Prevention: safety round/check completed  Taken 11/11/2023 0845 by Haim Blake RN  Safety Promotion/Fall Prevention: safety round/check completed     Problem: Fluid Imbalance (Pneumonia)  Goal: Fluid Balance  Outcome: Ongoing, Progressing     Problem: Infection (Pneumonia)  Goal: Resolution of Infection Signs and Symptoms  Outcome: Ongoing, Progressing     Problem: Respiratory Compromise (Pneumonia)  Goal: Effective Oxygenation and Ventilation  Outcome: Ongoing, Progressing  Intervention: Promote Airway Secretion Clearance  Recent Flowsheet Documentation  Taken 11/11/2023 0845 by Haim Blkae RN  Cough And Deep Breathing: done independently per patient     Problem: Adjustment to Surgery (Urinary Diversion)  Goal: Psychosocial Adjustment Initiation  Outcome: Ongoing, Progressing  Intervention: Support Psychosocial Response to Surgery  Recent Flowsheet  Documentation  Taken 11/11/2023 0845 by Haim Blake RN  Supportive Measures: active listening utilized     Problem: Bleeding (Urinary Diversion)  Goal: Absence of Bleeding  Outcome: Ongoing, Progressing     Problem: Bowel Motility Impaired (Urinary Diversion)  Goal: Effective Bowel Elimination  Outcome: Ongoing, Progressing     Problem: Fluid and Electrolyte Imbalance (Urinary Diversion)  Goal: Fluid and Electrolyte Balance  Outcome: Ongoing, Progressing     Problem: Infection (Urinary Diversion)  Goal: Absence of Infection Signs and Symptoms  Outcome: Ongoing, Progressing  Intervention: Prevent or Manage Infection  Recent Flowsheet Documentation  Taken 11/11/2023 0845 by Haim Blake RN  Infection Prevention:   single patient room provided   rest/sleep promoted   personal protective equipment utilized   hand hygiene promoted     Problem: Ongoing Anesthesia Effects (Urinary Diversion)  Goal: Anesthesia/Sedation Recovery  Outcome: Ongoing, Progressing  Intervention: Optimize Anesthesia Recovery  Recent Flowsheet Documentation  Taken 11/11/2023 1621 by Haim Blake RN  Safety Promotion/Fall Prevention: safety round/check completed  Taken 11/11/2023 1432 by Haim Blake RN  Safety Promotion/Fall Prevention: safety round/check completed  Taken 11/11/2023 1158 by Haim Blake RN  Safety Promotion/Fall Prevention: safety round/check completed  Taken 11/11/2023 1033 by Haim Blake RN  Safety Promotion/Fall Prevention: safety round/check completed  Taken 11/11/2023 0845 by Haim Blake RN  Safety Promotion/Fall Prevention: safety round/check completed     Problem: Pain (Urinary Diversion)  Goal: Acceptable Pain Control  Outcome: Ongoing, Progressing  Intervention: Prevent or Manage Pain  Recent Flowsheet Documentation  Taken 11/11/2023 0845 by Haim Blake RN  Diversional Activities:   television   smartphone     Problem: Postoperative Nausea and Vomiting (Urinary Diversion)  Goal: Nausea  and Vomiting Relief  Outcome: Ongoing, Progressing     Problem: Postoperative Stoma Care (Urinary Diversion)  Goal: Optimal Stoma Healing  Outcome: Ongoing, Progressing     Problem: Respiratory Compromise (Urinary Diversion)  Goal: Effective Oxygenation and Ventilation  Outcome: Ongoing, Progressing     Problem: Urine Elimination Impaired (Urinary Diversion)  Goal: Effective Urinary Elimination  Outcome: Ongoing, Progressing   Goal Outcome Evaluation:  Plan of Care Reviewed With: patient        Progress: improving  Outcome Evaluation: Patient rested throughout the day with the  sitting outside the room. Patient has no complaints at this time.

## 2023-11-11 NOTE — PROGRESS NOTES
LOS: 1 day   Patient Care Team:  Joey Islas MD as PCP - General (Family Medicine)  Neville Laughlin MD as Consulting Physician (Nephrology)    Subjective:  Improved    Objective:   Afebrile//continued oxygen requirements      Review of Systems:   Review of Systems   Constitutional:  Positive for activity change.   Respiratory:  Positive for shortness of breath.            Vital Signs  Temp:  [98.2 °F (36.8 °C)-98.8 °F (37.1 °C)] 98.8 °F (37.1 °C)  Heart Rate:  [64-79] 68  Resp:  [16-21] 20  BP: (148-151)/(61-64) 151/64    Physical Exam:  Physical Exam  Vitals and nursing note reviewed.   Constitutional:       Appearance: Normal appearance.   Cardiovascular:      Rate and Rhythm: Normal rate.      Heart sounds: Normal heart sounds.   Pulmonary:      Breath sounds: Normal breath sounds.   Skin:     General: Skin is warm.   Neurological:      Mental Status: He is alert.          Radiology:  CT Chest Without Contrast Diagnostic    Result Date: 11/8/2023  Impression: 1. Clustered parenchymal nodular densities within the superior left lower lobe are favored represent benign infectious/inflammatory nodules, dominant measuring 10 mm. 3-month CT chest follow-up is recommended. 2. Tiny biapical parenchymal nodular densities measuring between 3 to 5 mm are present. Benign etiology is favored. Attention at surveillance imaging is suggested. 3. Small esophageal hiatal hernia. Electronically Signed: Trinity Negron MD  11/8/2023 1:58 PM EST  Workstation ID: [id number]        Results Review:     I reviewed the patient's new clinical results.  I reviewed the patient's new imaging results and agree with the interpretation.    Medication Review:   Scheduled Meds:amLODIPine, 10 mg, Oral, Nightly  cefdinir, 300 mg, Oral, Q12H  gabapentin, 100 mg, Oral, TID  glipizide, 2.5 mg, Oral, QAM AC  ipratropium-albuterol, 3 mL, Nebulization, 4x Daily - RT  sodium bicarbonate, 650 mg, Oral, BID  warfarin, 5 mg, Oral,  Daily      Continuous Infusions:   PRN Meds:.  acetaminophen    ipratropium-albuterol    ondansetron    Labs:    CBC    Results from last 7 days   Lab Units 11/10/23  0915 11/08/23  1310   WBC 10*3/mm3 3.80 4.40   HEMOGLOBIN g/dL 9.8* 10.9*   PLATELETS 10*3/mm3 168 152     BMP   Results from last 7 days   Lab Units 11/10/23  2315 11/10/23  0915 11/08/23  1310   SODIUM mmol/L 133* 135* 136   POTASSIUM mmol/L 4.7 4.6 4.0   CHLORIDE mmol/L 102 102 101   CO2 mmol/L 24.0 25.0 26.0   BUN mg/dL 27* 26* 40*   CREATININE mg/dL 1.89* 1.97* 2.51*   GLUCOSE mg/dL 127* 175* 127*   PHOSPHORUS mg/dL 3.0 2.8  --      Cr Clearance Estimated Creatinine Clearance: 40.1 mL/min (A) (by C-G formula based on SCr of 1.89 mg/dL (H)).  Coag   Results from last 7 days   Lab Units 11/10/23  2315 11/10/23  0915 11/09/23  1137 11/08/23  1310   INR  1.94* 1.96* 2.01 1.80*     HbA1C   Lab Results   Component Value Date    HGBA1C 5.9 (H) 04/14/2022    HGBA1C 6.0 (H) 01/06/2022    HGBA1C 5.8 (H) 10/29/2020     Blood Glucose   Glucose   Date/Time Value Ref Range Status   11/10/2023 1627 128 (H) 70 - 105 mg/dL Final     Comment:     Serial Number: [id number]:  [id number]   11/10/2023 1119 137 (H) 70 - 105 mg/dL Final     Comment:     Serial Number: [id number]:  [id number]   11/10/2023 0745 84 70 - 105 mg/dL Final     Comment:     Serial Number: [id number]:  [id number]   11/09/2023 2251 163 (H) 70 - 105 mg/dL Final     Comment:     Serial Number: [id number]:  [id number]   11/09/2023 1620 144 (H) 70 - 105 mg/dL Final     Comment:     Serial Number: [id number]:  [id number]   11/09/2023 1208 136 (H) 70 - 105 mg/dL Final     Comment:     Serial Number: [id number]:  [id number]   11/09/2023 0752 75 70 - 105 mg/dL Final     Comment:     Serial Number: [id number]:  [id number]   11/08/2023 2121 163 (H) 70 - 105 mg/dL Final     Comment:     Serial Number: [id number]:  [id number]     Infection   Results from last 7  days   Lab Units 11/09/23  1205 11/09/23  1137 11/08/23  1555 11/08/23  1335   BLOODCX  No growth at 2 days No growth at 24 hours No growth at 2 days No growth at 2 days     CMP   Results from last 7 days   Lab Units 11/10/23  2315 11/10/23  0915 11/08/23  1310   SODIUM mmol/L 133* 135* 136   POTASSIUM mmol/L 4.7 4.6 4.0   CHLORIDE mmol/L 102 102 101   CO2 mmol/L 24.0 25.0 26.0   BUN mg/dL 27* 26* 40*   CREATININE mg/dL 1.89* 1.97* 2.51*   GLUCOSE mg/dL 127* 175* 127*   ALBUMIN g/dL 3.0* 3.3* 3.5   BILIRUBIN mg/dL  --   --  0.4   ALK PHOS U/L  --   --  77   AST (SGOT) U/L  --   --  52*   ALT (SGPT) U/L  --   --  57*     UA    Results from last 7 days   Lab Units 11/08/23  1335   NITRITE UA  Negative   WBC UA /HPF 3-5*   BACTERIA UA /HPF 1+*   SQUAM EPITHEL UA /HPF 0-2     Radiology(recent) No radiology results for the last day   Assessment:      Community-acquired atypical pneumonia/left lower lobe  Acute kidney injury  ATN  Chronic kidney disease stage IV  Hypertension associated with chronic kidney disease stage IV  Anemia with chronic kidney disease stage IV  History of gastrectomy  History of urostomy  History of deep venous thrombosis  Multiple pulmonary nodular densities likely infectious/reactive/inflammatory  Gastroesophageal reflux disease with esophagitis  Known history of hiatal hernia  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

## 2023-11-11 NOTE — PROGRESS NOTES
Daily Progress Note        Left lower lobe pneumonia    Assessment:      Left lower lobe pneumonia with nodular densities      Viral panel is negative\      Hypoxia currently on 2 L    Patient is deaf         Recommendations:      Antibiotics   DC Rocephin and doxycycline  P.o. cefdinir      Oxygen titration      Bronchodilators  Anticoagulation currently on warfarin             LOS: 1 day     Subjective         Objective     Vital signs for last 24 hours:  Vitals:    11/10/23 2107 11/11/23 0715 11/11/23 0718 11/11/23 0755   BP:    151/64   BP Location:    Left arm   Patient Position:    Lying   Pulse: 65 68 67 79   Resp: 18 20 20 21   Temp:    98.8 °F (37.1 °C)   TempSrc:    Oral   SpO2: 91% 90% 99% 92%   Weight:       Height:           Intake/Output last 3 shifts:  I/O last 3 completed shifts:  In: 440 [P.O.:440]  Out: 2150 [Urine:2150]  Intake/Output this shift:  I/O this shift:  In: -   Out: 350 [Urine:350]      Radiology  Imaging Results (Last 24 Hours)       ** No results found for the last 24 hours. **            Labs:  Results from last 7 days   Lab Units 11/10/23  0915   WBC 10*3/mm3 3.80   HEMOGLOBIN g/dL 9.8*   HEMATOCRIT % 29.5*   PLATELETS 10*3/mm3 168     Results from last 7 days   Lab Units 11/10/23  2315 11/10/23  0915 11/08/23  1310   SODIUM mmol/L 133*   < > 136   POTASSIUM mmol/L 4.7   < > 4.0   CHLORIDE mmol/L 102   < > 101   CO2 mmol/L 24.0   < > 26.0   BUN mg/dL 27*   < > 40*   CREATININE mg/dL 1.89*   < > 2.51*   CALCIUM mg/dL 8.4*   < > 8.8   BILIRUBIN mg/dL  --   --  0.4   ALK PHOS U/L  --   --  77   ALT (SGPT) U/L  --   --  57*   AST (SGOT) U/L  --   --  52*   GLUCOSE mg/dL 127*   < > 127*    < > = values in this interval not displayed.         Results from last 7 days   Lab Units 11/10/23  2315 11/10/23  0915 11/08/23  1310   ALBUMIN g/dL 3.0* 3.3* 3.5                 Results from last 7 days   Lab Units 11/10/23  2315 11/10/23  0915 11/09/23  1137   INR  1.94* 1.96* 2.01                Meds:   SCHEDULE  amLODIPine, 10 mg, Oral, Nightly  cefTRIAXone, 1,000 mg, Intravenous, Q24H  doxycycline, 100 mg, Intravenous, Q12H  gabapentin, 100 mg, Oral, TID  glipizide, 2.5 mg, Oral, QAM AC  ipratropium-albuterol, 3 mL, Nebulization, 4x Daily - RT  sodium bicarbonate, 650 mg, Oral, BID  warfarin, 5 mg, Oral, Daily      Infusions       PRNs    acetaminophen    ipratropium-albuterol    ondansetron    Physical Exam:  Physical Exam  Cardiovascular:      Heart sounds: No murmur heard.     No gallop.   Pulmonary:      Effort: No respiratory distress.      Breath sounds: No stridor. Rhonchi and rales present. No wheezing.   Chest:      Chest wall: No tenderness.         ROS  Review of Systems   Respiratory:  Positive for cough and shortness of breath. Negative for wheezing and stridor.    Cardiovascular:  Negative for chest pain, palpitations and leg swelling.             Total time spent with patient greater than: 45 Minutes

## 2023-11-11 NOTE — PROGRESS NOTES
"NEPHROLOGY PROGRESS NOTE------[facility]    [facility]  [phone]  Saeed Briscoe MD      Patient Care Team:  Joey Islas MD as PCP - General (Family Medicine)  Neville Laughlin MD as Consulting Physician (Nephrology)      Provider:  Saeed Briscoe MD  Patient Name: Michael Dorantes  :  1950    SUBJECTIVE:  Follow-up LEO/CKD  No chest pain or shortness of breath  Patient is hearing impaired, communication via sign .    Medication:  amLODIPine, 10 mg, Oral, Nightly  cefTRIAXone, 1,000 mg, Intravenous, Q24H  doxycycline, 100 mg, Intravenous, Q12H  gabapentin, 100 mg, Oral, TID  glipizide, 2.5 mg, Oral, QAM AC  ipratropium-albuterol, 3 mL, Nebulization, 4x Daily - RT  sodium bicarbonate, 650 mg, Oral, BID  warfarin, 5 mg, Oral, Daily      sodium chloride, 75 mL/hr, Last Rate: 75 mL/hr (11/10/23 1809)        OBJECTIVE    Vital Sign Min/Max for last 24 hours  Temp  Min: 98.2 °F (36.8 °C)  Max: 98.8 °F (37.1 °C)   BP  Min: 148/61  Max: 151/64   Pulse  Min: 64  Max: 79   Resp  Min: 16  Max: 21   SpO2  Min: 90 %  Max: 99 %   No data recorded   No data recorded     Flowsheet Rows      Flowsheet Row First Filed Value   Admission Height 182.9 cm (72\") Documented at 2023 1207   Admission Weight 81.4 kg (179 lb 7.3 oz) Documented at 2023 1207            I/O this shift:  In: -   Out: 350 [Urine:350]  I/O last 3 completed shifts:  In: 440 [P.O.:440]  Out: 2150 [Urine:2150]    Physical Exam:  General Appearance: alert, appears stated age and cooperative  Head: normocephalic, without obvious abnormality and atraumatic  Eyes: conjunctivae and sclerae normal and no icterus  Neck: supple and no JVD  Lungs: Scattered rhonchi  Heart: regular rhythm & normal rate and normal S1, S2  Chest: Wall no abnormalities observed  Abdomen: normal bowel sounds and soft, nontender  Extremities: moves extremities well, no edema, no cyanosis and " "no redness  Skin: no bleeding, bruising or rash, turgor normal, color normal and no lesions noted  Neurologic: Alert, and oriented. No focal deficits    Labs:    WBC WBC   Date Value Ref Range Status   11/10/2023 3.80 3.40 - 10.80 10*3/mm3 Final   11/08/2023 4.40 3.40 - 10.80 10*3/mm3 Final      HGB Hemoglobin   Date Value Ref Range Status   11/10/2023 9.8 (L) 13.0 - 17.7 g/dL Final   11/08/2023 10.9 (L) 13.0 - 17.7 g/dL Final      HCT Hematocrit   Date Value Ref Range Status   11/10/2023 29.5 (L) 37.5 - 51.0 % Final   11/08/2023 32.6 (L) 37.5 - 51.0 % Final      Platelets No results found for: \"LABPLAT\"   MCV MCV   Date Value Ref Range Status   11/10/2023 88.2 79.0 - 97.0 fL Final   11/08/2023 90.3 79.0 - 97.0 fL Final          Sodium Sodium   Date Value Ref Range Status   11/10/2023 133 (L) 136 - 145 mmol/L Final   11/10/2023 135 (L) 136 - 145 mmol/L Final   11/08/2023 136 136 - 145 mmol/L Final      Potassium Potassium   Date Value Ref Range Status   11/10/2023 4.7 3.5 - 5.2 mmol/L Final   11/10/2023 4.6 3.5 - 5.2 mmol/L Final   11/08/2023 4.0 3.5 - 5.2 mmol/L Final      Chloride Chloride   Date Value Ref Range Status   11/10/2023 102 98 - 107 mmol/L Final   11/10/2023 102 98 - 107 mmol/L Final   11/08/2023 101 98 - 107 mmol/L Final      CO2 CO2   Date Value Ref Range Status   11/10/2023 24.0 22.0 - 29.0 mmol/L Final   11/10/2023 25.0 22.0 - 29.0 mmol/L Final   11/08/2023 26.0 22.0 - 29.0 mmol/L Final      BUN BUN   Date Value Ref Range Status   11/10/2023 27 (H) 8 - 23 mg/dL Final   11/10/2023 26 (H) 8 - 23 mg/dL Final   11/08/2023 40 (H) 8 - 23 mg/dL Final      Creatinine Creatinine   Date Value Ref Range Status   11/10/2023 1.89 (H) 0.76 - 1.27 mg/dL Final   11/10/2023 1.97 (H) 0.76 - 1.27 mg/dL Final   11/08/2023 2.51 (H) 0.76 - 1.27 mg/dL Final      Calcium Calcium   Date Value Ref Range Status   11/10/2023 8.4 (L) 8.6 - 10.5 mg/dL Final   11/10/2023 8.3 (L) 8.6 - 10.5 mg/dL Final   11/08/2023 8.8 8.6 - 10.5 " "mg/dL Final      PO4 No components found for: \"PO4\"   Albumin Albumin   Date Value Ref Range Status   11/10/2023 3.0 (L) 3.5 - 5.2 g/dL Final   11/10/2023 3.3 (L) 3.5 - 5.2 g/dL Final   11/08/2023 3.5 3.5 - 5.2 g/dL Final      Magnesium No results found for: \"MG\"   Uric Acid No components found for: \"URIC ACID\"     Imaging Results (Last 72 Hours)       Procedure Component Value Units Date/Time    CT Chest Without Contrast Diagnostic [[id number]] Collected: 11/08/23 1350     Updated: 11/08/23 1401    Narrative:      CT CHEST WO CONTRAST DIAGNOSTIC    Date of Exam: 11/8/2023 1:48 PM EST    Indication: intermittent fever, nonprod cough.    Comparison: AP portable chest 1/17/2023. No prior CT chest for comparison at this institution.    Technique: Axial CT images were obtained of the chest without contrast administration.  Sagittal and coronal reconstructions were performed.  Automated exposure control and iterative reconstruction methods were used.      Findings:  Patchy nodular density is demonstrated within the superior left lower lobe abutting the major fissure, dominant component measuring 1 cm (series 5 image 35).    Small parenchymal nodular densities are seen within the lung apices. At the right apex, these measure 5 mm and 4 mm and 4 mm (series 5 image 15). At the left apex, these measure 3 mm (image 12), 7 mm (image 13), and 4 mm (image 16).    Small noncalcified nodule seen in the lateral subpleural right lower lobe measuring 3 mm (image 54).    Some of the images are degraded by respiratory motion.    Borderline enlarged left hilar lymph node measures 10 mm short axis (series 2 image 41). No pathologic enlarged mediastinal lymph nodes are identified.    Heart size is at upper limits of normal. Moderate coronary artery calcifications are present. There is mild thoracic aortic calcific atherosclerosis.    Heterogeneous multinodular thyroid gland does not appear appreciably enlarged.    Small esophageal hiatal " hernia is suspected. The remainder of the imaged upper abdominal organs demonstrate a normal noncontrast appearance.    No acute or suspicious osseous abnormalities are identified.      Impression:      Impression:    1. Clustered parenchymal nodular densities within the superior left lower lobe are favored represent benign infectious/inflammatory nodules, dominant measuring 10 mm. 3-month CT chest follow-up is recommended.  2. Tiny biapical parenchymal nodular densities measuring between 3 to 5 mm are present. Benign etiology is favored. Attention at surveillance imaging is suggested.  3. Small esophageal hiatal hernia.      Electronically Signed: Trinity Negron MD    11/8/2023 1:58 PM EST    Workstation ID: [id number]            Results for orders placed during the hospital encounter of 01/17/23    XR Chest 1 View    Narrative  XR CHEST 1 VW    Date of Exam: 1/17/2023 8:27 PM EST    Indication: fever/soa/back pain.    Comparison: 1/5/2022    Findings:  The heart and mediastinal contours are within normal limits. The lungs are clear. Osseous structures are unremarkable.    Impression  Impression:  No acute process    Electronically Signed: Joey Monroy  1/17/2023 9:26 PM EST  Workstation ID: [id number]      Results for orders placed during the hospital encounter of 01/05/22    XR Chest 1 View    Narrative  XR CHEST 1 VW    Date of Exam: 1/5/2022 23:38 EST    Indication: dyspnea.  Covid positive.    Comparison Exams: None available.    Technique: Portable AP chest    FINDINGS:  Ill-defined infiltrates are seen throughout the central to peripheral aspect of the lungs bilaterally with mild interstitial changes. No pleural effusions are observed. The cardiac silhouette is within normal limits for size for the portable study. The  mediastinum is unremarkable. No acute osseous abnormalities are identified.    Impression  Ill-defined infiltrates are noted bilaterally with mild interstitial changes. The findings suggest  atypical/viral infection or multifocal pneumonia and suggest changes of Covid 19 infection. Recommend follow-up to ensure improvement/resolution.    Electronically Signed: Bran Colin MD 1/6/2022 0:03 EST      Results for orders placed in visit on 12/13/18    SCANNED - IMAGING      Results for orders placed during the hospital encounter of 10/17/19    Duplex Venous Lower Extremity - Left    Interpretation Summary  · Acute left lower extremity deep vein thrombosis noted in the common femoral, proximal femoral, mid femoral, distal femoral and popliteal.        ASSESSMENT / PLAN      Left lower lobe pneumonia    LEO-likely prerenal and or ATN in setting of decreased p.o. 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  Baseline creatinine around 2.  Left lower lobe pneumonia  Diabetes       Creatinine improved  We will stop IV fluids  Renalwise OK for d/c        Saeed Briscoe MD  [facility]  [phone]  11/11/23  10:04 EST

## 2023-11-11 NOTE — THERAPY TREATMENT NOTE
"Subjective: Pt agreeable to therapeutic plan of care.Patient agreeable to amb and ALS  walked with us to communicate    Objective:     Bed mobility - Modified-Independent  Transfers - CGA  Ambulation - 300 feet CGA    Vitals: Pt was on 2L O2 but amb with it off and sats dropped to 87% but recovered to  93% at rest on RA so patient and nsg going to monitor with it off    Pain: 0 VAS     Education: Provided education on the importance of mobility in the acute care setting, Verbal/Tactile Cues, Transfer Training, and Gait Training    Assessment: Michael Dorantes presents with functional mobility impairments which indicate the need for skilled intervention. Tolerating session today without incident. Patient very cooperative and motivated. Was able to walk entire unit without AD and CGA with only 1 sl LOB and able to self correct. Pt has urostomy. Plans on  home with wife and HH at [place].Will continue to follow and progress as tolerated.     Plan/Recommendations:   Low Intensity Therapy recommended post-acute care - This is recommended as therapy feels this patient would require 2-3 visits per week. OP or HH would be the best option depending on patient's home bound status. Consider, if the patient has other  \"skilled\" needs such as wounds, IV antibiotics, etc. Combined with \"low intensity\" could also equate to a SNF. If patient is medically complex, consider LTAC.. Pt requires no DME at discharge.     Pt desires Home with family assist and and Home Health at discharge. Pt cooperative; agreeable to therapeutic recommendations and plan of care.         Basic Mobility 6-click:  Rollin = Total, A lot = 2, A little = 3; 4 = None  Supine>Sit:   1 = Total, A lot = 2, A little = 3; 4 = None   Sit>Stand with arms:  1 = Total, A lot = 2, A little = 3; 4 = None  Bed>Chair:   1 = Total, A lot = 2, A little = 3; 4 = None  Ambulate in room:  1 = Total, A lot = 2, A little = 3; 4 = None  3-5 Steps with railin = " Total, A lot = 2, A little = 3; 4 = None  Score: 20    Post-Tx Position: Supine with HOB Elevated, Alarms activated, and Call light and personal items within reach  PPE: gloves

## 2023-11-11 NOTE — PLAN OF CARE
Assessment: Michael Dorantes presents with functional mobility impairments which indicate the need for skilled intervention. Tolerating session today without incident. Patient very cooperative and motivated. Was able to walk entire unit without AD and CGA with only 1 sl LOB and able to self correct. Pt has urostomy. Plans on  home with wife and HH at [place].Will continue to follow and progress as tolerated.

## 2023-11-12 LAB
ALBUMIN SERPL-MCNC: 3 G/DL (ref 3.5–5.2)
ANION GAP SERPL CALCULATED.3IONS-SCNC: 10 MMOL/L (ref 5–15)
BUN SERPL-MCNC: 26 MG/DL (ref 8–23)
BUN/CREAT SERPL: 13.3 (ref 7–25)
CALCIUM SPEC-SCNC: 8.8 MG/DL (ref 8.6–10.5)
CHLORIDE SERPL-SCNC: 100 MMOL/L (ref 98–107)
CO2 SERPL-SCNC: 23 MMOL/L (ref 22–29)
CREAT SERPL-MCNC: 1.95 MG/DL (ref 0.76–1.27)
EGFRCR SERPLBLD CKD-EPI 2021: 35.7 ML/MIN/1.73
GLUCOSE SERPL-MCNC: 110 MG/DL (ref 65–99)
PHOSPHATE SERPL-MCNC: 3.6 MG/DL (ref 2.5–4.5)
POTASSIUM SERPL-SCNC: 4.9 MMOL/L (ref 3.5–5.2)
SODIUM SERPL-SCNC: 133 MMOL/L (ref 136–145)

## 2023-11-12 PROCEDURE — 94664 DEMO&/EVAL PT USE INHALER: CPT

## 2023-11-12 PROCEDURE — 94799 UNLISTED PULMONARY SVC/PX: CPT

## 2023-11-12 PROCEDURE — 94761 N-INVAS EAR/PLS OXIMETRY MLT: CPT

## 2023-11-12 PROCEDURE — 80069 RENAL FUNCTION PANEL: CPT | Performed by: FAMILY MEDICINE

## 2023-11-12 RX ADMIN — SODIUM BICARBONATE 650 MG: 650 TABLET ORAL at 20:53

## 2023-11-12 RX ADMIN — GABAPENTIN 100 MG: 100 CAPSULE ORAL at 08:28

## 2023-11-12 RX ADMIN — CEFDINIR 300 MG: 300 CAPSULE ORAL at 20:53

## 2023-11-12 RX ADMIN — GABAPENTIN 100 MG: 100 CAPSULE ORAL at 17:21

## 2023-11-12 RX ADMIN — SODIUM BICARBONATE 650 MG: 650 TABLET ORAL at 08:28

## 2023-11-12 RX ADMIN — GABAPENTIN 100 MG: 100 CAPSULE ORAL at 20:53

## 2023-11-12 RX ADMIN — GLIPIZIDE 2.5 MG: 5 TABLET ORAL at 08:28

## 2023-11-12 RX ADMIN — WARFARIN SODIUM 5 MG: 5 TABLET ORAL at 17:22

## 2023-11-12 RX ADMIN — IPRATROPIUM BROMIDE AND ALBUTEROL SULFATE 3 ML: .5; 3 SOLUTION RESPIRATORY (INHALATION) at 16:20

## 2023-11-12 RX ADMIN — AMLODIPINE BESYLATE 10 MG: 5 TABLET ORAL at 20:52

## 2023-11-12 RX ADMIN — IPRATROPIUM BROMIDE AND ALBUTEROL SULFATE 3 ML: .5; 3 SOLUTION RESPIRATORY (INHALATION) at 19:48

## 2023-11-12 RX ADMIN — IPRATROPIUM BROMIDE AND ALBUTEROL SULFATE 3 ML: .5; 3 SOLUTION RESPIRATORY (INHALATION) at 07:10

## 2023-11-12 RX ADMIN — CEFDINIR 300 MG: 300 CAPSULE ORAL at 08:28

## 2023-11-12 RX ADMIN — IPRATROPIUM BROMIDE AND ALBUTEROL SULFATE 3 ML: .5; 3 SOLUTION RESPIRATORY (INHALATION) at 11:32

## 2023-11-12 NOTE — PROGRESS NOTES
"NEPHROLOGY PROGRESS NOTE------[facility]    [facility]  [phone]  Saeed Briscoe MD      Patient Care Team:  Joey Islas MD as PCP - General (Family Medicine)  Neville Laughlin MD as Consulting Physician (Nephrology)      Provider:  Saeed Briscoe MD  Patient Name: Michael Dorantes  :  1950    SUBJECTIVE:  Follow-up LEO/CKD  No chest pain or shortness of breath  Patient is hearing impaired, communication via sign .    Medication:  amLODIPine, 10 mg, Oral, Nightly  cefdinir, 300 mg, Oral, Q12H  gabapentin, 100 mg, Oral, TID  glipizide, 2.5 mg, Oral, QAM AC  ipratropium-albuterol, 3 mL, Nebulization, 4x Daily - RT  sodium bicarbonate, 650 mg, Oral, BID  warfarin, 5 mg, Oral, Daily             OBJECTIVE    Vital Sign Min/Max for last 24 hours  Temp  Min: 98.5 °F (36.9 °C)  Max: 99.8 °F (37.7 °C)   BP  Min: 145/65  Max: 149/60   Pulse  Min: 60  Max: 71   Resp  Min: 16  Max: 22   SpO2  Min: 88 %  Max: 99 %   No data recorded   No data recorded     Flowsheet Rows      Flowsheet Row First Filed Value   Admission Height 182.9 cm (72\") Documented at 2023 1207   Admission Weight 81.4 kg (179 lb 7.3 oz) Documented at 2023 1207            I/O this shift:  In: 480 [P.O.:480]  Out: 650 [Urine:650]  I/O last 3 completed shifts:  In: 240 [P.O.:240]  Out: 1600 [Urine:1600]    Physical Exam:  General Appearance: alert, appears stated age and cooperative  Head: normocephalic, without obvious abnormality and atraumatic  Eyes: conjunctivae and sclerae normal and no icterus  Neck: supple and no JVD  Lungs: Scattered rhonchi  Heart: regular rhythm & normal rate and normal S1, S2  Chest: Wall no abnormalities observed  Abdomen: normal bowel sounds and soft, nontender  Extremities: moves extremities well, no edema, no cyanosis and no redness  Skin: no bleeding, bruising or rash, turgor normal, color normal and no lesions " "noted  Neurologic: Alert, and oriented. No focal deficits    Labs:    WBC WBC   Date Value Ref Range Status   11/10/2023 3.80 3.40 - 10.80 10*3/mm3 Final      HGB Hemoglobin   Date Value Ref Range Status   11/10/2023 9.8 (L) 13.0 - 17.7 g/dL Final      HCT Hematocrit   Date Value Ref Range Status   11/10/2023 29.5 (L) 37.5 - 51.0 % Final      Platelets No results found for: \"LABPLAT\"   MCV MCV   Date Value Ref Range Status   11/10/2023 88.2 79.0 - 97.0 fL Final          Sodium Sodium   Date Value Ref Range Status   11/12/2023 133 (L) 136 - 145 mmol/L Final   11/10/2023 133 (L) 136 - 145 mmol/L Final   11/10/2023 135 (L) 136 - 145 mmol/L Final      Potassium Potassium   Date Value Ref Range Status   11/12/2023 4.9 3.5 - 5.2 mmol/L Final     Comment:     Slight hemolysis detected by analyzer. Result may be falsely elevated.   11/10/2023 4.7 3.5 - 5.2 mmol/L Final   11/10/2023 4.6 3.5 - 5.2 mmol/L Final      Chloride Chloride   Date Value Ref Range Status   11/12/2023 100 98 - 107 mmol/L Final   11/10/2023 102 98 - 107 mmol/L Final   11/10/2023 102 98 - 107 mmol/L Final      CO2 CO2   Date Value Ref Range Status   11/12/2023 23.0 22.0 - 29.0 mmol/L Final   11/10/2023 24.0 22.0 - 29.0 mmol/L Final   11/10/2023 25.0 22.0 - 29.0 mmol/L Final      BUN BUN   Date Value Ref Range Status   11/12/2023 26 (H) 8 - 23 mg/dL Final   11/10/2023 27 (H) 8 - 23 mg/dL Final   11/10/2023 26 (H) 8 - 23 mg/dL Final      Creatinine Creatinine   Date Value Ref Range Status   11/12/2023 1.95 (H) 0.76 - 1.27 mg/dL Final   11/10/2023 1.89 (H) 0.76 - 1.27 mg/dL Final   11/10/2023 1.97 (H) 0.76 - 1.27 mg/dL Final      Calcium Calcium   Date Value Ref Range Status   11/12/2023 8.8 8.6 - 10.5 mg/dL Final   11/10/2023 8.4 (L) 8.6 - 10.5 mg/dL Final   11/10/2023 8.3 (L) 8.6 - 10.5 mg/dL Final      PO4 No components found for: \"PO4\"   Albumin Albumin   Date Value Ref Range Status   11/12/2023 3.0 (L) 3.5 - 5.2 g/dL Final   11/10/2023 3.0 (L) 3.5 - " "5.2 g/dL Final   11/10/2023 3.3 (L) 3.5 - 5.2 g/dL Final      Magnesium No results found for: \"MG\"   Uric Acid No components found for: \"URIC ACID\"     Imaging Results (Last 72 Hours)       ** No results found for the last 72 hours. **            Results for orders placed during the hospital encounter of 01/17/23    XR Chest 1 View    Narrative  XR CHEST 1 VW    Date of Exam: 1/17/2023 8:27 PM EST    Indication: fever/soa/back pain.    Comparison: 1/5/2022    Findings:  The heart and mediastinal contours are within normal limits. The lungs are clear. Osseous structures are unremarkable.    Impression  Impression:  No acute process    Electronically Signed: Joey Monroy  1/17/2023 9:26 PM EST  Workstation ID: [id number]      Results for orders placed during the hospital encounter of 01/05/22    XR Chest 1 View    Narrative  XR CHEST 1 VW    Date of Exam: 1/5/2022 23:38 EST    Indication: dyspnea.  Covid positive.    Comparison Exams: None available.    Technique: Portable AP chest    FINDINGS:  Ill-defined infiltrates are seen throughout the central to peripheral aspect of the lungs bilaterally with mild interstitial changes. No pleural effusions are observed. The cardiac silhouette is within normal limits for size for the portable study. The  mediastinum is unremarkable. No acute osseous abnormalities are identified.    Impression  Ill-defined infiltrates are noted bilaterally with mild interstitial changes. The findings suggest atypical/viral infection or multifocal pneumonia and suggest changes of Covid 19 infection. Recommend follow-up to ensure improvement/resolution.    Electronically Signed: Bran Colin MD 1/6/2022 0:03 EST      Results for orders placed in visit on 12/13/18    SCANNED - IMAGING      Results for orders placed during the hospital encounter of 10/17/19    Duplex Venous Lower Extremity - Left    Interpretation Summary  · Acute left lower extremity deep vein thrombosis noted in the common " femoral, proximal femoral, mid femoral, distal femoral and popliteal.        ASSESSMENT / PLAN      Left lower lobe pneumonia    LEO-likely prerenal and or ATN in setting of decreased p.o. 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  Baseline creatinine around 2.  Left lower lobe pneumonia  Diabetes       Creatinine stable, off IV fluids  Clinically improving  Home soon        Saeed Briscoe MD  [facility]  [phone]  11/12/23  13:01 EST

## 2023-11-12 NOTE — PROGRESS NOTES
Daily Progress Note        Left lower lobe pneumonia    Assessment:      Left lower lobe pneumonia with nodular densities      Viral panel is negative\      Hypoxia currently on 2 L    Patient is deaf         Recommendations:      Antibiotics   DCed Rocephin and doxycycline  P.o. cefdinir      Oxygen titration      Bronchodilators  Anticoagulation currently on warfarin             LOS: 2 days     Subjective         Objective     Vital signs for last 24 hours:  Vitals:    11/12/23 0713 11/12/23 0825 11/12/23 1132 11/12/23 1135   BP:  145/65     BP Location:  Left arm     Patient Position:  Lying     Pulse: 65 70 60 63   Resp: 16 16 21 22   Temp:  98.5 °F (36.9 °C)     TempSrc:  Oral     SpO2: 96% 94% 95% 97%   Weight:       Height:           Intake/Output last 3 shifts:  I/O last 3 completed shifts:  In: 240 [P.O.:240]  Out: 1600 [Urine:1600]  Intake/Output this shift:  I/O this shift:  In: 480 [P.O.:480]  Out: 650 [Urine:650]      Radiology  Imaging Results (Last 24 Hours)       ** No results found for the last 24 hours. **            Labs:  Results from last 7 days   Lab Units 11/10/23  0915   WBC 10*3/mm3 3.80   HEMOGLOBIN g/dL 9.8*   HEMATOCRIT % 29.5*   PLATELETS 10*3/mm3 168     Results from last 7 days   Lab Units 11/12/23  0551 11/10/23  0915 11/08/23  1310   SODIUM mmol/L 133*   < > 136   POTASSIUM mmol/L 4.9   < > 4.0   CHLORIDE mmol/L 100   < > 101   CO2 mmol/L 23.0   < > 26.0   BUN mg/dL 26*   < > 40*   CREATININE mg/dL 1.95*   < > 2.51*   CALCIUM mg/dL 8.8   < > 8.8   BILIRUBIN mg/dL  --   --  0.4   ALK PHOS U/L  --   --  77   ALT (SGPT) U/L  --   --  57*   AST (SGOT) U/L  --   --  52*   GLUCOSE mg/dL 110*   < > 127*    < > = values in this interval not displayed.         Results from last 7 days   Lab Units 11/12/23  0551 11/10/23  2315 11/10/23  0915   ALBUMIN g/dL 3.0* 3.0* 3.3*                 Results from last 7 days   Lab Units 11/10/23  2315 11/10/23  0915 11/09/23  1137   INR  1.94* 1.96* 2.01                Meds:   SCHEDULE  amLODIPine, 10 mg, Oral, Nightly  cefdinir, 300 mg, Oral, Q12H  gabapentin, 100 mg, Oral, TID  glipizide, 2.5 mg, Oral, QAM AC  ipratropium-albuterol, 3 mL, Nebulization, 4x Daily - RT  sodium bicarbonate, 650 mg, Oral, BID  warfarin, 5 mg, Oral, Daily      Infusions       PRNs    acetaminophen    benzocaine-menthol    ipratropium-albuterol    ondansetron    Physical Exam:  Physical Exam  Cardiovascular:      Heart sounds: No murmur heard.     No gallop.   Pulmonary:      Effort: No respiratory distress.      Breath sounds: No stridor. Rhonchi and rales present. No wheezing.   Chest:      Chest wall: No tenderness.         ROS  Review of Systems   Respiratory:  Positive for cough and shortness of breath. Negative for wheezing and stridor.    Cardiovascular:  Negative for chest pain, palpitations and leg swelling.             Total time spent with patient greater than: 45 Minutes

## 2023-11-12 NOTE — PROGRESS NOTES
LOS: 2 days   Patient Care Team:  Joey Islas MD as PCP - General (Family Medicine)  Neville Laughlin MD as Consulting Physician (Nephrology)    Subjective:  Less short of breath    Objective:   Afebrile      Review of Systems:   Review of Systems   Constitutional:  Positive for activity change.   Respiratory:  Positive for shortness of breath.            Vital Signs  Temp:  [98.5 °F (36.9 °C)-99.8 °F (37.7 °C)] 98.5 °F (36.9 °C)  Heart Rate:  [63-71] 70  Resp:  [16-22] 16  BP: (145-149)/(60-65) 145/65    Physical Exam:  Physical Exam  Vitals and nursing note reviewed.   Cardiovascular:      Rate and Rhythm: Rhythm irregular.      Heart sounds: Normal heart sounds.   Pulmonary:      Breath sounds: Normal breath sounds.      Comments: Poor air exchange  Neurological:      Mental Status: He is alert.          Radiology:  CT Chest Without Contrast Diagnostic    Result Date: 11/8/2023  Impression: 1. Clustered parenchymal nodular densities within the superior left lower lobe are favored represent benign infectious/inflammatory nodules, dominant measuring 10 mm. 3-month CT chest follow-up is recommended. 2. Tiny biapical parenchymal nodular densities measuring between 3 to 5 mm are present. Benign etiology is favored. Attention at surveillance imaging is suggested. 3. Small esophageal hiatal hernia. Electronically Signed: Trinity Negron MD  11/8/2023 1:58 PM EST  Workstation ID: [id number]        Results Review:     I reviewed the patient's new clinical results.  I reviewed the patient's new imaging results and agree with the interpretation.    Medication Review:   Scheduled Meds:amLODIPine, 10 mg, Oral, Nightly  cefdinir, 300 mg, Oral, Q12H  gabapentin, 100 mg, Oral, TID  glipizide, 2.5 mg, Oral, QAM AC  ipratropium-albuterol, 3 mL, Nebulization, 4x Daily - RT  sodium bicarbonate, 650 mg, Oral, BID  warfarin, 5 mg, Oral, Daily      Continuous Infusions:   PRN Meds:.  acetaminophen     benzocaine-menthol    ipratropium-albuterol    ondansetron    Labs:    CBC    Results from last 7 days   Lab Units 11/10/23  0915 11/08/23  1310   WBC 10*3/mm3 3.80 4.40   HEMOGLOBIN g/dL 9.8* 10.9*   PLATELETS 10*3/mm3 168 152     BMP   Results from last 7 days   Lab Units 11/12/23  0551 11/10/23  2315 11/10/23  0915 11/08/23  1310   SODIUM mmol/L 133* 133* 135* 136   POTASSIUM mmol/L 4.9 4.7 4.6 4.0   CHLORIDE mmol/L 100 102 102 101   CO2 mmol/L 23.0 24.0 25.0 26.0   BUN mg/dL 26* 27* 26* 40*   CREATININE mg/dL 1.95* 1.89* 1.97* 2.51*   GLUCOSE mg/dL 110* 127* 175* 127*   PHOSPHORUS mg/dL 3.6 3.0 2.8  --      Cr Clearance Estimated Creatinine Clearance: 38.8 mL/min (A) (by C-G formula based on SCr of 1.95 mg/dL (H)).  Coag   Results from last 7 days   Lab Units 11/10/23  2315 11/10/23  0915 11/09/23  1137 11/08/23  1310   INR  1.94* 1.96* 2.01 1.80*     HbA1C   Lab Results   Component Value Date    HGBA1C 5.9 (H) 04/14/2022    HGBA1C 6.0 (H) 01/06/2022    HGBA1C 5.8 (H) 10/29/2020     Blood Glucose   Glucose   Date/Time Value Ref Range Status   11/10/2023 1627 128 (H) 70 - 105 mg/dL Final     Comment:     Serial Number: [id number]:  [id number]   11/10/2023 1119 137 (H) 70 - 105 mg/dL Final     Comment:     Serial Number: [id number]:  [id number]   11/10/2023 0745 84 70 - 105 mg/dL Final     Comment:     Serial Number: [id number]:  [id number]   11/09/2023 2251 163 (H) 70 - 105 mg/dL Final     Comment:     Serial Number: [id number]:  [id number]   11/09/2023 1620 144 (H) 70 - 105 mg/dL Final     Comment:     Serial Number: [id number]:  [id number]   11/09/2023 1208 136 (H) 70 - 105 mg/dL Final     Comment:     Serial Number: [id number]:  [id number]     Infection   Results from last 7 days   Lab Units 11/09/23  1205 11/09/23  1137 11/08/23  1555 11/08/23  1335   BLOODCX  No growth at 2 days No growth at 2 days No growth at 3 days No growth at 3 days     CMP   Results from last 7 days    Lab Units 11/12/23  0551 11/10/23  2315 11/10/23  0915 11/08/23  1310   SODIUM mmol/L 133* 133* 135* 136   POTASSIUM mmol/L 4.9 4.7 4.6 4.0   CHLORIDE mmol/L 100 102 102 101   CO2 mmol/L 23.0 24.0 25.0 26.0   BUN mg/dL 26* 27* 26* 40*   CREATININE mg/dL 1.95* 1.89* 1.97* 2.51*   GLUCOSE mg/dL 110* 127* 175* 127*   ALBUMIN g/dL 3.0* 3.0* 3.3* 3.5   BILIRUBIN mg/dL  --   --   --  0.4   ALK PHOS U/L  --   --   --  77   AST (SGOT) U/L  --   --   --  52*   ALT (SGPT) U/L  --   --   --  57*     UA    Results from last 7 days   Lab Units 11/08/23  1335   NITRITE UA  Negative   WBC UA /HPF 3-5*   BACTERIA UA /HPF 1+*   SQUAM EPITHEL UA /HPF 0-2     Radiology(recent) No radiology results for the last day   Assessment:    Community-acquired atypical pneumonia/left lower lobe  Acute kidney injury  ATN  Chronic kidney disease stage IV  Hypertension associated with chronic kidney disease stage IV  Anemia with chronic kidney disease stage IV  History of gastrectomy  History of urostomy  History of deep venous thrombosis  Multiple pulmonary nodular densities likely infectious/reactive/inflammatory  Gastroesophageal reflux disease with esophagitis  Known history of hiatal hernia  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

## 2023-11-12 NOTE — PLAN OF CARE
Problem: Adult Inpatient Plan of Care  Goal: Plan of Care Review  Outcome: Ongoing, Progressing  Flowsheets (Taken 11/12/2023 0402)  Progress: improving  Plan of Care Reviewed With: patient  Outcome Evaluation: Patient appears to be resting thought the shift. Meds were administered see MAR.Patient call light within reach.  Goal: Patient-Specific Goal (Individualized)  Outcome: Ongoing, Progressing  Goal: Absence of Hospital-Acquired Illness or Injury  Outcome: Ongoing, Progressing  Intervention: Identify and Manage Fall Risk  Description: Perform standard risk assessment on admission using a validated tool or comprehensive approach appropriate to the patient; reassess fall risk frequently, with change in status or transfer to another level of care.  Communicate fall injury risk to interprofessional healthcare team.  Determine need for increased observation, equipment and environmental modification, such as low bed, signage and supportive, nonskid footwear.  Adjust safety measures to individual developmental age, stage and identified risk factors.  Reinforce the importance of safety and physical activity with patient and family.  Perform regular intentional rounding to assess need for position change, pain assessment and personal needs, including assistance with toileting.  Recent Flowsheet Documentation  Taken 11/12/2023 0026 by Haim Gama LPN  Safety Promotion/Fall Prevention:   clutter free environment maintained   room organization consistent   safety round/check completed  Taken 11/11/2023 2207 by Haim Gama LPN  Safety Promotion/Fall Prevention:   clutter free environment maintained   room organization consistent   safety round/check completed  Taken 11/11/2023 1904 by Haim Gama LPN  Safety Promotion/Fall Prevention:   clutter free environment maintained   room organization consistent   safety round/check completed  Intervention: Prevent Skin Injury  Description: Perform a screening for  skin injury risk, such as pressure or moisture associated skin damage on admission and at regular intervals throughout hospital stay.  Keep all areas of skin (especially folds) clean and dry.  Maintain adequate skin hydration.  Relieve and redistribute pressure and protect bony prominences; implement measures based on patient-specific risk factors.  Match turning and repositioning schedule to clinical condition.  Encourage weight shift frequently; assist with reposition if unable to complete independently.  Float heels off bed; avoid pressure on the Achilles tendon.  Keep skin free from extended contact with medical devices.  Encourage functional activity and mobility, as early as tolerated.  Use aids (e.g., slide boards, mechanical lift) during transfer.  Recent Flowsheet Documentation  Taken 11/11/2023 1904 by Haim Gama LPN  Skin Protection: adhesive use limited  Intervention: Prevent and Manage VTE (Venous Thromboembolism) Risk  Description: Assess for VTE (venous thromboembolism) risk.  Encourage and assist with early ambulation.  Initiate and maintain compression or other therapy, as indicated, based on identified risk in accordance with organizational protocol and provider order.  Encourage both active and passive leg exercises while in bed, if unable to ambulate.  Recent Flowsheet Documentation  Taken 11/11/2023 1904 by Haim Gama LPN  Activity Management: activity encouraged  Range of Motion: active ROM (range of motion) encouraged  Intervention: Prevent Infection  Description: Maintain skin and mucous membrane integrity; promote hand, oral and pulmonary hygiene.  Optimize fluid balance, nutrition, sleep and glycemic control to maximize infection resistance.  Identify potential sources of infection early to prevent or mitigate progression of infection (e.g., wound, lines, devices).  Evaluate ongoing need for invasive devices; remove promptly when no longer indicated.  Recent Flowsheet  Documentation  Taken 11/12/2023 0026 by Hami Gama LPN  Infection Prevention: environmental surveillance performed  Taken 11/11/2023 2207 by Haim Gama LPN  Infection Prevention: environmental surveillance performed  Taken 11/11/2023 1904 by Haim Gama LPN  Infection Prevention: environmental surveillance performed  Goal: Optimal Comfort and Wellbeing  Outcome: Ongoing, Progressing  Intervention: Provide Person-Centered Care  Description: Use a family-focused approach to care.  Develop trust and rapport by proactively providing information, encouraging questions, addressing concerns and offering reassurance.  Acknowledge emotional response to hospitalization.  Recognize and utilize personal coping strategies.  Honor spiritual and cultural preferences.  Recent Flowsheet Documentation  Taken 11/11/2023 1904 by Haim Gama LPN  Trust Relationship/Rapport:   care explained   questions encouraged   thoughts/feelings acknowledged  Goal: Readiness for Transition of Care  Outcome: Ongoing, Progressing     Problem: Asthma Comorbidity  Goal: Maintenance of Asthma Control  Outcome: Ongoing, Progressing  Intervention: Maintain Asthma Symptom Control  Description: Evaluate adherence to self-management (asthma action plan), such as medication, symptom-control, trigger-avoidance and self-monitoring.  Advocate for continuation of home regimen, including medication, method of delivery, schedule and symptom monitoring; acknowledge preferred modality and routine.  Minimize exposure to potential triggers, such as perfume, cleaning chemicals and all types of smoke.  Assess for proper use of inhaled medication and delivery technique; assist or reinstruct if needed.  Evaluate effectiveness of coping skills; encourage expression of feelings, expectations and concerns related to disease management and quality of life; reinforce education to enhance management plan and wellbeing.  Recent Flowsheet Documentation  Taken  11/12/2023 0026 by Haim Gama LPN  Medication Review/Management: medications reviewed  Taken 11/11/2023 2207 by Haim Gama LPN  Medication Review/Management: medications reviewed     Problem: Behavioral Health Comorbidity  Goal: Maintenance of Behavioral Health Symptom Control  Outcome: Ongoing, Progressing  Intervention: Maintain Behavioral Health Symptom Control  Description: Confirm mental health diagnosis and current treatment.  Evaluate adherence to previously identified self-management plan.  Advocate continuation of home strategies, including medication.  Evaluate effectiveness of self-management strategies and coping skills.  Communicate with providers to ensure continuity and follow-up at transition.  Recent Flowsheet Documentation  Taken 11/12/2023 0026 by Haim Gama LPN  Medication Review/Management: medications reviewed  Taken 11/11/2023 2207 by Haim Gama LPN  Medication Review/Management: medications reviewed     Problem: COPD (Chronic Obstructive Pulmonary Disease) Comorbidity  Goal: Maintenance of COPD Symptom Control  Outcome: Ongoing, Progressing  Intervention: Maintain COPD-Symptom Control  Description: Evaluate adherence to management plan (e.g., medication, trigger avoidance, infection prevention, self-monitoring).  Advocate for continuation of home regimen, including medication, method of delivery, schedule and symptom monitoring.  Anticipate the need for breathing techniques and activity pacing to minimize fatigue and breathlessness.  Assess for proper use of inhaled medication and delivery technique; assist or reinstruct if needed.  Evaluate effectiveness of coping skills; encourage expression of feelings, expectations and concerns related to disease management and quality of life; reinforce education to enhance management plan and wellbeing.  Recent Flowsheet Documentation  Taken 11/12/2023 0026 by Haim Gama LPN  Medication Review/Management: medications  reviewed  Taken 11/11/2023 2207 by Haim Gama LPN  Medication Review/Management: medications reviewed  Taken 11/11/2023 1904 by Hami Gama LPN  Supportive Measures: active listening utilized     Problem: Diabetes Comorbidity  Goal: Blood Glucose Level Within Targeted Range  Outcome: Ongoing, Progressing     Problem: Heart Failure Comorbidity  Goal: Maintenance of Heart Failure Symptom Control  Outcome: Ongoing, Progressing  Intervention: Maintain Heart Failure-Management  Description: Evaluate adherence to home heart failure self-care regimen (e.g., medication, fluid balance, sodium intake, daily weight, physical activity, telemonitoring, support).  Advocate continuation of home medication and schedule.  Consider pharmacologic therapy administration time and effects (e.g., avoid giving diuretic prior to bedtime or nitrates on empty stomach).  Monitor response to pharmacologic therapy, including weight fluctuations, blood pressure and electrolyte levels.  Monitor for signs and symptoms of anxiety and depression, including severity and duration; if present, provide psychosocial support.  Consider need for heart failure clinic or palliative care consult.  Recent Flowsheet Documentation  Taken 11/12/2023 0026 by Haim Gama LPN  Medication Review/Management: medications reviewed  Taken 11/11/2023 2207 by Haim Gama LPN  Medication Review/Management: medications reviewed     Problem: Hypertension Comorbidity  Goal: Blood Pressure in Desired Range  Outcome: Ongoing, Progressing  Intervention: Maintain Blood Pressure Management  Description: Evaluate adherence to home antihypertensive regimen (e.g., exercise and activity, diet modification, medication).  Provide scheduled antihypertensive medication; consider administration time and effects (e.g., avoid giving diuretic prior to bedtime).  Monitor response to antihypertensive medication therapy (e.g., blood pressure, electrolyte levels, medication  effects).  Minimize risk of orthostatic hypotension; encourage caution with position changes, particularly if elderly.  Recent Flowsheet Documentation  Taken 11/12/2023 0026 by Haim Gama LPN  Medication Review/Management: medications reviewed  Taken 11/11/2023 2207 by Haim Gama LPN  Medication Review/Management: medications reviewed     Problem: Obstructive Sleep Apnea Risk or Actual Comorbidity Management  Goal: Unobstructed Breathing During Sleep  Outcome: Ongoing, Progressing     Problem: Osteoarthritis Comorbidity  Goal: Maintenance of Osteoarthritis Symptom Control  Outcome: Ongoing, Progressing  Intervention: Maintain Osteoarthritis Symptom Control  Description: Evaluate adherence to self-management plan, such as medication, exercise and weight management.  Advocate for continuation of home regimen, such as medication, physical activity and thermal agents; monitor response.  Encourage participation in functional activities, such as mobility and ADLs (activities of daily living) to minimize decline associated with inactivity.  Facilitate use of patient-specific assistive devices, equipment or orthoses.  Evaluate effectiveness of coping skills; encourage expression of feelings, expectations and concerns related to disease management and quality of life; reinforce education to enhance management plan and wellbeing.  Recent Flowsheet Documentation  Taken 11/12/2023 0026 by Haim Gama LPN  Medication Review/Management: medications reviewed  Taken 11/11/2023 2207 by Haim Gama LPN  Medication Review/Management: medications reviewed  Taken 11/11/2023 1904 by Haim Gama LPN  Activity Management: activity encouraged     Problem: Pain Chronic (Persistent) (Comorbidity Management)  Goal: Acceptable Pain Control and Functional Ability  Outcome: Ongoing, Progressing  Intervention: Manage Persistent Pain  Description: Evaluate pain level, effect of treatment and patient response at regular  intervals.  Minimize pain stimuli; coordinate care and adjust environment (e.g., light, noise, unnecessary movement); promote sleep/rest.  Match pharmacologic analgesia to severity and type of pain mechanism (e.g., neuropathic, muscle, inflammatory); consider multimodal approach (e.g., nonopioid, opioid, adjuvant).  Provide medication at regular intervals; titrate to patient response.  Manage breakthrough pain with additional doses; consider rotation or switching medication.  Monitor for signs of substance tolerance (increased dose to reach desired effect, decreased effect with same dose).  Avoid abrupt withdrawal of medication, especially agents capable of causing physical dependence.  Manage medication-induced effects, such as constipation, nausea, pruritus, urinary retention, somnolence and dizziness.  Provide multimodal treatment interventions, such as physical activity, therapeutic exercise, yoga, TENS (transcutaneous electrical nerve stimulation) and manual therapy.  Train in functional activity modifications, such as body mechanics, posture, ergonomics, energy conservation and activity pacing.  Consider addition of complementary or alternative therapy, such as acupuncture, hypnosis or therapeutic touch.  Recent Flowsheet Documentation  Taken 11/12/2023 0026 by Haim Gama LPN  Medication Review/Management: medications reviewed  Taken 11/11/2023 2207 by Haim Gama LPN  Medication Review/Management: medications reviewed  Taken 11/11/2023 1904 by Haim Gama LPN  Bowel Elimination Promotion: adequate fluid intake promoted  Intervention: Optimize Psychosocial Wellbeing  Description: Facilitate patient’s self-control over pain by providing pain information and allowing choices in treatment.  Consider and address emotional response to pain.  Explore and promote use of coping strategies; address barriers to successful coping.  Evaluate and assist with psychosocial, cultural and spiritual factors  impacting pain.  Modify pain perception by using techniques, such as distraction, mindfulness, guided imagery, meditation or music.  Assess and monitor for signs and symptoms of behavioral health concerns, such as unhealthy substance use, depression and suicidal ideation.  Consider referral for ongoing coping support, such as cognitive behavioral therapy and mindfulness-based stress reduction.  Recent Flowsheet Documentation  Taken 11/11/2023 1904 by Haim Gama LPN  Supportive Measures: active listening utilized  Diversional Activities:   television   smartphone  Family/Support System Care: self-care encouraged     Problem: Seizure Disorder Comorbidity  Goal: Maintenance of Seizure Control  Outcome: Ongoing, Progressing     Problem: Fall Injury Risk  Goal: Absence of Fall and Fall-Related Injury  Outcome: Ongoing, Progressing  Intervention: Identify and Manage Contributors  Description: Develop a fall prevention plan with the patient and caregiver/family.  Provide reorientation, appropriate sensory stimulation and routines with changes in mental status to decrease risk of fall.  Promote use of personal vision and auditory aids.  Assess assistance level required for safe and effective self-care; provide support as needed, such as toileting, mobilization. For age 65 and older, implement timed toileting with assistance.  Encourage physical activity, such as performance of mobility and self-care at highest level of patient ability, multicomponent exercise program and provision of appropriate assistive devices.  If fall occurs, assess the severity of injury; implement fall injury protocol. Determine the cause and revise fall injury prevention plan.  Regularly review medication contribution to fall risk; adjust medication administration times to minimize risk of falling.  Consider risk related to polypharmacy and age.  Balance adequate pain management with potential for oversedation.  Recent Flowsheet  Documentation  Taken 11/12/2023 0026 by Haim Gama LPN  Medication Review/Management: medications reviewed  Taken 11/11/2023 2207 by Haim Gama LPN  Medication Review/Management: medications reviewed  Intervention: Promote Injury-Free Environment  Description: Provide a safe, barrier-free environment that encourages independent activity.  Keep care area uncluttered and well-lighted.  Determine need for increased observation or monitoring.  Avoid use of devices that minimize mobility, such as restraints or indwelling urinary catheter.  Recent Flowsheet Documentation  Taken 11/12/2023 0026 by Haim Gama LPN  Safety Promotion/Fall Prevention:   clutter free environment maintained   room organization consistent   safety round/check completed  Taken 11/11/2023 2207 by Haim Gama LPN  Safety Promotion/Fall Prevention:   clutter free environment maintained   room organization consistent   safety round/check completed  Taken 11/11/2023 1904 by Haim Gama LPN  Safety Promotion/Fall Prevention:   clutter free environment maintained   room organization consistent   safety round/check completed     Problem: Fluid Imbalance (Pneumonia)  Goal: Fluid Balance  Outcome: Ongoing, Progressing     Problem: Infection (Pneumonia)  Goal: Resolution of Infection Signs and Symptoms  Outcome: Ongoing, Progressing     Problem: Respiratory Compromise (Pneumonia)  Goal: Effective Oxygenation and Ventilation  Outcome: Ongoing, Progressing  Intervention: Promote Airway Secretion Clearance  Description: Assess the effectiveness of pulmonary hygiene and ability to perform airway clearance techniques.  Promote early mobility or ambulation; match activity to ability and tolerance.  Encourage deep breathing and lung expansion therapy to prevent atelectasis; adjust treatment to patient’s response.  Anticipate the need to splint chest or abdominal wall with cough to minimize discomfort; assist if needed.  Initiate  cough-enhancement and airway-clearance techniques with instruction.  Consider inhaled pharmacologic therapy (e.g., beta-2 agonist, mucolytic, corticosteroid, antimicrobial) to improve mucus clearance, inflammation, cough response and air flow.  Recent Flowsheet Documentation  Taken 11/11/2023 1904 by Haim Gama LPN  Cough And Deep Breathing: done independently per patient     Problem: Adjustment to Surgery (Urinary Diversion)  Goal: Psychosocial Adjustment Initiation  Outcome: Ongoing, Progressing  Intervention: Support Psychosocial Response to Surgery  Description: Provide opportunity for expression of thoughts, feelings and concerns regarding disrupted body function and loss of control.  Utilize nonpharmacologic strategies to decrease surgery-related anxiety or stress and restore a sense of control.  Acknowledge, normalize and validate potential life-long and/or temporary lifestyle adjustments.  Assess and monitor patient perception of body image (e.g., appearance, attractiveness, sexuality).  Empathetically discuss feelings related to stigmatization of ostomy odor, leakage and appearance; strategize ways to maintain quality of life.  Recent Flowsheet Documentation  Taken 11/11/2023 1904 by Haim Gama LPN  Supportive Measures: active listening utilized     Problem: Bleeding (Urinary Diversion)  Goal: Absence of Bleeding  Outcome: Ongoing, Progressing     Problem: Bowel Motility Impaired (Urinary Diversion)  Goal: Effective Bowel Elimination  Outcome: Ongoing, Progressing  Intervention: Enhance Bowel Motility and Elimination  Description: Initiate early feeding (oral, enteral) to maintain gut integrity and function; if oral intake is not feasible (e.g., nausea and vomiting), gum chewing can be an alternative strategy.  Promote activity and mobility.  Evaluate factors that may contribute to decreased bowel motility and constipation (e.g., iron supplement, opiate use, pain or fear); anticipate need for  stool softener or change in pain regimen.  Continue home bowel regimen, if possible.  Establish regular, unhurried time for elimination.  Promote privacy and comfort; position to facilitate elimination.  Monitor stool characteristics, abdominal girth, bowel sounds, expression of symptoms and relief.  Recent Flowsheet Documentation  Taken 11/11/2023 1904 by Haim Gama LPN  Bowel Elimination Promotion: adequate fluid intake promoted     Problem: Fluid and Electrolyte Imbalance (Urinary Diversion)  Goal: Fluid and Electrolyte Balance  Outcome: Ongoing, Progressing     Problem: Infection (Urinary Diversion)  Goal: Absence of Infection Signs and Symptoms  Outcome: Ongoing, Progressing  Intervention: Prevent or Manage Infection  Description: Optimize activity and mobility to maximize infection resistance (e.g., reposition, sit in chair, ambulate).  Maintain normothermia and glycemic control to maximize infection resistance.  Maintain dressing and closed drainage system integrity to reduce the risk for infection; inspect incision as visible.  Implement transmission-based precautions and isolation, as indicated, to prevent spread of infection.  Discontinue prophylactic antimicrobial agent within 24 hours after procedure, as directed.  Identify early signs of sepsis, such as increased heart rate and decreased blood pressure, as well as changes in mental state, respiratory pattern or peripheral perfusion.  Prepare for rapid sepsis management, including lactate level, intravenous access, fluid administration and oxygen therapy.  Provide fever-reduction and comfort measures.  Recent Flowsheet Documentation  Taken 11/12/2023 0026 by Haim Gama LPN  Infection Prevention: environmental surveillance performed  Taken 11/11/2023 2207 by Haim Gama LPN  Infection Prevention: environmental surveillance performed  Taken 11/11/2023 1904 by Haim Gama LPN  Infection Prevention: environmental surveillance  performed     Problem: Ongoing Anesthesia Effects (Urinary Diversion)  Goal: Anesthesia/Sedation Recovery  Outcome: Ongoing, Progressing  Intervention: Optimize Anesthesia Recovery  Description: Assess and monitor airway, breathing and circulation; maintain close surveillance for deterioration.  Implement continuous monitoring, such as cardiorespiratory, blood pressure, temperature, pulse oximetry and capnography.  Elevate head of bed, if able; facilitate regular position changes.  Assess neurocognitive function and for risks that may lead to postoperative delirium, such as decreased level of consciousness, pain and agitation; offer reassurance; answer questions.  Assess and monitor neurovascular and neuromuscular function, such as motor strength, tone, posture, peripheral pulses and extremity sensation; protect areas of decreased sensation from heat, cold, medical devices or objects.  Individualize frequency and intensity of monitoring based on sedation or anesthesia administered, identified risk factors, ongoing assessment and organizational protocol.  Prepare for administration of pharmacologic therapy, such as reversal agent, antiemetic or antipruritic medication, to manage sedation or anesthesia effects.  Adjust environment to maintain safety (e.g., fall precautions, safety equipment).  Recent Flowsheet Documentation  Taken 11/12/2023 0026 by Haim Gama LPN  Safety Promotion/Fall Prevention:   clutter free environment maintained   room organization consistent   safety round/check completed  Taken 11/11/2023 2207 by Haim Gama LPN  Safety Promotion/Fall Prevention:   clutter free environment maintained   room organization consistent   safety round/check completed  Taken 11/11/2023 1904 by Haim Gama LPN  Safety Promotion/Fall Prevention:   clutter free environment maintained   room organization consistent   safety round/check completed     Problem: Pain (Urinary Diversion)  Goal: Acceptable  Pain Control  Outcome: Ongoing, Progressing  Intervention: Prevent or Manage Pain  Description: Develop mutual pain management plan with patient and caregiver/family; review regularly.  Evaluate risk for opioid use; individualize pharmacologic pain management plan and titrate medication to patient response.  Combine multimodal analgesia and nonpharmacologic strategies to help potentiate synergistic effects and enhance comfort (e.g., complementary therapy, diversional activity).  Provide around-the-clock dosing of pain medication to keep pain levels in control.  Manage medication-induced effects, such as respiratory depression, constipation, nausea, vomiting.  Minimize pain stimuli; coordinate care and adjust environment (e.g., light, noise, unnecessary movement); promote sleep/rest for optimal healing.  Recent Flowsheet Documentation  Taken 11/11/2023 1904 by Haim Gama LPN  Diversional Activities:   television   smartphone     Problem: Postoperative Nausea and Vomiting (Urinary Diversion)  Goal: Nausea and Vomiting Relief  Outcome: Ongoing, Progressing     Problem: Postoperative Stoma Care (Urinary Diversion)  Goal: Optimal Stoma Healing  Outcome: Ongoing, Progressing     Problem: Respiratory Compromise (Urinary Diversion)  Goal: Effective Oxygenation and Ventilation  Outcome: Ongoing, Progressing     Problem: Urine Elimination Impaired (Urinary Diversion)  Goal: Effective Urinary Elimination  Outcome: Ongoing, Progressing   Goal Outcome Evaluation:  Plan of Care Reviewed With: patient        Progress: improving  Outcome Evaluation: Patient appears to be resting thought the shift. Meds were administered see MAR.Patient call light within reach.

## 2023-11-12 NOTE — PLAN OF CARE
Problem: Adult Inpatient Plan of Care  Goal: Plan of Care Review  Outcome: Ongoing, Progressing  Flowsheets (Taken 11/12/2023 1551)  Progress: improving  Outcome Evaluation: Patient rested throughout the shift with no complaints at this time.  Goal: Patient-Specific Goal (Individualized)  Outcome: Ongoing, Progressing  Goal: Absence of Hospital-Acquired Illness or Injury  Outcome: Ongoing, Progressing  Intervention: Identify and Manage Fall Risk  Recent Flowsheet Documentation  Taken 11/12/2023 1456 by Haim Blake RN  Safety Promotion/Fall Prevention: safety round/check completed  Taken 11/12/2023 1253 by Haim Blake RN  Safety Promotion/Fall Prevention: safety round/check completed  Taken 11/12/2023 1055 by Haim Blake RN  Safety Promotion/Fall Prevention: safety round/check completed  Taken 11/12/2023 0828 by Haim Blake RN  Safety Promotion/Fall Prevention:   safety round/check completed   room organization consistent   nonskid shoes/slippers when out of bed   fall prevention program maintained   clutter free environment maintained   assistive device/personal items within reach   activity supervised  Intervention: Prevent Skin Injury  Recent Flowsheet Documentation  Taken 11/12/2023 0828 by Haim Blake RN  Skin Protection:   adhesive use limited   skin-to-skin areas padded   transparent dressing maintained  Intervention: Prevent and Manage VTE (Venous Thromboembolism) Risk  Recent Flowsheet Documentation  Taken 11/12/2023 0828 by Haim Blake RN  VTE Prevention/Management:   bilateral   sequential compression devices off  Intervention: Prevent Infection  Recent Flowsheet Documentation  Taken 11/12/2023 0828 by Haim Blake RN  Infection Prevention:   single patient room provided   rest/sleep promoted   personal protective equipment utilized   hand hygiene promoted  Goal: Optimal Comfort and Wellbeing  Outcome: Ongoing, Progressing  Intervention: Provide Person-Centered  Care  Recent Flowsheet Documentation  Taken 11/12/2023 0828 by Haim Blake RN  Trust Relationship/Rapport:   care explained   choices provided   questions answered   questions encouraged  Goal: Readiness for Transition of Care  Outcome: Ongoing, Progressing     Problem: Asthma Comorbidity  Goal: Maintenance of Asthma Control  Outcome: Ongoing, Progressing  Intervention: Maintain Asthma Symptom Control  Recent Flowsheet Documentation  Taken 11/12/2023 1456 by Haim Blake RN  Medication Review/Management: medications reviewed  Taken 11/12/2023 1253 by Haim Blake RN  Medication Review/Management: medications reviewed  Taken 11/12/2023 1055 by Haim Blake RN  Medication Review/Management: medications reviewed  Taken 11/12/2023 0828 by Haim Blake RN  Medication Review/Management: medications reviewed     Problem: Behavioral Health Comorbidity  Goal: Maintenance of Behavioral Health Symptom Control  Outcome: Ongoing, Progressing  Intervention: Maintain Behavioral Health Symptom Control  Recent Flowsheet Documentation  Taken 11/12/2023 1456 by Haim Blake RN  Medication Review/Management: medications reviewed  Taken 11/12/2023 1253 by Haim Blake RN  Medication Review/Management: medications reviewed  Taken 11/12/2023 1055 by Haim Blake RN  Medication Review/Management: medications reviewed  Taken 11/12/2023 0828 by Haim Blake RN  Medication Review/Management: medications reviewed     Problem: COPD (Chronic Obstructive Pulmonary Disease) Comorbidity  Goal: Maintenance of COPD Symptom Control  Outcome: Ongoing, Progressing  Intervention: Maintain COPD-Symptom Control  Recent Flowsheet Documentation  Taken 11/12/2023 1456 by Haim Blake RN  Medication Review/Management: medications reviewed  Taken 11/12/2023 1253 by Haim Blake RN  Medication Review/Management: medications reviewed  Taken 11/12/2023 1055 by Haim Blake RN  Medication Review/Management:  medications reviewed  Taken 11/12/2023 0828 by Haim Blake RN  Supportive Measures: active listening utilized  Medication Review/Management: medications reviewed     Problem: Diabetes Comorbidity  Goal: Blood Glucose Level Within Targeted Range  Outcome: Ongoing, Progressing     Problem: Heart Failure Comorbidity  Goal: Maintenance of Heart Failure Symptom Control  Outcome: Ongoing, Progressing  Intervention: Maintain Heart Failure-Management  Recent Flowsheet Documentation  Taken 11/12/2023 1456 by Haim Blake RN  Medication Review/Management: medications reviewed  Taken 11/12/2023 1253 by Haim Blake RN  Medication Review/Management: medications reviewed  Taken 11/12/2023 1055 by Haim Blake RN  Medication Review/Management: medications reviewed  Taken 11/12/2023 0828 by Haim Blake RN  Medication Review/Management: medications reviewed     Problem: Hypertension Comorbidity  Goal: Blood Pressure in Desired Range  Outcome: Ongoing, Progressing  Intervention: Maintain Blood Pressure Management  Recent Flowsheet Documentation  Taken 11/12/2023 1456 by Haim Blake RN  Medication Review/Management: medications reviewed  Taken 11/12/2023 1253 by Haim Blake RN  Medication Review/Management: medications reviewed  Taken 11/12/2023 1055 by Haim Blake RN  Medication Review/Management: medications reviewed  Taken 11/12/2023 0828 by Haim Blake RN  Medication Review/Management: medications reviewed     Problem: Obstructive Sleep Apnea Risk or Actual Comorbidity Management  Goal: Unobstructed Breathing During Sleep  Outcome: Ongoing, Progressing     Problem: Osteoarthritis Comorbidity  Goal: Maintenance of Osteoarthritis Symptom Control  Outcome: Ongoing, Progressing  Intervention: Maintain Osteoarthritis Symptom Control  Recent Flowsheet Documentation  Taken 11/12/2023 1456 by Haim Blake RN  Medication Review/Management: medications reviewed  Taken 11/12/2023 1253 by  Haim Blake RN  Medication Review/Management: medications reviewed  Taken 11/12/2023 1055 by Haim Blake RN  Medication Review/Management: medications reviewed  Taken 11/12/2023 0828 by Haim Blake RN  Medication Review/Management: medications reviewed     Problem: Pain Chronic (Persistent) (Comorbidity Management)  Goal: Acceptable Pain Control and Functional Ability  Outcome: Ongoing, Progressing  Intervention: Manage Persistent Pain  Recent Flowsheet Documentation  Taken 11/12/2023 1456 by Haim Blake RN  Medication Review/Management: medications reviewed  Taken 11/12/2023 1253 by Haim Blake RN  Medication Review/Management: medications reviewed  Taken 11/12/2023 1055 by Haim Blake RN  Medication Review/Management: medications reviewed  Taken 11/12/2023 0828 by Haim Blake RN  Sleep/Rest Enhancement: awakenings minimized  Medication Review/Management: medications reviewed  Intervention: Optimize Psychosocial Wellbeing  Recent Flowsheet Documentation  Taken 11/12/2023 0828 by Haim Blake RN  Supportive Measures: active listening utilized  Diversional Activities: television     Problem: Seizure Disorder Comorbidity  Goal: Maintenance of Seizure Control  Outcome: Ongoing, Progressing     Problem: Fall Injury Risk  Goal: Absence of Fall and Fall-Related Injury  Outcome: Ongoing, Progressing  Intervention: Identify and Manage Contributors  Recent Flowsheet Documentation  Taken 11/12/2023 1456 by Haim Blake RN  Medication Review/Management: medications reviewed  Taken 11/12/2023 1253 by Haim Blake RN  Medication Review/Management: medications reviewed  Taken 11/12/2023 1055 by Haim Blake RN  Medication Review/Management: medications reviewed  Taken 11/12/2023 0828 by Haim Blake RN  Medication Review/Management: medications reviewed  Intervention: Promote Injury-Free Environment  Recent Flowsheet Documentation  Taken 11/12/2023 1456 by Haim Blake  RN  Safety Promotion/Fall Prevention: safety round/check completed  Taken 11/12/2023 1253 by Haim Blake RN  Safety Promotion/Fall Prevention: safety round/check completed  Taken 11/12/2023 1055 by Hiam Blake RN  Safety Promotion/Fall Prevention: safety round/check completed  Taken 11/12/2023 0828 by Haim Blake RN  Safety Promotion/Fall Prevention:   safety round/check completed   room organization consistent   nonskid shoes/slippers when out of bed   fall prevention program maintained   clutter free environment maintained   assistive device/personal items within reach   activity supervised     Problem: Fluid Imbalance (Pneumonia)  Goal: Fluid Balance  Outcome: Ongoing, Progressing     Problem: Infection (Pneumonia)  Goal: Resolution of Infection Signs and Symptoms  Outcome: Ongoing, Progressing     Problem: Respiratory Compromise (Pneumonia)  Goal: Effective Oxygenation and Ventilation  Outcome: Ongoing, Progressing  Intervention: Promote Airway Secretion Clearance  Recent Flowsheet Documentation  Taken 11/12/2023 0828 by Haim Blake RN  Cough And Deep Breathing: done independently per patient     Problem: Adjustment to Surgery (Urinary Diversion)  Goal: Psychosocial Adjustment Initiation  Outcome: Ongoing, Progressing  Intervention: Support Psychosocial Response to Surgery  Recent Flowsheet Documentation  Taken 11/12/2023 0828 by Haim Blake RN  Supportive Measures: active listening utilized     Problem: Bleeding (Urinary Diversion)  Goal: Absence of Bleeding  Outcome: Ongoing, Progressing     Problem: Bowel Motility Impaired (Urinary Diversion)  Goal: Effective Bowel Elimination  Outcome: Ongoing, Progressing     Problem: Fluid and Electrolyte Imbalance (Urinary Diversion)  Goal: Fluid and Electrolyte Balance  Outcome: Ongoing, Progressing     Problem: Infection (Urinary Diversion)  Goal: Absence of Infection Signs and Symptoms  Outcome: Ongoing, Progressing  Intervention: Prevent or  Manage Infection  Recent Flowsheet Documentation  Taken 11/12/2023 0828 by Haim Blake RN  Infection Prevention:   single patient room provided   rest/sleep promoted   personal protective equipment utilized   hand hygiene promoted     Problem: Ongoing Anesthesia Effects (Urinary Diversion)  Goal: Anesthesia/Sedation Recovery  Outcome: Ongoing, Progressing  Intervention: Optimize Anesthesia Recovery  Recent Flowsheet Documentation  Taken 11/12/2023 1456 by Haim Blake RN  Safety Promotion/Fall Prevention: safety round/check completed  Taken 11/12/2023 1253 by Haim Blake RN  Safety Promotion/Fall Prevention: safety round/check completed  Taken 11/12/2023 1055 by Haim Blake RN  Safety Promotion/Fall Prevention: safety round/check completed  Taken 11/12/2023 0828 by Haim Blake RN  Safety Promotion/Fall Prevention:   safety round/check completed   room organization consistent   nonskid shoes/slippers when out of bed   fall prevention program maintained   clutter free environment maintained   assistive device/personal items within reach   activity supervised     Problem: Pain (Urinary Diversion)  Goal: Acceptable Pain Control  Outcome: Ongoing, Progressing  Intervention: Prevent or Manage Pain  Recent Flowsheet Documentation  Taken 11/12/2023 0828 by Haim Blake RN  Diversional Activities: television     Problem: Postoperative Nausea and Vomiting (Urinary Diversion)  Goal: Nausea and Vomiting Relief  Outcome: Ongoing, Progressing     Problem: Postoperative Stoma Care (Urinary Diversion)  Goal: Optimal Stoma Healing  Outcome: Ongoing, Progressing     Problem: Respiratory Compromise (Urinary Diversion)  Goal: Effective Oxygenation and Ventilation  Outcome: Ongoing, Progressing     Problem: Urine Elimination Impaired (Urinary Diversion)  Goal: Effective Urinary Elimination  Outcome: Ongoing, Progressing   Goal Outcome Evaluation:  Plan of Care Reviewed With: patient        Progress:  improving  Outcome Evaluation: Patient rested throughout the shift with no complaints at this time.

## 2023-11-13 ENCOUNTER — READMISSION MANAGEMENT (OUTPATIENT)
Dept: CALL CENTER | Facility: HOSPITAL | Age: 73
End: 2023-11-13
Payer: MEDICARE

## 2023-11-13 VITALS
WEIGHT: 179.45 LBS | TEMPERATURE: 99.3 F | OXYGEN SATURATION: 94 % | SYSTOLIC BLOOD PRESSURE: 130 MMHG | RESPIRATION RATE: 17 BRPM | DIASTOLIC BLOOD PRESSURE: 57 MMHG | BODY MASS INDEX: 24.31 KG/M2 | HEART RATE: 62 BPM | HEIGHT: 72 IN

## 2023-11-13 PROBLEM — J18.9 PNEUMONIA, UNSPECIFIED ORGANISM: Status: ACTIVE | Noted: 2023-11-13

## 2023-11-13 LAB
ALBUMIN SERPL-MCNC: 3.2 G/DL (ref 3.5–5.2)
ANION GAP SERPL CALCULATED.3IONS-SCNC: 8 MMOL/L (ref 5–15)
BACTERIA SPEC AEROBE CULT: NORMAL
BACTERIA SPEC AEROBE CULT: NORMAL
BUN SERPL-MCNC: 30 MG/DL (ref 8–23)
BUN/CREAT SERPL: 16.9 (ref 7–25)
CALCIUM SPEC-SCNC: 8.8 MG/DL (ref 8.6–10.5)
CHLORIDE SERPL-SCNC: 99 MMOL/L (ref 98–107)
CO2 SERPL-SCNC: 26 MMOL/L (ref 22–29)
CREAT SERPL-MCNC: 1.77 MG/DL (ref 0.76–1.27)
EGFRCR SERPLBLD CKD-EPI 2021: 40.1 ML/MIN/1.73
GLUCOSE SERPL-MCNC: 114 MG/DL (ref 65–99)
INR PPP: 2.75 (ref 2–3)
PHOSPHATE SERPL-MCNC: 4.1 MG/DL (ref 2.5–4.5)
POTASSIUM SERPL-SCNC: 4.8 MMOL/L (ref 3.5–5.2)
PROTHROMBIN TIME: 27.8 SECONDS (ref 19.4–28.5)
SODIUM SERPL-SCNC: 133 MMOL/L (ref 136–145)

## 2023-11-13 PROCEDURE — 94618 PULMONARY STRESS TESTING: CPT

## 2023-11-13 PROCEDURE — 94761 N-INVAS EAR/PLS OXIMETRY MLT: CPT

## 2023-11-13 PROCEDURE — 80069 RENAL FUNCTION PANEL: CPT | Performed by: FAMILY MEDICINE

## 2023-11-13 PROCEDURE — 97110 THERAPEUTIC EXERCISES: CPT

## 2023-11-13 PROCEDURE — 85610 PROTHROMBIN TIME: CPT | Performed by: FAMILY MEDICINE

## 2023-11-13 PROCEDURE — 94799 UNLISTED PULMONARY SVC/PX: CPT

## 2023-11-13 PROCEDURE — 94664 DEMO&/EVAL PT USE INHALER: CPT

## 2023-11-13 PROCEDURE — 97530 THERAPEUTIC ACTIVITIES: CPT

## 2023-11-13 PROCEDURE — 97116 GAIT TRAINING THERAPY: CPT

## 2023-11-13 RX ORDER — IPRATROPIUM BROMIDE AND ALBUTEROL SULFATE 2.5; .5 MG/3ML; MG/3ML
3 SOLUTION RESPIRATORY (INHALATION) EVERY 4 HOURS PRN
Status: DISCONTINUED | OUTPATIENT
Start: 2023-11-13 | End: 2023-11-13 | Stop reason: HOSPADM

## 2023-11-13 RX ORDER — BUDESONIDE 0.5 MG/2ML
0.5 INHALANT ORAL
Status: DISCONTINUED | OUTPATIENT
Start: 2023-11-13 | End: 2023-11-13 | Stop reason: HOSPADM

## 2023-11-13 RX ORDER — CEFDINIR 300 MG/1
300 CAPSULE ORAL EVERY 12 HOURS SCHEDULED
Qty: 5 CAPSULE | Refills: 0 | Status: SHIPPED | OUTPATIENT
Start: 2023-11-13 | End: 2023-11-16

## 2023-11-13 RX ADMIN — SODIUM BICARBONATE 650 MG: 650 TABLET ORAL at 09:25

## 2023-11-13 RX ADMIN — CEFDINIR 300 MG: 300 CAPSULE ORAL at 09:25

## 2023-11-13 RX ADMIN — IPRATROPIUM BROMIDE AND ALBUTEROL SULFATE 3 ML: .5; 3 SOLUTION RESPIRATORY (INHALATION) at 08:14

## 2023-11-13 RX ADMIN — GABAPENTIN 100 MG: 100 CAPSULE ORAL at 09:25

## 2023-11-13 RX ADMIN — GLIPIZIDE 2.5 MG: 5 TABLET ORAL at 09:25

## 2023-11-13 RX ADMIN — GABAPENTIN 100 MG: 100 CAPSULE ORAL at 18:30

## 2023-11-13 NOTE — DISCHARGE SUMMARY
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          Hospital Course    Michael Dorantes is a 73-year-old male who presented to [facility] ED on 11/8/2023 with progressive shortness of breath and cough.  Patient tried medication at home but decompensated.  While in ED he was found to have community-acquired pneumonia, left lower lobe with nodular densities.  Pulmonology followed.  Patient did require 2 L of oxygen.  Viral panel was negative.  Patient was treated with Rocephin and doxycycline.  He will discharge on cefdinir p.o. for 5 days.  Patient was followed by nephrology for acute on chronic kidney disease.  Patient is hemodynamically stable at time of discharge.  He is deaf and has  at bedside.  He agrees with above plan.    Procedures Performed         Consults:   Consults       Date and Time Order Name Status Description    11/9/2023  9:01 AM Inpatient Nephrology Consult Completed     11/9/2023  9:01 AM Inpatient Pulmonology Consult Completed             Pertinent Test Results:    Lab Results (most recent)       Procedure Component Value Units Date/Time    Blood  Culture - Blood, Arm, Left [[id number]]  (Normal) Collected: 11/09/23 1137    Specimen: Blood from Arm, Left Updated: 11/13/23 1301     Blood Culture No growth at 4 days    Blood Culture - Blood, Arm, Left [[id number]]  (Normal) Collected: 11/09/23 1205    Specimen: Blood from Arm, Left Updated: 11/13/23 1245     Blood Culture No growth at 4 days    Renal Function Panel [[id number]]  (Abnormal) Collected: 11/13/23 0020    Specimen: Blood from Arm, Left Updated: 11/13/23 0056     Glucose 114 mg/dL      BUN 30 mg/dL      Creatinine 1.77 mg/dL      Sodium 133 mmol/L      Potassium 4.8 mmol/L      Chloride 99 mmol/L      CO2 26.0 mmol/L      Calcium 8.8 mg/dL      Albumin 3.2 g/dL      Phosphorus 4.1 mg/dL      Anion Gap 8.0 mmol/L      BUN/Creatinine Ratio 16.9     eGFR 40.1 mL/min/1.73     Narrative:      GFR Normal >60  Chronic Kidney Disease <60  Kidney Failure <15    The GFR formula is only valid for adults with stable renal function between ages 18 and 70.    Protime-INR [[id number]]  (Normal) Collected: 11/13/23 0020    Specimen: Blood from Arm, Left Updated: 11/13/23 0049     Protime 27.8 Seconds      INR 2.75    Renal Function Panel [[id number]]  (Abnormal) Collected: 11/12/23 0551    Specimen: Blood Updated: 11/12/23 0625     Glucose 110 mg/dL      BUN 26 mg/dL      Creatinine 1.95 mg/dL      Sodium 133 mmol/L      Potassium 4.9 mmol/L      Comment: Slight hemolysis detected by analyzer. Result may be falsely elevated.        Chloride 100 mmol/L      CO2 23.0 mmol/L      Calcium 8.8 mg/dL      Albumin 3.0 g/dL      Phosphorus 3.6 mg/dL      Anion Gap 10.0 mmol/L      BUN/Creatinine Ratio 13.3     eGFR 35.7 mL/min/1.73     Narrative:      GFR Normal >60  Chronic Kidney Disease <60  Kidney Failure <15    The GFR formula is only valid for adults with stable renal function between ages 18 and 70.    S. Pneumo Ag Urine or CSF - Urine, Urine, Clean Catch [[id number]]  (Normal) Collected: 11/11/23 0255    Specimen: Urine,  Clean Catch Updated: 11/11/23 0430     Strep Pneumo Ag Negative    Legionella Antigen, Urine - Urine, Urine, Clean Catch [[id number]]  (Normal) Collected: 11/11/23 0255    Specimen: Urine, Clean Catch Updated: 11/11/23 0430     LEGIONELLA ANTIGEN, URINE Negative    Protime-INR [[id number]]  (Abnormal) Collected: 11/10/23 2315    Specimen: Blood from Arm, Left Updated: 11/10/23 2338     Protime 20.1 Seconds      INR 1.94    POC Glucose Once [[id number]]  (Abnormal) Collected: 11/10/23 1627    Specimen: Blood Updated: 11/10/23 1629     Glucose 128 mg/dL      Comment: Serial Number: [id number]:  [id number]       POC Glucose Once [[id number]]  (Abnormal) Collected: 11/10/23 1119    Specimen: Blood Updated: 11/10/23 1127     Glucose 137 mg/dL      Comment: Serial Number: [id number]:  [id number]       CBC (No Diff) [[id number]]  (Abnormal) Collected: 11/10/23 0915    Specimen: Blood Updated: 11/10/23 1007     WBC 3.80 10*3/mm3      RBC 3.34 10*6/mm3      Hemoglobin 9.8 g/dL      Hematocrit 29.5 %      MCV 88.2 fL      MCH 29.5 pg      MCHC 33.4 g/dL      RDW 13.5 %      RDW-SD 43.8 fl      MPV 7.7 fL      Platelets 168 10*3/mm3     Urinalysis, Microscopic Only - Urine, Clean Catch [[id number]]  (Abnormal) Collected: 11/08/23 1335    Specimen: Urine, Clean Catch Updated: 11/08/23 1437     RBC, UA 0-2 /HPF      WBC, UA 3-5 /HPF      Comment: Urine culture not indicated.        Bacteria, UA 1+ /HPF      Squamous Epithelial Cells, UA 0-2 /HPF      Hyaline Casts, UA None Seen /LPF      Triple Phosphate Crystals, UA Moderate/2+ /HPF      Amorphous Crystals, UA Small/1+ /HPF      Methodology Manual Light Microscopy    Respiratory Panel PCR w/COVID-19(SARS-CoV-2) BEATRICE/NICHOLAS/TIERA/PAD/COR/MAD/JUHI In-House, NP Swab in UTM/VTM, 3-4 HR TAT - Swab, Nasopharynx [[id number]]  (Normal) Collected: 11/08/23 1309    Specimen: Swab from Nasopharynx Updated: 11/08/23 1407     ADENOVIRUS, PCR Not Detected     Coronavirus 229E Not Detected      Coronavirus HKU1 Not Detected     Coronavirus NL63 Not Detected     Coronavirus OC43 Not Detected     COVID19 Not Detected     Human Metapneumovirus Not Detected     Human Rhinovirus/Enterovirus Not Detected     Influenza A PCR Not Detected     Influenza B PCR Not Detected     Parainfluenza Virus 1 Not Detected     Parainfluenza Virus 2 Not Detected     Parainfluenza Virus 3 Not Detected     Parainfluenza Virus 4 Not Detected     RSV, PCR Not Detected     Bordetella pertussis pcr Not Detected     Bordetella parapertussis PCR Not Detected     Chlamydophila pneumoniae PCR Not Detected     Mycoplasma pneumo by PCR Not Detected    Narrative:      In the setting of a positive respiratory panel with a viral infection PLUS a negative procalcitonin without other underlying concern for bacterial infection, consider observing off antibiotics or discontinuation of antibiotics and continue supportive care. If the respiratory panel is positive for atypical bacterial infection (Bordetella pertussis, Chlamydophila pneumoniae, or Mycoplasma pneumoniae), consider antibiotic de-escalation to target atypical bacterial infection.    Urinalysis With Culture If Indicated - Urine, Clean Catch [[id number]]  (Abnormal) Collected: 11/08/23 1335    Specimen: Urine, Clean Catch Updated: 11/08/23 1345     Color, UA Dark Yellow     Comment: Result checked          Appearance, UA Turbid     Comment: Result checked          pH, UA >=9.0     Specific Gravity, UA 1.024     Glucose,  mg/dL (Trace)     Ketones, UA Negative     Bilirubin, UA Negative     Blood, UA Trace     Protein, UA >=300 mg/dL (3+)     Leuk Esterase, UA Moderate (2+)     Nitrite, UA Negative     Urobilinogen, UA 1.0 E.U./dL    Narrative:      In absence of clinical symptoms, the presence of pyuria, bacteria, and/or nitrites on the urinalysis result does not correlate with infection.    Comprehensive Metabolic Panel [[id number]]  (Abnormal) Collected: 11/08/23 1310    Specimen:  Blood Updated: 11/08/23 1342     Glucose 127 mg/dL      BUN 40 mg/dL      Creatinine 2.51 mg/dL      Sodium 136 mmol/L      Potassium 4.0 mmol/L      Chloride 101 mmol/L      CO2 26.0 mmol/L      Calcium 8.8 mg/dL      Total Protein 6.3 g/dL      Albumin 3.5 g/dL      ALT (SGPT) 57 U/L      AST (SGOT) 52 U/L      Alkaline Phosphatase 77 U/L      Total Bilirubin 0.4 mg/dL      Globulin 2.8 gm/dL      A/G Ratio 1.3 g/dL      BUN/Creatinine Ratio 15.9     Anion Gap 9.0 mmol/L      eGFR 26.3 mL/min/1.73     Narrative:      GFR Normal >60  Chronic Kidney Disease <60  Kidney Failure <15    The GFR formula is only valid for adults with stable renal function between ages 18 and 70.    POC Lactate [[id number]]  (Normal) Collected: 11/08/23 1338    Specimen: Blood Updated: 11/08/23 1340     Lactate 0.3 mmol/L      Comment: Serial Number: [id number]:  [id number]       CBC & Differential [[id number]]  (Abnormal) Collected: 11/08/23 1310    Specimen: Blood Updated: 11/08/23 1339    Narrative:      The following orders were created for panel order CBC & Differential.  Procedure                               Abnormality         Status                     ---------                               -----------         ------                     CBC Auto Differential[[id number]]        Abnormal            Final result               Scan Slide[[id number]]                                                                    Please view results for these tests on the individual orders.    CBC Auto Differential [[id number]]  (Abnormal) Collected: 11/08/23 1310    Specimen: Blood Updated: 11/08/23 1339     WBC 4.40 10*3/mm3      RBC 3.61 10*6/mm3      Hemoglobin 10.9 g/dL      Hematocrit 32.6 %      MCV 90.3 fL      MCH 30.1 pg      MCHC 33.3 g/dL      RDW 13.8 %      RDW-SD 43.3 fl      MPV 7.2 fL      Platelets 152 10*3/mm3      Neutrophil % 56.8 %      Lymphocyte % 18.8 %      Monocyte % 22.8 %      Eosinophil % 1.1 %      Basophil % 0.5 %       Neutrophils, Absolute 2.50 10*3/mm3      Lymphocytes, Absolute 0.80 10*3/mm3      Monocytes, Absolute 1.00 10*3/mm3      Eosinophils, Absolute 0.00 10*3/mm3      Basophils, Absolute 0.00 10*3/mm3      nRBC 0.0 /100 WBC                     Condition on Discharge:  stable    Vital Signs  Temp:  [97.3 °F (36.3 °C)-98.9 °F (37.2 °C)] 97.3 °F (36.3 °C)  Heart Rate:  [60-72] 72  Resp:  [12-21] 12  BP: (140-142)/(70-72) 140/72      Physical Exam  Vitals reviewed.   HENT:      Head: Normocephalic.      Right Ear: External ear normal.      Left Ear: External ear normal.      Nose: Nose normal.   Eyes:      General:         Right eye: No discharge.         Left eye: No discharge.   Cardiovascular:      Rate and Rhythm: Normal rate.      Pulses: Normal pulses.   Pulmonary:      Effort: Pulmonary effort is normal.      Breath sounds: Normal breath sounds.   Abdominal:      General: Bowel sounds are normal.   Musculoskeletal:         General: Normal range of motion.   Skin:     General: Skin is warm and dry.   Neurological:      Mental Status: He is alert and oriented to person, place, and time.   Psychiatric:         Mood and Affect: Mood normal.         Behavior: Behavior normal.              Discharge Disposition: Stable    Discharge Medications     Discharge Medications        ASK your doctor about these medications        Instructions Start Date   amLODIPine 10 MG tablet  Commonly known as: NORVASC   10 mg, Oral, Nightly      cholecalciferol 25 MCG (1000 UT) tablet  Commonly known as: VITAMIN D3   1,000 Units, Oral, Daily      fish oil 1000 MG capsule capsule   1,000 mg, Oral, Daily With Breakfast      gabapentin 300 MG capsule  Commonly known as: NEURONTIN   300 mg, Oral, 3 Times Daily      glimepiride 1 MG tablet  Commonly known as: AMARYL   1 mg, Oral, Every Morning Before Breakfast      hydrALAZINE 50 MG tablet  Commonly known as: APRESOLINE   50 mg, Oral, 3 Times Daily      sodium bicarbonate 650 MG tablet   650  mg, Oral, 2 Times Daily      warfarin 5 MG tablet  Commonly known as: COUMADIN   5 mg, Oral, Daily               Discharge Diet:     Activity at Discharge:     Follow-up Appointments  No future appointments.      Test Results Pending at Discharge  Pending Labs       Order Current Status    Blood Culture - Blood, Arm, Left Preliminary result    Blood Culture - Blood, Arm, Left Preliminary result    Blood Culture - Blood, Arm, Left Preliminary result    Blood Culture - Blood, Hand, Right Preliminary result             JOANNE Jeffers  11/13/23  13:15 EST    Time: Discharge 29 min

## 2023-11-13 NOTE — PROGRESS NOTES
"NEPHROLOGY PROGRESS NOTE------[facility]    [facility]  [phone]  Saeed Briscoe MD      Patient Care Team:  Joey Islas MD as PCP - General (Family Medicine)  Neville Laughlin MD as Consulting Physician (Nephrology)      Provider:  Saeed Briscoe MD  Patient Name: Michael Dorantes  :  1950    SUBJECTIVE:  Follow-up LEO/CKD  No chest pain or shortness of breath  Patient is hearing impaired, communication via sign .    Medication:  amLODIPine, 10 mg, Oral, Nightly  cefdinir, 300 mg, Oral, Q12H  gabapentin, 100 mg, Oral, TID  glipizide, 2.5 mg, Oral, QAM AC  ipratropium-albuterol, 3 mL, Nebulization, 4x Daily - RT  sodium bicarbonate, 650 mg, Oral, BID  warfarin, 5 mg, Oral, Daily             OBJECTIVE    Vital Sign Min/Max for last 24 hours  Temp  Min: 97.3 °F (36.3 °C)  Max: 98.9 °F (37.2 °C)   BP  Min: 140/72  Max: 142/70   Pulse  Min: 60  Max: 72   Resp  Min: 12  Max: 22   SpO2  Min: 94 %  Max: 99 %   No data recorded   No data recorded     Flowsheet Rows      Flowsheet Row First Filed Value   Admission Height 182.9 cm (72\") Documented at 2023 1207   Admission Weight 81.4 kg (179 lb 7.3 oz) Documented at 2023 1207            I/O this shift:  In: -   Out: 1050 [Urine:1050]  I/O last 3 completed shifts:  In: 720 [P.O.:720]  Out: 3900 [Urine:3900]    Physical Exam:  General Appearance: alert, appears stated age and cooperative  Head: normocephalic, without obvious abnormality and atraumatic  Eyes: conjunctivae and sclerae normal and no icterus  Neck: supple and no JVD  Lungs: Scattered rhonchi  Heart: regular rhythm & normal rate and normal S1, S2  Chest: Wall no abnormalities observed  Abdomen: normal bowel sounds and soft, nontender  Extremities: moves extremities well, no edema, no cyanosis and no redness  Skin: no bleeding, bruising or rash, turgor normal, color normal and no lesions noted  Neurologic: " "Alert, and oriented. No focal deficits    Labs:    WBC WBC   Date Value Ref Range Status   11/10/2023 3.80 3.40 - 10.80 10*3/mm3 Final      HGB Hemoglobin   Date Value Ref Range Status   11/10/2023 9.8 (L) 13.0 - 17.7 g/dL Final      HCT Hematocrit   Date Value Ref Range Status   11/10/2023 29.5 (L) 37.5 - 51.0 % Final      Platelets No results found for: \"LABPLAT\"   MCV MCV   Date Value Ref Range Status   11/10/2023 88.2 79.0 - 97.0 fL Final          Sodium Sodium   Date Value Ref Range Status   11/13/2023 133 (L) 136 - 145 mmol/L Final   11/12/2023 133 (L) 136 - 145 mmol/L Final   11/10/2023 133 (L) 136 - 145 mmol/L Final   11/10/2023 135 (L) 136 - 145 mmol/L Final      Potassium Potassium   Date Value Ref Range Status   11/13/2023 4.8 3.5 - 5.2 mmol/L Final   11/12/2023 4.9 3.5 - 5.2 mmol/L Final     Comment:     Slight hemolysis detected by analyzer. Result may be falsely elevated.   11/10/2023 4.7 3.5 - 5.2 mmol/L Final   11/10/2023 4.6 3.5 - 5.2 mmol/L Final      Chloride Chloride   Date Value Ref Range Status   11/13/2023 99 98 - 107 mmol/L Final   11/12/2023 100 98 - 107 mmol/L Final   11/10/2023 102 98 - 107 mmol/L Final   11/10/2023 102 98 - 107 mmol/L Final      CO2 CO2   Date Value Ref Range Status   11/13/2023 26.0 22.0 - 29.0 mmol/L Final   11/12/2023 23.0 22.0 - 29.0 mmol/L Final   11/10/2023 24.0 22.0 - 29.0 mmol/L Final   11/10/2023 25.0 22.0 - 29.0 mmol/L Final      BUN BUN   Date Value Ref Range Status   11/13/2023 30 (H) 8 - 23 mg/dL Final   11/12/2023 26 (H) 8 - 23 mg/dL Final   11/10/2023 27 (H) 8 - 23 mg/dL Final   11/10/2023 26 (H) 8 - 23 mg/dL Final      Creatinine Creatinine   Date Value Ref Range Status   11/13/2023 1.77 (H) 0.76 - 1.27 mg/dL Final   11/12/2023 1.95 (H) 0.76 - 1.27 mg/dL Final   11/10/2023 1.89 (H) 0.76 - 1.27 mg/dL Final   11/10/2023 1.97 (H) 0.76 - 1.27 mg/dL Final      Calcium Calcium   Date Value Ref Range Status   11/13/2023 8.8 8.6 - 10.5 mg/dL Final   11/12/2023 8.8 " "8.6 - 10.5 mg/dL Final   11/10/2023 8.4 (L) 8.6 - 10.5 mg/dL Final   11/10/2023 8.3 (L) 8.6 - 10.5 mg/dL Final      PO4 No components found for: \"PO4\"   Albumin Albumin   Date Value Ref Range Status   11/13/2023 3.2 (L) 3.5 - 5.2 g/dL Final   11/12/2023 3.0 (L) 3.5 - 5.2 g/dL Final   11/10/2023 3.0 (L) 3.5 - 5.2 g/dL Final   11/10/2023 3.3 (L) 3.5 - 5.2 g/dL Final      Magnesium No results found for: \"MG\"   Uric Acid No components found for: \"URIC ACID\"     Imaging Results (Last 72 Hours)       ** No results found for the last 72 hours. **            Results for orders placed during the hospital encounter of 01/17/23    XR Chest 1 View    Narrative  XR CHEST 1 VW    Date of Exam: 1/17/2023 8:27 PM EST    Indication: fever/soa/back pain.    Comparison: 1/5/2022    Findings:  The heart and mediastinal contours are within normal limits. The lungs are clear. Osseous structures are unremarkable.    Impression  Impression:  No acute process    Electronically Signed: Joey Monroy  1/17/2023 9:26 PM EST  Workstation ID: [id number]      Results for orders placed during the hospital encounter of 01/05/22    XR Chest 1 View    Narrative  XR CHEST 1 VW    Date of Exam: 1/5/2022 23:38 EST    Indication: dyspnea.  Covid positive.    Comparison Exams: None available.    Technique: Portable AP chest    FINDINGS:  Ill-defined infiltrates are seen throughout the central to peripheral aspect of the lungs bilaterally with mild interstitial changes. No pleural effusions are observed. The cardiac silhouette is within normal limits for size for the portable study. The  mediastinum is unremarkable. No acute osseous abnormalities are identified.    Impression  Ill-defined infiltrates are noted bilaterally with mild interstitial changes. The findings suggest atypical/viral infection or multifocal pneumonia and suggest changes of Covid 19 infection. Recommend follow-up to ensure improvement/resolution.    Electronically Signed: Bran" MD Dixie 1/6/2022 0:03 EST      Results for orders placed in visit on 12/13/18    SCANNED - IMAGING      Results for orders placed during the hospital encounter of 10/17/19    Duplex Venous Lower Extremity - Left    Interpretation Summary  · Acute left lower extremity deep vein thrombosis noted in the common femoral, proximal femoral, mid femoral, distal femoral and popliteal.        ASSESSMENT / PLAN      Left lower lobe pneumonia    LEO-likely prerenal and or ATN in setting of decreased p.o. 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  Baseline creatinine around 2.  Left lower lobe pneumonia  Diabetes       Creatinine stable, BP OK  Clinically improving  Home soon        Saeed Briscoe MD  [facility]  [phone]  11/13/23  09:07 EST

## 2023-11-13 NOTE — PROGRESS NOTES
"Pharmacy dosing service  Anticoagulant  Warfarin     Subjective:    Michael Dorantes is a 73 y.o.male being continued on warfarin for History of DVT/PE.    INR Goal: 2 - 3  Home medication?: warfarin 5 mg PO daily  Bridge Therapy Present?:  No  Interacting Medications Evaluation (New/Present/Discontinued): cefdinir  Additional Contributing Factors: PNA      Assessment/Plan:    Patient on warfarin 5mg qd. INR increased by 0.8 within last 48 hours. Will hold dose for today and reassess INR tomorrow AM.     Continue to monitor and adjust based on INR.         Date 11/13           INR 2.75           Dose HOLD               Objective:  [Ht: 182.9 cm (72\"); Wt: 81.4 kg (179 lb 7.3 oz); BMI: Body mass index is 24.34 kg/m².]    Lab Results   Component Value Date    ALBUMIN 3.2 (L) 11/13/2023     Lab Results   Component Value Date    INR 2.75 11/13/2023    INR 1.94 (L) 11/10/2023    INR 1.96 (L) 11/10/2023    PROTIME 27.8 11/13/2023    PROTIME 20.1 11/10/2023    PROTIME 20.3 11/10/2023     Lab Results   Component Value Date    HGB 9.8 (L) 11/10/2023    HGB 10.9 (L) 11/08/2023    HGB 10.9 (L) 01/21/2023     Lab Results   Component Value Date    HCT 29.5 (L) 11/10/2023    HCT 32.6 (L) 11/08/2023    HCT 33.7 (L) 01/21/2023       Yue Paul RPH  11/13/23 11:32 EST    "

## 2023-11-13 NOTE — PROGRESS NOTES
Exercise Oximetry    Patient Name:Michael Dorantes   MRN: [id number]   Date: 11/13/23             ROOM AIR BASELINE   SpO2% 92   Heart Rate 61   Blood Pressure      EXERCISE ON ROOM AIR SpO2% EXERCISE ON O2 @ LPM SpO2%   1 MINUTE 93 1 MINUTE    2 MINUTES 90 2 MINUTES    3 MINUTES 92 3 MINUTES    4 MINUTES 92 4 MINUTES    5 MINUTES 94 5 MINUTES    6 MINUTES 93 6 MINUTES               Distance Walked  6 mins Distance Walked   Dyspnea (Nolan Scale)   Dyspnea (Nolan Scale)   Fatigue (Nolan Scale)   Fatigue (Nolan Scale)   SpO2% Post Exercise   94 SpO2% Post Exercise   HR Post Exercise  63 HR Post Exercise   Time to Recovery  NA Time to Recovery     Comments: At this time patient was able to maintain 90% spo2 and above for the duration of the walk, he does not require o2 for home use at this time.

## 2023-11-13 NOTE — PLAN OF CARE
Problem: Adult Inpatient Plan of Care  Goal: Plan of Care Review  11/13/2023 1821 by Haily Moon RN  Outcome: Met  11/13/2023 1747 by Haily Moon RN  Outcome: Ongoing, Progressing  Goal: Patient-Specific Goal (Individualized)  11/13/2023 1821 by Haily Moon RN  Outcome: Met  11/13/2023 1747 by Haily Moon RN  Outcome: Ongoing, Progressing  Goal: Absence of Hospital-Acquired Illness or Injury  11/13/2023 1821 by Haily Moon RN  Outcome: Met  11/13/2023 1747 by Haily Moon RN  Outcome: Ongoing, Progressing  Intervention: Identify and Manage Fall Risk  Recent Flowsheet Documentation  Taken 11/13/2023 1659 by Haily Moon RN  Safety Promotion/Fall Prevention: safety round/check completed  Taken 11/13/2023 1459 by Haily Moon RN  Safety Promotion/Fall Prevention: safety round/check completed  Taken 11/13/2023 1259 by Haily Moon RN  Safety Promotion/Fall Prevention: safety round/check completed  Taken 11/13/2023 1104 by Haily Moon RN  Safety Promotion/Fall Prevention: safety round/check completed  Taken 11/13/2023 1000 by Haily Moon RN  Safety Promotion/Fall Prevention: safety round/check completed  Taken 11/13/2023 0859 by Haily Moon RN  Safety Promotion/Fall Prevention:   safety round/check completed   assistive device/personal items within reach   clutter free environment maintained   nonskid shoes/slippers when out of bed  Intervention: Prevent and Manage VTE (Venous Thromboembolism) Risk  Recent Flowsheet Documentation  Taken 11/13/2023 0859 by Haily Moon RN  VTE Prevention/Management:   bilateral   sequential compression devices off  Range of Motion: active ROM (range of motion) encouraged  Intervention: Prevent Infection  Recent Flowsheet Documentation  Taken 11/13/2023 0859 by Haily Moon RN  Infection Prevention:   single patient room provided   equipment surfaces disinfected    environmental surveillance performed  Goal: Optimal Comfort and Wellbeing  11/13/2023 1821 by Haily Moon RN  Outcome: Met  11/13/2023 1747 by Haily Moon RN  Outcome: Ongoing, Progressing  Intervention: Provide Person-Centered Care  Recent Flowsheet Documentation  Taken 11/13/2023 0859 by Haily Moon RN  Trust Relationship/Rapport:   care explained   choices provided   emotional support provided   empathic listening provided  Goal: Readiness for Transition of Care  11/13/2023 1821 by Haily Moon RN  Outcome: Met  11/13/2023 1747 by Haily Moon RN  Outcome: Ongoing, Progressing     Problem: Asthma Comorbidity  Goal: Maintenance of Asthma Control  11/13/2023 1821 by Haliy Moon RN  Outcome: Met  11/13/2023 1747 by Haily Moon RN  Outcome: Ongoing, Progressing  Intervention: Maintain Asthma Symptom Control  Recent Flowsheet Documentation  Taken 11/13/2023 0859 by Haily Moon RN  Medication Review/Management: medications reviewed     Problem: Behavioral Health Comorbidity  Goal: Maintenance of Behavioral Health Symptom Control  11/13/2023 1821 by Haily Moon RN  Outcome: Met  11/13/2023 1747 by Haily Moon RN  Outcome: Ongoing, Progressing  Intervention: Maintain Behavioral Health Symptom Control  Recent Flowsheet Documentation  Taken 11/13/2023 0859 by Haily Moon RN  Medication Review/Management: medications reviewed     Problem: COPD (Chronic Obstructive Pulmonary Disease) Comorbidity  Goal: Maintenance of COPD Symptom Control  11/13/2023 1821 by Haily Moon RN  Outcome: Met  11/13/2023 1747 by Haily Moon RN  Outcome: Ongoing, Progressing  Intervention: Maintain COPD-Symptom Control  Recent Flowsheet Documentation  Taken 11/13/2023 0859 by Haily Moon RN  Supportive Measures: active listening utilized  Medication Review/Management: medications reviewed     Problem: Diabetes  Comorbidity  Goal: Blood Glucose Level Within Targeted Range  11/13/2023 1821 by Haily Moon RN  Outcome: Met  11/13/2023 1747 by Haily Moon RN  Outcome: Ongoing, Progressing     Problem: Heart Failure Comorbidity  Goal: Maintenance of Heart Failure Symptom Control  11/13/2023 1821 by Haily Moon RN  Outcome: Met  11/13/2023 1747 by Haily Moon RN  Outcome: Ongoing, Progressing  Intervention: Maintain Heart Failure-Management  Recent Flowsheet Documentation  Taken 11/13/2023 0859 by Haily Moon RN  Medication Review/Management: medications reviewed     Problem: Hypertension Comorbidity  Goal: Blood Pressure in Desired Range  11/13/2023 1821 by Haily Moon RN  Outcome: Met  11/13/2023 1747 by Haily Moon RN  Outcome: Ongoing, Progressing  Intervention: Maintain Blood Pressure Management  Recent Flowsheet Documentation  Taken 11/13/2023 0859 by Haily Moon RN  Medication Review/Management: medications reviewed     Problem: Obstructive Sleep Apnea Risk or Actual Comorbidity Management  Goal: Unobstructed Breathing During Sleep  11/13/2023 1821 by Haily Moon RN  Outcome: Met  11/13/2023 1747 by Haily Moon RN  Outcome: Ongoing, Progressing     Problem: Osteoarthritis Comorbidity  Goal: Maintenance of Osteoarthritis Symptom Control  11/13/2023 1821 by Haily Moon RN  Outcome: Met  11/13/2023 1747 by Haily Moon RN  Outcome: Ongoing, Progressing  Intervention: Maintain Osteoarthritis Symptom Control  Recent Flowsheet Documentation  Taken 11/13/2023 0859 by Haily Moon RN  Assistive Device Utilized: gait belt  Medication Review/Management: medications reviewed     Problem: Pain Chronic (Persistent) (Comorbidity Management)  Goal: Acceptable Pain Control and Functional Ability  11/13/2023 1821 by Haily Moon RN  Outcome: Met  11/13/2023 1747 by Haily Moon RN  Outcome: Ongoing,  Progressing  Intervention: Manage Persistent Pain  Recent Flowsheet Documentation  Taken 11/13/2023 0859 by Haily Moon RN  Medication Review/Management: medications reviewed  Intervention: Optimize Psychosocial Wellbeing  Recent Flowsheet Documentation  Taken 11/13/2023 0859 by Haily Moon RN  Supportive Measures: active listening utilized  Diversional Activities: television     Problem: Seizure Disorder Comorbidity  Goal: Maintenance of Seizure Control  11/13/2023 1821 by Haliy Moon RN  Outcome: Met  11/13/2023 1747 by Haily Moon RN  Outcome: Ongoing, Progressing     Problem: Fall Injury Risk  Goal: Absence of Fall and Fall-Related Injury  11/13/2023 1821 by Haily Moon RN  Outcome: Met  11/13/2023 1747 by Haily Moon RN  Outcome: Ongoing, Progressing  Intervention: Identify and Manage Contributors  Recent Flowsheet Documentation  Taken 11/13/2023 0859 by Haily Moon RN  Medication Review/Management: medications reviewed  Intervention: Promote Injury-Free Environment  Recent Flowsheet Documentation  Taken 11/13/2023 1659 by Haily Moon RN  Safety Promotion/Fall Prevention: safety round/check completed  Taken 11/13/2023 1459 by Haily Moon RN  Safety Promotion/Fall Prevention: safety round/check completed  Taken 11/13/2023 1259 by Haily Moon RN  Safety Promotion/Fall Prevention: safety round/check completed  Taken 11/13/2023 1104 by Haily Moon RN  Safety Promotion/Fall Prevention: safety round/check completed  Taken 11/13/2023 1000 by Haily Moon RN  Safety Promotion/Fall Prevention: safety round/check completed  Taken 11/13/2023 0859 by Haily Moon RN  Safety Promotion/Fall Prevention:   safety round/check completed   assistive device/personal items within reach   clutter free environment maintained   nonskid shoes/slippers when out of bed     Problem: Fluid Imbalance (Pneumonia)  Goal: Fluid  Balance  11/13/2023 1821 by Haily Moon RN  Outcome: Met  11/13/2023 1747 by Haily Moon RN  Outcome: Ongoing, Progressing     Problem: Infection (Pneumonia)  Goal: Resolution of Infection Signs and Symptoms  11/13/2023 1821 by Haily Moon RN  Outcome: Met  11/13/2023 1747 by Haily Moon RN  Outcome: Ongoing, Progressing     Problem: Respiratory Compromise (Pneumonia)  Goal: Effective Oxygenation and Ventilation  11/13/2023 1821 by Haily Moon RN  Outcome: Met  11/13/2023 1747 by Haily Moon RN  Outcome: Ongoing, Progressing  Intervention: Promote Airway Secretion Clearance  Recent Flowsheet Documentation  Taken 11/13/2023 0859 by Haily Moon RN  Cough And Deep Breathing: done independently per patient     Problem: Adjustment to Surgery (Urinary Diversion)  Goal: Psychosocial Adjustment Initiation  11/13/2023 1821 by Haily Moon RN  Outcome: Met  11/13/2023 1747 by Haily Moon RN  Outcome: Ongoing, Progressing  Intervention: Support Psychosocial Response to Surgery  Recent Flowsheet Documentation  Taken 11/13/2023 0859 by Haily Moon RN  Supportive Measures: active listening utilized     Problem: Bleeding (Urinary Diversion)  Goal: Absence of Bleeding  11/13/2023 1821 by Haily Moon RN  Outcome: Met  11/13/2023 1747 by Haily Moon RN  Outcome: Ongoing, Progressing     Problem: Bowel Motility Impaired (Urinary Diversion)  Goal: Effective Bowel Elimination  11/13/2023 1821 by Haily Moon RN  Outcome: Met  11/13/2023 1747 by Haily Moon RN  Outcome: Ongoing, Progressing     Problem: Fluid and Electrolyte Imbalance (Urinary Diversion)  Goal: Fluid and Electrolyte Balance  11/13/2023 1821 by Haily Moon RN  Outcome: Met  11/13/2023 1747 by Haily Moon RN  Outcome: Ongoing, Progressing     Problem: Infection (Urinary Diversion)  Goal: Absence of Infection Signs and  Symptoms  11/13/2023 1821 by Haily Moon RN  Outcome: Met  11/13/2023 1747 by Haily Moon RN  Outcome: Ongoing, Progressing  Intervention: Prevent or Manage Infection  Recent Flowsheet Documentation  Taken 11/13/2023 0859 by Haily Moon RN  Infection Prevention:   single patient room provided   equipment surfaces disinfected   environmental surveillance performed     Problem: Ongoing Anesthesia Effects (Urinary Diversion)  Goal: Anesthesia/Sedation Recovery  11/13/2023 1821 by Haily Moon RN  Outcome: Met  11/13/2023 1747 by Haily Moon RN  Outcome: Ongoing, Progressing  Intervention: Optimize Anesthesia Recovery  Recent Flowsheet Documentation  Taken 11/13/2023 1659 by Haily Moon RN  Safety Promotion/Fall Prevention: safety round/check completed  Taken 11/13/2023 1459 by Haily Moon RN  Safety Promotion/Fall Prevention: safety round/check completed  Taken 11/13/2023 1400 by Haily Moon RN  Patient Tolerance (IS): good  Administration (IS): self-administered  Taken 11/13/2023 1259 by Haily Moon RN  Safety Promotion/Fall Prevention: safety round/check completed  Taken 11/13/2023 1104 by Haily Moon RN  Safety Promotion/Fall Prevention: safety round/check completed  Taken 11/13/2023 1000 by Haily Moon RN  Safety Promotion/Fall Prevention: safety round/check completed  Taken 11/13/2023 0859 by Haily Moon RN  Safety Promotion/Fall Prevention:   safety round/check completed   assistive device/personal items within reach   clutter free environment maintained   nonskid shoes/slippers when out of bed  Administration (IS): self-administered     Problem: Pain (Urinary Diversion)  Goal: Acceptable Pain Control  11/13/2023 1821 by Haily Moon RN  Outcome: Met  11/13/2023 1747 by Haily Moon RN  Outcome: Ongoing, Progressing  Intervention: Prevent or Manage Pain  Recent Flowsheet Documentation  Taken  11/13/2023 0859 by Haily Moon RN  Diversional Activities: television     Problem: Postoperative Nausea and Vomiting (Urinary Diversion)  Goal: Nausea and Vomiting Relief  11/13/2023 1821 by Haily Moon RN  Outcome: Met  11/13/2023 1747 by Haily Moon RN  Outcome: Ongoing, Progressing     Problem: Postoperative Stoma Care (Urinary Diversion)  Goal: Optimal Stoma Healing  11/13/2023 1821 by Haily Moon RN  Outcome: Met  11/13/2023 1747 by Haily Moon RN  Outcome: Ongoing, Progressing     Problem: Respiratory Compromise (Urinary Diversion)  Goal: Effective Oxygenation and Ventilation  11/13/2023 1821 by Haily Moon RN  Outcome: Met  11/13/2023 1747 by Haily Moon RN  Outcome: Ongoing, Progressing     Problem: Urine Elimination Impaired (Urinary Diversion)  Goal: Effective Urinary Elimination  11/13/2023 1821 by Haily Moon RN  Outcome: Met  11/13/2023 1747 by Haily Moon RN  Outcome: Ongoing, Progressing   Goal Outcome Evaluation:   Discharge paperwork and medications reviewed with pt and his wife. IV removed; intact and clean. Pt to transport home with his wife.

## 2023-11-13 NOTE — PROGRESS NOTES
"PULMONARY CRITICAL CARE PROGRESS  NOTE      PATIENT IDENTIFICATION:  Name: Michael Dorantes  MRN: [id number]  :  1950     Age: 73 y.o.  Sex: male    DATE OF Note:  2023   Referring Physician: Rommel Villalta MD                  Subjective:   Feeling better, currently on 2 L O2, no SOB, no chest or abd pain, no bowel or bladder issues reported     Objective:  tMax 24 hrs: Temp (24hrs), Av.1 °F (36.7 °C), Min:97.3 °F (36.3 °C), Max:98.9 °F (37.2 °C)      Vitals Ranges:   Temp:  [97.3 °F (36.3 °C)-98.9 °F (37.2 °C)] 97.3 °F (36.3 °C)  Heart Rate:  [60-72] 72  Resp:  [12-22] 12  BP: (140-142)/(70-72) 140/72    Intake and Output Last 3 Shifts:   I/O last 3 completed shifts:  In: 720 [P.O.:720]  Out: 3900 [Urine:3900]    Exam:  /72 (BP Location: Left arm, Patient Position: Lying)   Pulse 72   Temp 97.3 °F (36.3 °C) (Oral)   Resp 12   Ht 182.9 cm (72\")   Wt 81.4 kg (179 lb 7.3 oz)   SpO2 94%   BMI 24.34 kg/m²     General Appearance: Alert    HEENT:  Normocephalic, without obvious abnormality. Conjunctivae/corneas clear.  Normal external ear canals. Nares normal, no drainage     Neck:  Supple, symmetrical, trachea midline. No JVD.  Lungs /Chest wall:  rhonchi improving, respirations unlabored, symmetrical wall movement.     Heart:  Regular rate and rhythm, systolic murmur PMI left sternal border  Abdomen: Soft, nontender, no masses, no organomegaly.    Extremities: No edema,  clubbing or cyanosis        Medications:  amLODIPine, 10 mg, Oral, Nightly  cefdinir, 300 mg, Oral, Q12H  gabapentin, 100 mg, Oral, TID  glipizide, 2.5 mg, Oral, QAM AC  ipratropium-albuterol, 3 mL, Nebulization, 4x Daily - RT  sodium bicarbonate, 650 mg, Oral, BID  warfarin, 5 mg, Oral, Daily        Infusion:        PRN:    acetaminophen    benzocaine-menthol    ipratropium-albuterol    ondansetron  Data Review:  All labs (24hrs):   Recent Results (from the past 24 hour(s))   Protime-INR    Collection Time: 23 12:20 " AM    Specimen: Arm, Left; Blood   Result Value Ref Range    Protime 27.8 19.4 - 28.5 Seconds    INR 2.75 2.00 - 3.00   Renal Function Panel    Collection Time: 11/13/23 12:20 AM    Specimen: Arm, Left; Blood   Result Value Ref Range    Glucose 114 (H) 65 - 99 mg/dL    BUN 30 (H) 8 - 23 mg/dL    Creatinine 1.77 (H) 0.76 - 1.27 mg/dL    Sodium 133 (L) 136 - 145 mmol/L    Potassium 4.8 3.5 - 5.2 mmol/L    Chloride 99 98 - 107 mmol/L    CO2 26.0 22.0 - 29.0 mmol/L    Calcium 8.8 8.6 - 10.5 mg/dL    Albumin 3.2 (L) 3.5 - 5.2 g/dL    Phosphorus 4.1 2.5 - 4.5 mg/dL    Anion Gap 8.0 5.0 - 15.0 mmol/L    BUN/Creatinine Ratio 16.9 7.0 - 25.0    eGFR 40.1 (L) >60.0 mL/min/1.73        Imaging:  CT Chest Without Contrast Diagnostic  Narrative: CT CHEST WO CONTRAST DIAGNOSTIC    Date of Exam: 11/8/2023 1:48 PM EST    Indication: intermittent fever, nonprod cough.    Comparison: AP portable chest 1/17/2023. No prior CT chest for comparison at this institution.    Technique: Axial CT images were obtained of the chest without contrast administration.  Sagittal and coronal reconstructions were performed.  Automated exposure control and iterative reconstruction methods were used.    Findings:  Patchy nodular density is demonstrated within the superior left lower lobe abutting the major fissure, dominant component measuring 1 cm (series 5 image 35).    Small parenchymal nodular densities are seen within the lung apices. At the right apex, these measure 5 mm and 4 mm and 4 mm (series 5 image 15). At the left apex, these measure 3 mm (image 12), 7 mm (image 13), and 4 mm (image 16).    Small noncalcified nodule seen in the lateral subpleural right lower lobe measuring 3 mm (image 54).    Some of the images are degraded by respiratory motion.    Borderline enlarged left hilar lymph node measures 10 mm short axis (series 2 image 41). No pathologic enlarged mediastinal lymph nodes are identified.    Heart size is at upper limits of normal.  Moderate coronary artery calcifications are present. There is mild thoracic aortic calcific atherosclerosis.    Heterogeneous multinodular thyroid gland does not appear appreciably enlarged.    Small esophageal hiatal hernia is suspected. The remainder of the imaged upper abdominal organs demonstrate a normal noncontrast appearance.    No acute or suspicious osseous abnormalities are identified.  Impression: Impression:    1. Clustered parenchymal nodular densities within the superior left lower lobe are favored represent benign infectious/inflammatory nodules, dominant measuring 10 mm. 3-month CT chest follow-up is recommended.  2. Tiny biapical parenchymal nodular densities measuring between 3 to 5 mm are present. Benign etiology is favored. Attention at surveillance imaging is suggested.  3. Small esophageal hiatal hernia.    Electronically Signed: Trinity Negron MD    11/8/2023 1:58 PM EST    Workstation ID: [id number]       ASSESSMENT:  Pneumonia LLL  Hypoxia  Deafness  DM II  Prostate cancer         PLAN:  Encourage OOB during day   PT/OT  Antibiotics  Bronchodilators  Inhaled corticosteroids  Incentive spirometer  Electrolytes/ glycemic control  DVT and GI prophylaxis-Warfarin     Discussed with Dr Alejandra Lawson, JOANNE    11/13/2023  10:12 EST    I personally have examined  and interviewed the patient. I have reviewed the history, data, problems, assessment and plan with our NP.  Total Critical care time in direct medical management (   ) minutes, This time specifically excludes time spent performing procedures.    Clive Roberts MD   11/13/2023  10:52 EST

## 2023-11-13 NOTE — PLAN OF CARE
Problem: Adult Inpatient Plan of Care  Goal: Plan of Care Review  Outcome: Ongoing, Progressing  Goal: Patient-Specific Goal (Individualized)  Outcome: Ongoing, Progressing  Goal: Absence of Hospital-Acquired Illness or Injury  Outcome: Ongoing, Progressing  Intervention: Identify and Manage Fall Risk  Recent Flowsheet Documentation  Taken 11/13/2023 1659 by Haily Moon RN  Safety Promotion/Fall Prevention: safety round/check completed  Taken 11/13/2023 1459 by Haily Moon RN  Safety Promotion/Fall Prevention: safety round/check completed  Taken 11/13/2023 1259 by Haily Moon RN  Safety Promotion/Fall Prevention: safety round/check completed  Taken 11/13/2023 1104 by Haily Moon RN  Safety Promotion/Fall Prevention: safety round/check completed  Taken 11/13/2023 1000 by Haily Moon RN  Safety Promotion/Fall Prevention: safety round/check completed  Taken 11/13/2023 0859 by Haily Moon RN  Safety Promotion/Fall Prevention:   safety round/check completed   assistive device/personal items within reach   clutter free environment maintained   nonskid shoes/slippers when out of bed  Intervention: Prevent and Manage VTE (Venous Thromboembolism) Risk  Recent Flowsheet Documentation  Taken 11/13/2023 0859 by Haily Moon RN  VTE Prevention/Management:   bilateral   sequential compression devices off  Range of Motion: active ROM (range of motion) encouraged  Intervention: Prevent Infection  Recent Flowsheet Documentation  Taken 11/13/2023 0859 by Haily Moon RN  Infection Prevention:   single patient room provided   equipment surfaces disinfected   environmental surveillance performed  Goal: Optimal Comfort and Wellbeing  Outcome: Ongoing, Progressing  Intervention: Provide Person-Centered Care  Recent Flowsheet Documentation  Taken 11/13/2023 0859 by Haily Moon RN  Trust Relationship/Rapport:   care explained   choices provided   emotional  support provided   empathic listening provided  Goal: Readiness for Transition of Care  Outcome: Ongoing, Progressing     Problem: Asthma Comorbidity  Goal: Maintenance of Asthma Control  Outcome: Ongoing, Progressing  Intervention: Maintain Asthma Symptom Control  Recent Flowsheet Documentation  Taken 11/13/2023 0859 by Haily Moon RN  Medication Review/Management: medications reviewed     Problem: Behavioral Health Comorbidity  Goal: Maintenance of Behavioral Health Symptom Control  Outcome: Ongoing, Progressing  Intervention: Maintain Behavioral Health Symptom Control  Recent Flowsheet Documentation  Taken 11/13/2023 0859 by Haily Moon RN  Medication Review/Management: medications reviewed     Problem: COPD (Chronic Obstructive Pulmonary Disease) Comorbidity  Goal: Maintenance of COPD Symptom Control  Outcome: Ongoing, Progressing  Intervention: Maintain COPD-Symptom Control  Recent Flowsheet Documentation  Taken 11/13/2023 0859 by Haily Moon RN  Supportive Measures: active listening utilized  Medication Review/Management: medications reviewed     Problem: Diabetes Comorbidity  Goal: Blood Glucose Level Within Targeted Range  Outcome: Ongoing, Progressing     Problem: Heart Failure Comorbidity  Goal: Maintenance of Heart Failure Symptom Control  Outcome: Ongoing, Progressing  Intervention: Maintain Heart Failure-Management  Recent Flowsheet Documentation  Taken 11/13/2023 0859 by Haily Moon RN  Medication Review/Management: medications reviewed     Problem: Hypertension Comorbidity  Goal: Blood Pressure in Desired Range  Outcome: Ongoing, Progressing  Intervention: Maintain Blood Pressure Management  Recent Flowsheet Documentation  Taken 11/13/2023 0859 by Haily Moon RN  Medication Review/Management: medications reviewed     Problem: Obstructive Sleep Apnea Risk or Actual Comorbidity Management  Goal: Unobstructed Breathing During Sleep  Outcome: Ongoing,  Progressing     Problem: Osteoarthritis Comorbidity  Goal: Maintenance of Osteoarthritis Symptom Control  Outcome: Ongoing, Progressing  Intervention: Maintain Osteoarthritis Symptom Control  Recent Flowsheet Documentation  Taken 11/13/2023 0859 by Haily Moon RN  Assistive Device Utilized: gait belt  Medication Review/Management: medications reviewed     Problem: Pain Chronic (Persistent) (Comorbidity Management)  Goal: Acceptable Pain Control and Functional Ability  Outcome: Ongoing, Progressing  Intervention: Manage Persistent Pain  Recent Flowsheet Documentation  Taken 11/13/2023 0859 by Haily Moon RN  Medication Review/Management: medications reviewed  Intervention: Optimize Psychosocial Wellbeing  Recent Flowsheet Documentation  Taken 11/13/2023 0859 by Haily Moon RN  Supportive Measures: active listening utilized  Diversional Activities: television     Problem: Seizure Disorder Comorbidity  Goal: Maintenance of Seizure Control  Outcome: Ongoing, Progressing     Problem: Fall Injury Risk  Goal: Absence of Fall and Fall-Related Injury  Outcome: Ongoing, Progressing  Intervention: Identify and Manage Contributors  Recent Flowsheet Documentation  Taken 11/13/2023 0859 by Haily Moon RN  Medication Review/Management: medications reviewed  Intervention: Promote Injury-Free Environment  Recent Flowsheet Documentation  Taken 11/13/2023 1659 by Haily Moon RN  Safety Promotion/Fall Prevention: safety round/check completed  Taken 11/13/2023 1459 by Haily Moon RN  Safety Promotion/Fall Prevention: safety round/check completed  Taken 11/13/2023 1259 by Haily Moon RN  Safety Promotion/Fall Prevention: safety round/check completed  Taken 11/13/2023 1104 by Haily Moon RN  Safety Promotion/Fall Prevention: safety round/check completed  Taken 11/13/2023 1000 by Haily Moon RN  Safety Promotion/Fall Prevention: safety round/check  completed  Taken 11/13/2023 0859 by Haily Moon RN  Safety Promotion/Fall Prevention:   safety round/check completed   assistive device/personal items within reach   clutter free environment maintained   nonskid shoes/slippers when out of bed     Problem: Fluid Imbalance (Pneumonia)  Goal: Fluid Balance  Outcome: Ongoing, Progressing     Problem: Infection (Pneumonia)  Goal: Resolution of Infection Signs and Symptoms  Outcome: Ongoing, Progressing     Problem: Respiratory Compromise (Pneumonia)  Goal: Effective Oxygenation and Ventilation  Outcome: Ongoing, Progressing  Intervention: Promote Airway Secretion Clearance  Recent Flowsheet Documentation  Taken 11/13/2023 0859 by Haily Moon RN  Cough And Deep Breathing: done independently per patient     Problem: Adjustment to Surgery (Urinary Diversion)  Goal: Psychosocial Adjustment Initiation  Outcome: Ongoing, Progressing  Intervention: Support Psychosocial Response to Surgery  Recent Flowsheet Documentation  Taken 11/13/2023 0859 by Haily Moon RN  Supportive Measures: active listening utilized     Problem: Bleeding (Urinary Diversion)  Goal: Absence of Bleeding  Outcome: Ongoing, Progressing     Problem: Bowel Motility Impaired (Urinary Diversion)  Goal: Effective Bowel Elimination  Outcome: Ongoing, Progressing     Problem: Fluid and Electrolyte Imbalance (Urinary Diversion)  Goal: Fluid and Electrolyte Balance  Outcome: Ongoing, Progressing     Problem: Infection (Urinary Diversion)  Goal: Absence of Infection Signs and Symptoms  Outcome: Ongoing, Progressing  Intervention: Prevent or Manage Infection  Recent Flowsheet Documentation  Taken 11/13/2023 0859 by Haily Moon RN  Infection Prevention:   single patient room provided   equipment surfaces disinfected   environmental surveillance performed     Problem: Ongoing Anesthesia Effects (Urinary Diversion)  Goal: Anesthesia/Sedation Recovery  Outcome: Ongoing,  Progressing  Intervention: Optimize Anesthesia Recovery  Recent Flowsheet Documentation  Taken 11/13/2023 1659 by Haily Moon RN  Safety Promotion/Fall Prevention: safety round/check completed  Taken 11/13/2023 1459 by Haily Moon RN  Safety Promotion/Fall Prevention: safety round/check completed  Taken 11/13/2023 1400 by Haily Moon RN  Patient Tolerance (IS): good  Administration (IS): self-administered  Taken 11/13/2023 1259 by Haily Moon RN  Safety Promotion/Fall Prevention: safety round/check completed  Taken 11/13/2023 1104 by Haily Moon RN  Safety Promotion/Fall Prevention: safety round/check completed  Taken 11/13/2023 1000 by Haily Moon RN  Safety Promotion/Fall Prevention: safety round/check completed  Taken 11/13/2023 0859 by Haily Moon RN  Safety Promotion/Fall Prevention:   safety round/check completed   assistive device/personal items within reach   clutter free environment maintained   nonskid shoes/slippers when out of bed  Administration (IS): self-administered     Problem: Pain (Urinary Diversion)  Goal: Acceptable Pain Control  Outcome: Ongoing, Progressing  Intervention: Prevent or Manage Pain  Recent Flowsheet Documentation  Taken 11/13/2023 0859 by Haily Moon RN  Diversional Activities: television     Problem: Postoperative Nausea and Vomiting (Urinary Diversion)  Goal: Nausea and Vomiting Relief  Outcome: Ongoing, Progressing     Problem: Postoperative Stoma Care (Urinary Diversion)  Goal: Optimal Stoma Healing  Outcome: Ongoing, Progressing     Problem: Respiratory Compromise (Urinary Diversion)  Goal: Effective Oxygenation and Ventilation  Outcome: Ongoing, Progressing     Problem: Urine Elimination Impaired (Urinary Diversion)  Goal: Effective Urinary Elimination  Outcome: Ongoing, Progressing   Goal Outcome Evaluation:   Pt completed a 6 min walk today with PT. Wife has been at bedside.  has been  utilized today as well. No concerns at this time, call light within reach, and VSS.

## 2023-11-13 NOTE — CASE MANAGEMENT/SOCIAL WORK
Continued Stay Note   [facility]     Patient Name: Michael Dorantes  MRN: [id number]  Today's Date: 11/13/2023    Admit Date: 11/8/2023    Plan: DC PLAN: Return home with wife.       Discharge Plan       Row Name 11/13/23 1438       Plan    Plan DC PLAN: Return home with wife.    Plan Comments Reviewed IMM with the help of a bedside interrupter. Voiced understanding, obtained signiture and copy left at bedside. DC BARRIERS: Renal following. Coumadin. Possible DC today or tomorrow.                      Expected Discharge Date and Time       Expected Discharge Date Expected Discharge Time    Nov 14, 2023           Lorri Humphrey RN

## 2023-11-13 NOTE — PLAN OF CARE
Goal Outcome Evaluation:  Plan of Care Reviewed With: patient        Progress: no change          Pt currently resting abed. No complaints or distress noted. VSS continuing to monitor.

## 2023-11-13 NOTE — PLAN OF CARE
Goal Outcome Evaluation:   Michael Dorantes presents with functional mobility impairments which indicate the need for skilled intervention. Pt able to ambulate in hallway without AD on RA with CGA-min A. Pt required min A due to multiple brief instances of unsteadiness with ability to self correct. Pt educated on attempting to utilize cane next tx session to decrease falls risk. Pt would be safe to return home following hospital discharge. Tolerating session today without incident. Will continue to follow and progress as tolerated.

## 2023-11-13 NOTE — THERAPY TREATMENT NOTE
"Subjective: Pt agreeable to therapeutic plan of care.     Objective:     Bed mobility - Modified-Independent  Transfers - SBA  Ambulation - 250 feet CGA and Min-A    Therapeutic Exercise - 10 Reps B LE AROM supported sitting / chair    Vitals: Pt's O2 sats read 92-95% during tx session on 1L O2 and RA    Pain: Pt did not rate pain  Intervention for pain: N/A    Education: Provided education on the importance of mobility in the acute care setting, Verbal/Tactile Cues, Transfer Training, Gait Training, and Energy conservation strategies    Assessment: Michael Dorantes presents with functional mobility impairments which indicate the need for skilled intervention. Pt able to ambulate in hallway without AD on RA with CGA-min A. Pt required min A due to multiple brief instances of unsteadiness with ability to self correct. Pt educated on attempting to utilize cane next tx session to decrease falls risk. Pt would be safe to return home following hospital discharge. Tolerating session today without incident. Will continue to follow and progress as tolerated.     Plan/Recommendations:   Low Intensity Therapy recommended post-acute care - This is recommended as therapy feels this patient would require 2-3 visits per week. OP or HH would be the best option depending on patient's home bound status. Consider, if the patient has other  \"skilled\" needs such as wounds, IV antibiotics, etc. Combined with \"low intensity\" could also equate to a SNF. If patient is medically complex, consider LTAC.. Pt requires cane at discharge.     Pt desires Home with family assist and and Home Health at discharge. Pt cooperative; agreeable to therapeutic recommendations and plan of care.         Basic Mobility 6-click:  Rollin = Total, A lot = 2, A little = 3; 4 = None  Supine>Sit:   1 = Total, A lot = 2, A little = 3; 4 = None   Sit>Stand with arms:  1 = Total, A lot = 2, A little = 3; 4 = None  Bed>Chair:   1 = Total, A lot = 2, A little " = 3; 4 = None  Ambulate in room:  1 = Total, A lot = 2, A little = 3; 4 = None  3-5 Steps with railin = Total, A lot = 2, A little = 3; 4 = None  Score: 20    Modified [place]: N/A = No pre-op stroke/TIA    Post-Tx Position: Up in Chair, Alarms activated, and Call light and personal items within reach  PPE: gloves and surgical mask

## 2023-11-14 LAB
BACTERIA SPEC AEROBE CULT: NORMAL
BACTERIA SPEC AEROBE CULT: NORMAL

## 2023-11-14 NOTE — CASE MANAGEMENT/SOCIAL WORK
Case Management Discharge Note      Final Note: Routine home         Selected Continued Care - Discharged on 11/13/2023 Admission date: 11/8/2023 - Discharge disposition: Home or Self Care           Transportation Services  Private: Car    Final Discharge Disposition Code: 01 - home or self-care

## 2023-11-14 NOTE — OUTREACH NOTE
Prep Survey      Flowsheet Row Responses   [facility] discharged from? [facility]   Is LACE score < 7 ? No   Eligibility Readm Mgmt   Discharge diagnosis Lower lobe PNA   Does the patient have one of the following disease processes/diagnoses(primary or secondary)? Pneumonia   Does the patient have Home health ordered? No   Is there a DME ordered? No   Prep survey completed? Yes            TATIANNA ALEJANDRA - Registered Nurse

## 2023-11-16 ENCOUNTER — READMISSION MANAGEMENT (OUTPATIENT)
Dept: CALL CENTER | Facility: HOSPITAL | Age: 73
End: 2023-11-16
Payer: MEDICARE

## 2023-11-16 NOTE — OUTREACH NOTE
COPD/PN Week 1 Survey      Flowsheet Row Responses   [facility] patient discharged from? [facility]   Does the patient have one of the following disease processes/diagnoses(primary or secondary)? Pneumonia   Week 1 attempt successful? No   Unsuccessful attempts Attempt 1            Mi GONSALVES - Registered Nurse

## 2023-11-18 NOTE — PROGRESS NOTES
"Enter Query Response Below      Query Response: Gramnegative pneumonia             If applicable, please update the problem list.     Patient: Michael Dorantes        : 1950  Account: [id number]           Admit Date: 2023        How to Respond to this query:       a. Click New Note     b. Answer query within the yellow box.                c. Update the Problem List, if applicable.      ,    73 year old male with type 2 DM, CKD4 admitted  for LLL Pneumonia and LEO.  Medicine PNs include \"Community-acquired atypical pneumonia/left lower lobe.\"  Patient was treated with Vibramycin (-), Rocephin (-11/10), Doxycycline (, ), Omnicef (-) and NS IVF (-11/10)  Patient was discharged on Omnicef.    Please clarify the type of pneumonia the patient was treated/monitored for:    - Gram negative pneumonia (excluding Haemophilus influenzae)  - Bacterial pneumonia unspecified  - Pneumonia, unspecified  - Other- specify______  - Unable to determine    By submitting this query, we are merely seeking further clarification of documentation to accurately reflect all conditions that you are monitoring, evaluating, treating or that extend the hospitalization or utilize additional resources of care. Please utilize your independent clinical judgment when addressing the question(s) above.     This query and your response, once completed, will be entered into the legal medical record.    Sincerely,  Dm Alcantar RN CDIS  Clinical Documentation Integrity Program   [email or web address]  "

## 2023-11-22 ENCOUNTER — READMISSION MANAGEMENT (OUTPATIENT)
Dept: CALL CENTER | Facility: HOSPITAL | Age: 73
End: 2023-11-22
Payer: MEDICARE

## 2023-11-22 NOTE — OUTREACH NOTE
COPD/PN Week 2 Survey      Flowsheet Row Responses   [facility] [place] [facility]   Does the patient have one of the following disease processes/diagnoses(primary or secondary)? Pneumonia   Week 2 attempt successful? Yes   Call start time 1426   Call end time 1432   General alerts for this patient Patient is Deaf   Discharge diagnosis Lower lobe PNA   Person spoke with today (if not patient) and relationship Marilyn Carlos Enrique, Sister in law   Medication alerts for this patient Cefdinir   Meds reviewed with patient/caregiver? Yes   Is the patient having any side effects they believe may be caused by any medication additions or changes? No   Does the patient have all medications ordered at discharge? Yes   Is the patient taking all medications as directed (includes completed medication regime)? Yes   Does the patient have a primary care provider?  Yes   Does the patient have an appointment with their PCP or specialist within 7 days of discharge? Greater than 7 days   Comments regarding PCP PCP f/u appt on Wednesday, 11/29/23. Marilyn states due to the holiday that was the soonest appt available.   What is preventing the patient from scheduling follow up appointments within 7 days of discharge? Earlier appointment not available   Nursing Interventions Verified appointment date/time/provider   Has the patient kept scheduled appointments due by today? N/A   Has home health visited the patient within 72 hours of discharge? N/A   Pulse Ox monitoring Intermittent   Pulse Ox device source Patient   O2 Sat comments Sats are being monitored and staying in the 90's per Marilyn.   O2 Sat: education provided Sat levels, Monitoring frequency, When to seek care   Psychosocial issues? No   Comments Marilyn,  in law states pt's breathing is better, still having fatigue and decreased appetite. reviewed s/s of when to return to ED and seek medical attention.   Did the patient receive a copy of their discharge  instructions? Yes   Nursing interventions Reviewed instructions with patient   What is the patient's perception of their health status since discharge? Improving   Nursing Interventions Nurse provided patient education   If the patient is a current smoker, are they able to teach back resources for cessation? Not a smoker   Is the patient/caregiver able to teach back the hierarchy of who to call/visit for symptoms/problems? PCP, Specialist, Home health nurse, Urgent Care, ED, 911 Yes   Is the patient/caregiver able to teach back signs and symptoms of worsening condition: Fever/chills, Shortness of breath, Chest pain   Is the patient/caregiver able to teach back importance of completing antibiotic course of treatment? Yes   Week 2 call completed? Yes   Graduated Yes   Is the patient interested in additional calls from an ambulatory ? No   Would this patient benefit from a Referral to Amb Social Work? No   Wrap up additional comments Sister in law, Marilyn denies any needs or concerns at this time.   Call end time 1432            Kathy EDWARDS - Registered Nurse

## 2025-02-19 ENCOUNTER — HOSPITAL ENCOUNTER (INPATIENT)
Facility: HOSPITAL | Age: 75
LOS: 3 days | Discharge: HOME | DRG: 194 | End: 2025-02-22
Attending: EMERGENCY MEDICINE | Admitting: INTERNAL MEDICINE
Payer: MEDICARE

## 2025-02-19 ENCOUNTER — APPOINTMENT (OUTPATIENT)
Dept: GENERAL RADIOLOGY | Facility: HOSPITAL | Age: 75
DRG: 194 | End: 2025-02-19
Payer: MEDICARE

## 2025-02-19 DIAGNOSIS — J10.1 INFLUENZA A: ICD-10-CM

## 2025-02-19 DIAGNOSIS — R50.81 FEVER IN OTHER DISEASES: Primary | ICD-10-CM

## 2025-02-19 DIAGNOSIS — N17.9 ACUTE KIDNEY INJURY: ICD-10-CM

## 2025-02-19 PROBLEM — R06.00 DYSPNEA: Status: ACTIVE | Noted: 2025-02-19

## 2025-02-19 LAB
ALBUMIN SERPL-MCNC: 3.8 G/DL (ref 3.5–5.2)
ALBUMIN/GLOB SERPL: 1.5 G/DL
ALP SERPL-CCNC: 49 U/L (ref 39–117)
ALT SERPL W P-5'-P-CCNC: 16 U/L (ref 1–41)
ANION GAP SERPL CALCULATED.3IONS-SCNC: 10.3 MMOL/L (ref 5–15)
AST SERPL-CCNC: 24 U/L (ref 1–40)
B PARAPERT DNA SPEC QL NAA+PROBE: NOT DETECTED
B PERT DNA SPEC QL NAA+PROBE: NOT DETECTED
BASOPHILS # BLD AUTO: 0.02 10*3/MM3 (ref 0–0.2)
BASOPHILS NFR BLD AUTO: 0.4 % (ref 0–1.5)
BILIRUB SERPL-MCNC: 0.3 MG/DL (ref 0–1.2)
BUN SERPL-MCNC: 38 MG/DL (ref 8–23)
BUN/CREAT SERPL: 13.9 (ref 7–25)
C PNEUM DNA NPH QL NAA+NON-PROBE: NOT DETECTED
CALCIUM SPEC-SCNC: 9 MG/DL (ref 8.6–10.5)
CHLORIDE SERPL-SCNC: 101 MMOL/L (ref 98–107)
CO2 SERPL-SCNC: 24.7 MMOL/L (ref 22–29)
CREAT SERPL-MCNC: 2.73 MG/DL (ref 0.76–1.27)
D-LACTATE SERPL-SCNC: 0.9 MMOL/L (ref 0.3–2)
DEPRECATED RDW RBC AUTO: 41.5 FL (ref 37–54)
EGFRCR SERPLBLD CKD-EPI 2021: 23.7 ML/MIN/1.73
EOSINOPHIL # BLD AUTO: 0 10*3/MM3 (ref 0–0.4)
EOSINOPHIL NFR BLD AUTO: 0 % (ref 0.3–6.2)
ERYTHROCYTE [DISTWIDTH] IN BLOOD BY AUTOMATED COUNT: 12.5 % (ref 12.3–15.4)
FLUAV H3 RNA NPH QL NAA+PROBE: DETECTED
FLUBV RNA ISLT QL NAA+PROBE: NOT DETECTED
GLOBULIN UR ELPH-MCNC: 2.6 GM/DL
GLUCOSE BLDC GLUCOMTR-MCNC: 134 MG/DL (ref 70–105)
GLUCOSE BLDC GLUCOMTR-MCNC: 188 MG/DL (ref 70–105)
GLUCOSE SERPL-MCNC: 215 MG/DL (ref 65–99)
HADV DNA SPEC NAA+PROBE: NOT DETECTED
HCOV 229E RNA SPEC QL NAA+PROBE: NOT DETECTED
HCOV HKU1 RNA SPEC QL NAA+PROBE: NOT DETECTED
HCOV NL63 RNA SPEC QL NAA+PROBE: NOT DETECTED
HCOV OC43 RNA SPEC QL NAA+PROBE: NOT DETECTED
HCT VFR BLD AUTO: 32.8 % (ref 37.5–51)
HGB BLD-MCNC: 10.6 G/DL (ref 13–17.7)
HMPV RNA NPH QL NAA+NON-PROBE: NOT DETECTED
HOLD SPECIMEN: NORMAL
HOLD SPECIMEN: NORMAL
HPIV1 RNA ISLT QL NAA+PROBE: NOT DETECTED
HPIV2 RNA SPEC QL NAA+PROBE: NOT DETECTED
HPIV3 RNA NPH QL NAA+PROBE: NOT DETECTED
HPIV4 P GENE NPH QL NAA+PROBE: NOT DETECTED
IMM GRANULOCYTES # BLD AUTO: 0.04 10*3/MM3 (ref 0–0.05)
IMM GRANULOCYTES NFR BLD AUTO: 0.8 % (ref 0–0.5)
INR PPP: 2.11 (ref 2–3)
LYMPHOCYTES # BLD AUTO: 0.15 10*3/MM3 (ref 0.7–3.1)
LYMPHOCYTES NFR BLD AUTO: 2.8 % (ref 19.6–45.3)
M PNEUMO IGG SER IA-ACNC: NOT DETECTED
MCH RBC QN AUTO: 29.6 PG (ref 26.6–33)
MCHC RBC AUTO-ENTMCNC: 32.3 G/DL (ref 31.5–35.7)
MCV RBC AUTO: 91.6 FL (ref 79–97)
MONOCYTES # BLD AUTO: 0.63 10*3/MM3 (ref 0.1–0.9)
MONOCYTES NFR BLD AUTO: 11.9 % (ref 5–12)
NEUTROPHILS NFR BLD AUTO: 4.47 10*3/MM3 (ref 1.7–7)
NEUTROPHILS NFR BLD AUTO: 84.1 % (ref 42.7–76)
NRBC BLD AUTO-RTO: 0 /100 WBC (ref 0–0.2)
PLATELET # BLD AUTO: 171 10*3/MM3 (ref 140–450)
PMV BLD AUTO: 9 FL (ref 6–12)
POTASSIUM SERPL-SCNC: 4.6 MMOL/L (ref 3.5–5.2)
PROCALCITONIN SERPL-MCNC: 0.37 NG/ML (ref 0–0.25)
PROT SERPL-MCNC: 6.4 G/DL (ref 6–8.5)
PROTHROMBIN TIME: 23.8 SECONDS (ref 19.4–28.5)
RBC # BLD AUTO: 3.58 10*6/MM3 (ref 4.14–5.8)
RHINOVIRUS RNA SPEC NAA+PROBE: NOT DETECTED
RSV RNA NPH QL NAA+NON-PROBE: NOT DETECTED
SARS-COV-2 RNA RESP QL NAA+PROBE: NOT DETECTED
SODIUM SERPL-SCNC: 136 MMOL/L (ref 136–145)
WBC NRBC COR # BLD AUTO: 5.31 10*3/MM3 (ref 3.4–10.8)
WHOLE BLOOD HOLD COAG: NORMAL
WHOLE BLOOD HOLD SPECIMEN: NORMAL

## 2025-02-19 PROCEDURE — 85610 PROTHROMBIN TIME: CPT | Performed by: EMERGENCY MEDICINE

## 2025-02-19 PROCEDURE — 93005 ELECTROCARDIOGRAM TRACING: CPT | Performed by: EMERGENCY MEDICINE

## 2025-02-19 PROCEDURE — 94761 N-INVAS EAR/PLS OXIMETRY MLT: CPT

## 2025-02-19 PROCEDURE — 71045 X-RAY EXAM CHEST 1 VIEW: CPT

## 2025-02-19 PROCEDURE — 25010000002 CEFTRIAXONE PER 250 MG: Performed by: NURSE PRACTITIONER

## 2025-02-19 PROCEDURE — 82948 REAGENT STRIP/BLOOD GLUCOSE: CPT

## 2025-02-19 PROCEDURE — 83605 ASSAY OF LACTIC ACID: CPT | Performed by: EMERGENCY MEDICINE

## 2025-02-19 PROCEDURE — 84145 PROCALCITONIN (PCT): CPT | Performed by: EMERGENCY MEDICINE

## 2025-02-19 PROCEDURE — 25810000003 LACTATED RINGERS PER 1000 ML: Performed by: EMERGENCY MEDICINE

## 2025-02-19 PROCEDURE — 0202U NFCT DS 22 TRGT SARS-COV-2: CPT | Performed by: EMERGENCY MEDICINE

## 2025-02-19 PROCEDURE — 87040 BLOOD CULTURE FOR BACTERIA: CPT | Performed by: EMERGENCY MEDICINE

## 2025-02-19 PROCEDURE — 94799 UNLISTED PULMONARY SVC/PX: CPT

## 2025-02-19 PROCEDURE — 99285 EMERGENCY DEPT VISIT HI MDM: CPT

## 2025-02-19 PROCEDURE — 85025 COMPLETE CBC W/AUTO DIFF WBC: CPT | Performed by: EMERGENCY MEDICINE

## 2025-02-19 PROCEDURE — 63710000001 ONDANSETRON ODT 4 MG TABLET DISPERSIBLE: Performed by: NURSE PRACTITIONER

## 2025-02-19 PROCEDURE — 80053 COMPREHEN METABOLIC PANEL: CPT | Performed by: EMERGENCY MEDICINE

## 2025-02-19 PROCEDURE — 94640 AIRWAY INHALATION TREATMENT: CPT

## 2025-02-19 PROCEDURE — 36415 COLL VENOUS BLD VENIPUNCTURE: CPT

## 2025-02-19 RX ORDER — SODIUM CHLORIDE, SODIUM LACTATE, POTASSIUM CHLORIDE, CALCIUM CHLORIDE 600; 310; 30; 20 MG/100ML; MG/100ML; MG/100ML; MG/100ML
100 INJECTION, SOLUTION INTRAVENOUS CONTINUOUS
Status: DISCONTINUED | OUTPATIENT
Start: 2025-02-19 | End: 2025-02-20

## 2025-02-19 RX ORDER — SODIUM CHLORIDE 0.9 % (FLUSH) 0.9 %
10 SYRINGE (ML) INJECTION EVERY 12 HOURS SCHEDULED
Status: DISCONTINUED | OUTPATIENT
Start: 2025-02-19 | End: 2025-02-22 | Stop reason: HOSPADM

## 2025-02-19 RX ORDER — ACETAMINOPHEN 500 MG
1000 TABLET ORAL ONCE
Status: COMPLETED | OUTPATIENT
Start: 2025-02-19 | End: 2025-02-19

## 2025-02-19 RX ORDER — WARFARIN SODIUM 5 MG/1
5 TABLET ORAL
Status: DISCONTINUED | OUTPATIENT
Start: 2025-02-19 | End: 2025-02-21

## 2025-02-19 RX ORDER — AMOXICILLIN 250 MG
2 CAPSULE ORAL 2 TIMES DAILY PRN
Status: DISCONTINUED | OUTPATIENT
Start: 2025-02-19 | End: 2025-02-22 | Stop reason: HOSPADM

## 2025-02-19 RX ORDER — HYDRALAZINE HYDROCHLORIDE 20 MG/ML
10 INJECTION INTRAMUSCULAR; INTRAVENOUS EVERY 6 HOURS PRN
Status: DISCONTINUED | OUTPATIENT
Start: 2025-02-19 | End: 2025-02-22 | Stop reason: HOSPADM

## 2025-02-19 RX ORDER — BUDESONIDE 0.5 MG/2ML
0.5 INHALANT ORAL
Status: DISCONTINUED | OUTPATIENT
Start: 2025-02-19 | End: 2025-02-22 | Stop reason: HOSPADM

## 2025-02-19 RX ORDER — INSULIN LISPRO 100 [IU]/ML
2-7 INJECTION, SOLUTION INTRAVENOUS; SUBCUTANEOUS
Status: DISCONTINUED | OUTPATIENT
Start: 2025-02-19 | End: 2025-02-20

## 2025-02-19 RX ORDER — SODIUM CHLORIDE 0.9 % (FLUSH) 0.9 %
10 SYRINGE (ML) INJECTION AS NEEDED
Status: DISCONTINUED | OUTPATIENT
Start: 2025-02-19 | End: 2025-02-22 | Stop reason: HOSPADM

## 2025-02-19 RX ORDER — NICOTINE POLACRILEX 4 MG
15 LOZENGE BUCCAL
Status: DISCONTINUED | OUTPATIENT
Start: 2025-02-19 | End: 2025-02-22 | Stop reason: HOSPADM

## 2025-02-19 RX ORDER — DEXTROSE MONOHYDRATE 25 G/50ML
25 INJECTION, SOLUTION INTRAVENOUS
Status: DISCONTINUED | OUTPATIENT
Start: 2025-02-19 | End: 2025-02-22 | Stop reason: HOSPADM

## 2025-02-19 RX ORDER — ONDANSETRON 4 MG/1
4 TABLET, ORALLY DISINTEGRATING ORAL EVERY 6 HOURS PRN
Status: DISCONTINUED | OUTPATIENT
Start: 2025-02-19 | End: 2025-02-22 | Stop reason: HOSPADM

## 2025-02-19 RX ORDER — OSELTAMIVIR PHOSPHATE 6 MG/ML
30 FOR SUSPENSION ORAL
Status: DISCONTINUED | OUTPATIENT
Start: 2025-02-19 | End: 2025-02-20

## 2025-02-19 RX ORDER — ENOXAPARIN SODIUM 100 MG/ML
30 INJECTION SUBCUTANEOUS DAILY
Status: DISCONTINUED | OUTPATIENT
Start: 2025-02-19 | End: 2025-02-19

## 2025-02-19 RX ORDER — BISACODYL 5 MG/1
5 TABLET, DELAYED RELEASE ORAL DAILY PRN
Status: DISCONTINUED | OUTPATIENT
Start: 2025-02-19 | End: 2025-02-22 | Stop reason: HOSPADM

## 2025-02-19 RX ORDER — BISACODYL 10 MG
10 SUPPOSITORY, RECTAL RECTAL DAILY PRN
Status: DISCONTINUED | OUTPATIENT
Start: 2025-02-19 | End: 2025-02-22 | Stop reason: HOSPADM

## 2025-02-19 RX ORDER — SODIUM CHLORIDE 9 MG/ML
40 INJECTION, SOLUTION INTRAVENOUS AS NEEDED
Status: DISCONTINUED | OUTPATIENT
Start: 2025-02-19 | End: 2025-02-22 | Stop reason: HOSPADM

## 2025-02-19 RX ORDER — IPRATROPIUM BROMIDE AND ALBUTEROL SULFATE 2.5; .5 MG/3ML; MG/3ML
3 SOLUTION RESPIRATORY (INHALATION)
Status: DISCONTINUED | OUTPATIENT
Start: 2025-02-19 | End: 2025-02-22 | Stop reason: HOSPADM

## 2025-02-19 RX ORDER — IBUPROFEN 600 MG/1
1 TABLET ORAL
Status: DISCONTINUED | OUTPATIENT
Start: 2025-02-19 | End: 2025-02-22 | Stop reason: HOSPADM

## 2025-02-19 RX ORDER — GUAIFENESIN 600 MG/1
600 TABLET, EXTENDED RELEASE ORAL EVERY 12 HOURS SCHEDULED
Status: DISCONTINUED | OUTPATIENT
Start: 2025-02-19 | End: 2025-02-22 | Stop reason: HOSPADM

## 2025-02-19 RX ORDER — ONDANSETRON 2 MG/ML
4 INJECTION INTRAMUSCULAR; INTRAVENOUS EVERY 6 HOURS PRN
Status: DISCONTINUED | OUTPATIENT
Start: 2025-02-19 | End: 2025-02-22 | Stop reason: HOSPADM

## 2025-02-19 RX ORDER — SODIUM CHLORIDE 9 MG/ML
100 INJECTION, SOLUTION INTRAVENOUS CONTINUOUS
Status: DISCONTINUED | OUTPATIENT
Start: 2025-02-19 | End: 2025-02-19

## 2025-02-19 RX ORDER — POLYETHYLENE GLYCOL 3350 17 G/17G
17 POWDER, FOR SOLUTION ORAL DAILY PRN
Status: DISCONTINUED | OUTPATIENT
Start: 2025-02-19 | End: 2025-02-22 | Stop reason: HOSPADM

## 2025-02-19 RX ADMIN — CEFTRIAXONE SODIUM 1000 MG: 1 INJECTION, POWDER, FOR SOLUTION INTRAMUSCULAR; INTRAVENOUS at 14:49

## 2025-02-19 RX ADMIN — GUAIFENESIN 600 MG: 600 TABLET, MULTILAYER, EXTENDED RELEASE ORAL at 22:14

## 2025-02-19 RX ADMIN — BUDESONIDE 0.5 MG: 0.5 INHALANT RESPIRATORY (INHALATION) at 20:10

## 2025-02-19 RX ADMIN — IPRATROPIUM BROMIDE AND ALBUTEROL SULFATE 3 ML: .5; 3 SOLUTION RESPIRATORY (INHALATION) at 20:05

## 2025-02-19 RX ADMIN — IPRATROPIUM BROMIDE AND ALBUTEROL SULFATE 3 ML: .5; 3 SOLUTION RESPIRATORY (INHALATION) at 16:24

## 2025-02-19 RX ADMIN — Medication 10 ML: at 15:00

## 2025-02-19 RX ADMIN — WARFARIN SODIUM 5 MG: 5 TABLET ORAL at 17:35

## 2025-02-19 RX ADMIN — ONDANSETRON 4 MG: 4 TABLET, ORALLY DISINTEGRATING ORAL at 17:42

## 2025-02-19 RX ADMIN — GUAIFENESIN 600 MG: 600 TABLET, MULTILAYER, EXTENDED RELEASE ORAL at 15:00

## 2025-02-19 RX ADMIN — Medication 10 ML: at 22:14

## 2025-02-19 RX ADMIN — ACETAMINOPHEN 1000 MG: 500 TABLET, FILM COATED ORAL at 13:24

## 2025-02-19 RX ADMIN — SODIUM CHLORIDE, SODIUM LACTATE, POTASSIUM CHLORIDE, AND CALCIUM CHLORIDE 100 ML/HR: .6; .31; .03; .02 INJECTION, SOLUTION INTRAVENOUS at 15:22

## 2025-02-19 RX ADMIN — OSELTAMIVIR PHOSPHATE 30 MG: 6 POWDER, FOR SUSPENSION ORAL at 15:00

## 2025-02-19 NOTE — ED PROVIDER NOTES
Subjective   History of Present Illness  74-year-old presents with weakness.  Symptoms started last night.  Reported some cough and chest pain.  He had been sleeping more today.  Wife had been ill over the weekend.  He has had no vomiting no diarrhea.  No abdominal pain.  He does not complain of headache.  Review of Systems    Past Medical History:   Diagnosis Date    Bladder cancer     Deaf     Diabetes mellitus     Prostate cancer     Renal disorder        No Known Allergies    Past Surgical History:   Procedure Laterality Date    BLADDER SURGERY      CYSTECTOMY      GASTRECTOMY      PROSTATECTOMY         Family History   Problem Relation Age of Onset    Cancer Father        Social History     Socioeconomic History    Marital status:    Tobacco Use    Smoking status: Never    Smokeless tobacco: Never   Vaping Use    Vaping status: Never Used   Substance and Sexual Activity    Alcohol use: No    Drug use: No    Sexual activity: Defer     Prior to Admission medications    Medication Sig Start Date End Date Taking? Authorizing Provider   amLODIPine (NORVASC) 10 MG tablet Take 1 tablet by mouth Every Night.    Manuel Tomas MD   Cholecalciferol 25 MCG (1000 UT) tablet Take 1 tablet by mouth Daily.    Manuel Tomas MD   gabapentin (NEURONTIN) 300 MG capsule Take 1 capsule by mouth 3 (Three) Times a Day. 1/11/22   Samanta Rogers MD   glimepiride (AMARYL) 1 MG tablet Take 1 tablet by mouth Every Morning Before Breakfast.    Manuel Tomas MD   hydrALAZINE (APRESOLINE) 50 MG tablet Take 1 tablet by mouth 3 (Three) Times a Day.    Manuel Tomas MD   Omega-3 Fatty Acids (fish oil) 1000 MG capsule capsule Take 1 capsule by mouth Daily With Breakfast.    Manuel Tomas MD   sodium bicarbonate 650 MG tablet Take 1 tablet by mouth 2 (Two) Times a Day.    Manuel Tomas MD   warfarin (COUMADIN) 5 MG tablet Take 1 tablet by mouth Daily. 12/13/19   Manuel Tomas MD            Objective   Physical Exam  74-year-old male awake alert.  Generally very weak neck supple chest clear cardiovascular rate and rhythm abdomen soft nontender he has urostomy. extremities no tenderness edema.  Neurologic exam without focal findings noted.  Procedures           ED Course      Results for orders placed or performed during the hospital encounter of 02/19/25   ECG 12 Lead Chest Pain    Collection Time: 02/19/25 12:57 PM   Result Value Ref Range    QT Interval 395 ms    QTC Interval 421 ms   Comprehensive Metabolic Panel    Collection Time: 02/19/25  1:13 PM    Specimen: Blood   Result Value Ref Range    Glucose 215 (H) 65 - 99 mg/dL    BUN 38 (H) 8 - 23 mg/dL    Creatinine 2.73 (H) 0.76 - 1.27 mg/dL    Sodium 136 136 - 145 mmol/L    Potassium 4.6 3.5 - 5.2 mmol/L    Chloride 101 98 - 107 mmol/L    CO2 24.7 22.0 - 29.0 mmol/L    Calcium 9.0 8.6 - 10.5 mg/dL    Total Protein 6.4 6.0 - 8.5 g/dL    Albumin 3.8 3.5 - 5.2 g/dL    ALT (SGPT) 16 1 - 41 U/L    AST (SGOT) 24 1 - 40 U/L    Alkaline Phosphatase 49 39 - 117 U/L    Total Bilirubin 0.3 0.0 - 1.2 mg/dL    Globulin 2.6 gm/dL    A/G Ratio 1.5 g/dL    BUN/Creatinine Ratio 13.9 7.0 - 25.0    Anion Gap 10.3 5.0 - 15.0 mmol/L    eGFR 23.7 (L) >60.0 mL/min/1.73   Procalcitonin    Collection Time: 02/19/25  1:13 PM    Specimen: Blood   Result Value Ref Range    Procalcitonin 0.37 (H) 0.00 - 0.25 ng/mL   Protime-INR    Collection Time: 02/19/25  1:13 PM    Specimen: Blood   Result Value Ref Range    Protime 23.8 19.4 - 28.5 Seconds    INR 2.11 2.00 - 3.00   CBC Auto Differential    Collection Time: 02/19/25  1:13 PM    Specimen: Blood   Result Value Ref Range    WBC 5.31 3.40 - 10.80 10*3/mm3    RBC 3.58 (L) 4.14 - 5.80 10*6/mm3    Hemoglobin 10.6 (L) 13.0 - 17.7 g/dL    Hematocrit 32.8 (L) 37.5 - 51.0 %    MCV 91.6 79.0 - 97.0 fL    MCH 29.6 26.6 - 33.0 pg    MCHC 32.3 31.5 - 35.7 g/dL    RDW 12.5 12.3 - 15.4 %    RDW-SD 41.5 37.0 - 54.0 fl    MPV 9.0  6.0 - 12.0 fL    Platelets 171 140 - 450 10*3/mm3    Neutrophil % 84.1 (H) 42.7 - 76.0 %    Lymphocyte % 2.8 (L) 19.6 - 45.3 %    Monocyte % 11.9 5.0 - 12.0 %    Eosinophil % 0.0 (L) 0.3 - 6.2 %    Basophil % 0.4 0.0 - 1.5 %    Immature Grans % 0.8 (H) 0.0 - 0.5 %    Neutrophils, Absolute 4.47 1.70 - 7.00 10*3/mm3    Lymphocytes, Absolute 0.15 (L) 0.70 - 3.10 10*3/mm3    Monocytes, Absolute 0.63 0.10 - 0.90 10*3/mm3    Eosinophils, Absolute 0.00 0.00 - 0.40 10*3/mm3    Basophils, Absolute 0.02 0.00 - 0.20 10*3/mm3    Immature Grans, Absolute 0.04 0.00 - 0.05 10*3/mm3    nRBC 0.0 0.0 - 0.2 /100 WBC   Green Top (Gel)    Collection Time: 02/19/25  1:13 PM   Result Value Ref Range    Extra Tube Hold for add-ons.    Lavender Top    Collection Time: 02/19/25  1:13 PM   Result Value Ref Range    Extra Tube hold for add-on    Gold Top - SST    Collection Time: 02/19/25  1:13 PM   Result Value Ref Range    Extra Tube Hold for add-ons.    Light Blue Top    Collection Time: 02/19/25  1:13 PM   Result Value Ref Range    Extra Tube Hold for add-ons.    Respiratory Panel PCR w/COVID-19(SARS-CoV-2) BEATRICE/NICHOLAS/TIERA/PAD/COR/JUHI In-House, NP Swab in [facility], 2 HR TAT - Swab, Nasopharynx    Collection Time: 02/19/25  1:14 PM    Specimen: Nasopharynx; Swab   Result Value Ref Range    ADENOVIRUS, PCR Not Detected Not Detected    Coronavirus 229E Not Detected Not Detected    Coronavirus HKU1 Not Detected Not Detected    Coronavirus NL63 Not Detected Not Detected    Coronavirus OC43 Not Detected Not Detected    COVID19 Not Detected Not Detected - Ref. Range    Human Metapneumovirus Not Detected Not Detected    Human Rhinovirus/Enterovirus Not Detected Not Detected    Influenza A H3 Detected (A) Not Detected    Influenza B PCR Not Detected Not Detected    Parainfluenza Virus 1 Not Detected Not Detected    Parainfluenza Virus 2 Not Detected Not Detected    Parainfluenza Virus 3 Not Detected Not Detected    Parainfluenza Virus 4 Not Detected Not  Detected    RSV, PCR Not Detected Not Detected    Bordetella pertussis pcr Not Detected Not Detected    Bordetella parapertussis PCR Not Detected Not Detected    Chlamydophila pneumoniae PCR Not Detected Not Detected    Mycoplasma pneumo by PCR Not Detected Not Detected   POC Lactate    Collection Time: 02/19/25  1:19 PM    Specimen: Blood   Result Value Ref Range    Lactate 0.9 0.3 - 2.0 mmol/L     XR Chest 1 View   Final Result   Impression:   Right upper lobe ill-defined groundglass density may represent mild pneumonitis or developing pneumonia. No dense consolidations are identified.         Electronically Signed: Trinity Negron MD     2/19/2025 2:01 PM EST     Workstation ID: [id number]        Medications   sodium chloride 0.9 % flush 10 mL (has no administration in time range)   lactated ringers infusion (100 mL/hr Intravenous New Bag 2/19/25 1522)   sodium chloride 0.9 % flush 10 mL (10 mL Intravenous Given 2/19/25 1500)   sodium chloride 0.9 % flush 10 mL (has no administration in time range)   sodium chloride 0.9 % infusion 40 mL (has no administration in time range)   Potassium Replacement - Follow Nurse / BPA Driven Protocol (has no administration in time range)   Magnesium Standard Dose Replacement - Follow Nurse / BPA Driven Protocol (has no administration in time range)   Phosphorus Replacement - Follow Nurse / BPA Driven Protocol (has no administration in time range)   Calcium Replacement - Follow Nurse / BPA Driven Protocol (has no administration in time range)   sennosides-docusate (PERICOLACE) 8.6-50 MG per tablet 2 tablet (has no administration in time range)     And   polyethylene glycol (MIRALAX) packet 17 g (has no administration in time range)     And   bisacodyl (DULCOLAX) EC tablet 5 mg (has no administration in time range)     And   bisacodyl (DULCOLAX) suppository 10 mg (has no administration in time range)   ondansetron ODT (ZOFRAN-ODT) disintegrating tablet 4 mg (has no administration in  "time range)     Or   ondansetron (ZOFRAN) injection 4 mg (has no administration in time range)   hydrALAZINE (APRESOLINE) injection 10 mg (has no administration in time range)   Pharmacy to Dose Oseltamivir (TAMIFLU) (has no administration in time range)   ipratropium-albuterol (DUO-NEB) nebulizer solution 3 mL (has no administration in time range)   guaiFENesin (MUCINEX) 12 hr tablet 600 mg (600 mg Oral Given 2/19/25 1500)   budesonide (PULMICORT) nebulizer solution 0.5 mg (has no administration in time range)   cefTRIAXone (ROCEPHIN) 1,000 mg in sodium chloride 0.9 % 100 mL MBP (0 mg Intravenous Stopped 2/19/25 1522)   oseltamivir (TAMIFLU) 6 MG/ML suspension 30 mg (30 mg Oral Given 2/19/25 1500)   dextrose (GLUTOSE) oral gel 15 g (has no administration in time range)   dextrose (D50W) (25 g/50 mL) IV injection 25 g (has no administration in time range)   glucagon (GLUCAGEN) injection 1 mg (has no administration in time range)   insulin lispro (HUMALOG/ADMELOG) injection 2-7 Units (has no administration in time range)   acetaminophen (TYLENOL) tablet 1,000 mg (1,000 mg Oral Given 2/19/25 1324)     /54   Pulse 62   Temp 99.5 °F (37.5 °C) (Oral)   Resp 18   Ht 190.5 cm (75\")   Wt 83.9 kg (185 lb)   SpO2 94%   BMI 23.12 kg/m²                                                    Medical Decision Making  Problems Addressed:  Acute kidney injury: complicated acute illness or injury  Fever in other diseases: complicated acute illness or injury  Influenza A: complicated acute illness or injury    Amount and/or Complexity of Data Reviewed  Labs: ordered.  Radiology: ordered.  ECG/medicine tests: ordered.    Risk  OTC drugs.  Prescription drug management.  Decision regarding hospitalization.    Chart review: Patient had admission for pneumonia November 2023  Comorbidity: As per past history   Differential: Pneumonia, viral illness, influenza, dehydration  My EKG interpretation: Sinus rhythm with right bundle " branch block left anterior fascicular block rate of 68 no previous  Lab: Respiratory panel positive for influenza A H3 INR 2.11 procalcitonin elevation at 0.37 CBC white count 5.31 with hemoglobin 10.6 platelet count of 171 84 segs no bands 2 point comprehensive metabolic panel glucose 215 with BUN 38 creatinine 2.73  My Radiology review and interpretation: Chest x-ray reveals ill-defined patchy groundglass density right upper lobe.  Discussion/treatment: Patient IV placed previous on IV fluids.  He was given Tylenol for fever.  Since his procalcitonin is mildly elevated he was given Unasyn.  He will also be started on Tamiflu.  His most recent creatinine last year was 1.77.  Patient was discussed with the nurse practitioner for Dr. Rogers will be admitted to their service for continued care.  Patient was evaluated using appropriate PPE      Final diagnoses:   Fever in other diseases   Influenza A   Acute kidney injury       ED Disposition  ED Disposition       ED Disposition   Decision to Admit    Condition   --    Comment   Level of Care: Telemetry [5]   Diagnosis: Dyspnea [064305]   Certification: I Certify That Inpatient Hospital Services Are Medically Necessary For Greater Than 2 Midnights                 No follow-up provider specified.       Medication List      No changes were made to your prescriptions during this visit.            Juanito Reddy MD  02/19/25 1558       Juanito Reddy MD  02/19/25 1550

## 2025-02-19 NOTE — Clinical Note
Level of Care: Telemetry [5]   Diagnosis: Dyspnea [944776]   Certification: I Certify That Inpatient Hospital Services Are Medically Necessary For Greater Than 2 Midnights

## 2025-02-19 NOTE — PROGRESS NOTES
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    INR Goal: 2 - 3  Home medication?: warfarin 5 mg PO daily  Bridge Therapy Present?:  No  Interacting Medications Evaluation (New/Present/Discontinued): ceftriaxone  Additional Contributing Factors: patient last filled warfarin on insurance claims 10/20/24      Assessment/Plan:    INR therapeutic at time of consult. Will continue home regimen at this time. Will monitor INR daily, as patient sister unsure how complaint patient is with medications.     Continue to monitor and adjust based on INR.         Date 2/19           INR 2.11           Dose 5mg             Objective:  [Ht: 190.5 cm (75\"); Wt: 83.9 kg (185 lb); BMI: Body mass index is 23.12 kg/m².]    Lab Results   Component Value Date    ALBUMIN 3.8 02/19/2025     Lab Results   Component Value Date    INR 2.11 02/19/2025    INR 2.75 11/13/2023    INR 1.94 (L) 11/10/2023    PROTIME 23.8 02/19/2025    PROTIME 27.8 11/13/2023    PROTIME 20.1 11/10/2023     Lab Results   Component Value Date    HGB 10.6 (L) 02/19/2025    HGB 9.8 (L) 11/10/2023    HGB 10.9 (L) 11/08/2023     Lab Results   Component Value Date    HCT 32.8 (L) 02/19/2025    HCT 29.5 (L) 11/10/2023    HCT 32.6 (L) 11/08/2023       Tanja Alejandro RPH  02/19/25 16:55 EST     "

## 2025-02-19 NOTE — ED NOTES
Nursing report ED to floor  Michael Dorantes  74 y.o.  male    HPI:   Chief Complaint   Patient presents with    Chest Pain       Admitting doctor:   Samanta Rogers MD    Admitting diagnosis:   The primary encounter diagnosis was Fever in other diseases. Diagnoses of Influenza A and Acute kidney injury were also pertinent to this visit.    Code status:   Current Code Status       Date Active Code Status Order ID Comments User Context       2/19/2025 1432 CPR (Attempt to Resuscitate) [id number]  Anusha Muller, APRN ED        Question Answer    Code Status (Patient has no pulse and is not breathing) CPR (Attempt to Resuscitate)    Medical Interventions (Patient has pulse or is breathing) Full Support    Level Of Support Discussed With Patient                    Allergies:   Patient has no known allergies.    Isolation:  Droplet     Fall Risk:  Fall Risk Assessment was completed, and patient is at moderate risk for falls.   Predictive Model Details         22 (Low) Factor Value    Calculated 2/19/2025 16:37 Age 74    Risk of Fall Model Active Peripheral IV Present     Imaging order in this encounter Present     Respiratory Rate 19     Number of Distinct Medication Classes administered 6     Diastolic BP 56     Magnesium not on file     Creatinine 2.73 mg/dL     Julien Scale not on file     Albumin 3.8 g/dL     Calcium 9 mg/dL     Clinically Relevant Sex Not Female     Chloride 101 mmol/L     Potassium 4.6 mmol/L     Days after Admission 0.16     Total Bilirubin 0.3 mg/dL     ALT 16 U/L         Weight:       02/19/25  1244   Weight: 83.9 kg (185 lb)       Intake and Output    Intake/Output Summary (Last 24 hours) at 2/19/2025 1637  Last data filed at 2/19/2025 1522  Gross per 24 hour   Intake 100 ml   Output --   Net 100 ml       Diet:   Dietary Orders (From admission, onward)       Start     Ordered    02/19/25 1432  Diet: Regular/House; Fluid Consistency: Thin (IDDSI 0)  Diet Effective Now        References:    Diet Order  Definitions   Question Answer Comment   Diets: Regular/House    Fluid Consistency: Thin (IDDSI 0)        02/19/25 1432                     Most recent vitals:   Vitals:    02/19/25 1530 02/19/25 1624 02/19/25 1628 02/19/25 1631   BP: 121/54   131/56   BP Location:       Patient Position:       Pulse: 62 62 63 63   Resp: 18 14 19    Temp:       TempSrc:       SpO2: 94% 91% 97% 98%   Weight:       Height:           Active LDAs/IV Access:   Lines, Drains & Airways       Active LDAs       Name Placement date Placement time Site Days    Peripheral IV 02/19/25 1310 Anterior;Right Forearm 02/19/25  1310  Forearm  less than 1    Urostomy RLQ 10/17/19  2230  in place on admission  RLQ  1951                    Skin Condition:   Skin Assessments (last day)       Date/Time Skin WDL    02/19/25 13:47:07 WDL             Labs (abnormal labs have a star):   Labs Reviewed   RESPIRATORY PANEL PCR W/ COVID-19 (SARS-COV-2), NP SWAB IN UTM/VTP, 2 HR TAT - Abnormal; Notable for the following components:       Result Value    Influenza A H3 Detected (*)     All other components within normal limits    Narrative:     In the setting of a positive respiratory panel with a viral infection PLUS a negative procalcitonin without other underlying concern for bacterial infection, consider observing off antibiotics or discontinuation of antibiotics and continue supportive care. If the respiratory panel is positive for atypical bacterial infection (Bordetella pertussis, Chlamydophila pneumoniae, or Mycoplasma pneumoniae), consider antibiotic de-escalation to target atypical bacterial infection.   COMPREHENSIVE METABOLIC PANEL - Abnormal; Notable for the following components:    Glucose 215 (*)     BUN 38 (*)     Creatinine 2.73 (*)     eGFR 23.7 (*)     All other components within normal limits    Narrative:     GFR Categories in Chronic Kidney Disease (CKD)      GFR Category          GFR (mL/min/1.73)    Interpretation  G1                     90 or  "greater         Normal or high (1)  G2                      60-89                Mild decrease (1)  G3a                   45-59                Mild to moderate decrease  G3b                   30-44                Moderate to severe decrease  G4                    15-29                Severe decrease  G5                    14 or less           Kidney failure          (1)In the absence of evidence of kidney disease, neither GFR category G1 or G2 fulfill the criteria for CKD.    eGFR calculation 2021 CKD-EPI creatinine equation, which does not include race as a factor   PROCALCITONIN - Abnormal; Notable for the following components:    Procalcitonin 0.37 (*)     All other components within normal limits    Narrative:     As a Marker for Sepsis (Non-Neonates):    1. <0.5 ng/mL represents a low risk of severe sepsis and/or septic shock.  2. >2 ng/mL represents a high risk of severe sepsis and/or septic shock.    As a Marker for Lower Respiratory Tract Infections that require antibiotic therapy:    PCT on Admission    Antibiotic Therapy       6-12 Hrs later    >0.5                Strongly Recommended  >0.25 - <0.5        Recommended   0.1 - 0.25          Discouraged              Remeasure/reassess PCT  <0.1                Strongly Discouraged     Remeasure/reassess PCT    As 28 day mortality risk marker: \"Change in Procalcitonin Result\" (>80% or <=80%) if Day 0 (or Day 1) and Day 4 values are available. Refer to http://www.[employer]-pct-calculator.com    Change in PCT <=80%  A decrease of PCT levels below or equal to 80% defines a positive change in PCT test result representing a higher risk for 28-day all-cause mortality of patients diagnosed with severe sepsis for septic shock.    Change in PCT >80%  A decrease of PCT levels of more than 80% defines a negative change in PCT result representing a lower risk for 28-day all-cause mortality of patients diagnosed with severe sepsis or septic shock.      CBC WITH AUTO " DIFFERENTIAL - Abnormal; Notable for the following components:    RBC 3.58 (*)     Hemoglobin 10.6 (*)     Hematocrit 32.8 (*)     Neutrophil % 84.1 (*)     Lymphocyte % 2.8 (*)     Eosinophil % 0.0 (*)     Immature Grans % 0.8 (*)     Lymphocytes, Absolute 0.15 (*)     All other components within normal limits   PROTIME-INR - Normal   POC LACTATE - Normal   BLOOD CULTURE   BLOOD CULTURE   RAINBOW DRAW    Narrative:     The following orders were created for panel order [place] Draw.  Procedure                               Abnormality         Status                     ---------                               -----------         ------                     Green Top (Gel)[[id number]]                                  Final result               Lavender Top[[id number]]                                     Final result               Gold Top - SST[[id number]]                                   Final result               Light Blue Top[[id number]]                                   Final result                 Please view results for these tests on the individual orders.   POCT GLUCOSE FINGERSTICK   POCT GLUCOSE FINGERSTICK   POCT GLUCOSE FINGERSTICK   POCT GLUCOSE FINGERSTICK   CBC AND DIFFERENTIAL    Narrative:     The following orders were created for panel order CBC & Differential.  Procedure                               Abnormality         Status                     ---------                               -----------         ------                     CBC Auto Differential[[id number]]        Abnormal            Final result                 Please view results for these tests on the individual orders.   GREEN TOP   LAVENDER TOP   GOLD TOP - SST   LIGHT BLUE TOP       LOC: Person, Place, Time, and Situation    Telemetry:  Med/Surg    Cardiac Monitoring Ordered: no    EKG:   ECG 12 Lead Chest Pain   Preliminary Result   HEART RATE=68  bpm   RR Cfyuyfak=061  ms   NJ Cfefzhff=682  ms   P Horizontal Axis=24  deg   P Front Axis=11   deg   QRSD Cymldtkq=671  ms   QT Xwlghupd=334  ms   IDvW=068  ms   QRS Axis=-45  deg   T Wave Axis=28  deg   - ABNORMAL ECG -   Sinus rhythm   RBBB and LAFB   Date and Time of Study:2025-02-19 12:57:39          Medications Given in the ED:   Medications   sodium chloride 0.9 % flush 10 mL (has no administration in time range)   lactated ringers infusion (100 mL/hr Intravenous New Bag 2/19/25 1522)   sodium chloride 0.9 % flush 10 mL (10 mL Intravenous Given 2/19/25 1500)   sodium chloride 0.9 % flush 10 mL (has no administration in time range)   sodium chloride 0.9 % infusion 40 mL (has no administration in time range)   Potassium Replacement - Follow Nurse / BPA Driven Protocol (has no administration in time range)   Magnesium Standard Dose Replacement - Follow Nurse / BPA Driven Protocol (has no administration in time range)   Phosphorus Replacement - Follow Nurse / BPA Driven Protocol (has no administration in time range)   Calcium Replacement - Follow Nurse / BPA Driven Protocol (has no administration in time range)   sennosides-docusate (PERICOLACE) 8.6-50 MG per tablet 2 tablet (has no administration in time range)     And   polyethylene glycol (MIRALAX) packet 17 g (has no administration in time range)     And   bisacodyl (DULCOLAX) EC tablet 5 mg (has no administration in time range)     And   bisacodyl (DULCOLAX) suppository 10 mg (has no administration in time range)   ondansetron ODT (ZOFRAN-ODT) disintegrating tablet 4 mg (has no administration in time range)     Or   ondansetron (ZOFRAN) injection 4 mg (has no administration in time range)   hydrALAZINE (APRESOLINE) injection 10 mg (has no administration in time range)   Pharmacy to Dose Oseltamivir (TAMIFLU) (has no administration in time range)   ipratropium-albuterol (DUO-NEB) nebulizer solution 3 mL (3 mL Nebulization Given 2/19/25 1624)   guaiFENesin (MUCINEX) 12 hr tablet 600 mg (600 mg Oral Given 2/19/25 1500)   budesonide (PULMICORT) nebulizer  solution 0.5 mg (has no administration in time range)   cefTRIAXone (ROCEPHIN) 1,000 mg in sodium chloride 0.9 % 100 mL MBP (0 mg Intravenous Stopped 2/19/25 1522)   oseltamivir (TAMIFLU) 6 MG/ML suspension 30 mg (30 mg Oral Given 2/19/25 1500)   Pharmacy to dose warfarin (has no administration in time range)   dextrose (GLUTOSE) oral gel 15 g (has no administration in time range)   dextrose (D50W) (25 g/50 mL) IV injection 25 g (has no administration in time range)   glucagon (GLUCAGEN) injection 1 mg (has no administration in time range)   insulin lispro (HUMALOG/ADMELOG) injection 2-7 Units (has no administration in time range)   acetaminophen (TYLENOL) tablet 1,000 mg (1,000 mg Oral Given 2/19/25 1324)       Imaging results:  XR Chest 1 View    Result Date: 2/19/2025  Impression: Right upper lobe ill-defined groundglass density may represent mild pneumonitis or developing pneumonia. No dense consolidations are identified. Electronically Signed: Trinity Negron MD  2/19/2025 2:01 PM EST  Workstation ID: [id number]     Social issues:   Social History     Socioeconomic History    Marital status:    Tobacco Use    Smoking status: Never    Smokeless tobacco: Never   Vaping Use    Vaping status: Never Used   Substance and Sexual Activity    Alcohol use: No    Drug use: No    Sexual activity: Defer       NIH Stroke Scale:  Interval: (not recorded)  1a. Level of Consciousness: (not recorded)  1b. LOC Questions: (not recorded)  1c. LOC Commands: (not recorded)  2. Best Gaze: (not recorded)  3. Visual: (not recorded)  4. Facial Palsy: (not recorded)  5a. Motor Arm, Left: (not recorded)  5b. Motor Arm, Right: (not recorded)  6a. Motor Leg, Left: (not recorded)  6b. Motor Leg, Right: (not recorded)  7. Limb Ataxia: (not recorded)  8. Sensory: (not recorded)  9. Best Language: (not recorded)  10. Dysarthria: (not recorded)  11. Extinction and Inattention (formerly Neglect): (not recorded)    Total (NIH Stroke Scale):  (not recorded)     Additional notable assessment information:     Nursing report ED to floor:  GUICHO Yanez RN   02/19/25 16:37 EST

## 2025-02-19 NOTE — H&P
Patient Care Team:  Joey Islas MD as PCP - General (Family Medicine)  Neville Laughlin MD as Consulting Physician (Nephrology)    Chief complaint Dyspnea    Subjective     Patient is a 74 y.o. male who presents with hypoxia and dyspnea from home. Patient is hearing impaired and his wife is home sick so family is interpreting. Patient states he was fine yesterday but overnight began to feel sick with body aches, coughing, congestion, and fever. His wife checked his oxygenation and it was reported to be 86? He could not get out of bed this morning. He does report that he was vaccinated and that he is prone to getting pneumonia and has had covid twice.  Past medical history includes, kidney disease stage V, hypertension, anemia, history of gastrectomy, history of urostomy, history of DVT on warfarin, GERD, diabetes type 2.  In the ED creatinine was elevated above baseline, he had a fever of 100.4, chest x-ray revealing new onset of pneumonia.      Review of Systems   Constitutional:  Positive for activity change, appetite change, chills, fatigue and fever.   HENT: Negative.     Respiratory:  Positive for cough and shortness of breath.    Cardiovascular: Negative.    Gastrointestinal: Negative.    Genitourinary: Negative.    Musculoskeletal: Negative.    Neurological:  Positive for weakness.   Psychiatric/Behavioral: Negative.            History  Past Medical History:   Diagnosis Date    Bladder cancer     Deaf     Diabetes mellitus     Prostate cancer     Renal disorder      Past Surgical History:   Procedure Laterality Date    BLADDER SURGERY      CYSTECTOMY      GASTRECTOMY      PROSTATECTOMY       Family History   Problem Relation Age of Onset    Cancer Father      Social History     Tobacco Use    Smoking status: Never    Smokeless tobacco: Never   Vaping Use    Vaping status: Never Used   Substance Use Topics    Alcohol use: No    Drug use: No     (Not in a hospital admission)    Allergies:   Patient has no known allergies.    Objective     Vital Signs  Temp:  [104 °F (40 °C)] 104 °F (40 °C)  Heart Rate:  [63-69] 66  Resp:  [16] 16  BP: (105-128)/(50-67) 128/52       Physical Exam  Vitals reviewed.   Constitutional:       Appearance: He is ill-appearing.   HENT:      Head: Normocephalic and atraumatic.      Right Ear: External ear normal.      Left Ear: External ear normal.      Nose: Nose normal.      Mouth/Throat:      Mouth: Mucous membranes are moist.   Eyes:      General:         Right eye: No discharge.         Left eye: No discharge.   Cardiovascular:      Rate and Rhythm: Normal rate and regular rhythm.      Pulses: Normal pulses.      Heart sounds: Normal heart sounds.   Pulmonary:      Effort: Pulmonary effort is normal.      Breath sounds: Rhonchi present.   Abdominal:      General: Bowel sounds are normal.      Palpations: Abdomen is soft.   Musculoskeletal:         General: Normal range of motion.   Skin:     General: Skin is warm and dry.   Neurological:      Mental Status: He is alert and oriented to person, place, and time.   Psychiatric:         Behavior: Behavior normal.          Results Review:     Imaging Results (Last 24 Hours)       Procedure Component Value Units Date/Time    XR Chest 1 View [[id number]] Collected: 02/19/25 1359     Updated: 02/19/25 1403    Narrative:      XR CHEST 1 VW    Date of Exam: 2/19/2025 1:36 PM EST    Indication: cough    Comparison: CT chest 11/15/2024. AP chest 1/17/2023.    Findings:  Ill-defined groundglass density in the right upper lobe heart size is within normal limits. No pleural effusion or pneumothorax or acute osseous abnormalities are identified.      Impression:      Impression:  Right upper lobe ill-defined groundglass density may represent mild pneumonitis or developing pneumonia. No dense consolidations are identified.      Electronically Signed: Trinity Negron MD    2/19/2025 2:01 PM EST    Workstation ID: [id number]             Lab  Results (last 24 hours)       Procedure Component Value Units Date/Time    Respiratory Panel PCR w/COVID-19(SARS-CoV-2) BEATRICE/NICHOLAS/TIERA/PAD/COR/JUHI In-House, NP Swab in UTM/VTM, 2 HR TAT - Swab, Nasopharynx [[id number]]  (Abnormal) Collected: 02/19/25 1314    Specimen: Swab from Nasopharynx Updated: 02/19/25 1407     ADENOVIRUS, PCR Not Detected     Coronavirus 229E Not Detected     Coronavirus HKU1 Not Detected     Coronavirus NL63 Not Detected     Coronavirus OC43 Not Detected     COVID19 Not Detected     Human Metapneumovirus Not Detected     Human Rhinovirus/Enterovirus Not Detected     Influenza A H3 Detected     Influenza B PCR Not Detected     Parainfluenza Virus 1 Not Detected     Parainfluenza Virus 2 Not Detected     Parainfluenza Virus 3 Not Detected     Parainfluenza Virus 4 Not Detected     RSV, PCR Not Detected     Bordetella pertussis pcr Not Detected     Bordetella parapertussis PCR Not Detected     Chlamydophila pneumoniae PCR Not Detected     Mycoplasma pneumo by PCR Not Detected    Narrative:      In the setting of a positive respiratory panel with a viral infection PLUS a negative procalcitonin without other underlying concern for bacterial infection, consider observing off antibiotics or discontinuation of antibiotics and continue supportive care. If the respiratory panel is positive for atypical bacterial infection (Bordetella pertussis, Chlamydophila pneumoniae, or Mycoplasma pneumoniae), consider antibiotic de-escalation to target atypical bacterial infection.    Comprehensive Metabolic Panel [[id number]]  (Abnormal) Collected: 02/19/25 1313    Specimen: Blood Updated: 02/19/25 1352     Glucose 215 mg/dL      BUN 38 mg/dL      Creatinine 2.73 mg/dL      Sodium 136 mmol/L      Potassium 4.6 mmol/L      Chloride 101 mmol/L      CO2 24.7 mmol/L      Calcium 9.0 mg/dL      Total Protein 6.4 g/dL      Albumin 3.8 g/dL      ALT (SGPT) 16 U/L      AST (SGOT) 24 U/L      Alkaline Phosphatase 49 U/L       "Total Bilirubin 0.3 mg/dL      Globulin 2.6 gm/dL      A/G Ratio 1.5 g/dL      BUN/Creatinine Ratio 13.9     Anion Gap 10.3 mmol/L      eGFR 23.7 mL/min/1.73     Narrative:      GFR Categories in Chronic Kidney Disease (CKD)      GFR Category          GFR (mL/min/1.73)    Interpretation  G1                     90 or greater         Normal or high (1)  G2                      60-89                Mild decrease (1)  G3a                   45-59                Mild to moderate decrease  G3b                   30-44                Moderate to severe decrease  G4                    15-29                Severe decrease  G5                    14 or less           Kidney failure          (1)In the absence of evidence of kidney disease, neither GFR category G1 or G2 fulfill the criteria for CKD.    eGFR calculation 2021 CKD-EPI creatinine equation, which does not include race as a factor    Procalcitonin [[id number]]  (Abnormal) Collected: 02/19/25 1313    Specimen: Blood Updated: 02/19/25 1352     Procalcitonin 0.37 ng/mL     Narrative:      As a Marker for Sepsis (Non-Neonates):    1. <0.5 ng/mL represents a low risk of severe sepsis and/or septic shock.  2. >2 ng/mL represents a high risk of severe sepsis and/or septic shock.    As a Marker for Lower Respiratory Tract Infections that require antibiotic therapy:    PCT on Admission    Antibiotic Therapy       6-12 Hrs later    >0.5                Strongly Recommended  >0.25 - <0.5        Recommended   0.1 - 0.25          Discouraged              Remeasure/reassess PCT  <0.1                Strongly Discouraged     Remeasure/reassess PCT    As 28 day mortality risk marker: \"Change in Procalcitonin Result\" (>80% or <=80%) if Day 0 (or Day 1) and Day 4 values are available. Refer to http://www.[employer]-pct-calculator.com    Change in PCT <=80%  A decrease of PCT levels below or equal to 80% defines a positive change in PCT test result representing a higher risk for 28-day all-cause " mortality of patients diagnosed with severe sepsis for septic shock.    Change in PCT >80%  A decrease of PCT levels of more than 80% defines a negative change in PCT result representing a lower risk for 28-day all-cause mortality of patients diagnosed with severe sepsis or septic shock.       Protime-INR [[id number]]  (Normal) Collected: 02/19/25 1313    Specimen: Blood Updated: 02/19/25 1338     Protime 23.8 Seconds      INR 2.11    [facility] Draw [[id number]] Collected: 02/19/25 1313    Specimen: Blood Updated: 02/19/25 1330    Narrative:      The following orders were created for panel order [facility] Draw.  Procedure                               Abnormality         Status                     ---------                               -----------         ------                     Green Top (Gel)[[id number]]                                  Final result               Lavender Top[[id number]]                                     Final result               Gold Top - SST[[id number]]                                   Final result               Light Blue Top[[id number]]                                   Final result                 Please view results for these tests on the individual orders.    Lavender Top [[id number]] Collected: 02/19/25 1313    Specimen: Blood Updated: 02/19/25 1330     Extra Tube hold for add-on     Comment: Auto resulted       Gold Top - SST [[id number]] Collected: 02/19/25 1313    Specimen: Blood Updated: 02/19/25 1330     Extra Tube Hold for add-ons.     Comment: Auto resulted.       Light Blue Top [[id number]] Collected: 02/19/25 1313    Specimen: Blood Updated: 02/19/25 1330     Extra Tube Hold for add-ons.     Comment: Auto resulted       Green Top (Gel) [[id number]] Collected: 02/19/25 1313    Specimen: Blood Updated: 02/19/25 1330     Extra Tube Hold for add-ons.     Comment: Auto resulted.       CBC & Differential [[id number]]  (Abnormal) Collected: 02/19/25 1313    Specimen: Blood Updated: 02/19/25  1326    Narrative:      The following orders were created for panel order CBC & Differential.  Procedure                               Abnormality         Status                     ---------                               -----------         ------                     CBC Auto Differential[[id number]]        Abnormal            Final result                 Please view results for these tests on the individual orders.    CBC Auto Differential [[id number]]  (Abnormal) Collected: 02/19/25 1313    Specimen: Blood Updated: 02/19/25 1326     WBC 5.31 10*3/mm3      RBC 3.58 10*6/mm3      Hemoglobin 10.6 g/dL      Hematocrit 32.8 %      MCV 91.6 fL      MCH 29.6 pg      MCHC 32.3 g/dL      RDW 12.5 %      RDW-SD 41.5 fl      MPV 9.0 fL      Platelets 171 10*3/mm3      Neutrophil % 84.1 %      Lymphocyte % 2.8 %      Monocyte % 11.9 %      Eosinophil % 0.0 %      Basophil % 0.4 %      Immature Grans % 0.8 %      Neutrophils, Absolute 4.47 10*3/mm3      Lymphocytes, Absolute 0.15 10*3/mm3      Monocytes, Absolute 0.63 10*3/mm3      Eosinophils, Absolute 0.00 10*3/mm3      Basophils, Absolute 0.02 10*3/mm3      Immature Grans, Absolute 0.04 10*3/mm3      nRBC 0.0 /100 WBC     POC Lactate [[id number]]  (Normal) Collected: 02/19/25 1319    Specimen: Blood Updated: 02/19/25 1320     Lactate 0.9 mmol/L      Comment: Serial Number: [id number]:  [id number]       Blood Culture - Blood, Arm, Left [[id number]] Collected: 02/19/25 1313    Specimen: Blood from Arm, Left Updated: 02/19/25 1319    Blood Culture - Blood, Arm, Right [[id number]] Collected: 02/19/25 1313    Specimen: Blood from Arm, Right Updated: 02/19/25 1319             I reviewed the patient's new clinical results.    Assessment & Plan     Acute hypoxia  Dyspnea  Fever  Influenza A  Pneumonia  Hearing impaired  Hypertension associated with chronic kidney disease stage IV  Anemia with chronic kidney disease stage IV  History of gastrectomy  History of urostomy  History of  deep venous thrombosis  Gastroesophageal reflux disease with esophagitis  Known history of hiatal hernia  Diabetes mellitus type 2 with renal manifestations   Diabetic dyslipidemia     Plan of care  Tamiflu/nebs/rocephin  Nephrology to follow due to creatinine above baseline  Will hold off on home BP medication due to softer bp continue LR started in ED until nephrology follows  Hold home oral glycemic agent and add ss until patient eating better       I discussed the patient's findings and my recommendations with patient.     Anusha Muller, JOANNE  02/19/25  14:35 EST

## 2025-02-20 ENCOUNTER — APPOINTMENT (OUTPATIENT)
Dept: ULTRASOUND IMAGING | Facility: HOSPITAL | Age: 75
DRG: 194 | End: 2025-02-20
Payer: MEDICARE

## 2025-02-20 PROBLEM — D50.9 IRON DEFICIENCY ANEMIA: Status: ACTIVE | Noted: 2025-02-20

## 2025-02-20 LAB
ANION GAP SERPL CALCULATED.3IONS-SCNC: 11.2 MMOL/L (ref 5–15)
BASOPHILS # BLD AUTO: 0.02 10*3/MM3 (ref 0–0.2)
BASOPHILS NFR BLD AUTO: 0.4 % (ref 0–1.5)
BUN SERPL-MCNC: 38 MG/DL (ref 8–23)
BUN/CREAT SERPL: 15.6 (ref 7–25)
CALCIUM SPEC-SCNC: 8.4 MG/DL (ref 8.6–10.5)
CHLORIDE SERPL-SCNC: 101 MMOL/L (ref 98–107)
CK SERPL-CCNC: 205 U/L (ref 20–200)
CO2 SERPL-SCNC: 22.8 MMOL/L (ref 22–29)
CREAT SERPL-MCNC: 2.44 MG/DL (ref 0.76–1.27)
DEPRECATED RDW RBC AUTO: 42.1 FL (ref 37–54)
EGFRCR SERPLBLD CKD-EPI 2021: 27.1 ML/MIN/1.73
EOSINOPHIL # BLD AUTO: 0 10*3/MM3 (ref 0–0.4)
EOSINOPHIL NFR BLD AUTO: 0 % (ref 0.3–6.2)
ERYTHROCYTE [DISTWIDTH] IN BLOOD BY AUTOMATED COUNT: 12.4 % (ref 12.3–15.4)
FERRITIN SERPL-MCNC: 118 NG/ML (ref 30–400)
GLUCOSE BLDC GLUCOMTR-MCNC: 147 MG/DL (ref 70–105)
GLUCOSE SERPL-MCNC: 135 MG/DL (ref 65–99)
HCT VFR BLD AUTO: 31.9 % (ref 37.5–51)
HGB BLD-MCNC: 10 G/DL (ref 13–17.7)
IMM GRANULOCYTES # BLD AUTO: 0.03 10*3/MM3 (ref 0–0.05)
IMM GRANULOCYTES NFR BLD AUTO: 0.6 % (ref 0–0.5)
INR PPP: 2.21 (ref 2–3)
IRON 24H UR-MRATE: 17 MCG/DL (ref 59–158)
IRON SATN MFR SERPL: 6 % (ref 20–50)
LYMPHOCYTES # BLD AUTO: 0.32 10*3/MM3 (ref 0.7–3.1)
LYMPHOCYTES NFR BLD AUTO: 6.3 % (ref 19.6–45.3)
MCH RBC QN AUTO: 29 PG (ref 26.6–33)
MCHC RBC AUTO-ENTMCNC: 31.3 G/DL (ref 31.5–35.7)
MCV RBC AUTO: 92.5 FL (ref 79–97)
MONOCYTES # BLD AUTO: 0.87 10*3/MM3 (ref 0.1–0.9)
MONOCYTES NFR BLD AUTO: 17.1 % (ref 5–12)
NEUTROPHILS NFR BLD AUTO: 3.84 10*3/MM3 (ref 1.7–7)
NEUTROPHILS NFR BLD AUTO: 75.6 % (ref 42.7–76)
NRBC BLD AUTO-RTO: 0 /100 WBC (ref 0–0.2)
PLATELET # BLD AUTO: 148 10*3/MM3 (ref 140–450)
PMV BLD AUTO: 9.1 FL (ref 6–12)
POTASSIUM SERPL-SCNC: 4.6 MMOL/L (ref 3.5–5.2)
PROTHROMBIN TIME: 24.7 SECONDS (ref 19.4–28.5)
QT INTERVAL: 395 MS
QTC INTERVAL: 421 MS
RBC # BLD AUTO: 3.45 10*6/MM3 (ref 4.14–5.8)
SODIUM SERPL-SCNC: 135 MMOL/L (ref 136–145)
TIBC SERPL-MCNC: 268 MCG/DL (ref 298–536)
TRANSFERRIN SERPL-MCNC: 180 MG/DL (ref 200–360)
TSH SERPL DL<=0.05 MIU/L-ACNC: 1.48 UIU/ML (ref 0.27–4.2)
URATE SERPL-MCNC: 6.4 MG/DL (ref 3.4–7)
WBC NRBC COR # BLD AUTO: 5.08 10*3/MM3 (ref 3.4–10.8)

## 2025-02-20 PROCEDURE — 94799 UNLISTED PULMONARY SVC/PX: CPT

## 2025-02-20 PROCEDURE — 94761 N-INVAS EAR/PLS OXIMETRY MLT: CPT

## 2025-02-20 PROCEDURE — 25810000003 SODIUM CHLORIDE 0.9 % SOLUTION: Performed by: INTERNAL MEDICINE

## 2025-02-20 PROCEDURE — 82728 ASSAY OF FERRITIN: CPT | Performed by: INTERNAL MEDICINE

## 2025-02-20 PROCEDURE — 84550 ASSAY OF BLOOD/URIC ACID: CPT | Performed by: INTERNAL MEDICINE

## 2025-02-20 PROCEDURE — 83540 ASSAY OF IRON: CPT | Performed by: INTERNAL MEDICINE

## 2025-02-20 PROCEDURE — 25010000002 CEFTRIAXONE PER 250 MG: Performed by: NURSE PRACTITIONER

## 2025-02-20 PROCEDURE — 84466 ASSAY OF TRANSFERRIN: CPT | Performed by: INTERNAL MEDICINE

## 2025-02-20 PROCEDURE — 85610 PROTHROMBIN TIME: CPT | Performed by: NURSE PRACTITIONER

## 2025-02-20 PROCEDURE — 85025 COMPLETE CBC W/AUTO DIFF WBC: CPT | Performed by: NURSE PRACTITIONER

## 2025-02-20 PROCEDURE — 97162 PT EVAL MOD COMPLEX 30 MIN: CPT

## 2025-02-20 PROCEDURE — 25010000002 CALCIUM GLUCONATE-NACL 1-0.675 GM/50ML-% SOLUTION: Performed by: INTERNAL MEDICINE

## 2025-02-20 PROCEDURE — 80048 BASIC METABOLIC PNL TOTAL CA: CPT | Performed by: NURSE PRACTITIONER

## 2025-02-20 PROCEDURE — 84443 ASSAY THYROID STIM HORMONE: CPT | Performed by: INTERNAL MEDICINE

## 2025-02-20 PROCEDURE — 94664 DEMO&/EVAL PT USE INHALER: CPT

## 2025-02-20 PROCEDURE — 76775 US EXAM ABDO BACK WALL LIM: CPT

## 2025-02-20 PROCEDURE — 82948 REAGENT STRIP/BLOOD GLUCOSE: CPT | Performed by: NURSE PRACTITIONER

## 2025-02-20 PROCEDURE — 82550 ASSAY OF CK (CPK): CPT | Performed by: INTERNAL MEDICINE

## 2025-02-20 RX ORDER — OSELTAMIVIR PHOSPHATE 6 MG/ML
30 FOR SUSPENSION ORAL EVERY 12 HOURS SCHEDULED
Status: DISCONTINUED | OUTPATIENT
Start: 2025-02-20 | End: 2025-02-22 | Stop reason: HOSPADM

## 2025-02-20 RX ORDER — ACETAMINOPHEN 325 MG/1
650 TABLET ORAL EVERY 6 HOURS PRN
Status: DISCONTINUED | OUTPATIENT
Start: 2025-02-20 | End: 2025-02-22 | Stop reason: HOSPADM

## 2025-02-20 RX ORDER — SODIUM CHLORIDE 9 MG/ML
75 INJECTION, SOLUTION INTRAVENOUS CONTINUOUS
Status: DISCONTINUED | OUTPATIENT
Start: 2025-02-20 | End: 2025-02-21

## 2025-02-20 RX ORDER — INSULIN LISPRO 100 [IU]/ML
2-7 INJECTION, SOLUTION INTRAVENOUS; SUBCUTANEOUS
Status: DISCONTINUED | OUTPATIENT
Start: 2025-02-20 | End: 2025-02-22 | Stop reason: HOSPADM

## 2025-02-20 RX ORDER — CALCIUM CARBONATE 500 MG/1
2 TABLET, CHEWABLE ORAL 3 TIMES DAILY PRN
Status: DISCONTINUED | OUTPATIENT
Start: 2025-02-20 | End: 2025-02-22 | Stop reason: HOSPADM

## 2025-02-20 RX ORDER — CALCIUM GLUCONATE 20 MG/ML
1000 INJECTION, SOLUTION INTRAVENOUS ONCE
Status: COMPLETED | OUTPATIENT
Start: 2025-02-20 | End: 2025-02-20

## 2025-02-20 RX ORDER — FERROUS SULFATE 325(65) MG
325 TABLET ORAL
Status: DISCONTINUED | OUTPATIENT
Start: 2025-02-20 | End: 2025-02-22 | Stop reason: HOSPADM

## 2025-02-20 RX ADMIN — PHENOL 1 SPRAY: 1.5 LIQUID ORAL at 04:23

## 2025-02-20 RX ADMIN — BUDESONIDE 0.5 MG: 0.5 INHALANT RESPIRATORY (INHALATION) at 08:44

## 2025-02-20 RX ADMIN — ACETAMINOPHEN 650 MG: 325 TABLET, FILM COATED ORAL at 04:22

## 2025-02-20 RX ADMIN — GUAIFENESIN 600 MG: 600 TABLET, MULTILAYER, EXTENDED RELEASE ORAL at 09:03

## 2025-02-20 RX ADMIN — WARFARIN SODIUM 5 MG: 5 TABLET ORAL at 18:17

## 2025-02-20 RX ADMIN — OSELTAMIVIR PHOSPHATE 30 MG: 6 POWDER, FOR SUSPENSION ORAL at 09:03

## 2025-02-20 RX ADMIN — GUAIFENESIN 600 MG: 600 TABLET, MULTILAYER, EXTENDED RELEASE ORAL at 20:30

## 2025-02-20 RX ADMIN — IPRATROPIUM BROMIDE AND ALBUTEROL SULFATE 3 ML: .5; 3 SOLUTION RESPIRATORY (INHALATION) at 19:45

## 2025-02-20 RX ADMIN — OSELTAMIVIR PHOSPHATE 30 MG: 6 FOR SUSPENSION ORAL at 20:30

## 2025-02-20 RX ADMIN — FERROUS SULFATE TAB 325 MG (65 MG ELEMENTAL FE) 325 MG: 325 (65 FE) TAB at 12:17

## 2025-02-20 RX ADMIN — Medication 10 ML: at 20:30

## 2025-02-20 RX ADMIN — BUDESONIDE 0.5 MG: 0.5 INHALANT RESPIRATORY (INHALATION) at 19:50

## 2025-02-20 RX ADMIN — SODIUM CHLORIDE 75 ML/HR: 9 INJECTION, SOLUTION INTRAVENOUS at 12:05

## 2025-02-20 RX ADMIN — CALCIUM GLUCONATE 1000 MG: 20 INJECTION, SOLUTION INTRAVENOUS at 09:03

## 2025-02-20 RX ADMIN — Medication 10 ML: at 10:16

## 2025-02-20 RX ADMIN — IPRATROPIUM BROMIDE AND ALBUTEROL SULFATE 3 ML: .5; 3 SOLUTION RESPIRATORY (INHALATION) at 08:40

## 2025-02-20 RX ADMIN — CEFTRIAXONE SODIUM 1000 MG: 1 INJECTION, POWDER, FOR SOLUTION INTRAMUSCULAR; INTRAVENOUS at 12:06

## 2025-02-20 RX ADMIN — ANTACID TABLETS 2 TABLET: 500 TABLET, CHEWABLE ORAL at 14:39

## 2025-02-20 RX ADMIN — IPRATROPIUM BROMIDE AND ALBUTEROL SULFATE 3 ML: .5; 3 SOLUTION RESPIRATORY (INHALATION) at 11:50

## 2025-02-20 RX ADMIN — PHENOL 1 SPRAY: 1.5 LIQUID ORAL at 00:36

## 2025-02-20 RX ADMIN — IPRATROPIUM BROMIDE AND ALBUTEROL SULFATE 3 ML: .5; 3 SOLUTION RESPIRATORY (INHALATION) at 16:03

## 2025-02-20 NOTE — CASE MANAGEMENT/SOCIAL WORK
Discharge Planning Assessment   O[facility]     Patient Name: Michael Dorantes  MRN: [id number]  Today's Date: 2/20/2025    Admit Date: 2/19/2025    Plan: Anticipate routine home with wife.   Discharge Needs Assessment       Row Name 02/20/25 1348       Living Environment    People in Home spouse    Name(s) of People in Home Wife-Riddhi    Current Living Arrangements home    Potentially Unsafe Housing Conditions none    In the past 12 months has the electric, gas, oil, or water company threatened to shut off services in your home? No    Primary Care Provided by self    Provides Primary Care For no one    Family Caregiver if Needed spouse    Family Caregiver Names Riddhi    Quality of Family Relationships helpful;involved;supportive    Able to Return to Prior Arrangements yes       Resource/Environmental Concerns    Resource/Environmental Concerns none    Transportation Concerns none       Transportation Needs    In the past 12 months, has lack of transportation kept you from medical appointments or from getting medications? no    In the past 12 months, has lack of transportation kept you from meetings, work, or from getting things needed for daily living? No       Food Insecurity    Within the past 12 months, you worried that your food would run out before you got the money to buy more. Never true    Within the past 12 months, the food you bought just didn't last and you didn't have money to get more. Never true       Transition Planning    Patient/Family Anticipates Transition to home;home with family    Patient/Family Anticipated Services at Transition none    Transportation Anticipated car, drives self;family or friend will provide       Discharge Needs Assessment    Readmission Within the Last 30 Days no previous admission in last 30 days    Equipment Currently Used at Home glucometer;colostomy/ostomy supplies    Concerns to be Addressed denies needs/concerns at this time;no discharge needs identified    Do you want  help finding or keeping work or a job? Patient declined    Do you want help with school or training? For example, starting or completing job training or getting a high school diploma, GED or equivalent No    Anticipated Changes Related to Illness none    Equipment Needed After Discharge none    Provided Post Acute Provider List? N/A    Provided Post Acute Provider Quality & Resource List? N/A                   Discharge Plan       Row Name 02/20/25 5203       Plan    Plan Anticipate routine home with wife.    Patient/Family in Agreement with Plan yes    Plan Comments CM met with patient at bedside. Primary nurse Mary had already set up translation iPad for ASL. Pt lives at home with wife Riddhi, he drives, and is independent with ADL's. PCP and pharmacy confirmed- pt agreeable to use [facility]. Only DME used at home is glucometer and urostomy supplies. Pt denies current HHC/PT services and does not anticipate needing at discharge. Pt reports his wife's sister Karen will transport at time of dc.              Demographic Summary       Row Name 02/20/25 1346       General Information    Admission Type inpatient    Arrived From emergency department    Referral Source admission list    Reason for Consult care coordination/care conference;discharge planning    Preferred Language English;sign language       Contact Information    Permission Granted to Share Info With                    Functional Status       Row Name 02/20/25 1345       Functional Status    Usual Activity Tolerance good    Current Activity Tolerance good       Functional Status, IADL    Medications independent    Meal Preparation independent    Housekeeping independent    Laundry independent    Shopping independent    If for any reason you need help with day-to-day activities such as bathing, preparing meals, shopping, managing finances, etc., do you get the help you need? I don't need any help             Megan Naegele, RN  Case  Manager   Office Phone: [phone]  Office Cell: [phone]

## 2025-02-20 NOTE — CONSULTS
NEPHROLOGY CONSULTATION-----[facility]    [facility]  [phone]  Stephanie Laughlin MD    Patient Care Team:  Joey Islas MD as PCP - General (Family Medicine)  Neville Laughlin MD as Consulting Physician (Nephrology)    CC/REASON FOR CONSULTATION: RENAL FAILURE/ELEVATED SERUM CREATININE    PHYSICIAN REQUESTING CONSULTATION: Samanta Rogers MD     History of Present Illness    Patient is a 74 y.o. WM, very well known to me as I actively follow him as an outpatient,  whom I was asked to see in consultation for evaluation and management of renal failure/elevated serum creatinine. Patient was admitted after presenting to ER with c/o weakness, cough and chest pain.   Patient with known CRF/CKD STG 3. No NSAIDs or recent IV dye exposure. No known h/o hepatitis, TB, rheumatic fever, jaundice, SLE. Does bleed/bruise easily as he is chronically anticoagulated on Coumadin. +Urostomy. No edema or fluid retention.  +Compliance with home meds. Was not on diuretics prior to admission. Was not on ACE-I/ARB prior to admission. No herbal med use.     Review of Systems   Constitutional:  Positive for activity change, appetite change and fatigue. Negative for chills, diaphoresis, fever and unexpected weight change.   HENT:  Positive for hearing loss. Negative for congestion, dental problem, drooling, ear discharge, ear pain, facial swelling, mouth sores, nosebleeds, postnasal drip, rhinorrhea, sinus pressure, sinus pain, sneezing, sore throat, tinnitus, trouble swallowing and voice change.    Eyes:  Negative for photophobia, pain, discharge, redness, itching and visual disturbance.   Respiratory:  Positive for cough and shortness of breath. Negative for apnea, choking, chest tightness, wheezing and stridor.    Cardiovascular:  Positive for chest pain. Negative for palpitations and leg swelling.   Gastrointestinal:  Negative for abdominal distention,  abdominal pain, anal bleeding, blood in stool, constipation, diarrhea, nausea, rectal pain and vomiting.   Endocrine: Negative for cold intolerance, heat intolerance, polydipsia, polyphagia and polyuria.   Genitourinary:  Negative for decreased urine volume, difficulty urinating, dysuria, enuresis, flank pain, frequency, genital sores, hematuria and urgency.   Musculoskeletal:  Positive for arthralgias and back pain. Negative for gait problem, joint swelling, myalgias, neck pain and neck stiffness.   Skin:  Negative for color change, pallor, rash and wound.   Allergic/Immunologic: Negative for environmental allergies, food allergies and immunocompromised state.   Neurological:  Positive for weakness. Negative for dizziness, tremors, seizures, syncope, facial asymmetry, speech difficulty, light-headedness, numbness and headaches.   Hematological:  Negative for adenopathy. Does not bruise/bleed easily.   Psychiatric/Behavioral:  Negative for agitation, behavioral problems, confusion, decreased concentration, dysphoric mood, hallucinations, self-injury, sleep disturbance and suicidal ideas. The patient is not nervous/anxious and is not hyperactive.           Past Medical History:   Diagnosis Date    Bladder cancer     Deaf     Diabetes mellitus     Prostate cancer     Renal disorder        Past Surgical History:   Procedure Laterality Date    BLADDER SURGERY      CYSTECTOMY      GASTRECTOMY      PROSTATECTOMY         Family History   Problem Relation Age of Onset    Cancer Father        Social History     Tobacco Use    Smoking status: Never    Smokeless tobacco: Never   Vaping Use    Vaping status: Never Used   Substance Use Topics    Alcohol use: No    Drug use: No       Home Meds:   Medications Prior to Admission   Medication Sig Dispense Refill Last Dose/Taking    amLODIPine (NORVASC) 10 MG tablet Take 1 tablet by mouth Every Night.   Unknown    Cholecalciferol 25 MCG (1000 UT) tablet Take 1 tablet by mouth Daily.        gabapentin (NEURONTIN) 300 MG capsule Take 1 capsule by mouth 3 (Three) Times a Day.   Unknown    glimepiride (AMARYL) 1 MG tablet Take 1 tablet by mouth Every Morning Before Breakfast.   Unknown    hydrALAZINE (APRESOLINE) 50 MG tablet Take 1 tablet by mouth 3 (Three) Times a Day.   Unknown    Omega-3 Fatty Acids (fish oil) 1000 MG capsule capsule Take 1 capsule by mouth Daily With Breakfast.       sodium bicarbonate 650 MG tablet Take 1 tablet by mouth 2 (Two) Times a Day.   Unknown    warfarin (COUMADIN) 5 MG tablet Take 1 tablet by mouth Daily.   Unknown       Scheduled Meds:  budesonide, 0.5 mg, Nebulization, BID - RT  cefTRIAXone, 1,000 mg, Intravenous, Q24H  guaiFENesin, 600 mg, Oral, Q12H  insulin lispro, 2-7 Units, Subcutaneous, 4x Daily AC & at Bedtime  ipratropium-albuterol, 3 mL, Nebulization, 4x Daily - RT  oseltamivir, 30 mg, Oral, Q24H  sodium chloride, 10 mL, Intravenous, Q12H  warfarin, 5 mg, Oral, Daily        Continuous Infusions:  Pharmacy to Dose Oseltamivir (TAMIFLU),   Pharmacy to dose warfarin,         PRN Meds:    acetaminophen    senna-docusate sodium **AND** polyethylene glycol **AND** bisacodyl **AND** bisacodyl    Calcium Replacement - Follow Nurse / BPA Driven Protocol    dextrose    dextrose    glucagon (human recombinant)    hydrALAZINE    Magnesium Standard Dose Replacement - Follow Nurse / BPA Driven Protocol    ondansetron ODT **OR** ondansetron    Pharmacy to Dose Oseltamivir (TAMIFLU)    Pharmacy to dose warfarin    phenol    Phosphorus Replacement - Follow Nurse / BPA Driven Protocol    Potassium Replacement - Follow Nurse / BPA Driven Protocol    sodium chloride    sodium chloride    sodium chloride    Allergies:  Patient has no known allergies.    OBJECTIVE    Vital Signs  Temp:  [97.8 °F (36.6 °C)-104 °F (40 °C)] 99.4 °F (37.4 °C)  Heart Rate:  [61-74] 74  Resp:  [13-19] 13  BP: (105-149)/(50-74) 136/74    No intake/output data recorded.  I/O last 3 completed  "shifts:  In: 340 [P.O.:240; IV Piggyback:100]  Out: 400 [Urine:400]    Physical Exam: +DEAF  General Appearance: alert, appears stated age and cooperative  Head: normocephalic, without obvious abnormality and atraumatic +DRY OP  Eyes: conjunctivae and sclerae normal and no icterus  Neck: supple and no JVD  Lungs: +BIBASILAR RALES  Heart: regular rhythm & normal rate and normal S1, S2 +CHAYO  Chest Wall: no abnormalities observed  Abdomen: normal bowel sounds and soft non-tender +UROSTOMY  Extremities: moves extremities well, no edema, no cyanosis  Skin: no bleeding, bruising or rash +DECREASED SKIN TURGOR  Neurologic: Alert, and oriented. No focal deficits    Results Review:    I reviewed the patient's new clinical results.    WBC WBC   Date Value Ref Range Status   02/20/2025 5.08 3.40 - 10.80 10*3/mm3 Final   02/19/2025 5.31 3.40 - 10.80 10*3/mm3 Final      HGB Hemoglobin   Date Value Ref Range Status   02/20/2025 10.0 (L) 13.0 - 17.7 g/dL Final   02/19/2025 10.6 (L) 13.0 - 17.7 g/dL Final      HCT Hematocrit   Date Value Ref Range Status   02/20/2025 31.9 (L) 37.5 - 51.0 % Final   02/19/2025 32.8 (L) 37.5 - 51.0 % Final      Platelets No results found for: \"LABPLAT\"   MCV MCV   Date Value Ref Range Status   02/20/2025 92.5 79.0 - 97.0 fL Final   02/19/2025 91.6 79.0 - 97.0 fL Final          Sodium Sodium   Date Value Ref Range Status   02/20/2025 135 (L) 136 - 145 mmol/L Final   02/19/2025 136 136 - 145 mmol/L Final      Potassium Potassium   Date Value Ref Range Status   02/20/2025 4.6 3.5 - 5.2 mmol/L Final   02/19/2025 4.6 3.5 - 5.2 mmol/L Final      Chloride Chloride   Date Value Ref Range Status   02/20/2025 101 98 - 107 mmol/L Final   02/19/2025 101 98 - 107 mmol/L Final      CO2 CO2   Date Value Ref Range Status   02/20/2025 22.8 22.0 - 29.0 mmol/L Final   02/19/2025 24.7 22.0 - 29.0 mmol/L Final      BUN BUN   Date Value Ref Range Status   02/20/2025 38 (H) 8 - 23 mg/dL Final   02/19/2025 38 (H) 8 - 23 " "mg/dL Final      Creatinine Creatinine   Date Value Ref Range Status   02/20/2025 2.44 (H) 0.76 - 1.27 mg/dL Final   02/19/2025 2.73 (H) 0.76 - 1.27 mg/dL Final      Calcium Calcium   Date Value Ref Range Status   02/20/2025 8.4 (L) 8.6 - 10.5 mg/dL Final   02/19/2025 9.0 8.6 - 10.5 mg/dL Final      PO4 No results found for: \"CAPO4\"   Albumin Albumin   Date Value Ref Range Status   02/19/2025 3.8 3.5 - 5.2 g/dL Final      Magnesium No results found for: \"MG\"   Uric Acid No results found for: \"URICACID\"       Imaging Results (Last 72 Hours)       Procedure Component Value Units Date/Time    XR Chest 1 View [[id number]] Collected: 02/19/25 1359     Updated: 02/19/25 1403    Narrative:      XR CHEST 1 VW    Date of Exam: 2/19/2025 1:36 PM EST    Indication: cough    Comparison: CT chest 11/15/2024. AP chest 1/17/2023.    Findings:  Ill-defined groundglass density in the right upper lobe heart size is within normal limits. No pleural effusion or pneumothorax or acute osseous abnormalities are identified.      Impression:      Impression:  Right upper lobe ill-defined groundglass density may represent mild pneumonitis or developing pneumonia. No dense consolidations are identified.      Electronically Signed: Trinity Negron MD    2/19/2025 2:01 PM EST    Workstation ID: [id number]              Results for orders placed during the hospital encounter of 02/19/25    XR Chest 1 View    Narrative  XR CHEST 1 VW    Date of Exam: 2/19/2025 1:36 PM EST    Indication: cough    Comparison: CT chest 11/15/2024. AP chest 1/17/2023.    Findings:  Ill-defined groundglass density in the right upper lobe heart size is within normal limits. No pleural effusion or pneumothorax or acute osseous abnormalities are identified.    Impression  Impression:  Right upper lobe ill-defined groundglass density may represent mild pneumonitis or developing pneumonia. No dense consolidations are identified.      Electronically Signed: Trinity Negron" MD  2/19/2025 2:01 PM EST  Workstation ID: [id number]      Results for orders placed during the hospital encounter of 01/17/23    XR Chest 1 View    Narrative  XR CHEST 1 VW    Date of Exam: 1/17/2023 8:27 PM EST    Indication: fever/soa/back pain.    Comparison: 1/5/2022    Findings:  The heart and mediastinal contours are within normal limits. The lungs are clear. Osseous structures are unremarkable.    Impression  Impression:  No acute process    Electronically Signed: Joey Eliana  1/17/2023 9:26 PM EST  Workstation ID: [id number]      Results for orders placed during the hospital encounter of 01/05/22    XR Chest 1 View    Narrative  XR CHEST 1 VW    Date of Exam: 1/5/2022 23:38 EST    Indication: dyspnea.  Covid positive.    Comparison Exams: None available.    Technique: Portable AP chest    FINDINGS:  Ill-defined infiltrates are seen throughout the central to peripheral aspect of the lungs bilaterally with mild interstitial changes. No pleural effusions are observed. The cardiac silhouette is within normal limits for size for the portable study. The  mediastinum is unremarkable. No acute osseous abnormalities are identified.    Impression  Ill-defined infiltrates are noted bilaterally with mild interstitial changes. The findings suggest atypical/viral infection or multifocal pneumonia and suggest changes of Covid 19 infection. Recommend follow-up to ensure improvement/resolution.    Electronically Signed: Bran Colin MD 1/6/2022 0:03 EST        Results for orders placed during the hospital encounter of 10/17/19    Duplex Venous Lower Extremity - Left    Interpretation Summary  · Acute left lower extremity deep vein thrombosis noted in the common femoral, proximal femoral, mid femoral, distal femoral and popliteal.      ASSESSMENT / PLAN      Dyspnea    Diabetes mellitus with neuropathy    History of DVT (deep vein thrombosis)    Hypoxia    CKD (chronic kidney disease) stage 3, GFR 30-59 ml/min    History  of urostomy    Pneumonia, unspecified organism    Influenza A      1. RENAL FAILURE-----Nonoliguric. +ARF/LEO on top of known CRF/CKD STG 3B with a baseline serum creatinine of about 1.8. CRF/CKD STG 3B is secondary to DGS/HTN NS. +ARF/LEO appears to be secondary to prerenal state/intravascular volume depletion. Hydrate with NS. Check urine and serum studies and renal US. No NSAIDs or IV dye. Lytes, acid-base okay. No uremia     2. DMII WITH RENAL MANIFESTATIONS-----BS okay. Glucometers, SSI.       3. HTN WITH CKD-----BP okay. Avoid hypotension. No ACE-I/ARB/DRI/diuretic for now     4. INFLUENZA A INFECTION/? RUL PNA------Supportive care and on Tamiflu and Rocephin     5. H/O BLADDER/PROSTATE CA WITH UROSTOMY     6. ANEMIA OF CKD--------H/H in safe range. Follow for EPO need. Check Fe     7. DM PERIPHERAL NEUROPATHY------On Neurontin     8. GERD/PUD PROPHYLAXIS------On PPI. Benefits outweigh risks, despite renal dysfunction, given steroid exposure and h/o use and tolerating PPI     9. H/O DVT-----Anticoagulated     10. HEARING IMPAIRED    11. HYPOCALCEMIA------Replace IV and follow ionized levels    12. OA/DJD-----No NSAIDs. Uric acid ok      I discussed the patient's findings and my recommendations with patient and nursing staff    Will follow along closely. Thank you for allowing us to see this patient in renal consultation.    [facility]  [phone]  MD Stephanie Gant MD  02/20/25  07:44 EST

## 2025-02-20 NOTE — PROGRESS NOTES
"     LOS: 1 day   Patient Care Team:  Joey Islas MD as PCP - General (Family Medicine)  Neville Laughlin MD as Consulting Physician (Nephrology)    Subjective     Interval History: Stable overnight    Patient Complaints: Patient states he feels \"a little bit better\".  Still has cough, body aches, malaise.    History taken from: patient    Review of Systems   Constitutional:  Positive for activity change, appetite change, fatigue and fever. Negative for chills and diaphoresis.   HENT:  Positive for congestion and hearing loss.    Eyes:  Negative for visual disturbance.   Respiratory:  Positive for cough. Negative for shortness of breath, wheezing and stridor.    Cardiovascular:  Negative for chest pain, palpitations and leg swelling.   Gastrointestinal:  Negative for abdominal pain, diarrhea, nausea and vomiting.   Endocrine: Negative for polyuria.   Genitourinary:  Negative for dysuria.   Musculoskeletal:  Positive for arthralgias and gait problem.   Neurological:  Positive for weakness.   Psychiatric/Behavioral:  Negative for confusion.            Objective     Vital Signs  Temp:  [97.8 °F (36.6 °C)-104 °F (40 °C)] 98.1 °F (36.7 °C)  Heart Rate:  [61-74] 63  Resp:  [13-19] 18  BP: (105-149)/(50-74) 143/74    Physical Exam:     General Appearance:    Alert, cooperative, in no acute distress,   Head:    Normocephalic, without obvious abnormality, atraumatic   Eyes:            Lids and lashes normal, conjunctivae and sclerae normal, no   icterus, no pallor, corneas clear, PERRLA   Ears:    Ears appear intact with no abnormalities noted   Throat:   No oral lesions, no thrush, oral mucosa moist   Neck:   No adenopathy, supple, trachea midline, no thyromegaly, no   carotid bruit, no JVD   Lungs:   Diffuse rhonchi, scattered    Heart:    Regular rhythm and normal rate, normal S1 and S2, no            murmur, no gallop, no rub, no click   Chest Wall:    No abnormalities observed   Abdomen:     Normal " bowel sounds, no masses, no organomegaly, soft        Non-tender non-distended, no guarding,   Extremities:   Moves all extremities well, no edema, no cyanosis, no             Redness   Pulses:   Pulses palpable and equal bilaterally   Skin:   No bleeding, bruising or rash   Lymph nodes:   No palpable adenopathy   Neurologic:   Cranial nerves 2 - 12 grossly intact, sensation intact, DTR       present and equal bilaterally        Results Review:    Lab Results (last 24 hours)       Procedure Component Value Units Date/Time    POC Glucose 4x Daily Before Meals & at Bedtime [[id number]]  (Abnormal) Collected: 02/20/25 1126    Specimen: Blood Updated: 02/20/25 1128     Glucose 147 mg/dL      Comment: Serial Number: [id number]:  [id number]       Iron Profile [[id number]]  (Abnormal) Collected: 02/20/25 0126    Specimen: Blood from Hand, Right Updated: 02/20/25 0912     Iron 17 mcg/dL      Iron Saturation (TSAT) 6 %      Transferrin 180 mg/dL      TIBC 268 mcg/dL     Ferritin [[id number]]  (Normal) Collected: 02/20/25 0126    Specimen: Blood from Hand, Right Updated: 02/20/25 0912     Ferritin 118.00 ng/mL     Narrative:      Results may be falsely decreased if patient taking Biotin.      TSH [[id number]]  (Normal) Collected: 02/20/25 0126    Specimen: Blood from Hand, Right Updated: 02/20/25 0832     TSH 1.480 uIU/mL     CK [[id number]]  (Abnormal) Collected: 02/20/25 0126    Specimen: Blood from Hand, Right Updated: 02/20/25 0832     Creatine Kinase 205 U/L     Uric Acid [[id number]]  (Normal) Collected: 02/20/25 0126    Specimen: Blood from Hand, Right Updated: 02/20/25 0832     Uric Acid 6.4 mg/dL     Protime-INR [[id number]]  (Normal) Collected: 02/20/25 0126    Specimen: Blood from Hand, Right Updated: 02/20/25 0220     Protime 24.7 Seconds      INR 2.21    Basic Metabolic Panel [[id number]]  (Abnormal) Collected: 02/20/25 0126    Specimen: Blood from Hand, Right Updated: 02/20/25 0208     Glucose 135 mg/dL      BUN  38 mg/dL      Creatinine 2.44 mg/dL      Sodium 135 mmol/L      Potassium 4.6 mmol/L      Chloride 101 mmol/L      CO2 22.8 mmol/L      Calcium 8.4 mg/dL      BUN/Creatinine Ratio 15.6     Anion Gap 11.2 mmol/L      eGFR 27.1 mL/min/1.73     Narrative:      GFR Categories in Chronic Kidney Disease (CKD)      GFR Category          GFR (mL/min/1.73)    Interpretation  G1                     90 or greater         Normal or high (1)  G2                      60-89                Mild decrease (1)  G3a                   45-59                Mild to moderate decrease  G3b                   30-44                Moderate to severe decrease  G4                    15-29                Severe decrease  G5                    14 or less           Kidney failure          (1)In the absence of evidence of kidney disease, neither GFR category G1 or G2 fulfill the criteria for CKD.    eGFR calculation 2021 CKD-EPI creatinine equation, which does not include race as a factor    CBC Auto Differential [[id number]]  (Abnormal) Collected: 02/20/25 0126    Specimen: Blood from Hand, Right Updated: 02/20/25 0150     WBC 5.08 10*3/mm3      RBC 3.45 10*6/mm3      Hemoglobin 10.0 g/dL      Hematocrit 31.9 %      MCV 92.5 fL      MCH 29.0 pg      MCHC 31.3 g/dL      RDW 12.4 %      RDW-SD 42.1 fl      MPV 9.1 fL      Platelets 148 10*3/mm3      Neutrophil % 75.6 %      Lymphocyte % 6.3 %      Monocyte % 17.1 %      Eosinophil % 0.0 %      Basophil % 0.4 %      Immature Grans % 0.6 %      Neutrophils, Absolute 3.84 10*3/mm3      Lymphocytes, Absolute 0.32 10*3/mm3      Monocytes, Absolute 0.87 10*3/mm3      Eosinophils, Absolute 0.00 10*3/mm3      Basophils, Absolute 0.02 10*3/mm3      Immature Grans, Absolute 0.03 10*3/mm3      nRBC 0.0 /100 WBC     POC Glucose Once [[id number]]  (Abnormal) Collected: 02/19/25 2135    Specimen: Blood Updated: 02/19/25 2137     Glucose 134 mg/dL      Comment: Serial Number: [id number]:  [id number]       POC  Glucose Once [[id number]]  (Abnormal) Collected: 02/19/25 1832    Specimen: Blood Updated: 02/19/25 1834     Glucose 188 mg/dL      Comment: Serial Number: [id number]:  [id number]       Respiratory Panel PCR w/COVID-19(SARS-CoV-2) BEATRICE/NICHOLAS/TIERA/PAD/COR/JUHI In-House, NP Swab in UTM/VTM, 2 HR TAT - Swab, Nasopharynx [[id number]]  (Abnormal) Collected: 02/19/25 1314    Specimen: Swab from Nasopharynx Updated: 02/19/25 1407     ADENOVIRUS, PCR Not Detected     Coronavirus 229E Not Detected     Coronavirus HKU1 Not Detected     Coronavirus NL63 Not Detected     Coronavirus OC43 Not Detected     COVID19 Not Detected     Human Metapneumovirus Not Detected     Human Rhinovirus/Enterovirus Not Detected     Influenza A H3 Detected     Influenza B PCR Not Detected     Parainfluenza Virus 1 Not Detected     Parainfluenza Virus 2 Not Detected     Parainfluenza Virus 3 Not Detected     Parainfluenza Virus 4 Not Detected     RSV, PCR Not Detected     Bordetella pertussis pcr Not Detected     Bordetella parapertussis PCR Not Detected     Chlamydophila pneumoniae PCR Not Detected     Mycoplasma pneumo by PCR Not Detected    Narrative:      In the setting of a positive respiratory panel with a viral infection PLUS a negative procalcitonin without other underlying concern for bacterial infection, consider observing off antibiotics or discontinuation of antibiotics and continue supportive care. If the respiratory panel is positive for atypical bacterial infection (Bordetella pertussis, Chlamydophila pneumoniae, or Mycoplasma pneumoniae), consider antibiotic de-escalation to target atypical bacterial infection.    Comprehensive Metabolic Panel [[id number]]  (Abnormal) Collected: 02/19/25 1313    Specimen: Blood Updated: 02/19/25 1352     Glucose 215 mg/dL      BUN 38 mg/dL      Creatinine 2.73 mg/dL      Sodium 136 mmol/L      Potassium 4.6 mmol/L      Chloride 101 mmol/L      CO2 24.7 mmol/L      Calcium 9.0 mg/dL      Total Protein  "6.4 g/dL      Albumin 3.8 g/dL      ALT (SGPT) 16 U/L      AST (SGOT) 24 U/L      Alkaline Phosphatase 49 U/L      Total Bilirubin 0.3 mg/dL      Globulin 2.6 gm/dL      A/G Ratio 1.5 g/dL      BUN/Creatinine Ratio 13.9     Anion Gap 10.3 mmol/L      eGFR 23.7 mL/min/1.73     Narrative:      GFR Categories in Chronic Kidney Disease (CKD)      GFR Category          GFR (mL/min/1.73)    Interpretation  G1                     90 or greater         Normal or high (1)  G2                      60-89                Mild decrease (1)  G3a                   45-59                Mild to moderate decrease  G3b                   30-44                Moderate to severe decrease  G4                    15-29                Severe decrease  G5                    14 or less           Kidney failure          (1)In the absence of evidence of kidney disease, neither GFR category G1 or G2 fulfill the criteria for CKD.    eGFR calculation 2021 CKD-EPI creatinine equation, which does not include race as a factor    Procalcitonin [[id number]]  (Abnormal) Collected: 02/19/25 1313    Specimen: Blood Updated: 02/19/25 1352     Procalcitonin 0.37 ng/mL     Narrative:      As a Marker for Sepsis (Non-Neonates):    1. <0.5 ng/mL represents a low risk of severe sepsis and/or septic shock.  2. >2 ng/mL represents a high risk of severe sepsis and/or septic shock.    As a Marker for Lower Respiratory Tract Infections that require antibiotic therapy:    PCT on Admission    Antibiotic Therapy       6-12 Hrs later    >0.5                Strongly Recommended  >0.25 - <0.5        Recommended   0.1 - 0.25          Discouraged              Remeasure/reassess PCT  <0.1                Strongly Discouraged     Remeasure/reassess PCT    As 28 day mortality risk marker: \"Change in Procalcitonin Result\" (>80% or <=80%) if Day 0 (or Day 1) and Day 4 values are available. Refer to http://www.[employer]-pct-calculator.com    Change in PCT <=80%  A decrease of PCT levels " below or equal to 80% defines a positive change in PCT test result representing a higher risk for 28-day all-cause mortality of patients diagnosed with severe sepsis for septic shock.    Change in PCT >80%  A decrease of PCT levels of more than 80% defines a negative change in PCT result representing a lower risk for 28-day all-cause mortality of patients diagnosed with severe sepsis or septic shock.       Protime-INR [[id number]]  (Normal) Collected: 02/19/25 1313    Specimen: Blood Updated: 02/19/25 1338     Protime 23.8 Seconds      INR 2.11    [facility] [facility] [[id number]] Collected: 02/19/25 1313    Specimen: Blood Updated: 02/19/25 1330    Narrative:      The following orders were created for panel order [facility] [facility].  Procedure                               Abnormality         Status                     ---------                               -----------         ------                     Green Top (Gel)[[id number]]                                  Final result               Lavender Top[[id number]]                                     Final result               Gold Top - SST[[id number]]                                   Final result               Light Blue Top[[id number]]                                   Final result                 Please view results for these tests on the individual orders.    Lavender Top [[id number]] Collected: 02/19/25 1313    Specimen: Blood Updated: 02/19/25 1330     Extra Tube hold for add-on     Comment: Auto resulted       Gold Top - SST [[id number]] Collected: 02/19/25 1313    Specimen: Blood Updated: 02/19/25 1330     Extra Tube Hold for add-ons.     Comment: Auto resulted.       Light Blue Top [[id number]] Collected: 02/19/25 1313    Specimen: Blood Updated: 02/19/25 1330     Extra Tube Hold for add-ons.     Comment: Auto resulted       Green Top (Gel) [[id number]] Collected: 02/19/25 1313    Specimen: Blood Updated: 02/19/25 1330     Extra Tube Hold for add-ons.     Comment: Auto  resulted.       CBC & Differential [[id number]]  (Abnormal) Collected: 02/19/25 1313    Specimen: Blood Updated: 02/19/25 1326    Narrative:      The following orders were created for panel order CBC & Differential.  Procedure                               Abnormality         Status                     ---------                               -----------         ------                     CBC Auto Differential[[id number]]        Abnormal            Final result                 Please view results for these tests on the individual orders.    CBC Auto Differential [[id number]]  (Abnormal) Collected: 02/19/25 1313    Specimen: Blood Updated: 02/19/25 1326     WBC 5.31 10*3/mm3      RBC 3.58 10*6/mm3      Hemoglobin 10.6 g/dL      Hematocrit 32.8 %      MCV 91.6 fL      MCH 29.6 pg      MCHC 32.3 g/dL      RDW 12.5 %      RDW-SD 41.5 fl      MPV 9.0 fL      Platelets 171 10*3/mm3      Neutrophil % 84.1 %      Lymphocyte % 2.8 %      Monocyte % 11.9 %      Eosinophil % 0.0 %      Basophil % 0.4 %      Immature Grans % 0.8 %      Neutrophils, Absolute 4.47 10*3/mm3      Lymphocytes, Absolute 0.15 10*3/mm3      Monocytes, Absolute 0.63 10*3/mm3      Eosinophils, Absolute 0.00 10*3/mm3      Basophils, Absolute 0.02 10*3/mm3      Immature Grans, Absolute 0.04 10*3/mm3      nRBC 0.0 /100 WBC     POC Lactate [[id number]]  (Normal) Collected: 02/19/25 1319    Specimen: Blood Updated: 02/19/25 1320     Lactate 0.9 mmol/L      Comment: Serial Number: [id number]:  [id number]       Blood Culture - Blood, Arm, Left [[id number]] Collected: 02/19/25 1313    Specimen: Blood from Arm, Left Updated: 02/19/25 1319    Blood Culture - Blood, Arm, Right [[id number]] Collected: 02/19/25 1313    Specimen: Blood from Arm, Right Updated: 02/19/25 1319             Imaging Results (Last 24 Hours)       Procedure Component Value Units Date/Time    XR Chest 1 View [[id number]] Collected: 02/19/25 1359     Updated: 02/19/25 1403    Narrative:      XR  CHEST 1 VW    Date of Exam: 2/19/2025 1:36 PM EST    Indication: cough    Comparison: CT chest 11/15/2024. AP chest 1/17/2023.    Findings:  Ill-defined groundglass density in the right upper lobe heart size is within normal limits. No pleural effusion or pneumothorax or acute osseous abnormalities are identified.      Impression:      Impression:  Right upper lobe ill-defined groundglass density may represent mild pneumonitis or developing pneumonia. No dense consolidations are identified.      Electronically Signed: Trinity Negron MD    2/19/2025 2:01 PM EST    Workstation ID: [id number]                 I reviewed the patient's new clinical results.    Medication Review:   Scheduled Meds:budesonide, 0.5 mg, Nebulization, BID - RT  cefTRIAXone, 1,000 mg, Intravenous, Q24H  guaiFENesin, 600 mg, Oral, Q12H  insulin lispro, 2-7 Units, Subcutaneous, 4x Daily AC & at Bedtime  ipratropium-albuterol, 3 mL, Nebulization, 4x Daily - RT  oseltamivir, 30 mg, Oral, Q12H  sodium chloride, 10 mL, Intravenous, Q12H  warfarin, 5 mg, Oral, Daily      Continuous Infusions:Pharmacy to Dose Oseltamivir (TAMIFLU),   Pharmacy to dose warfarin,   sodium chloride, 75 mL/hr      PRN Meds:.  acetaminophen    senna-docusate sodium **AND** polyethylene glycol **AND** bisacodyl **AND** bisacodyl    Calcium Replacement - Follow Nurse / BPA Driven Protocol    dextrose    dextrose    glucagon (human recombinant)    hydrALAZINE    Magnesium Standard Dose Replacement - Follow Nurse / BPA Driven Protocol    ondansetron ODT **OR** ondansetron    Pharmacy to Dose Oseltamivir (TAMIFLU)    Pharmacy to dose warfarin    phenol    Phosphorus Replacement - Follow Nurse / BPA Driven Protocol    Potassium Replacement - Follow Nurse / BPA Driven Protocol    sodium chloride    sodium chloride    sodium chloride     Assessment & Plan       Dyspnea    Diabetes mellitus with neuropathy    History of DVT (deep vein thrombosis)    Hypoxia    CKD (chronic kidney  disease) stage 3, GFR 30-59 ml/min    History of urostomy    Pneumonia, unspecified organism    Influenza A    Iron deficiency anemia    -Continue Tamiflu for influenza and ceftriaxone for secondary bacterial pneumonia  -Continue supplemental oxygen and wean as tolerated  -IV fluids for prerenal acute kidney injury  -INR is therapeutic  -Chronic iron deficiency anemia-continue iron    Plan for disposition: To be determined; PT virgil Rogers MD  02/20/25  11:38 EST

## 2025-02-20 NOTE — PLAN OF CARE
Goal Outcome Evaluation:      Patient sleeping in between care. Remains on 2L NC. Patient stable at this time.

## 2025-02-20 NOTE — THERAPY EVALUATION
Patient Name: Michael Dorantes  : 1950    MRN: [id number]                              Today's Date: 2025       Admit Date: 2025    Visit Dx:     ICD-10-CM ICD-9-CM   1. Fever in other diseases  R50.81 780.61   2. Influenza A  J10.1 487.1   3. Acute kidney injury  N17.9 584.9     Patient Active Problem List   Diagnosis    DVT femoral (deep venous thrombosis) with thrombophlebitis, left    Pneumonia due to COVID-19 virus    Diabetes mellitus with neuropathy    History of DVT (deep vein thrombosis)    LEO (acute kidney injury)    COVID-19    Hypoxia    Cytokine release syndrome, grade 2    Pyelonephritis    Fecal impaction    CKD (chronic kidney disease) stage 3, GFR 30-59 ml/min    History of urostomy    UTI (urinary tract infection), bacterial    Anemia, chronic disease    COVID-19    Fever    Stage 4 chronic kidney disease    Fever    Left lower lobe pneumonia    Pneumonia, unspecified organism    Dyspnea    Influenza A    Iron deficiency anemia     Past Medical History:   Diagnosis Date    Bladder cancer     Deaf     Diabetes mellitus     Prostate cancer     Renal disorder      Past Surgical History:   Procedure Laterality Date    BLADDER SURGERY      CYSTECTOMY      GASTRECTOMY      PROSTATECTOMY        General Information       Row Name 25 1447          Physical Therapy Time and Intention    Document Type evaluation  -CL     Mode of Treatment individual therapy;physical therapy  -CL       Row Name 25 1447          General Information    Patient Profile Reviewed yes  -CL     Prior Level of Function independent:;ADL's;all household mobility;community mobility  -CL     Existing Precautions/Restrictions no known precautions/restrictions  -CL     Barriers to Rehab hearing deficit  -CL       Row Name 25 1447          Living Environment    People in Home spouse  -CL     Name(s) of People in Home Wife: Riddhi  -CL       Row Name 25 1447          Home Main Entrance    Number of  Stairs, Main Entrance none  -CL       Row Name 02/20/25 1447          Stairs Within Home, Primary    Stairs, Within Home, Primary Most activity is on main floor/can stay on main floor  -CL     Number of Stairs, Within Home, Primary twelve  -CL     Stair Railings, Within Home, Primary railings safe and in good condition  -CL       Row Name 02/20/25 1447          Cognition    Orientation Status (Cognition) oriented x 4  -CL               User Key  (r) = Recorded By, (t) = Taken By, (c) = Cosigned By      Initials Name Provider Type    CL Risa Berger, PT Physical Therapist                   Mobility       Row Name 02/20/25 1449          Bed Mobility    Bed Mobility bed mobility (all) activities  -CL     All Activities, [place] (Bed Mobility) independent  -CL       Row Name 02/20/25 1449          Bed-Chair Transfer    Bed-Chair [place] (Transfers) independent  -CL       Row Name 02/20/25 1449          Sit-Stand Transfer    Sit-Stand [place] (Transfers) independent  -CL       Row Name 02/20/25 1449          Gait/Stairs (Locomotion)    [place] Level (Gait) independent  -CL     Patient was able to Ambulate yes  -CL     Distance in Feet (Gait) 40  -CL               User Key  (r) = Recorded By, (t) = Taken By, (c) = Cosigned By      Initials Name Provider Type    Risa Newman, PT Physical Therapist                   Obj/Interventions       Row Name 02/20/25 1450          Range of Motion Comprehensive    General Range of Motion no range of motion deficits identified  -CL       Row Name 02/20/25 1450          Strength Comprehensive (MMT)    General Manual Muscle Testing (MMT) Assessment no strength deficits identified  -CL       Row Name 02/20/25 1450          Balance    Balance Assessment sitting static balance;sitting dynamic balance;standing static balance;standing dynamic balance  -CL     Static Sitting Balance independent  -CL     Dynamic Sitting Balance independent  -CL     Position,  Sitting Balance unsupported;sitting edge of bed  -CL     Static Standing Balance independent  -CL     Dynamic Standing Balance independent  -CL     Position/Device Used, Standing Balance unsupported  -CL       Row Name 02/20/25 5985          Sensory Assessment (Somatosensory)    Sensory Assessment (Somatosensory) --  questionable decrease in senstaion in feet  -CL               User Key  (r) = Recorded By, (t) = Taken By, (c) = Cosigned By      Initials Name Provider Type    Risa Newman, PT Physical Therapist                   Goals/Plan    No documentation.                  Clinical Impression       Row Name 02/20/25 2828          Pain    Pretreatment Pain Rating 0/10 - no pain  -CL     Posttreatment Pain Rating 0/10 - no pain  -CL       Row Name 02/20/25 145          Plan of Care Review    Plan of Care Reviewed With patient  -CL     Outcome Evaluation Michael Dorantes is a 74 y.o. male with medical hx of CKD stage IV, HTN, Anemia, s/p gastrectomy, urostomy, DVT, GERD and DM who presents with Dyspnea and hypoxia. CXR reveals PNA. He is also positive for Flu A. At baseline, pt lives with his wife in a 2 story home; he can stay on main level and spends most of his time there.  He reports no steps to enter.  He is typically independent for mobility, ADLs and IADLs without AD.  He is hearing impaired and a  is present to assist throughout evaluation.  At time of PT evaluation, he is A&O x4. He has 1L O2 per nasal cannula and is satting at 97% but does not use O2 at home.  He is independent with bed mobility and management of his urostomy tube, reporting that he and his wife manage it at home with no difficulty.  He ambulates without AD with no LOB, maintaining SpO2 at 92% on RA.  At the end of session, O2 was re-placed at 1L.  Pt was provided an incentive spirometer, instructed in its use and demonstrated competence with it.  No further PT needs.  We will complete this PT order.  -CL        Row Name 02/20/25 1450          Therapy Assessment/Plan (PT)    Criteria for Skilled Interventions Met (PT) no problems identified which require skilled intervention  -CL     Therapy Frequency (PT) evaluation only  -CL       Row Name 02/20/25 1450          Vital Signs    Pre Systolic BP Rehab 172  -CL     Pre Treatment Diastolic BP 75  -CL     Intra Systolic BP Rehab 165  -CL     Intra Treatment Diastolic BP 83  -CL     Pretreatment Heart Rate (beats/min) 67  -CL     Intratreatment Heart Rate (beats/min) 67  -CL     Pre SpO2 (%) 97  -CL     O2 Delivery Pre Treatment supplemental O2  -CL     Intra SpO2 (%) 94  -CL     O2 Delivery Intra Treatment supplemental O2  -CL     Post SpO2 (%) 92  -CL     O2 Delivery Post Treatment room air  -CL     Pre Patient Position Supine  -CL     Intra Patient Position Sitting  -CL     Post Patient Position Standing  -CL       Row Name 02/20/25 1450          Positioning and Restraints    Pre-Treatment Position in bed  -CL     Post Treatment Position bed  -CL     In Bed notified nsg;supine;call light within reach  -CL               User Key  (r) = Recorded By, (t) = Taken By, (c) = Cosigned By      Initials Name Provider Type    Risa Newman, PT Physical Therapist                   Outcome Measures       Row Name 02/20/25 1458 02/20/25 0830       How much help from another person do you currently need...    Turning from your back to your side while in flat bed without using bedrails? 4  -CL 4  -RH    Moving from lying on back to sitting on the side of a flat bed without bedrails? 4  -CL 4  -RH    Moving to and from a bed to a chair (including a wheelchair)? 4  -CL 3  -RH    Standing up from a chair using your arms (e.g., wheelchair, bedside chair)? 4  -CL 3  -RH    Climbing 3-5 steps with a railing? 4  -CL 3  -RH    To walk in hospital room? 4  -CL 3  -RH    AM-PAC 6 Clicks Score (PT) 24  -CL 20  -RH    Highest Level of Mobility Goal 8 --> Walked 250 feet or more  -CL 6 -->  Walk 10 steps or more  -      Row Name 02/20/25 1458          Functional Assessment    Outcome Measure Options AM-PAC 6 Clicks Basic Mobility (PT)  -CL               User Key  (r) = Recorded By, (t) = Taken By, (c) = Cosigned By      Initials Name Provider Type     Mary Ley, RN Registered Nurse    CL Risa Berger, PT Physical Therapist                                 Physical Therapy Education       Title: PT OT SLP Therapies (Done)       Topic: Physical Therapy (Done)       Point: Mobility training (Done)       Learning Progress Summary            Patient Acceptance, E,TB, VU by CL at 2/20/2025 1458                                      User Key       Initials Effective Dates Name Provider Type Discipline    CL 03/02/22 -  Risa Berger PT Physical Therapist PT                  PT Recommendation and Plan     Outcome Evaluation: Michael Dorantes is a 74 y.o. male with medical hx of CKD stage IV, HTN, Anemia, s/p gastrectomy, urostomy, DVT, GERD and DM who presents with Dyspnea and hypoxia. CXR reveals PNA. He is also positive for Flu A. At baseline, pt lives with his wife in a 2 story home; he can stay on main level and spends most of his time there.  He reports no steps to enter.  He is typically independent for mobility, ADLs and IADLs without AD.  He is hearing impaired and a  is present to assist throughout evaluation.  At time of PT evaluation, he is A&O x4. He has 1L O2 per nasal cannula and is satting at 97% but does not use O2 at home.  He is independent with bed mobility and management of his urostomy tube, reporting that he and his wife manage it at home with no difficulty.  He ambulates without AD with no LOB, maintaining SpO2 at 92% on RA.  At the end of session, O2 was re-placed at 1L.  Pt was provided an incentive spirometer, instructed in its use and demonstrated competence with it.  No further PT needs.  We will complete this PT order.     Time Calculation:    PT Evaluation Complexity  History, PT Evaluation Complexity: 3 or more personal factors and/or comorbidities  Examination of Body Systems (PT Eval Complexity): total of 3 or more elements  Clinical Presentation (PT Evaluation Complexity): evolving  Clinical Decision Making (PT Evaluation Complexity): moderate complexity  Overall Complexity (PT Evaluation Complexity): moderate complexity     PT Charges       Row Name 02/20/25 1459             Time Calculation    Start Time 1347  -CL      Stop Time 1412  -CL      Time Calculation (min) 25 min  -CL      PT Received On 02/20/25  -CL                User Key  (r) = Recorded By, (t) = Taken By, (c) = Cosigned By      Initials Name Provider Type    CL Risa Berger, PT Physical Therapist                  Therapy Charges for Today       Code Description Service Date Service Provider Modifiers Qty    34418166002 HC PT EVAL MOD COMPLEXITY 4 2/20/2025 Risa Berger, PT GP 1            PT G-Codes  Outcome Measure Options: AM-PAC 6 Clicks Basic Mobility (PT)  AM-PAC 6 Clicks Score (PT): 24  PT Discharge Summary  Anticipated Discharge Disposition (PT): home  Reason for Discharge: At baseline function    Risa Berger, PT  2/20/2025

## 2025-02-20 NOTE — PLAN OF CARE
Goal Outcome Evaluation:  Plan of Care Reviewed With: patient           Outcome Evaluation: Michael Dorantes is a 74 y.o. male with medical hx of CKD stage IV, HTN, Anemia, s/p gastrectomy, urostomy, DVT, GERD and DM who presents with Dyspnea and hypoxia. CXR reveals PNA. He is also positive for Flu A. At baseline, pt lives with his wife in a 2 story home; he can stay on main level and spends most of his time there.  He reports no steps to enter.  He is typically independent for mobility, ADLs and IADLs without AD.  He is hearing impaired and a  is present to assist throughout evaluation.  At time of PT evaluation, he is A&O x4. He has 1L O2 per nasal cannula and is satting at 97% but does not use O2 at home.  He is independent with bed mobility and management of his urostomy tube, reporting that he and his wife manage it at home with no difficulty.  He ambulates without AD with no LOB, maintaining SpO2 at 92% on RA.  At the end of session, O2 was re-placed at 1L.  Pt was provided an incentive spirometer, instructed in its use and demonstrated competence with it.  No further PT needs.  We will complete this PT order.    Anticipated Discharge Disposition (PT): home

## 2025-02-20 NOTE — PROGRESS NOTES
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    INR Goal: 2 - 3  Home medication?: warfarin 5 mg PO daily  Bridge Therapy Present?:  No  Interacting Medications Evaluation (New/Present/Discontinued):  Ceftriazone (2/19 - 2/23) - may enhance the anticoagulant effect of warfarin  Tamiflu (2/19 - 2/23) - may enhance the anticoagulant effect of warfarin  Additional Contributing Factors:   patient last filled warfarin on insurance claims 10/20/24 for 90 day-supply  Patient's sister unsure how complaint patient is with medications  ~75% intake of documented meals in previous 24 hours.       Assessment/Plan:    Patient's INR is therapeutic today at 2.21, an increase from 2.11 yesterday.  Continue home warfarin 5 mg this evening.  Daily PT/INR ordered.    Continue to monitor and adjust based on INR.       Date 2/19 2/20          INR 2.11 2.21          Dose 5 mg 5 mg            Objective:  [Ht: 190.5 cm (75\"); Wt: 83.9 kg (185 lb); BMI: Body mass index is 23.12 kg/m².]    Lab Results   Component Value Date    ALBUMIN 3.8 02/19/2025     Lab Results   Component Value Date    INR 2.21 02/20/2025    INR 2.11 02/19/2025    INR 2.75 11/13/2023    PROTIME 24.7 02/20/2025    PROTIME 23.8 02/19/2025    PROTIME 27.8 11/13/2023     Lab Results   Component Value Date    HGB 10.0 (L) 02/20/2025    HGB 10.6 (L) 02/19/2025    HGB 9.8 (L) 11/10/2023     Lab Results   Component Value Date    HCT 31.9 (L) 02/20/2025    HCT 32.8 (L) 02/19/2025    HCT 29.5 (L) 11/10/2023     Az Bates RPH  02/20/25 10:12 EST   "

## 2025-02-21 LAB
ALBUMIN SERPL-MCNC: 3.3 G/DL (ref 3.5–5.2)
ALBUMIN/GLOB SERPL: 1.4 G/DL
ALP SERPL-CCNC: 43 U/L (ref 39–117)
ALT SERPL W P-5'-P-CCNC: 14 U/L (ref 1–41)
ANION GAP SERPL CALCULATED.3IONS-SCNC: 7.9 MMOL/L (ref 5–15)
AST SERPL-CCNC: 49 U/L (ref 1–40)
BILIRUB SERPL-MCNC: 0.2 MG/DL (ref 0–1.2)
BUN SERPL-MCNC: 30 MG/DL (ref 8–23)
BUN/CREAT SERPL: 14 (ref 7–25)
CA-I SERPL ISE-MCNC: 1.13 MMOL/L (ref 1.15–1.3)
CALCIUM SPEC-SCNC: 8.2 MG/DL (ref 8.6–10.5)
CHLORIDE SERPL-SCNC: 101 MMOL/L (ref 98–107)
CO2 SERPL-SCNC: 24.1 MMOL/L (ref 22–29)
CREAT SERPL-MCNC: 2.14 MG/DL (ref 0.76–1.27)
DEPRECATED RDW RBC AUTO: 40.9 FL (ref 37–54)
EGFRCR SERPLBLD CKD-EPI 2021: 31.7 ML/MIN/1.73
ERYTHROCYTE [DISTWIDTH] IN BLOOD BY AUTOMATED COUNT: 12.2 % (ref 12.3–15.4)
GLOBULIN UR ELPH-MCNC: 2.4 GM/DL
GLUCOSE BLDC GLUCOMTR-MCNC: 110 MG/DL (ref 70–105)
GLUCOSE BLDC GLUCOMTR-MCNC: 126 MG/DL (ref 70–105)
GLUCOSE BLDC GLUCOMTR-MCNC: 195 MG/DL (ref 70–105)
GLUCOSE BLDC GLUCOMTR-MCNC: 99 MG/DL (ref 70–105)
GLUCOSE SERPL-MCNC: 148 MG/DL (ref 65–99)
HCT VFR BLD AUTO: 32.1 % (ref 37.5–51)
HGB BLD-MCNC: 10.3 G/DL (ref 13–17.7)
INR PPP: 1.88 (ref 2–3)
MAGNESIUM SERPL-MCNC: 2.1 MG/DL (ref 1.6–2.4)
MCH RBC QN AUTO: 29.5 PG (ref 26.6–33)
MCHC RBC AUTO-ENTMCNC: 32.1 G/DL (ref 31.5–35.7)
MCV RBC AUTO: 92 FL (ref 79–97)
PHOSPHATE SERPL-MCNC: 2.9 MG/DL (ref 2.5–4.5)
PLATELET # BLD AUTO: 143 10*3/MM3 (ref 140–450)
PMV BLD AUTO: 9.3 FL (ref 6–12)
POTASSIUM SERPL-SCNC: 4.4 MMOL/L (ref 3.5–5.2)
PROT SERPL-MCNC: 5.7 G/DL (ref 6–8.5)
PROTHROMBIN TIME: 21.8 SECONDS (ref 19.4–28.5)
RBC # BLD AUTO: 3.49 10*6/MM3 (ref 4.14–5.8)
SODIUM SERPL-SCNC: 133 MMOL/L (ref 136–145)
WBC NRBC COR # BLD AUTO: 2.55 10*3/MM3 (ref 3.4–10.8)

## 2025-02-21 PROCEDURE — 94799 UNLISTED PULMONARY SVC/PX: CPT

## 2025-02-21 PROCEDURE — 82330 ASSAY OF CALCIUM: CPT | Performed by: INTERNAL MEDICINE

## 2025-02-21 PROCEDURE — 80053 COMPREHEN METABOLIC PANEL: CPT | Performed by: INTERNAL MEDICINE

## 2025-02-21 PROCEDURE — 94664 DEMO&/EVAL PT USE INHALER: CPT

## 2025-02-21 PROCEDURE — 82948 REAGENT STRIP/BLOOD GLUCOSE: CPT | Performed by: NURSE PRACTITIONER

## 2025-02-21 PROCEDURE — 83735 ASSAY OF MAGNESIUM: CPT | Performed by: INTERNAL MEDICINE

## 2025-02-21 PROCEDURE — 85610 PROTHROMBIN TIME: CPT | Performed by: NURSE PRACTITIONER

## 2025-02-21 PROCEDURE — 84100 ASSAY OF PHOSPHORUS: CPT | Performed by: INTERNAL MEDICINE

## 2025-02-21 PROCEDURE — 82948 REAGENT STRIP/BLOOD GLUCOSE: CPT

## 2025-02-21 PROCEDURE — 94761 N-INVAS EAR/PLS OXIMETRY MLT: CPT

## 2025-02-21 PROCEDURE — 85027 COMPLETE CBC AUTOMATED: CPT | Performed by: INTERNAL MEDICINE

## 2025-02-21 PROCEDURE — 25010000002 CEFTRIAXONE PER 250 MG: Performed by: NURSE PRACTITIONER

## 2025-02-21 RX ORDER — GLIPIZIDE 5 MG/1
2.5 TABLET ORAL
Status: DISCONTINUED | OUTPATIENT
Start: 2025-02-21 | End: 2025-02-22 | Stop reason: HOSPADM

## 2025-02-21 RX ORDER — HYDRALAZINE HYDROCHLORIDE 25 MG/1
50 TABLET, FILM COATED ORAL 3 TIMES DAILY
Status: DISCONTINUED | OUTPATIENT
Start: 2025-02-21 | End: 2025-02-22 | Stop reason: HOSPADM

## 2025-02-21 RX ORDER — ACETYLCYSTEINE 200 MG/ML
2 SOLUTION ORAL; RESPIRATORY (INHALATION)
Status: DISCONTINUED | OUTPATIENT
Start: 2025-02-21 | End: 2025-02-22 | Stop reason: HOSPADM

## 2025-02-21 RX ADMIN — GLIPIZIDE 2.5 MG: 5 TABLET ORAL at 13:11

## 2025-02-21 RX ADMIN — IPRATROPIUM BROMIDE AND ALBUTEROL SULFATE 3 ML: .5; 3 SOLUTION RESPIRATORY (INHALATION) at 11:24

## 2025-02-21 RX ADMIN — ACETYLCYSTEINE 2 ML: 200 SOLUTION ORAL; RESPIRATORY (INHALATION) at 19:46

## 2025-02-21 RX ADMIN — HYDRALAZINE HYDROCHLORIDE 50 MG: 25 TABLET ORAL at 17:11

## 2025-02-21 RX ADMIN — Medication 10 ML: at 09:29

## 2025-02-21 RX ADMIN — WARFARIN SODIUM 7.5 MG: 5 TABLET ORAL at 17:11

## 2025-02-21 RX ADMIN — FERROUS SULFATE TAB 325 MG (65 MG ELEMENTAL FE) 325 MG: 325 (65 FE) TAB at 09:29

## 2025-02-21 RX ADMIN — ANTACID TABLETS 2 TABLET: 500 TABLET, CHEWABLE ORAL at 23:23

## 2025-02-21 RX ADMIN — OSELTAMIVIR PHOSPHATE 30 MG: 6 FOR SUSPENSION ORAL at 20:22

## 2025-02-21 RX ADMIN — Medication 10 ML: at 20:22

## 2025-02-21 RX ADMIN — BUDESONIDE 0.5 MG: 0.5 INHALANT RESPIRATORY (INHALATION) at 19:51

## 2025-02-21 RX ADMIN — IPRATROPIUM BROMIDE AND ALBUTEROL SULFATE 3 ML: .5; 3 SOLUTION RESPIRATORY (INHALATION) at 19:46

## 2025-02-21 RX ADMIN — GUAIFENESIN 600 MG: 600 TABLET, MULTILAYER, EXTENDED RELEASE ORAL at 09:29

## 2025-02-21 RX ADMIN — CEFTRIAXONE SODIUM 1000 MG: 1 INJECTION, POWDER, FOR SOLUTION INTRAMUSCULAR; INTRAVENOUS at 12:39

## 2025-02-21 RX ADMIN — GUAIFENESIN 600 MG: 600 TABLET, MULTILAYER, EXTENDED RELEASE ORAL at 20:22

## 2025-02-21 RX ADMIN — OSELTAMIVIR PHOSPHATE 30 MG: 6 FOR SUSPENSION ORAL at 09:29

## 2025-02-21 RX ADMIN — BUDESONIDE 0.5 MG: 0.5 INHALANT RESPIRATORY (INHALATION) at 11:24

## 2025-02-21 RX ADMIN — HYDRALAZINE HYDROCHLORIDE 50 MG: 25 TABLET ORAL at 20:22

## 2025-02-21 NOTE — PROGRESS NOTES
"NEPHROLOGY PROGRESS NOTE------[facility]    [facility]  [phone]  Saeed Briscoe MD      Patient Care Team:  Joey Islas MD as PCP - General (Family Medicine)  Neville Laughlin MD as Consulting Physician (Nephrology)      Provider:  Saeed Briscoe MD  Patient Name: Michael Dorantes  :  1950    SUBJECTIVE:  Follow-up LEO/CKD  No chest pain or shortness of breath  History through sign .    Medication:  acetylcysteine, 2 mL, Nebulization, BID - RT  budesonide, 0.5 mg, Nebulization, BID - RT  cefTRIAXone, 1,000 mg, Intravenous, Q24H  ferrous sulfate, 325 mg, Oral, Daily With Breakfast  glipizide, 2.5 mg, Oral, QAM AC  guaiFENesin, 600 mg, Oral, Q12H  hydrALAZINE, 50 mg, Oral, TID  insulin lispro, 2-7 Units, Subcutaneous, TID With Meals  ipratropium-albuterol, 3 mL, Nebulization, 4x Daily - RT  oseltamivir, 30 mg, Oral, Q12H  sodium chloride, 10 mL, Intravenous, Q12H  warfarin, 7.5 mg, Oral, Once      Pharmacy to Dose Oseltamivir (TAMIFLU),   Pharmacy to dose warfarin,   sodium chloride, 75 mL/hr, Last Rate: 75 mL/hr (25 1205)        OBJECTIVE    Vital Sign Min/Max for last 24 hours  Temp  Min: 98 °F (36.7 °C)  Max: 99.2 °F (37.3 °C)   BP  Min: 147/69  Max: 168/74   Pulse  Min: 56  Max: 70   Resp  Min: 16  Max: 20   SpO2  Min: 92 %  Max: 100 %   No data recorded   No data recorded     Flowsheet Rows      Flowsheet Row First Filed Value   Admission Height 190.5 cm (75\") Documented at 2025 1244   Admission Weight 83.9 kg (185 lb) Documented at 2025 1244            I/O this shift:  In: -   Out: 800 [Urine:800]  I/O last 3 completed shifts:  In: 720 [P.O.:720]  Out: 1200 [Urine:1200]    Physical Exam:  General Appearance: alert, appears stated age and cooperative  Head: normocephalic, without obvious abnormality and atraumatic  Eyes: conjunctivae and sclerae normal and no icterus  Neck: supple and no " "JVD  Lungs: clear to auscultation and respirations regular  Heart: regular rhythm & normal rate and normal S1, S2  Chest: Wall no abnormalities observed  Abdomen: normal bowel sounds and soft, nontender  Extremities: moves extremities well, no edema, no cyanosis and no redness  Skin: no bleeding, bruising or rash, turgor normal, color normal and no lesions noted  Neurologic: Alert, and oriented. No focal deficits    Labs:    WBC WBC   Date Value Ref Range Status   02/21/2025 2.55 (L) 3.40 - 10.80 10*3/mm3 Final   02/20/2025 5.08 3.40 - 10.80 10*3/mm3 Final   02/19/2025 5.31 3.40 - 10.80 10*3/mm3 Final      HGB Hemoglobin   Date Value Ref Range Status   02/21/2025 10.3 (L) 13.0 - 17.7 g/dL Final   02/20/2025 10.0 (L) 13.0 - 17.7 g/dL Final   02/19/2025 10.6 (L) 13.0 - 17.7 g/dL Final      HCT Hematocrit   Date Value Ref Range Status   02/21/2025 32.1 (L) 37.5 - 51.0 % Final   02/20/2025 31.9 (L) 37.5 - 51.0 % Final   02/19/2025 32.8 (L) 37.5 - 51.0 % Final      Platelets No results found for: \"LABPLAT\"   MCV MCV   Date Value Ref Range Status   02/21/2025 92.0 79.0 - 97.0 fL Final   02/20/2025 92.5 79.0 - 97.0 fL Final   02/19/2025 91.6 79.0 - 97.0 fL Final          Sodium Sodium   Date Value Ref Range Status   02/21/2025 133 (L) 136 - 145 mmol/L Final   02/20/2025 135 (L) 136 - 145 mmol/L Final   02/19/2025 136 136 - 145 mmol/L Final      Potassium Potassium   Date Value Ref Range Status   02/21/2025 4.4 3.5 - 5.2 mmol/L Final   02/20/2025 4.6 3.5 - 5.2 mmol/L Final   02/19/2025 4.6 3.5 - 5.2 mmol/L Final      Chloride Chloride   Date Value Ref Range Status   02/21/2025 101 98 - 107 mmol/L Final   02/20/2025 101 98 - 107 mmol/L Final   02/19/2025 101 98 - 107 mmol/L Final      CO2 CO2   Date Value Ref Range Status   02/21/2025 24.1 22.0 - 29.0 mmol/L Final   02/20/2025 22.8 22.0 - 29.0 mmol/L Final   02/19/2025 24.7 22.0 - 29.0 mmol/L Final      BUN BUN   Date Value Ref Range Status   02/21/2025 30 (H) 8 - 23 mg/dL " "Final   02/20/2025 38 (H) 8 - 23 mg/dL Final   02/19/2025 38 (H) 8 - 23 mg/dL Final      Creatinine Creatinine   Date Value Ref Range Status   02/21/2025 2.14 (H) 0.76 - 1.27 mg/dL Final   02/20/2025 2.44 (H) 0.76 - 1.27 mg/dL Final   02/19/2025 2.73 (H) 0.76 - 1.27 mg/dL Final      Calcium Calcium   Date Value Ref Range Status   02/21/2025 8.2 (L) 8.6 - 10.5 mg/dL Final   02/20/2025 8.4 (L) 8.6 - 10.5 mg/dL Final   02/19/2025 9.0 8.6 - 10.5 mg/dL Final      PO4 No components found for: \"PO4\"   Albumin Albumin   Date Value Ref Range Status   02/21/2025 3.3 (L) 3.5 - 5.2 g/dL Final   02/19/2025 3.8 3.5 - 5.2 g/dL Final      Magnesium Magnesium   Date Value Ref Range Status   02/21/2025 2.1 1.6 - 2.4 mg/dL Final      Uric Acid No components found for: \"URIC ACID\"     Imaging Results (Last 72 Hours)       Procedure Component Value Units Date/Time    US Renal Bilateral [[id number]] Collected: 02/20/25 1835     Updated: 02/20/25 1839    Narrative:      US RENAL BILATERAL    Date of Exam: 2/20/2025 5:24 PM EST    Indication: ARF/LEO/CRF/CKD.    Comparison: 11/30/2017, CT 1/18/2022    Technique: Grayscale and color Doppler ultrasound evaluation of the kidneys and urinary bladder was performed.      Findings:  RIGHT kidney measures 9.1 x 5.1 x 5.8 cm. There is renal cortical atrophy. There is no solid kidney mass.  No echogenic shadowing stone.  No hydronephrosis.    LEFT kidney measures 10.3 x 4.5 x 4.2 cm. There is renal cortical atrophy. There is a simple 2.3 cm cyst within the mid to lower right kidney. There is no solid kidney mass.  No echogenic shadowing stone. There is mild hydronephrosis.    Urinary bladder is surgically absent.        Impression:      Impression:  1.Mild left hydronephrosis similar to previous CT given differences in modality.  2.Relatively mild renal cortical atrophy bilaterally.        Electronically Signed: Darryn Martinez MD    2/20/2025 6:37 PM EST    Workstation ID: [id number]    XR Chest 1 View " [[id number]] Collected: 02/19/25 1359     Updated: 02/19/25 1403    Narrative:      XR CHEST 1 VW    Date of Exam: 2/19/2025 1:36 PM EST    Indication: cough    Comparison: CT chest 11/15/2024. AP chest 1/17/2023.    Findings:  Ill-defined groundglass density in the right upper lobe heart size is within normal limits. No pleural effusion or pneumothorax or acute osseous abnormalities are identified.      Impression:      Impression:  Right upper lobe ill-defined groundglass density may represent mild pneumonitis or developing pneumonia. No dense consolidations are identified.      Electronically Signed: Trinity Negron MD    2/19/2025 2:01 PM EST    Workstation ID: [id number]            Results for orders placed during the hospital encounter of 02/19/25    XR Chest 1 View    Narrative  XR CHEST 1 VW    Date of Exam: 2/19/2025 1:36 PM EST    Indication: cough    Comparison: CT chest 11/15/2024. AP chest 1/17/2023.    Findings:  Ill-defined groundglass density in the right upper lobe heart size is within normal limits. No pleural effusion or pneumothorax or acute osseous abnormalities are identified.    Impression  Impression:  Right upper lobe ill-defined groundglass density may represent mild pneumonitis or developing pneumonia. No dense consolidations are identified.      Electronically Signed: Trinity Negron MD  2/19/2025 2:01 PM EST  Workstation ID: [id number]      Results for orders placed during the hospital encounter of 01/17/23    XR Chest 1 View    Narrative  XR CHEST 1 VW    Date of Exam: 1/17/2023 8:27 PM EST    Indication: fever/soa/back pain.    Comparison: 1/5/2022    Findings:  The heart and mediastinal contours are within normal limits. The lungs are clear. Osseous structures are unremarkable.    Impression  Impression:  No acute process    Electronically Signed: Joey Monroy  1/17/2023 9:26 PM EST  Workstation ID: [id number]      Results for orders placed during the hospital encounter of 01/05/22    XR  Chest 1 View    Narrative  XR CHEST 1 VW    Date of Exam: 1/5/2022 23:38 EST    Indication: dyspnea.  Covid positive.    Comparison Exams: None available.    Technique: Portable AP chest    FINDINGS:  Ill-defined infiltrates are seen throughout the central to peripheral aspect of the lungs bilaterally with mild interstitial changes. No pleural effusions are observed. The cardiac silhouette is within normal limits for size for the portable study. The  mediastinum is unremarkable. No acute osseous abnormalities are identified.    Impression  Ill-defined infiltrates are noted bilaterally with mild interstitial changes. The findings suggest atypical/viral infection or multifocal pneumonia and suggest changes of Covid 19 infection. Recommend follow-up to ensure improvement/resolution.    Electronically Signed: Bran Colin MD 1/6/2022 0:03 EST      Results for orders placed during the hospital encounter of 10/17/19    Duplex Venous Lower Extremity - Left    Interpretation Summary  · Acute left lower extremity deep vein thrombosis noted in the common femoral, proximal femoral, mid femoral, distal femoral and popliteal.        ASSESSMENT / PLAN      Dyspnea    Diabetes mellitus with neuropathy    History of DVT (deep vein thrombosis)    Hypoxia    CKD (chronic kidney disease) stage 3, GFR 30-59 ml/min    History of urostomy    Pneumonia, unspecified organism    Influenza A    Iron deficiency anemia    1.  LEO/CKD-----Nonoliguric. +ARF/LEO on top of known CRF/CKD STG 3B with a baseline serum creatinine of about 1.8. CRF/CKD STG 3B is secondary to DGS/HTN NS. +ARF/LEO appears to be secondary to prerenal state/intravascular volume depletion.  Creatinine better.  Stop IV fluids.  Renal ultrasound with mild left hydro/chronic.     2. DMII WITH RENAL MANIFESTATIONS-----BS okay. Glucometers, SSI.       3. HTN WITH CKD-----BP okay. Avoid hypotension. No ACE-I/ARB/DRI/diuretic for now     4. INFLUENZA A INFECTION/? RUL  PNA------Supportive care and on Tamiflu and Rocephin     5. H/O BLADDER/PROSTATE CA WITH UROSTOMY     6. ANEMIA OF CKD--------H/H in safe range. Follow for EPO need. Check Fe     7. DM PERIPHERAL NEUROPATHY------On Neurontin     8. GERD/PUD PROPHYLAXIS------On PPI. Benefits outweigh risks, despite renal dysfunction, given steroid exposure and h/o use and tolerating PPI     9. H/O DVT-----Anticoagulated     10. HEARING IMPAIRED     11. HYPOCALCEMIA------Replace IV and follow ionized levels     12. OA/DJD-----No NSAIDs. Uric acid ok        Saeed Briscoe MD  [facility]  [phone]  02/21/25  11:30 EST

## 2025-02-21 NOTE — PROGRESS NOTES
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    INR Goal: 2 - 3  Home medication?: warfarin 5 mg PO daily  Bridge Therapy Present?:  No  Interacting Medications Evaluation (New/Present/Discontinued):  Ceftriazone (2/19 - 2/23) - may enhance the anticoagulant effect of warfarin  Tamiflu (2/19 - 2/23) - may enhance the anticoagulant effect of warfarin  Additional Contributing Factors:   patient last filled warfarin on insurance claims 10/20/24 for 90 day-supply  Patient's sister unsure how complaint patient is with medications  ~% intake of documented meals in previous 24 hours.       Assessment/Plan:    Patient's INR is therapeutic today at 2.21, an increase from 2.11 yesterday.  Will give a boosted dose of warfarin 7.5 mg this evening with the tentative plan to continue home regimen.  Daily PT/INR ordered.    Continue to monitor and adjust based on INR.       Date 2/19 2/20 2/21         INR 2.11 2.21 1.88         Dose 5 mg 5 mg 7.5 mg           Objective:  [Ht: 190.5 cm (75\"); Wt: 83.9 kg (185 lb); BMI: Body mass index is 23.12 kg/m².]    Lab Results   Component Value Date    ALBUMIN 3.3 (L) 02/21/2025     Lab Results   Component Value Date    INR 1.88 (L) 02/21/2025    INR 2.21 02/20/2025    INR 2.11 02/19/2025    PROTIME 21.8 02/21/2025    PROTIME 24.7 02/20/2025    PROTIME 23.8 02/19/2025     Lab Results   Component Value Date    HGB 10.3 (L) 02/21/2025    HGB 10.0 (L) 02/20/2025    HGB 10.6 (L) 02/19/2025     Lab Results   Component Value Date    HCT 32.1 (L) 02/21/2025    HCT 31.9 (L) 02/20/2025    HCT 32.8 (L) 02/19/2025     Az Bates [facility]  02/21/25 10:08 EST   "

## 2025-02-21 NOTE — CASE MANAGEMENT/SOCIAL WORK
Continued Stay Note  BOZENA Crocker     Patient Name: Michael Dorantes  MRN: [id number]  Today's Date: 2/21/2025    Admit Date: 2/19/2025    Plan: Anticipate routine home with wife.   Discharge Plan       Row Name 02/21/25 1490       Plan    Plan Comments IMM copy delivered and reviewed with pt at bedside with ASL . Anticipate discharge home today.                Megan Naegele, RN     Office Phone: [phone]  Office Cell: [phone]

## 2025-02-21 NOTE — PLAN OF CARE
Please see PT evaluation for mobility. Pt ambulating (I) and functioning at baseline with ADLs. No need for skilled OT intervention warranted with in acute setting. OT will complete order at this time, thank you for this referral.

## 2025-02-21 NOTE — PROGRESS NOTES
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    INR Goal: 2 - 3  Home medication?: warfarin 5 mg PO daily  Bridge Therapy Present?:  No  Interacting Medications Evaluation (New/Present/Discontinued):  Ceftriazone (2/19 - 2/23) - may enhance the anticoagulant effect of warfarin  Tamiflu (2/19 - 2/23) - may enhance the anticoagulant effect of warfarin  Additional Contributing Factors:   patient last filled warfarin on insurance claims 10/20/24 for 90 day-supply  Patient's sister unsure how complaint patient is with medications  ~% intake of documented meals in previous 24 hours.       Assessment/Plan:    Patient's INR is therapeutic today at 2.21, an increase from 2.11 yesterday.  Will give a boosted dose of warfarin 7.5 mg this evening with the tentative plan to continue home regimen.  Daily PT/INR ordered.    Continue to monitor and adjust based on INR.       Date 2/19 2/20 2/21         INR 2.11 2.21 1.88         Dose 5 mg 5 mg 7.5 mg           Objective:  [Ht: 190.5 cm (75\"); Wt: 83.9 kg (185 lb); BMI: Body mass index is 23.12 kg/m².]    Lab Results   Component Value Date    ALBUMIN 3.3 (L) 02/21/2025     Lab Results   Component Value Date    INR 1.88 (L) 02/21/2025    INR 2.21 02/20/2025    INR 2.11 02/19/2025    PROTIME 21.8 02/21/2025    PROTIME 24.7 02/20/2025    PROTIME 23.8 02/19/2025     Lab Results   Component Value Date    HGB 10.3 (L) 02/21/2025    HGB 10.0 (L) 02/20/2025    HGB 10.6 (L) 02/19/2025     Lab Results   Component Value Date    HCT 32.1 (L) 02/21/2025    HCT 31.9 (L) 02/20/2025    HCT 32.8 (L) 02/19/2025     Az Bates [facility]  02/21/25 10:12 EST   "

## 2025-02-21 NOTE — PROGRESS NOTES
LOS: 2 days   Patient Care Team:  Joey Islas MD as PCP - General (Family Medicine)  Neville Laughlin MD as Consulting Physician (Nephrology)    Subjective     Patient states that he can not cough up secretions    Review of Systems   Constitutional:  Positive for activity change and fatigue.   HENT: Negative.     Respiratory:  Positive for cough. Negative for shortness of breath.    Cardiovascular: Negative.    Gastrointestinal: Negative.    Genitourinary: Negative.    Musculoskeletal: Negative.    Neurological:  Positive for weakness.   Psychiatric/Behavioral: Negative.             Objective     Vital Signs  Temp:  [98 °F (36.7 °C)-99.2 °F (37.3 °C)] 99.2 °F (37.3 °C)  Heart Rate:  [56-70] 56  Resp:  [16-20] 18  BP: (143-168)/(69-76) 150/76      Physical Exam  Vitals reviewed.   Constitutional:       Appearance: He is ill-appearing.   HENT:      Head: Normocephalic and atraumatic.      Right Ear: External ear normal.      Left Ear: External ear normal.      Nose: Nose normal.      Mouth/Throat:      Mouth: Mucous membranes are dry.   Eyes:      General:         Right eye: No discharge.         Left eye: No discharge.   Cardiovascular:      Rate and Rhythm: Normal rate and regular rhythm.      Pulses: Normal pulses.      Heart sounds: Normal heart sounds.   Pulmonary:      Effort: Pulmonary effort is normal.      Breath sounds: Examination of the right-middle field reveals decreased breath sounds. Examination of the left-middle field reveals decreased breath sounds. Examination of the right-lower field reveals decreased breath sounds. Examination of the left-lower field reveals decreased breath sounds.   Abdominal:      General: Bowel sounds are normal.      Palpations: Abdomen is soft.   Musculoskeletal:         General: Normal range of motion.      Cervical back: Normal range of motion.   Skin:     General: Skin is warm.   Neurological:      Mental Status: He is alert and oriented to person,  place, and time.   Psychiatric:         Behavior: Behavior normal.              Results Review:    Lab Results (last 24 hours)       Procedure Component Value Units Date/Time    POC Glucose 4x Daily Before Meals & at Bedtime [[id number]]  (Normal) Collected: 02/21/25 0810    Specimen: Blood Updated: 02/21/25 0812     Glucose 99 mg/dL      Comment: Serial Number: [id number]:  [id number]       Calcium, Ionized [[id number]]  (Abnormal) Collected: 02/21/25 0120    Specimen: Blood from Arm, Left Updated: 02/21/25 0329     Ionized Calcium 1.13 mmol/L     Comprehensive Metabolic Panel [[id number]]  (Abnormal) Collected: 02/21/25 0120    Specimen: Blood from Arm, Left Updated: 02/21/25 0307     Glucose 148 mg/dL      BUN 30 mg/dL      Creatinine 2.14 mg/dL      Sodium 133 mmol/L      Potassium 4.4 mmol/L      Chloride 101 mmol/L      CO2 24.1 mmol/L      Calcium 8.2 mg/dL      Total Protein 5.7 g/dL      Albumin 3.3 g/dL      ALT (SGPT) 14 U/L      AST (SGOT) 49 U/L      Alkaline Phosphatase 43 U/L      Total Bilirubin 0.2 mg/dL      Globulin 2.4 gm/dL      A/G Ratio 1.4 g/dL      BUN/Creatinine Ratio 14.0     Anion Gap 7.9 mmol/L      eGFR 31.7 mL/min/1.73     Narrative:      GFR Categories in Chronic Kidney Disease (CKD)      GFR Category          GFR (mL/min/1.73)    Interpretation  G1                     90 or greater         Normal or high (1)  G2                      60-89                Mild decrease (1)  G3a                   45-59                Mild to moderate decrease  G3b                   30-44                Moderate to severe decrease  G4                    15-29                Severe decrease  G5                    14 or less           Kidney failure          (1)In the absence of evidence of kidney disease, neither GFR category G1 or G2 fulfill the criteria for CKD.    eGFR calculation 2021 CKD-EPI creatinine equation, which does not include race as a factor    Magnesium [[id number]]  (Normal) Collected:  02/21/25 0120    Specimen: Blood from Arm, Left Updated: 02/21/25 0307     Magnesium 2.1 mg/dL     Phosphorus [[id number]]  (Normal) Collected: 02/21/25 0120    Specimen: Blood from Arm, Left Updated: 02/21/25 0307     Phosphorus 2.9 mg/dL     Protime-INR [[id number]]  (Abnormal) Collected: 02/21/25 0120    Specimen: Blood from Arm, Left Updated: 02/21/25 0239     Protime 21.8 Seconds      INR 1.88    CBC (No Diff) [[id number]]  (Abnormal) Collected: 02/21/25 0120    Specimen: Blood from Arm, Left Updated: 02/21/25 0231     WBC 2.55 10*3/mm3      RBC 3.49 10*6/mm3      Hemoglobin 10.3 g/dL      Hematocrit 32.1 %      MCV 92.0 fL      MCH 29.5 pg      MCHC 32.1 g/dL      RDW 12.2 %      RDW-SD 40.9 fl      MPV 9.3 fL      Platelets 143 10*3/mm3     Blood Culture - Blood, Arm, Left [[id number]]  (Normal) Collected: 02/19/25 1313    Specimen: Blood from Arm, Left Updated: 02/20/25 1330     Blood Culture No growth at 24 hours    Blood Culture - Blood, Arm, Right [[id number]]  (Normal) Collected: 02/19/25 1313    Specimen: Blood from Arm, Right Updated: 02/20/25 1330     Blood Culture No growth at 24 hours    POC Glucose 4x Daily Before Meals & at Bedtime [[id number]]  (Abnormal) Collected: 02/20/25 1126    Specimen: Blood Updated: 02/20/25 1128     Glucose 147 mg/dL      Comment: Serial Number: [id number]:  [id number]                Imaging Results (Last 24 Hours)       Procedure Component Value Units Date/Time    US Renal Bilateral [[id number]] Collected: 02/20/25 1835     Updated: 02/20/25 1839    Narrative:      US RENAL BILATERAL    Date of Exam: 2/20/2025 5:24 PM EST    Indication: ARF/LEO/CRF/CKD.    Comparison: 11/30/2017, CT 1/18/2022    Technique: Grayscale and color Doppler ultrasound evaluation of the kidneys and urinary bladder was performed.      Findings:  RIGHT kidney measures 9.1 x 5.1 x 5.8 cm. There is renal cortical atrophy. There is no solid kidney mass.  No echogenic shadowing stone.  No  hydronephrosis.    LEFT kidney measures 10.3 x 4.5 x 4.2 cm. There is renal cortical atrophy. There is a simple 2.3 cm cyst within the mid to lower right kidney. There is no solid kidney mass.  No echogenic shadowing stone. There is mild hydronephrosis.    Urinary bladder is surgically absent.        Impression:      Impression:  1.Mild left hydronephrosis similar to previous CT given differences in modality.  2.Relatively mild renal cortical atrophy bilaterally.        Electronically Signed: Darryn Martinez MD    2/20/2025 6:37 PM EST    Workstation ID: [id number]                 I reviewed the patient's new clinical results.    Medication Review:   Scheduled Meds:acetylcysteine, 2 mL, Nebulization, BID - RT  budesonide, 0.5 mg, Nebulization, BID - RT  cefTRIAXone, 1,000 mg, Intravenous, Q24H  ferrous sulfate, 325 mg, Oral, Daily With Breakfast  guaiFENesin, 600 mg, Oral, Q12H  insulin lispro, 2-7 Units, Subcutaneous, TID With Meals  ipratropium-albuterol, 3 mL, Nebulization, 4x Daily - RT  oseltamivir, 30 mg, Oral, Q12H  sodium chloride, 10 mL, Intravenous, Q12H  warfarin, 7.5 mg, Oral, Once      Continuous Infusions:Pharmacy to Dose Oseltamivir (TAMIFLU),   Pharmacy to dose warfarin,   sodium chloride, 75 mL/hr, Last Rate: 75 mL/hr (02/20/25 1205)      PRN Meds:.  acetaminophen    senna-docusate sodium **AND** polyethylene glycol **AND** bisacodyl **AND** bisacodyl    calcium carbonate    Calcium Replacement - Follow Nurse / BPA Driven Protocol    dextrose    dextrose    glucagon (human recombinant)    hydrALAZINE    Magnesium Standard Dose Replacement - Follow Nurse / BPA Driven Protocol    ondansetron ODT **OR** ondansetron    Pharmacy to Dose Oseltamivir (TAMIFLU)    Pharmacy to dose warfarin    phenol    Phosphorus Replacement - Follow Nurse / BPA Driven Protocol    Potassium Replacement - Follow Nurse / BPA Driven Protocol    sodium chloride    sodium chloride    sodium chloride     Interval  History:    Assessment & Plan     Acute hypoxia  Dyspnea  Fever  Influenza A  Pneumonia  Hearing impaired  Hypertension associated with chronic kidney disease stage IV  Anemia with chronic kidney disease stage IV  History of gastrectomy  History of urostomy  History of deep venous thrombosis  Gastroesophageal reflux disease with esophagitis  Known history of hiatal hernia  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

## 2025-02-21 NOTE — PLAN OF CARE
Goal Outcome Evaluation:        Problem: Adult Inpatient Plan of Care  Goal: Absence of Hospital-Acquired Illness or Injury  Intervention: Identify and Manage Fall Risk  Recent Flowsheet Documentation  Taken 2/21/2025 0400 by Ailyn Heath RN  Safety Promotion/Fall Prevention: safety round/check completed  Taken 2/21/2025 0200 by Ailyn Heath RN  Safety Promotion/Fall Prevention: safety round/check completed  Taken 2/21/2025 0000 by Ailyn Heath RN  Safety Promotion/Fall Prevention: safety round/check completed  Taken 2/20/2025 2030 by Ailyn Heath RN  Safety Promotion/Fall Prevention:   safety round/check completed   room organization consistent   nonskid shoes/slippers when out of bed   clutter free environment maintained   assistive device/personal items within reach  Intervention: Prevent Skin Injury  Recent Flowsheet Documentation  Taken 2/20/2025 2030 by Ailyn Heath RN  Body Position: position changed independently  Intervention: Prevent Infection  Recent Flowsheet Documentation  Taken 2/20/2025 2030 by Ailyn Heath RN  Infection Prevention:   rest/sleep promoted   single patient room provided  Goal: Optimal Comfort and Wellbeing  Intervention: Provide Person-Centered Care  Recent Flowsheet Documentation  Taken 2/20/2025 2030 by Ailyn Heath RN  Trust Relationship/Rapport:   choices provided   care explained     Problem: Skin Injury Risk Increased  Goal: Skin Health and Integrity  Intervention: Optimize Skin Protection  Recent Flowsheet Documentation  Taken 2/20/2025 2030 by Ailyn Heath RN  Pressure Reduction Techniques: frequent weight shift encouraged  Pressure Reduction Devices: pressure-redistributing mattress utilized     Problem: Fall Injury Risk  Goal: Absence of Fall and Fall-Related Injury  Intervention: Promote Injury-Free Environment  Recent Flowsheet Documentation  Taken 2/21/2025 0400 by Ailyn Heath RN  Safety Promotion/Fall Prevention: safety  round/check completed  Taken 2/21/2025 0200 by Ailyn Heath, RN  Safety Promotion/Fall Prevention: safety round/check completed  Taken 2/21/2025 0000 by Ailyn Heath, RN  Safety Promotion/Fall Prevention: safety round/check completed  Taken 2/20/2025 2030 by Ailyn Heath, RN  Safety Promotion/Fall Prevention:   safety round/check completed   room organization consistent   nonskid shoes/slippers when out of bed   clutter free environment maintained   assistive device/personal items within reach

## 2025-02-21 NOTE — PROGRESS NOTES
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    INR Goal: 2 - 3  Home medication?: warfarin 5 mg PO daily  Bridge Therapy Present?:  No  Interacting Medications Evaluation (New/Present/Discontinued):  Ceftriazone (2/19 - 2/23) - may enhance the anticoagulant effect of warfarin  Tamiflu (2/19 - 2/23) - may enhance the anticoagulant effect of warfarin  Additional Contributing Factors:   patient last filled warfarin on insurance claims 10/20/24 for 90 day-supply  Patient's sister unsure how complaint patient is with medications  ~% intake of documented meals in previous 24 hours.       Assessment/Plan:    Patient's INR is therapeutic today at 2.21, an increase from 2.11 yesterday.  Will give a boosted dose of warfarin 7.5 mg this evening with the tentative plan to continue home regimen.  Daily PT/INR ordered.    Continue to monitor and adjust based on INR.       Date 2/19 2/20 2/21         INR 2.11 2.21 1.88         Dose 5 mg 5 mg 7.5 mg           Objective:  [Ht: 190.5 cm (75\"); Wt: 83.9 kg (185 lb); BMI: Body mass index is 23.12 kg/m².]    Lab Results   Component Value Date    ALBUMIN 3.3 (L) 02/21/2025     Lab Results   Component Value Date    INR 1.88 (L) 02/21/2025    INR 2.21 02/20/2025    INR 2.11 02/19/2025    PROTIME 21.8 02/21/2025    PROTIME 24.7 02/20/2025    PROTIME 23.8 02/19/2025     Lab Results   Component Value Date    HGB 10.3 (L) 02/21/2025    HGB 10.0 (L) 02/20/2025    HGB 10.6 (L) 02/19/2025     Lab Results   Component Value Date    HCT 32.1 (L) 02/21/2025    HCT 31.9 (L) 02/20/2025    HCT 32.8 (L) 02/19/2025     Az Bates [facility]  02/21/25 10:12 EST   "

## 2025-02-22 ENCOUNTER — READMISSION MANAGEMENT (OUTPATIENT)
Dept: CALL CENTER | Facility: HOSPITAL | Age: 75
End: 2025-02-22
Payer: MEDICARE

## 2025-02-22 VITALS
HEIGHT: 75 IN | SYSTOLIC BLOOD PRESSURE: 148 MMHG | HEART RATE: 69 BPM | TEMPERATURE: 98.6 F | WEIGHT: 185 LBS | RESPIRATION RATE: 16 BRPM | BODY MASS INDEX: 23 KG/M2 | OXYGEN SATURATION: 96 % | DIASTOLIC BLOOD PRESSURE: 65 MMHG

## 2025-02-22 LAB
ALBUMIN SERPL-MCNC: 3.8 G/DL (ref 3.5–5.2)
ANION GAP SERPL CALCULATED.3IONS-SCNC: 12.4 MMOL/L (ref 5–15)
BASOPHILS # BLD AUTO: 0.02 10*3/MM3 (ref 0–0.2)
BASOPHILS NFR BLD AUTO: 0.6 % (ref 0–1.5)
BUN SERPL-MCNC: 20 MG/DL (ref 8–23)
BUN/CREAT SERPL: 11 (ref 7–25)
CALCIUM SPEC-SCNC: 8.9 MG/DL (ref 8.6–10.5)
CHLORIDE SERPL-SCNC: 102 MMOL/L (ref 98–107)
CO2 SERPL-SCNC: 21.6 MMOL/L (ref 22–29)
CREAT SERPL-MCNC: 1.81 MG/DL (ref 0.76–1.27)
DEPRECATED RDW RBC AUTO: 40.1 FL (ref 37–54)
EGFRCR SERPLBLD CKD-EPI 2021: 38.8 ML/MIN/1.73
EOSINOPHIL # BLD AUTO: 0.04 10*3/MM3 (ref 0–0.4)
EOSINOPHIL NFR BLD AUTO: 1.2 % (ref 0.3–6.2)
ERYTHROCYTE [DISTWIDTH] IN BLOOD BY AUTOMATED COUNT: 12.1 % (ref 12.3–15.4)
GLUCOSE BLDC GLUCOMTR-MCNC: 109 MG/DL (ref 70–105)
GLUCOSE SERPL-MCNC: 116 MG/DL (ref 65–99)
HCT VFR BLD AUTO: 36.6 % (ref 37.5–51)
HGB BLD-MCNC: 12 G/DL (ref 13–17.7)
IMM GRANULOCYTES # BLD AUTO: 0.01 10*3/MM3 (ref 0–0.05)
IMM GRANULOCYTES NFR BLD AUTO: 0.3 % (ref 0–0.5)
INR PPP: 1.7 (ref 2–3)
LYMPHOCYTES # BLD AUTO: 0.57 10*3/MM3 (ref 0.7–3.1)
LYMPHOCYTES NFR BLD AUTO: 17.5 % (ref 19.6–45.3)
MCH RBC QN AUTO: 29.6 PG (ref 26.6–33)
MCHC RBC AUTO-ENTMCNC: 32.8 G/DL (ref 31.5–35.7)
MCV RBC AUTO: 90.1 FL (ref 79–97)
MONOCYTES # BLD AUTO: 0.33 10*3/MM3 (ref 0.1–0.9)
MONOCYTES NFR BLD AUTO: 10.1 % (ref 5–12)
NEUTROPHILS NFR BLD AUTO: 2.29 10*3/MM3 (ref 1.7–7)
NEUTROPHILS NFR BLD AUTO: 70.3 % (ref 42.7–76)
NRBC BLD AUTO-RTO: 0 /100 WBC (ref 0–0.2)
PHOSPHATE SERPL-MCNC: 2.8 MG/DL (ref 2.5–4.5)
PLATELET # BLD AUTO: 174 10*3/MM3 (ref 140–450)
PMV BLD AUTO: 8.7 FL (ref 6–12)
POTASSIUM SERPL-SCNC: 4.4 MMOL/L (ref 3.5–5.2)
PROTHROMBIN TIME: 20 SECONDS (ref 19.4–28.5)
RBC # BLD AUTO: 4.06 10*6/MM3 (ref 4.14–5.8)
SODIUM SERPL-SCNC: 136 MMOL/L (ref 136–145)
WBC NRBC COR # BLD AUTO: 3.26 10*3/MM3 (ref 3.4–10.8)

## 2025-02-22 PROCEDURE — 94799 UNLISTED PULMONARY SVC/PX: CPT

## 2025-02-22 PROCEDURE — 94664 DEMO&/EVAL PT USE INHALER: CPT

## 2025-02-22 PROCEDURE — 85025 COMPLETE CBC W/AUTO DIFF WBC: CPT | Performed by: INTERNAL MEDICINE

## 2025-02-22 PROCEDURE — 82948 REAGENT STRIP/BLOOD GLUCOSE: CPT

## 2025-02-22 PROCEDURE — 85610 PROTHROMBIN TIME: CPT | Performed by: NURSE PRACTITIONER

## 2025-02-22 PROCEDURE — 63710000001 ONDANSETRON ODT 4 MG TABLET DISPERSIBLE: Performed by: NURSE PRACTITIONER

## 2025-02-22 PROCEDURE — 80069 RENAL FUNCTION PANEL: CPT | Performed by: INTERNAL MEDICINE

## 2025-02-22 PROCEDURE — 94761 N-INVAS EAR/PLS OXIMETRY MLT: CPT

## 2025-02-22 RX ORDER — OSELTAMIVIR PHOSPHATE 30 MG/1
30 CAPSULE ORAL EVERY 12 HOURS SCHEDULED
Qty: 5 CAPSULE | Refills: 0 | Status: SHIPPED | OUTPATIENT
Start: 2025-02-22 | End: 2025-02-22

## 2025-02-22 RX ORDER — CEFDINIR 300 MG/1
300 CAPSULE ORAL 2 TIMES DAILY
Qty: 16 CAPSULE | Refills: 0 | Status: SHIPPED | OUTPATIENT
Start: 2025-02-22 | End: 2025-02-22

## 2025-02-22 RX ORDER — OSELTAMIVIR PHOSPHATE 30 MG/1
30 CAPSULE ORAL EVERY 12 HOURS SCHEDULED
Qty: 5 CAPSULE | Refills: 0 | Status: SHIPPED | OUTPATIENT
Start: 2025-02-22 | End: 2025-02-25

## 2025-02-22 RX ORDER — CEFDINIR 300 MG/1
300 CAPSULE ORAL 2 TIMES DAILY
Qty: 16 CAPSULE | Refills: 0 | Status: SHIPPED | OUTPATIENT
Start: 2025-02-22 | End: 2025-03-02

## 2025-02-22 RX ADMIN — OSELTAMIVIR PHOSPHATE 30 MG: 6 FOR SUSPENSION ORAL at 08:47

## 2025-02-22 RX ADMIN — GLIPIZIDE 2.5 MG: 5 TABLET ORAL at 08:47

## 2025-02-22 RX ADMIN — HYDRALAZINE HYDROCHLORIDE 50 MG: 25 TABLET ORAL at 08:47

## 2025-02-22 RX ADMIN — ANTACID TABLETS 2 TABLET: 500 TABLET, CHEWABLE ORAL at 10:13

## 2025-02-22 RX ADMIN — IPRATROPIUM BROMIDE AND ALBUTEROL SULFATE 3 ML: .5; 3 SOLUTION RESPIRATORY (INHALATION) at 07:55

## 2025-02-22 RX ADMIN — ONDANSETRON 4 MG: 4 TABLET, ORALLY DISINTEGRATING ORAL at 05:25

## 2025-02-22 RX ADMIN — GUAIFENESIN 600 MG: 600 TABLET, MULTILAYER, EXTENDED RELEASE ORAL at 08:47

## 2025-02-22 RX ADMIN — FERROUS SULFATE TAB 325 MG (65 MG ELEMENTAL FE) 325 MG: 325 (65 FE) TAB at 08:47

## 2025-02-22 RX ADMIN — BUDESONIDE 0.5 MG: 0.5 INHALANT RESPIRATORY (INHALATION) at 07:55

## 2025-02-22 RX ADMIN — Medication 10 ML: at 08:48

## 2025-02-22 NOTE — PROGRESS NOTES
"NEPHROLOGY PROGRESS NOTE------[facility]    [facility]  [phone]  Saeed Briscoe MD      Patient Care Team:  Joey Islas MD as PCP - General (Family Medicine)  Neville Laughlin MD as Consulting Physician (Nephrology)      Provider:  Saeed Briscoe MD  Patient Name: Michael Dorantes  :  1950    SUBJECTIVE:  Follow-up LEO/CKD  No chest pain or shortness of breath      Medication:  acetylcysteine, 2 mL, Nebulization, BID - RT  budesonide, 0.5 mg, Nebulization, BID - RT  cefTRIAXone, 1,000 mg, Intravenous, Q24H  ferrous sulfate, 325 mg, Oral, Daily With Breakfast  glipizide, 2.5 mg, Oral, QAM AC  guaiFENesin, 600 mg, Oral, Q12H  hydrALAZINE, 50 mg, Oral, TID  insulin lispro, 2-7 Units, Subcutaneous, TID With Meals  ipratropium-albuterol, 3 mL, Nebulization, 4x Daily - RT  oseltamivir, 30 mg, Oral, Q12H  sodium chloride, 10 mL, Intravenous, Q12H      Pharmacy to Dose Oseltamivir (TAMIFLU),   Pharmacy to dose warfarin,         OBJECTIVE    Vital Sign Min/Max for last 24 hours  Temp  Min: 97.2 °F (36.2 °C)  Max: 98.6 °F (37 °C)   BP  Min: 148/65  Max: 174/81   Pulse  Min: 58  Max: 72   Resp  Min: 10  Max: 18   SpO2  Min: 92 %  Max: 98 %   No data recorded   No data recorded     Flowsheet Rows      Flowsheet Row First Filed Value   Admission Height 190.5 cm (75\") Documented at 2025 1244   Admission Weight 83.9 kg (185 lb) Documented at 2025 1244            I/O this shift:  In: 240 [P.O.:240]  Out: 1000 [Urine:1000]  I/O last 3 completed shifts:  In: 240 [P.O.:240]  Out: 2350 [Urine:2350]    Physical Exam:  General Appearance: alert, appears stated age and cooperative  Head: normocephalic, without obvious abnormality and atraumatic  Eyes: conjunctivae and sclerae normal and no icterus  Neck: supple and no JVD  Lungs: clear to auscultation and respirations regular  Heart: regular rhythm & normal rate and normal S1, S2  Chest: " "Wall no abnormalities observed  Abdomen: normal bowel sounds and soft, nontender  Extremities: moves extremities well, no edema, no cyanosis and no redness  Skin: no bleeding, bruising or rash, turgor normal, color normal and no lesions noted  Neurologic: Alert, and oriented. No focal deficits    Labs:    WBC WBC   Date Value Ref Range Status   02/22/2025 3.26 (L) 3.40 - 10.80 10*3/mm3 Final   02/21/2025 2.55 (L) 3.40 - 10.80 10*3/mm3 Final   02/20/2025 5.08 3.40 - 10.80 10*3/mm3 Final   02/19/2025 5.31 3.40 - 10.80 10*3/mm3 Final      HGB Hemoglobin   Date Value Ref Range Status   02/22/2025 12.0 (L) 13.0 - 17.7 g/dL Final   02/21/2025 10.3 (L) 13.0 - 17.7 g/dL Final   02/20/2025 10.0 (L) 13.0 - 17.7 g/dL Final   02/19/2025 10.6 (L) 13.0 - 17.7 g/dL Final      HCT Hematocrit   Date Value Ref Range Status   02/22/2025 36.6 (L) 37.5 - 51.0 % Final   02/21/2025 32.1 (L) 37.5 - 51.0 % Final   02/20/2025 31.9 (L) 37.5 - 51.0 % Final   02/19/2025 32.8 (L) 37.5 - 51.0 % Final      Platelets No results found for: \"LABPLAT\"   MCV MCV   Date Value Ref Range Status   02/22/2025 90.1 79.0 - 97.0 fL Final   02/21/2025 92.0 79.0 - 97.0 fL Final   02/20/2025 92.5 79.0 - 97.0 fL Final   02/19/2025 91.6 79.0 - 97.0 fL Final          Sodium Sodium   Date Value Ref Range Status   02/22/2025 136 136 - 145 mmol/L Final   02/21/2025 133 (L) 136 - 145 mmol/L Final   02/20/2025 135 (L) 136 - 145 mmol/L Final   02/19/2025 136 136 - 145 mmol/L Final      Potassium Potassium   Date Value Ref Range Status   02/22/2025 4.4 3.5 - 5.2 mmol/L Final   02/21/2025 4.4 3.5 - 5.2 mmol/L Final   02/20/2025 4.6 3.5 - 5.2 mmol/L Final   02/19/2025 4.6 3.5 - 5.2 mmol/L Final      Chloride Chloride   Date Value Ref Range Status   02/22/2025 102 98 - 107 mmol/L Final   02/21/2025 101 98 - 107 mmol/L Final   02/20/2025 101 98 - 107 mmol/L Final   02/19/2025 101 98 - 107 mmol/L Final      CO2 CO2   Date Value Ref Range Status   02/22/2025 21.6 (L) 22.0 - 29.0 " "mmol/L Final   02/21/2025 24.1 22.0 - 29.0 mmol/L Final   02/20/2025 22.8 22.0 - 29.0 mmol/L Final   02/19/2025 24.7 22.0 - 29.0 mmol/L Final      BUN BUN   Date Value Ref Range Status   02/22/2025 20 8 - 23 mg/dL Final   02/21/2025 30 (H) 8 - 23 mg/dL Final   02/20/2025 38 (H) 8 - 23 mg/dL Final   02/19/2025 38 (H) 8 - 23 mg/dL Final      Creatinine Creatinine   Date Value Ref Range Status   02/22/2025 1.81 (H) 0.76 - 1.27 mg/dL Final   02/21/2025 2.14 (H) 0.76 - 1.27 mg/dL Final   02/20/2025 2.44 (H) 0.76 - 1.27 mg/dL Final   02/19/2025 2.73 (H) 0.76 - 1.27 mg/dL Final      Calcium Calcium   Date Value Ref Range Status   02/22/2025 8.9 8.6 - 10.5 mg/dL Final   02/21/2025 8.2 (L) 8.6 - 10.5 mg/dL Final   02/20/2025 8.4 (L) 8.6 - 10.5 mg/dL Final   02/19/2025 9.0 8.6 - 10.5 mg/dL Final      PO4 No components found for: \"PO4\"   Albumin Albumin   Date Value Ref Range Status   02/22/2025 3.8 3.5 - 5.2 g/dL Final   02/21/2025 3.3 (L) 3.5 - 5.2 g/dL Final   02/19/2025 3.8 3.5 - 5.2 g/dL Final      Magnesium Magnesium   Date Value Ref Range Status   02/21/2025 2.1 1.6 - 2.4 mg/dL Final      Uric Acid No components found for: \"URIC ACID\"     Imaging Results (Last 72 Hours)       Procedure Component Value Units Date/Time    US Renal Bilateral [[id number]] Collected: 02/20/25 1835     Updated: 02/20/25 1839    Narrative:      US RENAL BILATERAL    Date of Exam: 2/20/2025 5:24 PM EST    Indication: ARF/LEO/CRF/CKD.    Comparison: 11/30/2017, CT 1/18/2022    Technique: Grayscale and color Doppler ultrasound evaluation of the kidneys and urinary bladder was performed.      Findings:  RIGHT kidney measures 9.1 x 5.1 x 5.8 cm. There is renal cortical atrophy. There is no solid kidney mass.  No echogenic shadowing stone.  No hydronephrosis.    LEFT kidney measures 10.3 x 4.5 x 4.2 cm. There is renal cortical atrophy. There is a simple 2.3 cm cyst within the mid to lower right kidney. There is no solid kidney mass.  No echogenic " shadowing stone. There is mild hydronephrosis.    Urinary bladder is surgically absent.        Impression:      Impression:  1.Mild left hydronephrosis similar to previous CT given differences in modality.  2.Relatively mild renal cortical atrophy bilaterally.        Electronically Signed: Darryn Martinez MD    2/20/2025 6:37 PM EST    Workstation ID: [id number]    XR Chest 1 View [[id number]] Collected: 02/19/25 1359     Updated: 02/19/25 1403    Narrative:      XR CHEST 1 VW    Date of Exam: 2/19/2025 1:36 PM EST    Indication: cough    Comparison: CT chest 11/15/2024. AP chest 1/17/2023.    Findings:  Ill-defined groundglass density in the right upper lobe heart size is within normal limits. No pleural effusion or pneumothorax or acute osseous abnormalities are identified.      Impression:      Impression:  Right upper lobe ill-defined groundglass density may represent mild pneumonitis or developing pneumonia. No dense consolidations are identified.      Electronically Signed: Trinity Negron MD    2/19/2025 2:01 PM EST    Workstation ID: [id number]            Results for orders placed during the hospital encounter of 02/19/25    XR Chest 1 View    Narrative  XR CHEST 1 VW    Date of Exam: 2/19/2025 1:36 PM EST    Indication: cough    Comparison: CT chest 11/15/2024. AP chest 1/17/2023.    Findings:  Ill-defined groundglass density in the right upper lobe heart size is within normal limits. No pleural effusion or pneumothorax or acute osseous abnormalities are identified.    Impression  Impression:  Right upper lobe ill-defined groundglass density may represent mild pneumonitis or developing pneumonia. No dense consolidations are identified.      Electronically Signed: Trinity Negron MD  2/19/2025 2:01 PM EST  Workstation ID: [id number]      Results for orders placed during the hospital encounter of 01/17/23    XR Chest 1 View    Narrative  XR CHEST 1 VW    Date of Exam: 1/17/2023 8:27 PM EST    Indication: fever/soa/back  pain.    Comparison: 1/5/2022    Findings:  The heart and mediastinal contours are within normal limits. The lungs are clear. Osseous structures are unremarkable.    Impression  Impression:  No acute process    Electronically Signed: Joey Monroy  1/17/2023 9:26 PM EST  Workstation ID: [id number]      Results for orders placed during the hospital encounter of 01/05/22    XR Chest 1 View    Narrative  XR CHEST 1 VW    Date of Exam: 1/5/2022 23:38 EST    Indication: dyspnea.  Covid positive.    Comparison Exams: None available.    Technique: Portable AP chest    FINDINGS:  Ill-defined infiltrates are seen throughout the central to peripheral aspect of the lungs bilaterally with mild interstitial changes. No pleural effusions are observed. The cardiac silhouette is within normal limits for size for the portable study. The  mediastinum is unremarkable. No acute osseous abnormalities are identified.    Impression  Ill-defined infiltrates are noted bilaterally with mild interstitial changes. The findings suggest atypical/viral infection or multifocal pneumonia and suggest changes of Covid 19 infection. Recommend follow-up to ensure improvement/resolution.    Electronically Signed: Bran Colin MD 1/6/2022 0:03 EST      Results for orders placed during the hospital encounter of 10/17/19    Duplex Venous Lower Extremity - Left    Interpretation Summary  · Acute left lower extremity deep vein thrombosis noted in the common femoral, proximal femoral, mid femoral, distal femoral and popliteal.        ASSESSMENT / PLAN      Dyspnea    Diabetes mellitus with neuropathy    History of DVT (deep vein thrombosis)    Hypoxia    CKD (chronic kidney disease) stage 3, GFR 30-59 ml/min    History of urostomy    Pneumonia, unspecified organism    Influenza A    Iron deficiency anemia    1.  LEO/CKD-----Nonoliguric. +ARF/LEO on top of known CRF/CKD STG 3B with a baseline serum creatinine of about 1.8. CRF/CKD STG 3B is secondary to  DGS/HTN NS. +ARF/LEO appears to be secondary to prerenal state/intravascular volume depletion.  Creatinine better.  Stop IV fluids.  Renal ultrasound with mild left hydro/chronic.     2. DMII WITH RENAL MANIFESTATIONS-----BS okay. Glucometers, SSI.       3. HTN WITH CKD-----BP okay. Avoid hypotension. No ACE-I/ARB/DRI/diuretic for now     4. INFLUENZA A INFECTION/? RUL PNA------Supportive care and on Tamiflu and Rocephin     5. H/O BLADDER/PROSTATE CA WITH UROSTOMY     6. ANEMIA OF CKD--------H/H in safe range. Follow for EPO need. Check Fe     7. DM PERIPHERAL NEUROPATHY------On Neurontin     8. GERD/PUD PROPHYLAXIS------On PPI. Benefits outweigh risks, despite renal dysfunction, given steroid exposure and h/o use and tolerating PPI     9. H/O DVT-----Anticoagulated     10. HEARING IMPAIRED     11. HYPOCALCEMIA------Replace IV and follow ionized levels     12. OA/DJD-----No NSAIDs. Uric acid ok    Creatinine stable  Blood pressure controlled  Okay for discharge from renal standpoint    Saeed Briscoe MD  [facility]  [phone]  02/22/25  11:13 EST

## 2025-02-22 NOTE — PLAN OF CARE
Pts sister in law came to  pt, pt complained of GERD, given tums. VSS, on RA, denies pain.   Problem: Adult Inpatient Plan of Care  Goal: Plan of Care Review  Outcome: Progressing  Goal: Patient-Specific Goal (Individualized)  Outcome: Progressing  Goal: Absence of Hospital-Acquired Illness or Injury  Outcome: Progressing  Goal: Optimal Comfort and Wellbeing  Outcome: Progressing  Goal: Readiness for Transition of Care  Outcome: Progressing     Problem: Skin Injury Risk Increased  Goal: Skin Health and Integrity  Outcome: Progressing     Problem: Fall Injury Risk  Goal: Absence of Fall and Fall-Related Injury  Outcome: Progressing   Goal Outcome Evaluation:

## 2025-02-22 NOTE — DISCHARGE SUMMARY
Date of Discharge:  2/22/2025    Discharge Diagnosis: Influenza A, Pneumonia    Presenting Problem/History of Present Illness  Active Hospital Problems    Diagnosis  POA    **Dyspnea [R06.00]  Yes    Iron deficiency anemia [D50.9]  Yes    Influenza A [J10.1]  Yes    Pneumonia, unspecified organism [J18.9]  Yes    CKD (chronic kidney disease) stage 3, GFR 30-59 ml/min [N18.30]  Yes    History of urostomy [Z98.890]  Not Applicable    Hypoxia [R09.02]  Yes    Diabetes mellitus with neuropathy [E11.40]  Yes    History of DVT (deep vein thrombosis) [Z86.718]  Not Applicable      Resolved Hospital Problems   No resolved problems to display.          Hospital Course  Pleasant 74 y.o. male with history of CKD, urostomy, and diabetes presented with hypoxia, dyspnea, and weakness. He was found to have elevated creatinine above baseline, fever, CXR positive for pneumonia, and respiratory panel positive for influenza A. He was treated with rocephin, tamiflu and fluids. Renal functions improved as well as his dyspnea and hypoxia. He will be discharged with 5 more doses of Tamiflu as well as Cefdinir. He is to follow up with PCP and nephrology within 1-2 weeks and have his INR check on 02/24/2025 or 02/25/2025. Discussion was conducted through .     Procedures Performed         Consults:   Consults       Date and Time Order Name Status Description    2/19/2025  2:34 PM Inpatient Nephrology Consult Completed             Pertinent Test Results:    Lab Results (most recent)       Procedure Component Value Units Date/Time    Renal Function Panel [[id number]]  (Abnormal) Collected: 02/22/25 0534    Specimen: Blood from Arm, Left Updated: 02/22/25 0622     Glucose 116 mg/dL      BUN 20 mg/dL      Creatinine 1.81 mg/dL      Sodium 136 mmol/L      Potassium 4.4 mmol/L      Chloride 102 mmol/L      CO2 21.6 mmol/L      Calcium 8.9 mg/dL      Albumin 3.8 g/dL      Phosphorus 2.8 mg/dL      Anion Gap 12.4 mmol/L       BUN/Creatinine Ratio 11.0     eGFR 38.8 mL/min/1.73     Narrative:      GFR Categories in Chronic Kidney Disease (CKD)      GFR Category          GFR (mL/min/1.73)    Interpretation  G1                     90 or greater         Normal or high (1)  G2                      60-89                Mild decrease (1)  G3a                   45-59                Mild to moderate decrease  G3b                   30-44                Moderate to severe decrease  G4                    15-29                Severe decrease  G5                    14 or less           Kidney failure          (1)In the absence of evidence of kidney disease, neither GFR category G1 or G2 fulfill the criteria for CKD.    eGFR calculation 2021 CKD-EPI creatinine equation, which does not include race as a factor    Protime-INR [[id number]]  (Abnormal) Collected: 02/22/25 0534    Specimen: Blood from Arm, Left Updated: 02/22/25 0617     Protime 20.0 Seconds      INR 1.70    CBC & Differential [[id number]]  (Abnormal) Collected: 02/22/25 0534    Specimen: Blood from Arm, Left Updated: 02/22/25 0543    Narrative:      The following orders were created for panel order CBC & Differential.  Procedure                               Abnormality         Status                     ---------                               -----------         ------                     CBC Auto Differential[[id number]]        Abnormal            Final result                 Please view results for these tests on the individual orders.    CBC Auto Differential [[id number]]  (Abnormal) Collected: 02/22/25 0534    Specimen: Blood from Arm, Left Updated: 02/22/25 0543     WBC 3.26 10*3/mm3      RBC 4.06 10*6/mm3      Hemoglobin 12.0 g/dL      Hematocrit 36.6 %      MCV 90.1 fL      MCH 29.6 pg      MCHC 32.8 g/dL      RDW 12.1 %      RDW-SD 40.1 fl      MPV 8.7 fL      Platelets 174 10*3/mm3      Neutrophil % 70.3 %      Lymphocyte % 17.5 %      Monocyte % 10.1 %      Eosinophil % 1.2 %       Basophil % 0.6 %      Immature Grans % 0.3 %      Neutrophils, Absolute 2.29 10*3/mm3      Lymphocytes, Absolute 0.57 10*3/mm3      Monocytes, Absolute 0.33 10*3/mm3      Eosinophils, Absolute 0.04 10*3/mm3      Basophils, Absolute 0.02 10*3/mm3      Immature Grans, Absolute 0.01 10*3/mm3      nRBC 0.0 /100 WBC     POC Glucose Once [[id number]]  (Abnormal) Collected: 02/21/25 2054    Specimen: Blood Updated: 02/21/25 2056     Glucose 110 mg/dL      Comment: Serial Number: [id number]:  [id number]       POC Glucose 4x Daily Before Meals & at Bedtime [[id number]]  (Abnormal) Collected: 02/21/25 1724    Specimen: Blood Updated: 02/21/25 1726     Glucose 126 mg/dL      Comment: Serial Number: [id number]:  [id number]       Blood Culture - Blood, Arm, Left [[id number]]  (Normal) Collected: 02/19/25 1313    Specimen: Blood from Arm, Left Updated: 02/21/25 1330     Blood Culture No growth at 2 days    Blood Culture - Blood, Arm, Right [[id number]]  (Normal) Collected: 02/19/25 1313    Specimen: Blood from Arm, Right Updated: 02/21/25 1330     Blood Culture No growth at 2 days    Calcium, Ionized [[id number]]  (Abnormal) Collected: 02/21/25 0120    Specimen: Blood from Arm, Left Updated: 02/21/25 0329     Ionized Calcium 1.13 mmol/L     Comprehensive Metabolic Panel [[id number]]  (Abnormal) Collected: 02/21/25 0120    Specimen: Blood from Arm, Left Updated: 02/21/25 0307     Glucose 148 mg/dL      BUN 30 mg/dL      Creatinine 2.14 mg/dL      Sodium 133 mmol/L      Potassium 4.4 mmol/L      Chloride 101 mmol/L      CO2 24.1 mmol/L      Calcium 8.2 mg/dL      Total Protein 5.7 g/dL      Albumin 3.3 g/dL      ALT (SGPT) 14 U/L      AST (SGOT) 49 U/L      Alkaline Phosphatase 43 U/L      Total Bilirubin 0.2 mg/dL      Globulin 2.4 gm/dL      A/G Ratio 1.4 g/dL      BUN/Creatinine Ratio 14.0     Anion Gap 7.9 mmol/L      eGFR 31.7 mL/min/1.73     Narrative:      GFR Categories in Chronic Kidney Disease (CKD)      GFR  Category          GFR (mL/min/1.73)    Interpretation  G1                     90 or greater         Normal or high (1)  G2                      60-89                Mild decrease (1)  G3a                   45-59                Mild to moderate decrease  G3b                   30-44                Moderate to severe decrease  G4                    15-29                Severe decrease  G5                    14 or less           Kidney failure          (1)In the absence of evidence of kidney disease, neither GFR category G1 or G2 fulfill the criteria for CKD.    eGFR calculation 2021 CKD-EPI creatinine equation, which does not include race as a factor    Magnesium [[id number]]  (Normal) Collected: 02/21/25 0120    Specimen: Blood from Arm, Left Updated: 02/21/25 0307     Magnesium 2.1 mg/dL     Phosphorus [[id number]]  (Normal) Collected: 02/21/25 0120    Specimen: Blood from Arm, Left Updated: 02/21/25 0307     Phosphorus 2.9 mg/dL     Protime-INR [[id number]]  (Abnormal) Collected: 02/21/25 0120    Specimen: Blood from Arm, Left Updated: 02/21/25 0239     Protime 21.8 Seconds      INR 1.88    CBC (No Diff) [[id number]]  (Abnormal) Collected: 02/21/25 0120    Specimen: Blood from Arm, Left Updated: 02/21/25 0231     WBC 2.55 10*3/mm3      RBC 3.49 10*6/mm3      Hemoglobin 10.3 g/dL      Hematocrit 32.1 %      MCV 92.0 fL      MCH 29.5 pg      MCHC 32.1 g/dL      RDW 12.2 %      RDW-SD 40.9 fl      MPV 9.3 fL      Platelets 143 10*3/mm3     Iron Profile [[id number]]  (Abnormal) Collected: 02/20/25 0126    Specimen: Blood from Hand, Right Updated: 02/20/25 0912     Iron 17 mcg/dL      Iron Saturation (TSAT) 6 %      Transferrin 180 mg/dL      TIBC 268 mcg/dL     Ferritin [[id number]]  (Normal) Collected: 02/20/25 0126    Specimen: Blood from Hand, Right Updated: 02/20/25 0912     Ferritin 118.00 ng/mL     Narrative:      Results may be falsely decreased if patient taking Biotin.      TSH [[id number]]  (Normal) Collected:  02/20/25 0126    Specimen: Blood from Hand, Right Updated: 02/20/25 0832     TSH 1.480 uIU/mL     CK [[id number]]  (Abnormal) Collected: 02/20/25 0126    Specimen: Blood from Hand, Right Updated: 02/20/25 0832     Creatine Kinase 205 U/L     Uric Acid [[id number]]  (Normal) Collected: 02/20/25 0126    Specimen: Blood from Hand, Right Updated: 02/20/25 0832     Uric Acid 6.4 mg/dL     Basic Metabolic Panel [[id number]]  (Abnormal) Collected: 02/20/25 0126    Specimen: Blood from Hand, Right Updated: 02/20/25 0208     Glucose 135 mg/dL      BUN 38 mg/dL      Creatinine 2.44 mg/dL      Sodium 135 mmol/L      Potassium 4.6 mmol/L      Chloride 101 mmol/L      CO2 22.8 mmol/L      Calcium 8.4 mg/dL      BUN/Creatinine Ratio 15.6     Anion Gap 11.2 mmol/L      eGFR 27.1 mL/min/1.73     Narrative:      GFR Categories in Chronic Kidney Disease (CKD)      GFR Category          GFR (mL/min/1.73)    Interpretation  G1                     90 or greater         Normal or high (1)  G2                      60-89                Mild decrease (1)  G3a                   45-59                Mild to moderate decrease  G3b                   30-44                Moderate to severe decrease  G4                    15-29                Severe decrease  G5                    14 or less           Kidney failure          (1)In the absence of evidence of kidney disease, neither GFR category G1 or G2 fulfill the criteria for CKD.    eGFR calculation 2021 CKD-EPI creatinine equation, which does not include race as a factor    CBC Auto Differential [[id number]]  (Abnormal) Collected: 02/20/25 0126    Specimen: Blood from Hand, Right Updated: 02/20/25 0150     WBC 5.08 10*3/mm3      RBC 3.45 10*6/mm3      Hemoglobin 10.0 g/dL      Hematocrit 31.9 %      MCV 92.5 fL      MCH 29.0 pg      MCHC 31.3 g/dL      RDW 12.4 %      RDW-SD 42.1 fl      MPV 9.1 fL      Platelets 148 10*3/mm3      Neutrophil % 75.6 %      Lymphocyte % 6.3 %      Monocyte % 17.1 %       Eosinophil % 0.0 %      Basophil % 0.4 %      Immature Grans % 0.6 %      Neutrophils, Absolute 3.84 10*3/mm3      Lymphocytes, Absolute 0.32 10*3/mm3      Monocytes, Absolute 0.87 10*3/mm3      Eosinophils, Absolute 0.00 10*3/mm3      Basophils, Absolute 0.02 10*3/mm3      Immature Grans, Absolute 0.03 10*3/mm3      nRBC 0.0 /100 WBC     Respiratory Panel PCR w/COVID-19(SARS-CoV-2) BEATRICE/NICHOLAS/TIERA/PAD/COR/JUHI In-House, NP Swab in UTM/VTM, 2 HR TAT - Swab, Nasopharynx [[id number]]  (Abnormal) Collected: 02/19/25 1314    Specimen: Swab from Nasopharynx Updated: 02/19/25 1407     ADENOVIRUS, PCR Not Detected     Coronavirus 229E Not Detected     Coronavirus HKU1 Not Detected     Coronavirus NL63 Not Detected     Coronavirus OC43 Not Detected     COVID19 Not Detected     Human Metapneumovirus Not Detected     Human Rhinovirus/Enterovirus Not Detected     Influenza A H3 Detected     Influenza B PCR Not Detected     Parainfluenza Virus 1 Not Detected     Parainfluenza Virus 2 Not Detected     Parainfluenza Virus 3 Not Detected     Parainfluenza Virus 4 Not Detected     RSV, PCR Not Detected     Bordetella pertussis pcr Not Detected     Bordetella parapertussis PCR Not Detected     Chlamydophila pneumoniae PCR Not Detected     Mycoplasma pneumo by PCR Not Detected    Narrative:      In the setting of a positive respiratory panel with a viral infection PLUS a negative procalcitonin without other underlying concern for bacterial infection, consider observing off antibiotics or discontinuation of antibiotics and continue supportive care. If the respiratory panel is positive for atypical bacterial infection (Bordetella pertussis, Chlamydophila pneumoniae, or Mycoplasma pneumoniae), consider antibiotic de-escalation to target atypical bacterial infection.    Comprehensive Metabolic Panel [[id number]]  (Abnormal) Collected: 02/19/25 1313    Specimen: Blood Updated: 02/19/25 1352     Glucose 215 mg/dL      BUN 38 mg/dL       "Creatinine 2.73 mg/dL      Sodium 136 mmol/L      Potassium 4.6 mmol/L      Chloride 101 mmol/L      CO2 24.7 mmol/L      Calcium 9.0 mg/dL      Total Protein 6.4 g/dL      Albumin 3.8 g/dL      ALT (SGPT) 16 U/L      AST (SGOT) 24 U/L      Alkaline Phosphatase 49 U/L      Total Bilirubin 0.3 mg/dL      Globulin 2.6 gm/dL      A/G Ratio 1.5 g/dL      BUN/Creatinine Ratio 13.9     Anion Gap 10.3 mmol/L      eGFR 23.7 mL/min/1.73     Narrative:      GFR Categories in Chronic Kidney Disease (CKD)      GFR Category          GFR (mL/min/1.73)    Interpretation  G1                     90 or greater         Normal or high (1)  G2                      60-89                Mild decrease (1)  G3a                   45-59                Mild to moderate decrease  G3b                   30-44                Moderate to severe decrease  G4                    15-29                Severe decrease  G5                    14 or less           Kidney failure          (1)In the absence of evidence of kidney disease, neither GFR category G1 or G2 fulfill the criteria for CKD.    eGFR calculation 2021 CKD-EPI creatinine equation, which does not include race as a factor    Procalcitonin [[id number]]  (Abnormal) Collected: 02/19/25 1313    Specimen: Blood Updated: 02/19/25 1352     Procalcitonin 0.37 ng/mL     Narrative:      As a Marker for Sepsis (Non-Neonates):    1. <0.5 ng/mL represents a low risk of severe sepsis and/or septic shock.  2. >2 ng/mL represents a high risk of severe sepsis and/or septic shock.    As a Marker for Lower Respiratory Tract Infections that require antibiotic therapy:    PCT on Admission    Antibiotic Therapy       6-12 Hrs later    >0.5                Strongly Recommended  >0.25 - <0.5        Recommended   0.1 - 0.25          Discouraged              Remeasure/reassess PCT  <0.1                Strongly Discouraged     Remeasure/reassess PCT    As 28 day mortality risk marker: \"Change in Procalcitonin Result\" " (>80% or <=80%) if Day 0 (or Day 1) and Day 4 values are available. Refer to http://[email or web address]-pct-calculator.[email or web address]    Change in PCT <=80%  A decrease of PCT levels below or equal to 80% defines a positive change in PCT test result representing a higher risk for 28-day all-cause mortality of patients diagnosed with severe sepsis for septic shock.    Change in PCT >80%  A decrease of PCT levels of more than 80% defines a negative change in PCT result representing a lower risk for 28-day all-cause mortality of patients diagnosed with severe sepsis or septic shock.       Collins Draw [[id number]] Collected: 02/19/25 1313    Specimen: Blood Updated: 02/19/25 1330    Narrative:      The following orders were created for panel order [facility] Draw.  Procedure                               Abnormality         Status                     ---------                               -----------         ------                     Green Top (Gel)[[id number]]                                  Final result               Lavender Top[[id number]]                                     Final result               Gold Top - SST[[id number]]                                   Final result               Light Blue Top[[id number]]                                   Final result                 Please view results for these tests on the individual orders.    Lavender Top [[id number]] Collected: 02/19/25 1313    Specimen: Blood Updated: 02/19/25 1330     Extra Tube hold for add-on     Comment: Auto resulted       Gold Top - SST [[id number]] Collected: 02/19/25 1313    Specimen: Blood Updated: 02/19/25 1330     Extra Tube Hold for add-ons.     Comment: Auto resulted.       Light Blue Top [[id number]] Collected: 02/19/25 1313    Specimen: Blood Updated: 02/19/25 1330     Extra Tube Hold for add-ons.     Comment: Auto resulted       Green Top (Gel) [[id number]] Collected: 02/19/25 1313    Specimen: Blood Updated: 02/19/25 1330     Extra Tube Hold for add-ons.      Comment: Auto resulted.       CBC & Differential [[id number]]  (Abnormal) Collected: 02/19/25 1313    Specimen: Blood Updated: 02/19/25 1326    Narrative:      The following orders were created for panel order CBC & Differential.  Procedure                               Abnormality         Status                     ---------                               -----------         ------                     CBC Auto Differential[[id number]]        Abnormal            Final result                 Please view results for these tests on the individual orders.    POC Lactate [[id number]]  (Normal) Collected: 02/19/25 1319    Specimen: Blood Updated: 02/19/25 1320     Lactate 0.9 mmol/L      Comment: Serial Number: [id number]:  [id number]                            Condition on Discharge:  Improved    Vital Signs  Temp:  [97.2 °F (36.2 °C)-97.7 °F (36.5 °C)] 97.2 °F (36.2 °C)  Heart Rate:  [58-72] 69  Resp:  [10-18] 16  BP: (148-174)/(63-86) 148/65    Physical Exam:     General Appearance:    Alert, cooperative, in no acute distress   Head:    Normocephalic, without obvious abnormality, atraumatic   Eyes:            Lids and lashes normal, conjunctivae and sclerae normal, no   icterus, no pallor, corneas clear   Ears:    Ears appear intact with no abnormalities noted   Throat:   No oral lesions, no thrush, oral mucosa moist   Neck:   No adenopathy, supple, trachea midline, no thyromegaly, no   carotid bruit, no JVD   Lungs:     Clear to auscultation,respirations regular, even and unlabored    Heart:    Regular rhythm and normal rate, normal S1 and S2, no  murmur, no gallop, no rub, no click   Chest Wall:    No abnormalities observed   Abdomen:     Normal bowel sounds, no masses, no organomegaly, soft non-tender, non-distended, no guarding, no rebound                tenderness   Extremities:   Moves all extremities well, no edema, no cyanosis, no redness   Pulses:   Pulses palpable and equal bilaterally   Skin:   No  bleeding, bruising or rash   Lymph nodes:   No palpable adenopathy           Discharge Disposition  Home or Self Care    Discharge Medications     Discharge Medications        New Medications        Instructions Start Date   cefdinir 300 MG capsule  Commonly known as: OMNICEF   300 mg, Oral, 2 Times Daily      oseltamivir 30 MG capsule  Commonly known as: Tamiflu   30 mg, Oral, Every 12 Hours Scheduled             Continue These Medications        Instructions Start Date   amLODIPine 10 MG tablet  Commonly known as: NORVASC   10 mg, Oral, Nightly      cholecalciferol 25 MCG (1000 UT) tablet  Commonly known as: VITAMIN D3   1,000 Units, Oral, Daily      fish oil 1000 MG capsule capsule   1,000 mg, Oral, Daily With Breakfast      gabapentin 300 MG capsule  Commonly known as: NEURONTIN   300 mg, Oral, 3 Times Daily      glimepiride 1 MG tablet  Commonly known as: AMARYL   1 mg, Oral, Every Morning Before Breakfast      hydrALAZINE 50 MG tablet  Commonly known as: APRESOLINE   50 mg, Oral, 3 Times Daily      sodium bicarbonate 650 MG tablet   650 mg, Oral, 2 Times Daily      warfarin 5 MG tablet  Commonly known as: COUMADIN   5 mg, Oral, Daily               Discharge Diet:   Diet Instructions       Diet: Diabetic Diets; Consistent Carbohydrate; Regular (IDDSI 7); Thin (IDDSI 0)      Discharge Diet: Diabetic Diets    Diabetic Diet: Consistent Carbohydrate    Texture: Regular (IDDSI 7)    Fluid Consistency: Thin (IDDSI 0)            Activity at Discharge:   Activity Instructions       Activity as Tolerated              Follow-up Appointments  No future appointments.  Additional Instructions for the Follow-ups that You Need to Schedule       Discharge Follow-up with PCP   As directed       Currently Documented PCP:    Joey Islas MD    PCP Phone Number:    [phone]     Follow Up Details: 1-2 weeks        Discharge Follow-up with Specialty: 1-2 weeks   As directed      Specialty: 1-2 weeks   Follow Up Details:  nephrology        Discharge Follow-up with Specified Provider: Monday or Tuesday 2/24/2025 or 02/25/2025   As directed      To: Monday or Tuesday 2/24/2025 or 02/25/2025   Follow Up Details: INR recheck                Test Results Pending at Discharge  Pending Results       None             Porsche Gabriel PA-C  02/22/25  09:36 EST    Time: Discharge 25 min

## 2025-02-23 NOTE — CASE MANAGEMENT/SOCIAL WORK
Case Management Discharge Note      Final Note: Routine home.    Provided Post Acute Provider List?: N/A  Provided Post Acute Provider Quality & Resource List?: N/A    Selected Continued Care - Discharged on 2/22/2025 Admission date: 2/19/2025 - Discharge disposition: Home or Self Care       Transportation Services  Private: Car    Final Discharge Disposition Code: 01 - home or self-care

## 2025-02-23 NOTE — OUTREACH NOTE
Prep Survey      Flowsheet Row Responses   [facility] facility patient discharged from? [facility]   Is LACE score < 7 ? No   Eligibility Readm Mgmt   Discharge diagnosis Dypsnea/Influenza pneumonia   Does the patient have one of the following disease processes/diagnoses(primary or secondary)? Pneumonia   Does the patient have Home health ordered? No   Is there a DME ordered? No   Prep survey completed? Yes            TATIANNA ALEJANDRA - Registered Nurse

## 2025-02-24 LAB
BACTERIA SPEC AEROBE CULT: NORMAL
BACTERIA SPEC AEROBE CULT: NORMAL

## 2025-02-27 ENCOUNTER — READMISSION MANAGEMENT (OUTPATIENT)
Dept: CALL CENTER | Facility: HOSPITAL | Age: 75
End: 2025-02-27
Payer: MEDICARE

## 2025-02-27 NOTE — OUTREACH NOTE
COPD/PN Week 1 Survey      Flowsheet Row Responses   [facility] facility patient discharged from? [facility]   Does the patient have one of the following disease processes/diagnoses(primary or secondary)? Pneumonia   Week 1 attempt successful? No   Unsuccessful attempts Attempt 1            Eileen Hamm Registered Nurse

## 2025-03-04 ENCOUNTER — READMISSION MANAGEMENT (OUTPATIENT)
Dept: CALL CENTER | Facility: HOSPITAL | Age: 75
End: 2025-03-04
Payer: MEDICARE

## 2025-03-04 NOTE — OUTREACH NOTE
COPD/PN Week 1 Survey      Flowsheet Row Responses   [facility] facility patient discharged from? [facility]   Does the patient have one of the following disease processes/diagnoses(primary or secondary)? Pneumonia   Week 1 attempt successful? No   Unsuccessful attempts Attempt 2            OBDULIA MURPHY - Registered Nurse

## 2025-03-13 ENCOUNTER — READMISSION MANAGEMENT (OUTPATIENT)
Dept: CALL CENTER | Facility: HOSPITAL | Age: 75
End: 2025-03-13
Payer: MEDICARE

## 2025-03-13 NOTE — OUTREACH NOTE
COPD/PN Week 3 Survey      Flowsheet Row Responses   [facility] [place] patient discharged from? [facility]   Does the patient have one of the following disease processes/diagnoses(primary or secondary)? Pneumonia   Week 3 attempt successful? Yes   Call start time 1517   Unsuccessful attempts Attempt 1   Call end time 1521   Discharge diagnosis Dypsnea/Influenza pneumonia   Person spoke with today (if not patient) and relationship CIPRIANO Sanders   Medication alerts for this patient Warfarin--noted on discharge that pt was supposed to have INR completed on 2/24/25 or 2/25/25, family was not aware of this order at d/c and will ensure completed   Meds reviewed with patient/caregiver? Yes   Is the patient having any side effects they believe may be caused by any medication additions or changes? No   Does the patient have all medications ordered at discharge? Yes   Is the patient taking all medications as directed (includes completed medication regime)? Yes   Does the patient have a primary care provider?  Yes   Has the patient kept scheduled appointments due by today? N/A   Comments made aware of need for f/u with PCP and nephro   Psychosocial issues? No   Did the patient receive a copy of their discharge instructions? Yes   Nursing interventions Reviewed instructions with patient   What is the patient's perception of their health status since discharge? Improving  [Reports pt is doing better and has completed meds ordered, no further issues with [facility] at this time.  Encouraged f/u]   Nursing Interventions Nurse provided patient education   Is the patient/caregiver able to teach back the hierarchy of who to call/visit for symptoms/problems? PCP, Specialist, Home health nurse, Urgent Care, ED, 911 Yes   Week 3 call completed? Yes   Graduated Yes   Call end time 1521            YONI SORENSON - Registered Nurse

## 2025-04-29 NOTE — OUTREACH NOTE
COVID-19 Week 2 Survey      Responses   [facility] patient discharged from? [facility]   Does the patient have one of the following disease processes/diagnoses(primary or secondary)? COVID-19   COVID-19 underlying condition? None          Terra Li RN   Recheck uric acid level.    Orders:    Uric acid; Future    CBC and differential; Future    Comprehensive metabolic panel; Future    TSH, 3rd generation with Free T4 reflex; Future    Magnesium; Future